# Patient Record
Sex: FEMALE | Race: WHITE | NOT HISPANIC OR LATINO | Employment: OTHER | URBAN - METROPOLITAN AREA
[De-identification: names, ages, dates, MRNs, and addresses within clinical notes are randomized per-mention and may not be internally consistent; named-entity substitution may affect disease eponyms.]

---

## 2017-01-06 ENCOUNTER — COMMUNICATION - HEALTHEAST (OUTPATIENT)
Dept: PALLIATIVE MEDICINE | Facility: OTHER | Age: 77
End: 2017-01-06

## 2017-01-20 ENCOUNTER — COMMUNICATION - HEALTHEAST (OUTPATIENT)
Dept: PALLIATIVE MEDICINE | Facility: CLINIC | Age: 77
End: 2017-01-20

## 2017-01-20 DIAGNOSIS — R52 PAIN: ICD-10-CM

## 2017-01-30 ENCOUNTER — COMMUNICATION - HEALTHEAST (OUTPATIENT)
Dept: INTERNAL MEDICINE | Facility: CLINIC | Age: 77
End: 2017-01-30

## 2017-01-30 DIAGNOSIS — I45.4 BUNDLE BRANCH BLOCK: ICD-10-CM

## 2017-01-31 ENCOUNTER — COMMUNICATION - HEALTHEAST (OUTPATIENT)
Dept: PALLIATIVE MEDICINE | Facility: OTHER | Age: 77
End: 2017-01-31

## 2017-02-08 ENCOUNTER — OFFICE VISIT - HEALTHEAST (OUTPATIENT)
Dept: INTERNAL MEDICINE | Facility: CLINIC | Age: 77
End: 2017-02-08

## 2017-02-08 ENCOUNTER — COMMUNICATION - HEALTHEAST (OUTPATIENT)
Dept: INTERNAL MEDICINE | Facility: CLINIC | Age: 77
End: 2017-02-08

## 2017-02-08 ENCOUNTER — RECORDS - HEALTHEAST (OUTPATIENT)
Dept: MAMMOGRAPHY | Facility: CLINIC | Age: 77
End: 2017-02-08

## 2017-02-08 DIAGNOSIS — F11.20 OPIOID TYPE DEPENDENCE, CONTINUOUS (H): ICD-10-CM

## 2017-02-08 DIAGNOSIS — E03.9 ACQUIRED HYPOTHYROIDISM: ICD-10-CM

## 2017-02-08 DIAGNOSIS — M06.9 RHEUMATOID ARTHRITIS (H): ICD-10-CM

## 2017-02-08 DIAGNOSIS — I45.4 BUNDLE BRANCH BLOCK: ICD-10-CM

## 2017-02-08 DIAGNOSIS — Z12.31 ENCOUNTER FOR SCREENING MAMMOGRAM FOR MALIGNANT NEOPLASM OF BREAST: ICD-10-CM

## 2017-02-08 DIAGNOSIS — M81.0 OSTEOPOROSIS: ICD-10-CM

## 2017-02-08 DIAGNOSIS — Z51.81 MEDICATION MONITORING ENCOUNTER: ICD-10-CM

## 2017-02-08 DIAGNOSIS — Z00.00 PREVENTATIVE HEALTH CARE: ICD-10-CM

## 2017-02-08 LAB
CHOLEST SERPL-MCNC: 170 MG/DL
FASTING STATUS PATIENT QL REPORTED: ABNORMAL
HDLC SERPL-MCNC: 43 MG/DL
LDLC SERPL CALC-MCNC: 104 MG/DL
TRIGL SERPL-MCNC: 116 MG/DL

## 2017-02-08 ASSESSMENT — MIFFLIN-ST. JEOR: SCORE: 1247.87

## 2017-02-09 ENCOUNTER — COMMUNICATION - HEALTHEAST (OUTPATIENT)
Dept: INTERNAL MEDICINE | Facility: CLINIC | Age: 77
End: 2017-02-09

## 2017-02-09 ENCOUNTER — HOSPITAL ENCOUNTER (OUTPATIENT)
Dept: PALLIATIVE MEDICINE | Facility: OTHER | Age: 77
Discharge: HOME | End: 2017-02-09

## 2017-02-09 DIAGNOSIS — M06.9 RHEUMATOID ARTHRITIS (H): ICD-10-CM

## 2017-02-09 DIAGNOSIS — M51.379 DEGENERATION OF LUMBAR OR LUMBOSACRAL INTERVERTEBRAL DISC: ICD-10-CM

## 2017-02-09 DIAGNOSIS — R52 PAIN: ICD-10-CM

## 2017-02-09 DIAGNOSIS — M54.32 BACK PAIN WITH LEFT-SIDED SCIATICA: ICD-10-CM

## 2017-02-09 ASSESSMENT — MIFFLIN-ST. JEOR: SCORE: 1247.87

## 2017-02-20 ENCOUNTER — COMMUNICATION - HEALTHEAST (OUTPATIENT)
Dept: PALLIATIVE MEDICINE | Facility: OTHER | Age: 77
End: 2017-02-20

## 2017-02-21 ENCOUNTER — OFFICE VISIT - HEALTHEAST (OUTPATIENT)
Dept: PALLIATIVE MEDICINE | Facility: OTHER | Age: 77
End: 2017-02-21

## 2017-02-21 DIAGNOSIS — G89.29 CHRONIC PAIN: ICD-10-CM

## 2017-02-26 ENCOUNTER — COMMUNICATION - HEALTHEAST (OUTPATIENT)
Dept: INTERNAL MEDICINE | Facility: CLINIC | Age: 77
End: 2017-02-26

## 2017-02-26 DIAGNOSIS — E03.9 HYPOTHYROIDISM: ICD-10-CM

## 2017-03-24 ENCOUNTER — COMMUNICATION - HEALTHEAST (OUTPATIENT)
Dept: PALLIATIVE MEDICINE | Facility: OTHER | Age: 77
End: 2017-03-24

## 2017-03-24 DIAGNOSIS — M54.32 BACK PAIN WITH LEFT-SIDED SCIATICA: ICD-10-CM

## 2017-03-24 DIAGNOSIS — M51.379 DEGENERATION OF LUMBAR OR LUMBOSACRAL INTERVERTEBRAL DISC: ICD-10-CM

## 2017-03-24 DIAGNOSIS — R52 PAIN: ICD-10-CM

## 2017-03-24 DIAGNOSIS — M06.9 RHEUMATOID ARTHRITIS (H): ICD-10-CM

## 2017-03-27 ENCOUNTER — HOSPITAL ENCOUNTER (OUTPATIENT)
Dept: PALLIATIVE MEDICINE | Facility: OTHER | Age: 77
Discharge: HOME | End: 2017-03-27

## 2017-03-27 DIAGNOSIS — M54.32 BACK PAIN WITH LEFT-SIDED SCIATICA: ICD-10-CM

## 2017-03-27 DIAGNOSIS — M06.9 RHEUMATOID ARTHRITIS (H): ICD-10-CM

## 2017-03-27 DIAGNOSIS — M51.379 DEGENERATION OF LUMBAR OR LUMBOSACRAL INTERVERTEBRAL DISC: ICD-10-CM

## 2017-03-27 DIAGNOSIS — R52 PAIN: ICD-10-CM

## 2017-03-27 ASSESSMENT — MIFFLIN-ST. JEOR: SCORE: 1300.04

## 2017-04-03 ENCOUNTER — COMMUNICATION - HEALTHEAST (OUTPATIENT)
Dept: PALLIATIVE MEDICINE | Facility: CLINIC | Age: 77
End: 2017-04-03

## 2017-04-03 DIAGNOSIS — R52 PAIN: ICD-10-CM

## 2017-04-18 ENCOUNTER — COMMUNICATION - HEALTHEAST (OUTPATIENT)
Dept: INTERNAL MEDICINE | Facility: CLINIC | Age: 77
End: 2017-04-18

## 2017-04-18 ENCOUNTER — OFFICE VISIT - HEALTHEAST (OUTPATIENT)
Dept: INTERNAL MEDICINE | Facility: CLINIC | Age: 77
End: 2017-04-18

## 2017-04-18 DIAGNOSIS — M25.559 HIP PAIN: ICD-10-CM

## 2017-04-18 DIAGNOSIS — F11.20 OPIOID TYPE DEPENDENCE, CONTINUOUS (H): ICD-10-CM

## 2017-04-18 ASSESSMENT — MIFFLIN-ST. JEOR: SCORE: 1229.72

## 2017-04-19 ENCOUNTER — COMMUNICATION - HEALTHEAST (OUTPATIENT)
Dept: INTERNAL MEDICINE | Facility: CLINIC | Age: 77
End: 2017-04-19

## 2017-04-21 ENCOUNTER — COMMUNICATION - HEALTHEAST (OUTPATIENT)
Dept: PALLIATIVE MEDICINE | Facility: OTHER | Age: 77
End: 2017-04-21

## 2017-04-24 ENCOUNTER — HOSPITAL ENCOUNTER (OUTPATIENT)
Dept: PALLIATIVE MEDICINE | Facility: OTHER | Age: 77
Discharge: HOME | End: 2017-04-24

## 2017-04-24 DIAGNOSIS — M25.519 PAIN IN JOINT, SHOULDER REGION: ICD-10-CM

## 2017-04-24 DIAGNOSIS — R52 PAIN: ICD-10-CM

## 2017-04-24 DIAGNOSIS — M06.9 RHEUMATOID ARTHRITIS (H): ICD-10-CM

## 2017-04-24 DIAGNOSIS — M51.379 DEGENERATION OF LUMBAR OR LUMBOSACRAL INTERVERTEBRAL DISC: ICD-10-CM

## 2017-04-24 DIAGNOSIS — M54.32 BACK PAIN WITH LEFT-SIDED SCIATICA: ICD-10-CM

## 2017-04-24 DIAGNOSIS — F11.20 OPIOID TYPE DEPENDENCE, CONTINUOUS (H): ICD-10-CM

## 2017-04-24 DIAGNOSIS — M96.1 POSTLAMINECTOMY SYNDROME, LUMBAR REGION: ICD-10-CM

## 2017-04-24 ASSESSMENT — MIFFLIN-ST. JEOR: SCORE: 1237.66

## 2017-05-16 ENCOUNTER — HOSPITAL ENCOUNTER (OUTPATIENT)
Dept: PALLIATIVE MEDICINE | Facility: OTHER | Age: 77
Discharge: HOME | End: 2017-05-16

## 2017-05-16 ENCOUNTER — HOSPITAL ENCOUNTER (OUTPATIENT)
Dept: PALLIATIVE MEDICINE | Facility: OTHER | Age: 77
Discharge: HOME | End: 2017-05-16
Attending: PHARMACIST

## 2017-05-16 ENCOUNTER — COMMUNICATION - HEALTHEAST (OUTPATIENT)
Dept: PALLIATIVE MEDICINE | Facility: OTHER | Age: 77
End: 2017-05-16

## 2017-05-16 DIAGNOSIS — M51.379 DEGENERATION OF LUMBAR OR LUMBOSACRAL INTERVERTEBRAL DISC: ICD-10-CM

## 2017-05-16 DIAGNOSIS — G89.4 CHRONIC PAIN SYNDROME: ICD-10-CM

## 2017-05-16 DIAGNOSIS — M54.32 BACK PAIN WITH LEFT-SIDED SCIATICA: ICD-10-CM

## 2017-05-16 DIAGNOSIS — M06.9 RHEUMATOID ARTHRITIS, INVOLVING UNSPECIFIED SITE, UNSPECIFIED RHEUMATOID FACTOR PRESENCE: ICD-10-CM

## 2017-05-16 DIAGNOSIS — M06.9 RHEUMATOID ARTHRITIS (H): ICD-10-CM

## 2017-05-16 DIAGNOSIS — M96.1 POSTLAMINECTOMY SYNDROME, LUMBAR REGION: ICD-10-CM

## 2017-05-16 DIAGNOSIS — R52 PAIN: ICD-10-CM

## 2017-05-16 RX ORDER — LORATADINE 10 MG/1
10 TABLET ORAL DAILY
Status: SHIPPED | COMMUNITY
Start: 2017-05-16 | End: 2022-12-14

## 2017-05-16 ASSESSMENT — MIFFLIN-ST. JEOR
SCORE: 1237.66
SCORE: 1237.66

## 2017-05-18 ENCOUNTER — AMBULATORY - HEALTHEAST (OUTPATIENT)
Dept: PALLIATIVE MEDICINE | Facility: OTHER | Age: 77
End: 2017-05-18

## 2017-06-05 ENCOUNTER — COMMUNICATION - HEALTHEAST (OUTPATIENT)
Dept: PALLIATIVE MEDICINE | Facility: OTHER | Age: 77
End: 2017-06-05

## 2017-06-05 DIAGNOSIS — R52 PAIN: ICD-10-CM

## 2017-06-05 DIAGNOSIS — M54.16 LEFT LUMBAR RADICULOPATHY: ICD-10-CM

## 2017-06-09 ENCOUNTER — COMMUNICATION - HEALTHEAST (OUTPATIENT)
Dept: PALLIATIVE MEDICINE | Facility: CLINIC | Age: 77
End: 2017-06-09

## 2017-06-09 DIAGNOSIS — G89.4 CHRONIC PAIN SYNDROME: ICD-10-CM

## 2017-06-09 DIAGNOSIS — M51.379 DEGENERATION OF LUMBAR OR LUMBOSACRAL INTERVERTEBRAL DISC: ICD-10-CM

## 2017-06-12 ENCOUNTER — COMMUNICATION - HEALTHEAST (OUTPATIENT)
Dept: PALLIATIVE MEDICINE | Facility: OTHER | Age: 77
End: 2017-06-12

## 2017-06-15 ENCOUNTER — HOSPITAL ENCOUNTER (OUTPATIENT)
Dept: PALLIATIVE MEDICINE | Facility: OTHER | Age: 77
Discharge: HOME | End: 2017-06-15
Attending: PHARMACIST

## 2017-06-15 ENCOUNTER — HOSPITAL ENCOUNTER (OUTPATIENT)
Dept: PALLIATIVE MEDICINE | Facility: OTHER | Age: 77
Discharge: HOME | End: 2017-06-15

## 2017-06-15 DIAGNOSIS — M51.379 DEGENERATION OF LUMBAR OR LUMBOSACRAL INTERVERTEBRAL DISC: ICD-10-CM

## 2017-06-15 DIAGNOSIS — M06.9 RHEUMATOID ARTHRITIS, INVOLVING UNSPECIFIED SITE, UNSPECIFIED RHEUMATOID FACTOR PRESENCE: ICD-10-CM

## 2017-06-15 DIAGNOSIS — G89.4 CHRONIC PAIN SYNDROME: ICD-10-CM

## 2017-06-15 DIAGNOSIS — M54.32 BACK PAIN WITH LEFT-SIDED SCIATICA: ICD-10-CM

## 2017-06-15 ASSESSMENT — MIFFLIN-ST. JEOR
SCORE: 1237.66
SCORE: 1215.44

## 2017-06-20 ENCOUNTER — COMMUNICATION - HEALTHEAST (OUTPATIENT)
Dept: PALLIATIVE MEDICINE | Facility: OTHER | Age: 77
End: 2017-06-20

## 2017-06-21 ENCOUNTER — HOSPITAL ENCOUNTER (OUTPATIENT)
Dept: PALLIATIVE MEDICINE | Facility: OTHER | Age: 77
Discharge: HOME | End: 2017-06-21
Attending: PAIN MEDICINE

## 2017-06-21 DIAGNOSIS — M54.16 LEFT LUMBAR RADICULOPATHY: ICD-10-CM

## 2017-06-21 ASSESSMENT — MIFFLIN-ST. JEOR: SCORE: 1260.34

## 2017-06-22 ENCOUNTER — COMMUNICATION - HEALTHEAST (OUTPATIENT)
Dept: PALLIATIVE MEDICINE | Facility: OTHER | Age: 77
End: 2017-06-22

## 2017-06-29 ENCOUNTER — COMMUNICATION - HEALTHEAST (OUTPATIENT)
Dept: PALLIATIVE MEDICINE | Facility: OTHER | Age: 77
End: 2017-06-29

## 2017-06-29 DIAGNOSIS — R52 PAIN: ICD-10-CM

## 2017-07-04 ENCOUNTER — AMBULATORY - HEALTHEAST (OUTPATIENT)
Dept: INTERNAL MEDICINE | Facility: CLINIC | Age: 77
End: 2017-07-04

## 2017-07-04 ENCOUNTER — COMMUNICATION - HEALTHEAST (OUTPATIENT)
Dept: INTERNAL MEDICINE | Facility: CLINIC | Age: 77
End: 2017-07-04

## 2017-07-10 ENCOUNTER — COMMUNICATION - HEALTHEAST (OUTPATIENT)
Dept: PALLIATIVE MEDICINE | Facility: OTHER | Age: 77
End: 2017-07-10

## 2017-07-10 DIAGNOSIS — G89.4 CHRONIC PAIN SYNDROME: ICD-10-CM

## 2017-07-10 DIAGNOSIS — M51.379 DEGENERATION OF LUMBAR OR LUMBOSACRAL INTERVERTEBRAL DISC: ICD-10-CM

## 2017-07-10 DIAGNOSIS — M54.32 BACK PAIN WITH LEFT-SIDED SCIATICA: ICD-10-CM

## 2017-07-31 ENCOUNTER — COMMUNICATION - HEALTHEAST (OUTPATIENT)
Dept: PALLIATIVE MEDICINE | Facility: OTHER | Age: 77
End: 2017-07-31

## 2017-07-31 DIAGNOSIS — R52 PAIN: ICD-10-CM

## 2017-08-07 ENCOUNTER — COMMUNICATION - HEALTHEAST (OUTPATIENT)
Dept: INTERNAL MEDICINE | Facility: CLINIC | Age: 77
End: 2017-08-07

## 2017-08-07 ENCOUNTER — COMMUNICATION - HEALTHEAST (OUTPATIENT)
Dept: PALLIATIVE MEDICINE | Facility: OTHER | Age: 77
End: 2017-08-07

## 2017-08-07 DIAGNOSIS — G89.4 CHRONIC PAIN SYNDROME: ICD-10-CM

## 2017-08-07 DIAGNOSIS — M51.379 DEGENERATION OF LUMBAR OR LUMBOSACRAL INTERVERTEBRAL DISC: ICD-10-CM

## 2017-08-07 DIAGNOSIS — I10 HTN (HYPERTENSION): ICD-10-CM

## 2017-08-07 DIAGNOSIS — M54.32 BACK PAIN WITH LEFT-SIDED SCIATICA: ICD-10-CM

## 2017-08-07 DIAGNOSIS — I45.4 BUNDLE BRANCH BLOCK: ICD-10-CM

## 2017-08-23 ENCOUNTER — HOSPITAL ENCOUNTER (OUTPATIENT)
Dept: PALLIATIVE MEDICINE | Facility: OTHER | Age: 77
Discharge: HOME | End: 2017-08-23
Attending: ANESTHESIOLOGY

## 2017-08-23 DIAGNOSIS — M54.32 BACK PAIN WITH LEFT-SIDED SCIATICA: ICD-10-CM

## 2017-08-23 ASSESSMENT — MIFFLIN-ST. JEOR: SCORE: 1252.4

## 2017-08-29 ENCOUNTER — COMMUNICATION - HEALTHEAST (OUTPATIENT)
Dept: PALLIATIVE MEDICINE | Facility: OTHER | Age: 77
End: 2017-08-29

## 2017-08-29 DIAGNOSIS — R52 PAIN: ICD-10-CM

## 2017-09-14 ENCOUNTER — COMMUNICATION - HEALTHEAST (OUTPATIENT)
Dept: PALLIATIVE MEDICINE | Facility: CLINIC | Age: 77
End: 2017-09-14

## 2017-09-14 DIAGNOSIS — M54.32 BACK PAIN WITH LEFT-SIDED SCIATICA: ICD-10-CM

## 2017-09-27 ENCOUNTER — COMMUNICATION - HEALTHEAST (OUTPATIENT)
Dept: PALLIATIVE MEDICINE | Facility: OTHER | Age: 77
End: 2017-09-27

## 2017-09-27 DIAGNOSIS — R52 PAIN: ICD-10-CM

## 2017-10-16 ENCOUNTER — COMMUNICATION - HEALTHEAST (OUTPATIENT)
Dept: PALLIATIVE MEDICINE | Facility: CLINIC | Age: 77
End: 2017-10-16

## 2017-10-16 DIAGNOSIS — M54.32 BACK PAIN WITH LEFT-SIDED SCIATICA: ICD-10-CM

## 2017-10-16 DIAGNOSIS — R52 PAIN: ICD-10-CM

## 2017-10-31 ENCOUNTER — COMMUNICATION - HEALTHEAST (OUTPATIENT)
Dept: INTERNAL MEDICINE | Facility: CLINIC | Age: 77
End: 2017-10-31

## 2017-10-31 DIAGNOSIS — J31.0 RHINITIS: ICD-10-CM

## 2017-11-03 ENCOUNTER — COMMUNICATION - HEALTHEAST (OUTPATIENT)
Dept: INTERNAL MEDICINE | Facility: CLINIC | Age: 77
End: 2017-11-03

## 2017-11-03 DIAGNOSIS — I45.4 BUNDLE BRANCH BLOCK: ICD-10-CM

## 2017-11-08 ENCOUNTER — HOSPITAL ENCOUNTER (OUTPATIENT)
Dept: PALLIATIVE MEDICINE | Facility: OTHER | Age: 77
Discharge: HOME | End: 2017-11-08
Attending: ANESTHESIOLOGY

## 2017-11-08 DIAGNOSIS — R52 PAIN: ICD-10-CM

## 2017-11-08 DIAGNOSIS — G89.4 CHRONIC PAIN SYNDROME: ICD-10-CM

## 2017-11-08 DIAGNOSIS — M54.32 BACK PAIN WITH LEFT-SIDED SCIATICA: ICD-10-CM

## 2017-11-08 ASSESSMENT — MIFFLIN-ST. JEOR: SCORE: 1252.4

## 2017-11-16 ENCOUNTER — COMMUNICATION - HEALTHEAST (OUTPATIENT)
Dept: PALLIATIVE MEDICINE | Facility: OTHER | Age: 77
End: 2017-11-16

## 2017-11-20 ENCOUNTER — COMMUNICATION - HEALTHEAST (OUTPATIENT)
Dept: INTERNAL MEDICINE | Facility: CLINIC | Age: 77
End: 2017-11-20

## 2017-11-20 DIAGNOSIS — M25.559 HIP PAIN: ICD-10-CM

## 2017-12-14 ENCOUNTER — COMMUNICATION - HEALTHEAST (OUTPATIENT)
Dept: PALLIATIVE MEDICINE | Facility: OTHER | Age: 77
End: 2017-12-14

## 2017-12-14 DIAGNOSIS — M54.32 BACK PAIN WITH LEFT-SIDED SCIATICA: ICD-10-CM

## 2017-12-15 ENCOUNTER — COMMUNICATION - HEALTHEAST (OUTPATIENT)
Dept: PALLIATIVE MEDICINE | Facility: OTHER | Age: 77
End: 2017-12-15

## 2017-12-22 ENCOUNTER — RECORDS - HEALTHEAST (OUTPATIENT)
Dept: ADMINISTRATIVE | Facility: OTHER | Age: 77
End: 2017-12-22

## 2018-01-03 ENCOUNTER — COMMUNICATION - HEALTHEAST (OUTPATIENT)
Dept: INTERNAL MEDICINE | Facility: CLINIC | Age: 78
End: 2018-01-03

## 2018-01-03 DIAGNOSIS — R06.2 WHEEZING: ICD-10-CM

## 2018-01-04 ENCOUNTER — COMMUNICATION - HEALTHEAST (OUTPATIENT)
Dept: PALLIATIVE MEDICINE | Facility: OTHER | Age: 78
End: 2018-01-04

## 2018-01-04 ENCOUNTER — COMMUNICATION - HEALTHEAST (OUTPATIENT)
Dept: PALLIATIVE MEDICINE | Facility: CLINIC | Age: 78
End: 2018-01-04

## 2018-01-04 DIAGNOSIS — M54.32 BACK PAIN WITH LEFT-SIDED SCIATICA: ICD-10-CM

## 2018-01-12 ENCOUNTER — COMMUNICATION - HEALTHEAST (OUTPATIENT)
Dept: PALLIATIVE MEDICINE | Facility: OTHER | Age: 78
End: 2018-01-12

## 2018-01-12 DIAGNOSIS — R52 PAIN: ICD-10-CM

## 2018-01-29 ENCOUNTER — HOSPITAL ENCOUNTER (OUTPATIENT)
Dept: PALLIATIVE MEDICINE | Facility: OTHER | Age: 78
Discharge: HOME | End: 2018-01-29
Attending: ANESTHESIOLOGY

## 2018-01-29 ENCOUNTER — RECORDS - HEALTHEAST (OUTPATIENT)
Dept: ADMINISTRATIVE | Facility: OTHER | Age: 78
End: 2018-01-29

## 2018-01-29 DIAGNOSIS — G89.4 CHRONIC PAIN SYNDROME: ICD-10-CM

## 2018-01-29 ASSESSMENT — MIFFLIN-ST. JEOR: SCORE: 1252.4

## 2018-01-30 ENCOUNTER — COMMUNICATION - HEALTHEAST (OUTPATIENT)
Dept: PALLIATIVE MEDICINE | Facility: OTHER | Age: 78
End: 2018-01-30

## 2018-01-30 DIAGNOSIS — M54.32 BACK PAIN WITH LEFT-SIDED SCIATICA: ICD-10-CM

## 2018-02-06 ENCOUNTER — COMMUNICATION - HEALTHEAST (OUTPATIENT)
Dept: INTERNAL MEDICINE | Facility: CLINIC | Age: 78
End: 2018-02-06

## 2018-02-06 DIAGNOSIS — I45.4 BUNDLE BRANCH BLOCK: ICD-10-CM

## 2018-02-09 ENCOUNTER — COMMUNICATION - HEALTHEAST (OUTPATIENT)
Dept: PALLIATIVE MEDICINE | Facility: OTHER | Age: 78
End: 2018-02-09

## 2018-02-13 ENCOUNTER — COMMUNICATION - HEALTHEAST (OUTPATIENT)
Dept: PALLIATIVE MEDICINE | Facility: OTHER | Age: 78
End: 2018-02-13

## 2018-02-13 DIAGNOSIS — M54.32 BACK PAIN WITH LEFT-SIDED SCIATICA: ICD-10-CM

## 2018-02-16 ENCOUNTER — COMMUNICATION - HEALTHEAST (OUTPATIENT)
Dept: PALLIATIVE MEDICINE | Facility: OTHER | Age: 78
End: 2018-02-16

## 2018-02-23 ENCOUNTER — COMMUNICATION - HEALTHEAST (OUTPATIENT)
Dept: INTERNAL MEDICINE | Facility: CLINIC | Age: 78
End: 2018-02-23

## 2018-02-23 DIAGNOSIS — E03.9 HYPOTHYROIDISM: ICD-10-CM

## 2018-03-12 ENCOUNTER — COMMUNICATION - HEALTHEAST (OUTPATIENT)
Dept: PALLIATIVE MEDICINE | Facility: OTHER | Age: 78
End: 2018-03-12

## 2018-03-12 DIAGNOSIS — M54.32 BACK PAIN WITH LEFT-SIDED SCIATICA: ICD-10-CM

## 2018-03-12 DIAGNOSIS — R52 PAIN: ICD-10-CM

## 2018-03-26 ENCOUNTER — HOSPITAL ENCOUNTER (OUTPATIENT)
Dept: PALLIATIVE MEDICINE | Facility: OTHER | Age: 78
Discharge: HOME | End: 2018-03-26
Attending: ANESTHESIOLOGY

## 2018-03-26 DIAGNOSIS — M54.32 BACK PAIN WITH LEFT-SIDED SCIATICA: ICD-10-CM

## 2018-03-26 DIAGNOSIS — M06.9 RHEUMATOID ARTHRITIS, INVOLVING UNSPECIFIED SITE, UNSPECIFIED RHEUMATOID FACTOR PRESENCE: ICD-10-CM

## 2018-03-26 DIAGNOSIS — G89.4 CHRONIC PAIN SYNDROME: ICD-10-CM

## 2018-03-26 ASSESSMENT — MIFFLIN-ST. JEOR: SCORE: 1252.4

## 2018-04-12 ENCOUNTER — COMMUNICATION - HEALTHEAST (OUTPATIENT)
Dept: PALLIATIVE MEDICINE | Facility: OTHER | Age: 78
End: 2018-04-12

## 2018-04-30 ENCOUNTER — COMMUNICATION - HEALTHEAST (OUTPATIENT)
Dept: INTERNAL MEDICINE | Facility: CLINIC | Age: 78
End: 2018-04-30

## 2018-05-04 ENCOUNTER — COMMUNICATION - HEALTHEAST (OUTPATIENT)
Dept: INTERNAL MEDICINE | Facility: CLINIC | Age: 78
End: 2018-05-04

## 2018-05-04 DIAGNOSIS — I10 HTN (HYPERTENSION): ICD-10-CM

## 2018-05-07 ENCOUNTER — COMMUNICATION - HEALTHEAST (OUTPATIENT)
Dept: PALLIATIVE MEDICINE | Facility: OTHER | Age: 78
End: 2018-05-07

## 2018-05-07 DIAGNOSIS — M54.32 BACK PAIN WITH LEFT-SIDED SCIATICA: ICD-10-CM

## 2018-05-08 ENCOUNTER — COMMUNICATION - HEALTHEAST (OUTPATIENT)
Dept: INTERNAL MEDICINE | Facility: CLINIC | Age: 78
End: 2018-05-08

## 2018-05-08 DIAGNOSIS — I45.4 BUNDLE BRANCH BLOCK: ICD-10-CM

## 2018-05-09 ENCOUNTER — COMMUNICATION - HEALTHEAST (OUTPATIENT)
Dept: SCHEDULING | Facility: CLINIC | Age: 78
End: 2018-05-09

## 2018-05-10 ENCOUNTER — COMMUNICATION - HEALTHEAST (OUTPATIENT)
Dept: INTERNAL MEDICINE | Facility: CLINIC | Age: 78
End: 2018-05-10

## 2018-05-10 DIAGNOSIS — M06.9 RHEUMATOID ARTHRITIS, INVOLVING UNSPECIFIED SITE, UNSPECIFIED RHEUMATOID FACTOR PRESENCE: ICD-10-CM

## 2018-05-15 ENCOUNTER — HOSPITAL ENCOUNTER (OUTPATIENT)
Dept: PALLIATIVE MEDICINE | Facility: OTHER | Age: 78
Discharge: HOME | End: 2018-05-15
Attending: ANESTHESIOLOGY

## 2018-05-15 ENCOUNTER — RECORDS - HEALTHEAST (OUTPATIENT)
Dept: ADMINISTRATIVE | Facility: OTHER | Age: 78
End: 2018-05-15

## 2018-05-15 DIAGNOSIS — G89.4 CHRONIC PAIN SYNDROME: ICD-10-CM

## 2018-05-15 ASSESSMENT — MIFFLIN-ST. JEOR: SCORE: 1230.58

## 2018-05-17 ENCOUNTER — COMMUNICATION - HEALTHEAST (OUTPATIENT)
Dept: INTERNAL MEDICINE | Facility: CLINIC | Age: 78
End: 2018-05-17

## 2018-05-21 ENCOUNTER — COMMUNICATION - HEALTHEAST (OUTPATIENT)
Dept: PALLIATIVE MEDICINE | Facility: OTHER | Age: 78
End: 2018-05-21

## 2018-05-21 ENCOUNTER — OFFICE VISIT - HEALTHEAST (OUTPATIENT)
Dept: INTERNAL MEDICINE | Facility: CLINIC | Age: 78
End: 2018-05-21

## 2018-05-21 DIAGNOSIS — F11.20 OPIOID TYPE DEPENDENCE, CONTINUOUS (H): ICD-10-CM

## 2018-05-21 DIAGNOSIS — I87.2 STASIS DERMATITIS: ICD-10-CM

## 2018-05-21 DIAGNOSIS — M06.9 RHEUMATOID ARTHRITIS (H): ICD-10-CM

## 2018-05-21 DIAGNOSIS — I87.2 CHRONIC VENOUS INSUFFICIENCY: ICD-10-CM

## 2018-05-21 DIAGNOSIS — R52 PAIN: ICD-10-CM

## 2018-05-23 ENCOUNTER — COMMUNICATION - HEALTHEAST (OUTPATIENT)
Dept: INTERNAL MEDICINE | Facility: CLINIC | Age: 78
End: 2018-05-23

## 2018-05-23 DIAGNOSIS — E03.9 HYPOTHYROIDISM: ICD-10-CM

## 2018-05-28 ENCOUNTER — RECORDS - HEALTHEAST (OUTPATIENT)
Dept: ADMINISTRATIVE | Facility: OTHER | Age: 78
End: 2018-05-28

## 2018-05-29 ENCOUNTER — COMMUNICATION - HEALTHEAST (OUTPATIENT)
Dept: PALLIATIVE MEDICINE | Facility: OTHER | Age: 78
End: 2018-05-29

## 2018-05-29 DIAGNOSIS — M54.32 BACK PAIN WITH LEFT-SIDED SCIATICA: ICD-10-CM

## 2018-06-05 ENCOUNTER — COMMUNICATION - HEALTHEAST (OUTPATIENT)
Dept: INTERNAL MEDICINE | Facility: CLINIC | Age: 78
End: 2018-06-05

## 2018-06-07 ENCOUNTER — COMMUNICATION - HEALTHEAST (OUTPATIENT)
Dept: PALLIATIVE MEDICINE | Facility: CLINIC | Age: 78
End: 2018-06-07

## 2018-06-07 DIAGNOSIS — M06.9 RHEUMATOID ARTHRITIS, INVOLVING UNSPECIFIED SITE, UNSPECIFIED RHEUMATOID FACTOR PRESENCE: ICD-10-CM

## 2018-06-14 ENCOUNTER — OFFICE VISIT - HEALTHEAST (OUTPATIENT)
Dept: VASCULAR SURGERY | Facility: CLINIC | Age: 78
End: 2018-06-14

## 2018-06-14 DIAGNOSIS — I87.303 VENOUS HYPERTENSION OF BOTH LOWER EXTREMITIES: ICD-10-CM

## 2018-06-14 DIAGNOSIS — M06.9 RHEUMATOID ARTHRITIS (H): ICD-10-CM

## 2018-06-14 DIAGNOSIS — I87.2 VENOUS STASIS DERMATITIS OF BOTH LOWER EXTREMITIES: ICD-10-CM

## 2018-06-14 DIAGNOSIS — L60.2 ONYCHAUXIS: ICD-10-CM

## 2018-06-14 DIAGNOSIS — I87.2 VENOUS INSUFFICIENCY OF BOTH LOWER EXTREMITIES: ICD-10-CM

## 2018-06-14 ASSESSMENT — MIFFLIN-ST. JEOR: SCORE: 1179.38

## 2018-06-19 ENCOUNTER — OFFICE VISIT - HEALTHEAST (OUTPATIENT)
Dept: INTERNAL MEDICINE | Facility: CLINIC | Age: 78
End: 2018-06-19

## 2018-06-19 ENCOUNTER — COMMUNICATION - HEALTHEAST (OUTPATIENT)
Dept: INTERNAL MEDICINE | Facility: CLINIC | Age: 78
End: 2018-06-19

## 2018-06-19 ENCOUNTER — COMMUNICATION - HEALTHEAST (OUTPATIENT)
Dept: PALLIATIVE MEDICINE | Facility: OTHER | Age: 78
End: 2018-06-19

## 2018-06-19 DIAGNOSIS — R52 PAIN: ICD-10-CM

## 2018-06-19 DIAGNOSIS — I87.2 VENOUS (PERIPHERAL) INSUFFICIENCY: ICD-10-CM

## 2018-06-19 DIAGNOSIS — M54.32 BACK PAIN WITH LEFT-SIDED SCIATICA: ICD-10-CM

## 2018-06-19 DIAGNOSIS — M81.0 OSTEOPOROSIS: ICD-10-CM

## 2018-06-19 DIAGNOSIS — Z09 HOSPITAL DISCHARGE FOLLOW-UP: ICD-10-CM

## 2018-06-19 DIAGNOSIS — Z00.00 PREVENTATIVE HEALTH CARE: ICD-10-CM

## 2018-06-19 RX ORDER — FOLIC ACID 1 MG/1
1 TABLET ORAL DAILY
Refills: 2 | Status: SHIPPED | COMMUNITY
Start: 2018-06-12

## 2018-06-19 ASSESSMENT — MIFFLIN-ST. JEOR: SCORE: 1186.64

## 2018-06-26 ENCOUNTER — COMMUNICATION - HEALTHEAST (OUTPATIENT)
Dept: PALLIATIVE MEDICINE | Facility: OTHER | Age: 78
End: 2018-06-26

## 2018-06-26 DIAGNOSIS — M54.32 BACK PAIN WITH LEFT-SIDED SCIATICA: ICD-10-CM

## 2018-06-27 ENCOUNTER — COMMUNICATION - HEALTHEAST (OUTPATIENT)
Dept: INTERNAL MEDICINE | Facility: CLINIC | Age: 78
End: 2018-06-27

## 2018-06-29 ENCOUNTER — HOSPITAL ENCOUNTER (OUTPATIENT)
Dept: PALLIATIVE MEDICINE | Facility: OTHER | Age: 78
Discharge: HOME | End: 2018-06-29
Attending: ANESTHESIOLOGY

## 2018-06-29 DIAGNOSIS — M54.32 BACK PAIN WITH LEFT-SIDED SCIATICA: ICD-10-CM

## 2018-06-29 DIAGNOSIS — G89.4 CHRONIC PAIN SYNDROME: ICD-10-CM

## 2018-06-29 DIAGNOSIS — M06.9 RHEUMATOID ARTHRITIS, INVOLVING UNSPECIFIED SITE, UNSPECIFIED RHEUMATOID FACTOR PRESENCE: ICD-10-CM

## 2018-06-29 ASSESSMENT — MIFFLIN-ST. JEOR: SCORE: 1191.17

## 2018-07-03 ENCOUNTER — COMMUNICATION - HEALTHEAST (OUTPATIENT)
Dept: PALLIATIVE MEDICINE | Facility: OTHER | Age: 78
End: 2018-07-03

## 2018-07-11 ENCOUNTER — OFFICE VISIT - HEALTHEAST (OUTPATIENT)
Dept: INTERNAL MEDICINE | Facility: CLINIC | Age: 78
End: 2018-07-11

## 2018-07-11 DIAGNOSIS — E03.9 ACQUIRED HYPOTHYROIDISM: ICD-10-CM

## 2018-07-11 DIAGNOSIS — I10 HTN (HYPERTENSION): ICD-10-CM

## 2018-07-11 DIAGNOSIS — M81.0 OSTEOPOROSIS: ICD-10-CM

## 2018-07-11 DIAGNOSIS — L03.119 CELLULITIS OF LOWER EXTREMITY, UNSPECIFIED LATERALITY: ICD-10-CM

## 2018-07-11 DIAGNOSIS — I87.2 VENOUS INSUFFICIENCY OF BOTH LOWER EXTREMITIES: ICD-10-CM

## 2018-07-11 DIAGNOSIS — Z00.00 PREVENTATIVE HEALTH CARE: ICD-10-CM

## 2018-07-11 LAB
ALBUMIN SERPL-MCNC: 3.5 G/DL (ref 3.5–5)
ALP SERPL-CCNC: 104 U/L (ref 45–120)
ALT SERPL W P-5'-P-CCNC: 28 U/L (ref 0–45)
ANION GAP SERPL CALCULATED.3IONS-SCNC: 11 MMOL/L (ref 5–18)
AST SERPL W P-5'-P-CCNC: 32 U/L (ref 0–40)
BILIRUB SERPL-MCNC: 0.4 MG/DL (ref 0–1)
BUN SERPL-MCNC: 15 MG/DL (ref 8–28)
CALCIUM SERPL-MCNC: 9.3 MG/DL (ref 8.5–10.5)
CHLORIDE BLD-SCNC: 107 MMOL/L (ref 98–107)
CHOLEST SERPL-MCNC: 155 MG/DL
CO2 SERPL-SCNC: 23 MMOL/L (ref 22–31)
CREAT SERPL-MCNC: 0.73 MG/DL (ref 0.6–1.1)
ERYTHROCYTE [DISTWIDTH] IN BLOOD BY AUTOMATED COUNT: 12.1 % (ref 11–14.5)
FASTING STATUS PATIENT QL REPORTED: NO
GFR SERPL CREATININE-BSD FRML MDRD: >60 ML/MIN/1.73M2
GLUCOSE BLD-MCNC: 91 MG/DL (ref 70–125)
HCT VFR BLD AUTO: 44.3 % (ref 35–47)
HDLC SERPL-MCNC: 54 MG/DL
HGB BLD-MCNC: 14.7 G/DL (ref 12–16)
LDLC SERPL CALC-MCNC: 77 MG/DL
MCH RBC QN AUTO: 32 PG (ref 27–34)
MCHC RBC AUTO-ENTMCNC: 33.3 G/DL (ref 32–36)
MCV RBC AUTO: 96 FL (ref 80–100)
PLATELET # BLD AUTO: 329 THOU/UL (ref 140–440)
PMV BLD AUTO: 6.7 FL (ref 7–10)
POTASSIUM BLD-SCNC: 4.6 MMOL/L (ref 3.5–5)
PROT SERPL-MCNC: 7.1 G/DL (ref 6–8)
RBC # BLD AUTO: 4.6 MILL/UL (ref 3.8–5.4)
SODIUM SERPL-SCNC: 141 MMOL/L (ref 136–145)
TRIGL SERPL-MCNC: 118 MG/DL
TSH SERPL DL<=0.005 MIU/L-ACNC: 0.55 UIU/ML (ref 0.3–5)
WBC: 9.5 THOU/UL (ref 4–11)

## 2018-07-12 LAB
25(OH)D3 SERPL-MCNC: 74.9 NG/ML (ref 30–80)
25(OH)D3 SERPL-MCNC: 74.9 NG/ML (ref 30–80)

## 2018-07-13 ENCOUNTER — RECORDS - HEALTHEAST (OUTPATIENT)
Dept: BONE DENSITY | Facility: CLINIC | Age: 78
End: 2018-07-13

## 2018-07-13 ENCOUNTER — RECORDS - HEALTHEAST (OUTPATIENT)
Dept: ADMINISTRATIVE | Facility: OTHER | Age: 78
End: 2018-07-13

## 2018-07-13 DIAGNOSIS — M81.0 AGE-RELATED OSTEOPOROSIS WITHOUT CURRENT PATHOLOGICAL FRACTURE: ICD-10-CM

## 2018-07-16 ENCOUNTER — COMMUNICATION - HEALTHEAST (OUTPATIENT)
Dept: PALLIATIVE MEDICINE | Facility: CLINIC | Age: 78
End: 2018-07-16

## 2018-07-16 ENCOUNTER — COMMUNICATION - HEALTHEAST (OUTPATIENT)
Dept: PALLIATIVE MEDICINE | Facility: OTHER | Age: 78
End: 2018-07-16

## 2018-07-16 DIAGNOSIS — M06.9 RHEUMATOID ARTHRITIS, INVOLVING UNSPECIFIED SITE, UNSPECIFIED RHEUMATOID FACTOR PRESENCE: ICD-10-CM

## 2018-07-26 ENCOUNTER — COMMUNICATION - HEALTHEAST (OUTPATIENT)
Dept: PALLIATIVE MEDICINE | Facility: OTHER | Age: 78
End: 2018-07-26

## 2018-07-26 DIAGNOSIS — M54.32 BACK PAIN WITH LEFT-SIDED SCIATICA: ICD-10-CM

## 2018-07-28 ENCOUNTER — RECORDS - HEALTHEAST (OUTPATIENT)
Dept: ADMINISTRATIVE | Facility: OTHER | Age: 78
End: 2018-07-28

## 2018-08-02 ENCOUNTER — COMMUNICATION - HEALTHEAST (OUTPATIENT)
Dept: INTERNAL MEDICINE | Facility: CLINIC | Age: 78
End: 2018-08-02

## 2018-08-02 DIAGNOSIS — I45.4 BUNDLE BRANCH BLOCK: ICD-10-CM

## 2018-08-16 ENCOUNTER — COMMUNICATION - HEALTHEAST (OUTPATIENT)
Dept: PALLIATIVE MEDICINE | Facility: OTHER | Age: 78
End: 2018-08-16

## 2018-08-16 DIAGNOSIS — M06.9 RHEUMATOID ARTHRITIS, INVOLVING UNSPECIFIED SITE, UNSPECIFIED RHEUMATOID FACTOR PRESENCE: ICD-10-CM

## 2018-08-21 ENCOUNTER — COMMUNICATION - HEALTHEAST (OUTPATIENT)
Dept: PALLIATIVE MEDICINE | Facility: CLINIC | Age: 78
End: 2018-08-21

## 2018-08-21 DIAGNOSIS — M62.830 SPASM OF BACK MUSCLES: ICD-10-CM

## 2018-08-22 ENCOUNTER — COMMUNICATION - HEALTHEAST (OUTPATIENT)
Dept: INTERNAL MEDICINE | Facility: CLINIC | Age: 78
End: 2018-08-22

## 2018-08-22 DIAGNOSIS — E03.9 HYPOTHYROIDISM: ICD-10-CM

## 2018-08-29 ENCOUNTER — RECORDS - HEALTHEAST (OUTPATIENT)
Dept: ADMINISTRATIVE | Facility: OTHER | Age: 78
End: 2018-08-29

## 2018-08-29 ENCOUNTER — HOSPITAL ENCOUNTER (OUTPATIENT)
Dept: PALLIATIVE MEDICINE | Facility: OTHER | Age: 78
Discharge: HOME | End: 2018-08-29
Attending: ANESTHESIOLOGY

## 2018-08-29 DIAGNOSIS — M54.32 BACK PAIN WITH LEFT-SIDED SCIATICA: ICD-10-CM

## 2018-08-29 DIAGNOSIS — M06.9 RHEUMATOID ARTHRITIS, INVOLVING UNSPECIFIED SITE, UNSPECIFIED RHEUMATOID FACTOR PRESENCE: ICD-10-CM

## 2018-08-29 DIAGNOSIS — M62.830 SPASM OF BACK MUSCLES: ICD-10-CM

## 2018-08-29 ASSESSMENT — MIFFLIN-ST. JEOR: SCORE: 1187.77

## 2018-09-10 ENCOUNTER — COMMUNICATION - HEALTHEAST (OUTPATIENT)
Dept: PALLIATIVE MEDICINE | Facility: CLINIC | Age: 78
End: 2018-09-10

## 2018-09-12 ENCOUNTER — COMMUNICATION - HEALTHEAST (OUTPATIENT)
Dept: PALLIATIVE MEDICINE | Facility: OTHER | Age: 78
End: 2018-09-12

## 2018-10-03 ENCOUNTER — OFFICE VISIT - HEALTHEAST (OUTPATIENT)
Dept: VASCULAR SURGERY | Facility: CLINIC | Age: 78
End: 2018-10-03

## 2018-10-03 DIAGNOSIS — I89.0 ACQUIRED LYMPHEDEMA OF LEG: ICD-10-CM

## 2018-10-03 DIAGNOSIS — I87.2 VENOUS INSUFFICIENCY OF BOTH LOWER EXTREMITIES: ICD-10-CM

## 2018-10-03 DIAGNOSIS — M06.9 RHEUMATOID ARTHRITIS (H): ICD-10-CM

## 2018-10-03 DIAGNOSIS — I87.2 VENOUS STASIS DERMATITIS OF BOTH LOWER EXTREMITIES: ICD-10-CM

## 2018-10-03 ASSESSMENT — MIFFLIN-ST. JEOR: SCORE: 1191.17

## 2018-10-09 ENCOUNTER — COMMUNICATION - HEALTHEAST (OUTPATIENT)
Dept: INTERNAL MEDICINE | Facility: CLINIC | Age: 78
End: 2018-10-09

## 2018-10-09 ENCOUNTER — COMMUNICATION - HEALTHEAST (OUTPATIENT)
Dept: PALLIATIVE MEDICINE | Facility: OTHER | Age: 78
End: 2018-10-09

## 2018-10-09 DIAGNOSIS — M06.9 RHEUMATOID ARTHRITIS, INVOLVING UNSPECIFIED SITE, UNSPECIFIED RHEUMATOID FACTOR PRESENCE: ICD-10-CM

## 2018-10-09 DIAGNOSIS — E03.9 HYPOTHYROIDISM: ICD-10-CM

## 2018-10-11 ENCOUNTER — COMMUNICATION - HEALTHEAST (OUTPATIENT)
Dept: PALLIATIVE MEDICINE | Facility: OTHER | Age: 78
End: 2018-10-11

## 2018-10-15 ENCOUNTER — COMMUNICATION - HEALTHEAST (OUTPATIENT)
Dept: PALLIATIVE MEDICINE | Facility: OTHER | Age: 78
End: 2018-10-15

## 2018-10-15 DIAGNOSIS — G89.4 CHRONIC PAIN SYNDROME: ICD-10-CM

## 2018-10-15 DIAGNOSIS — M06.9 RHEUMATOID ARTHRITIS, INVOLVING UNSPECIFIED SITE, UNSPECIFIED RHEUMATOID FACTOR PRESENCE: ICD-10-CM

## 2018-10-22 ENCOUNTER — COMMUNICATION - HEALTHEAST (OUTPATIENT)
Dept: PALLIATIVE MEDICINE | Facility: OTHER | Age: 78
End: 2018-10-22

## 2018-10-22 DIAGNOSIS — M06.9 RHEUMATOID ARTHRITIS, INVOLVING UNSPECIFIED SITE, UNSPECIFIED RHEUMATOID FACTOR PRESENCE: ICD-10-CM

## 2018-10-22 DIAGNOSIS — M62.830 SPASM OF BACK MUSCLES: ICD-10-CM

## 2018-11-12 ENCOUNTER — COMMUNICATION - HEALTHEAST (OUTPATIENT)
Dept: PALLIATIVE MEDICINE | Facility: OTHER | Age: 78
End: 2018-11-12

## 2018-11-12 DIAGNOSIS — G89.4 CHRONIC PAIN SYNDROME: ICD-10-CM

## 2018-11-19 ENCOUNTER — HOSPITAL ENCOUNTER (OUTPATIENT)
Dept: PALLIATIVE MEDICINE | Facility: OTHER | Age: 78
Discharge: HOME | End: 2018-11-19
Attending: ANESTHESIOLOGY

## 2018-11-19 DIAGNOSIS — G89.4 CHRONIC PAIN SYNDROME: ICD-10-CM

## 2018-11-19 DIAGNOSIS — M62.830 SPASM OF BACK MUSCLES: ICD-10-CM

## 2018-11-19 DIAGNOSIS — M06.9 RHEUMATOID ARTHRITIS, INVOLVING UNSPECIFIED SITE, UNSPECIFIED RHEUMATOID FACTOR PRESENCE: ICD-10-CM

## 2018-11-19 ASSESSMENT — MIFFLIN-ST. JEOR: SCORE: 1191.17

## 2018-11-28 ENCOUNTER — COMMUNICATION - HEALTHEAST (OUTPATIENT)
Dept: PALLIATIVE MEDICINE | Facility: OTHER | Age: 78
End: 2018-11-28

## 2018-11-28 DIAGNOSIS — M54.32 BACK PAIN WITH LEFT-SIDED SCIATICA: ICD-10-CM

## 2018-12-14 ENCOUNTER — COMMUNICATION - HEALTHEAST (OUTPATIENT)
Dept: INTERNAL MEDICINE | Facility: CLINIC | Age: 78
End: 2018-12-14

## 2018-12-14 DIAGNOSIS — J20.9 ACUTE BRONCHITIS, UNSPECIFIED ORGANISM: ICD-10-CM

## 2019-01-02 ENCOUNTER — COMMUNICATION - HEALTHEAST (OUTPATIENT)
Dept: PALLIATIVE MEDICINE | Facility: OTHER | Age: 79
End: 2019-01-02

## 2019-01-02 DIAGNOSIS — M06.9 RHEUMATOID ARTHRITIS, INVOLVING UNSPECIFIED SITE, UNSPECIFIED RHEUMATOID FACTOR PRESENCE: ICD-10-CM

## 2019-01-02 DIAGNOSIS — M54.32 BACK PAIN WITH LEFT-SIDED SCIATICA: ICD-10-CM

## 2019-01-03 ENCOUNTER — COMMUNICATION - HEALTHEAST (OUTPATIENT)
Dept: PALLIATIVE MEDICINE | Facility: OTHER | Age: 79
End: 2019-01-03

## 2019-01-14 ENCOUNTER — RECORDS - HEALTHEAST (OUTPATIENT)
Dept: ADMINISTRATIVE | Facility: OTHER | Age: 79
End: 2019-01-14

## 2019-01-15 ENCOUNTER — OFFICE VISIT - HEALTHEAST (OUTPATIENT)
Dept: INTERNAL MEDICINE | Facility: CLINIC | Age: 79
End: 2019-01-15

## 2019-01-15 DIAGNOSIS — Z12.31 ENCOUNTER FOR SCREENING MAMMOGRAM FOR MALIGNANT NEOPLASM OF BREAST: ICD-10-CM

## 2019-01-15 DIAGNOSIS — Z00.00 PREVENTATIVE HEALTH CARE: ICD-10-CM

## 2019-01-15 DIAGNOSIS — M81.8 OTHER OSTEOPOROSIS WITHOUT CURRENT PATHOLOGICAL FRACTURE: ICD-10-CM

## 2019-01-15 DIAGNOSIS — E03.9 ACQUIRED HYPOTHYROIDISM: ICD-10-CM

## 2019-01-15 DIAGNOSIS — T14.8XXA HEMATOMA OF SKIN: ICD-10-CM
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
## 2019-01-15 DIAGNOSIS — F11.20 OPIOID TYPE DEPENDENCE, CONTINUOUS (H): ICD-10-CM

## 2019-01-15 DIAGNOSIS — M06.9 RHEUMATOID ARTHRITIS, INVOLVING UNSPECIFIED SITE, UNSPECIFIED RHEUMATOID FACTOR PRESENCE: ICD-10-CM

## 2019-01-15 LAB
ANION GAP SERPL CALCULATED.3IONS-SCNC: 6 MMOL/L (ref 5–18)
BUN SERPL-MCNC: 13 MG/DL (ref 8–28)
CALCIUM SERPL-MCNC: 8.7 MG/DL (ref 8.5–10.5)
CHLORIDE BLD-SCNC: 101 MMOL/L (ref 98–107)
CO2 SERPL-SCNC: 29 MMOL/L (ref 22–31)
CREAT SERPL-MCNC: 0.78 MG/DL (ref 0.6–1.1)
ERYTHROCYTE [DISTWIDTH] IN BLOOD BY AUTOMATED COUNT: 10.9 % (ref 11–14.5)
GFR SERPL CREATININE-BSD FRML MDRD: >60 ML/MIN/1.73M2
GLUCOSE BLD-MCNC: 88 MG/DL (ref 70–125)
HCT VFR BLD AUTO: 38.5 % (ref 35–47)
HGB BLD-MCNC: 13.1 G/DL (ref 12–16)
MCH RBC QN AUTO: 31.9 PG (ref 27–34)
MCHC RBC AUTO-ENTMCNC: 33.9 G/DL (ref 32–36)
MCV RBC AUTO: 94 FL (ref 80–100)
PLATELET # BLD AUTO: 269 THOU/UL (ref 140–440)
PMV BLD AUTO: 7 FL (ref 7–10)
POTASSIUM BLD-SCNC: 5.1 MMOL/L (ref 3.5–5)
RBC # BLD AUTO: 4.1 MILL/UL (ref 3.8–5.4)
SODIUM SERPL-SCNC: 136 MMOL/L (ref 136–145)
TSH SERPL DL<=0.005 MIU/L-ACNC: 1.18 UIU/ML (ref 0.3–5)
WBC: 6.5 THOU/UL (ref 4–11)

## 2019-01-16 ENCOUNTER — HOSPITAL ENCOUNTER (OUTPATIENT)
Dept: PALLIATIVE MEDICINE | Facility: OTHER | Age: 79
Discharge: HOME | End: 2019-01-16
Attending: ANESTHESIOLOGY

## 2019-01-16 DIAGNOSIS — M54.32 BACK PAIN WITH LEFT-SIDED SCIATICA: ICD-10-CM

## 2019-01-16 DIAGNOSIS — M06.9 RHEUMATOID ARTHRITIS, INVOLVING UNSPECIFIED SITE, UNSPECIFIED RHEUMATOID FACTOR PRESENCE: ICD-10-CM

## 2019-01-16 RX ORDER — BENZOCAINE/MENTHOL 6 MG-10 MG
LOZENGE MUCOUS MEMBRANE
Qty: 30 G | Refills: 2 | Status: SHIPPED | OUTPATIENT
Start: 2019-01-16

## 2019-01-16 ASSESSMENT — MIFFLIN-ST. JEOR: SCORE: 1209.32

## 2019-01-25 ENCOUNTER — AMBULATORY - HEALTHEAST (OUTPATIENT)
Dept: INTERNAL MEDICINE | Facility: CLINIC | Age: 79
End: 2019-01-25

## 2019-01-25 ENCOUNTER — COMMUNICATION - HEALTHEAST (OUTPATIENT)
Dept: VASCULAR SURGERY | Facility: CLINIC | Age: 79
End: 2019-01-25

## 2019-01-25 ENCOUNTER — COMMUNICATION - HEALTHEAST (OUTPATIENT)
Dept: INTERNAL MEDICINE | Facility: CLINIC | Age: 79
End: 2019-01-25

## 2019-01-25 DIAGNOSIS — I45.4 BUNDLE BRANCH BLOCK: ICD-10-CM

## 2019-01-29 ENCOUNTER — COMMUNICATION - HEALTHEAST (OUTPATIENT)
Dept: PALLIATIVE MEDICINE | Facility: OTHER | Age: 79
End: 2019-01-29

## 2019-02-07 ENCOUNTER — COMMUNICATION - HEALTHEAST (OUTPATIENT)
Dept: SCHEDULING | Facility: CLINIC | Age: 79
End: 2019-02-07

## 2019-02-07 ENCOUNTER — COMMUNICATION - HEALTHEAST (OUTPATIENT)
Dept: INTERNAL MEDICINE | Facility: CLINIC | Age: 79
End: 2019-02-07

## 2019-02-07 ENCOUNTER — AMBULATORY - HEALTHEAST (OUTPATIENT)
Dept: INTERNAL MEDICINE | Facility: CLINIC | Age: 79
End: 2019-02-07

## 2019-02-14 ENCOUNTER — OFFICE VISIT - HEALTHEAST (OUTPATIENT)
Dept: SURGERY | Facility: CLINIC | Age: 79
End: 2019-02-14

## 2019-02-14 ENCOUNTER — COMMUNICATION - HEALTHEAST (OUTPATIENT)
Dept: SURGERY | Facility: CLINIC | Age: 79
End: 2019-02-14

## 2019-02-14 DIAGNOSIS — S81.802A OPEN WOUND OF KNEE, LEG, AND ANKLE, LEFT, INITIAL ENCOUNTER: ICD-10-CM

## 2019-02-14 DIAGNOSIS — S91.002A OPEN WOUND OF KNEE, LEG, AND ANKLE, LEFT, INITIAL ENCOUNTER: ICD-10-CM

## 2019-02-14 DIAGNOSIS — S81.002A OPEN WOUND OF KNEE, LEG, AND ANKLE, LEFT, INITIAL ENCOUNTER: ICD-10-CM

## 2019-02-14 ASSESSMENT — MIFFLIN-ST. JEOR: SCORE: 1182.1

## 2019-02-26 ENCOUNTER — COMMUNICATION - HEALTHEAST (OUTPATIENT)
Dept: PALLIATIVE MEDICINE | Facility: OTHER | Age: 79
End: 2019-02-26

## 2019-02-26 DIAGNOSIS — M06.9 RHEUMATOID ARTHRITIS, INVOLVING UNSPECIFIED SITE, UNSPECIFIED RHEUMATOID FACTOR PRESENCE: ICD-10-CM

## 2019-02-26 DIAGNOSIS — M54.32 BACK PAIN WITH LEFT-SIDED SCIATICA: ICD-10-CM

## 2019-03-21 ENCOUNTER — COMMUNICATION - HEALTHEAST (OUTPATIENT)
Dept: PALLIATIVE MEDICINE | Facility: OTHER | Age: 79
End: 2019-03-21

## 2019-03-21 DIAGNOSIS — M06.9 RHEUMATOID ARTHRITIS, INVOLVING UNSPECIFIED SITE, UNSPECIFIED RHEUMATOID FACTOR PRESENCE: ICD-10-CM

## 2019-03-21 DIAGNOSIS — M54.32 BACK PAIN WITH LEFT-SIDED SCIATICA: ICD-10-CM

## 2019-03-28 ENCOUNTER — COMMUNICATION - HEALTHEAST (OUTPATIENT)
Dept: PALLIATIVE MEDICINE | Facility: OTHER | Age: 79
End: 2019-03-28

## 2019-04-23 ENCOUNTER — COMMUNICATION - HEALTHEAST (OUTPATIENT)
Dept: PALLIATIVE MEDICINE | Facility: OTHER | Age: 79
End: 2019-04-23

## 2019-04-23 DIAGNOSIS — M06.9 RHEUMATOID ARTHRITIS, INVOLVING UNSPECIFIED SITE, UNSPECIFIED RHEUMATOID FACTOR PRESENCE: ICD-10-CM

## 2019-04-30 ENCOUNTER — COMMUNICATION - HEALTHEAST (OUTPATIENT)
Dept: PALLIATIVE MEDICINE | Facility: OTHER | Age: 79
End: 2019-04-30

## 2019-04-30 DIAGNOSIS — M06.9 RHEUMATOID ARTHRITIS, INVOLVING UNSPECIFIED SITE, UNSPECIFIED RHEUMATOID FACTOR PRESENCE: ICD-10-CM

## 2019-04-30 DIAGNOSIS — M54.32 BACK PAIN WITH LEFT-SIDED SCIATICA: ICD-10-CM

## 2019-05-01 ENCOUNTER — COMMUNICATION - HEALTHEAST (OUTPATIENT)
Dept: PALLIATIVE MEDICINE | Facility: OTHER | Age: 79
End: 2019-05-01

## 2019-05-01 DIAGNOSIS — M54.32 BACK PAIN WITH LEFT-SIDED SCIATICA: ICD-10-CM

## 2019-05-03 ENCOUNTER — HOSPITAL ENCOUNTER (OUTPATIENT)
Dept: PALLIATIVE MEDICINE | Facility: OTHER | Age: 79
Discharge: HOME | End: 2019-05-03
Attending: ANESTHESIOLOGY

## 2019-05-03 DIAGNOSIS — G89.4 CHRONIC PAIN SYNDROME: ICD-10-CM

## 2019-05-03 DIAGNOSIS — M54.32 BACK PAIN WITH LEFT-SIDED SCIATICA: ICD-10-CM

## 2019-05-03 DIAGNOSIS — M06.9 RHEUMATOID ARTHRITIS, INVOLVING UNSPECIFIED SITE, UNSPECIFIED RHEUMATOID FACTOR PRESENCE: ICD-10-CM

## 2019-05-03 ASSESSMENT — MIFFLIN-ST. JEOR: SCORE: 1182.1

## 2019-05-05 LAB
6MAM UR QL: NOT DETECTED
7AMINOCLONAZEPAM UR QL: NOT DETECTED
A-OH ALPRAZ UR QL: NOT DETECTED
ALPRAZ UR QL: NOT DETECTED
AMPHET UR QL SCN: NOT DETECTED
BARBITURATES UR QL: NOT DETECTED
BUPRENORPHINE UR QL: NOT DETECTED
BZE UR QL: NOT DETECTED
CARBOXYTHC UR QL: NOT DETECTED
CARISOPRODOL UR QL: PRESENT
CLONAZEPAM UR QL: NOT DETECTED
CODEINE UR QL: NOT DETECTED
CREAT UR-MCNC: 37.7 MG/DL (ref 20–400)
DIAZEPAM UR QL: NOT DETECTED
ETHYL GLUCURONIDE UR QL: NOT DETECTED
FENTANYL UR QL: NOT DETECTED
HYDROCODONE UR QL: NOT DETECTED
HYDROMORPHONE UR QL: NOT DETECTED
LORAZEPAM UR QL: NOT DETECTED
MDA UR QL: NOT DETECTED
MDEA UR QL: NOT DETECTED
MDMA UR QL: NOT DETECTED
ME-PHENIDATE UR QL: NOT DETECTED
MEPERIDINE UR QL: NOT DETECTED
METHADONE UR QL: NOT DETECTED
METHAMPHET UR QL: NOT DETECTED
MIDAZOLAM UR QL SCN: NOT DETECTED
MORPHINE UR QL: NOT DETECTED
NORBUPRENORPHINE UR QL CFM: NOT DETECTED
NORDIAZEPAM UR QL: NOT DETECTED
NORFENTANYL UR QL: NOT DETECTED
NORHYDROCODONE UR QL CFM: NOT DETECTED
NOROXYCODONE UR QL CFM: PRESENT
NOROXYMORPHONE UR QL SCN: PRESENT
OXAZEPAM UR QL: NOT DETECTED
OXYCODONE UR QL: PRESENT
OXYMORPHONE UR QL: PRESENT
PATHOLOGY STUDY: NORMAL
PCP UR QL: NOT DETECTED
PHENTERMINE UR QL: NOT DETECTED
PROPOXYPH UR QL: NOT DETECTED
SERVICE CMNT-IMP: NORMAL
TAPENTADOL UR QL SCN: NOT DETECTED
TAPENTADOL UR QL SCN: NOT DETECTED
TEMAZEPAM UR QL: NOT DETECTED
TRAMADOL UR QL: NOT DETECTED
ZOLPIDEM UR QL: NOT DETECTED

## 2019-05-07 ENCOUNTER — COMMUNICATION - HEALTHEAST (OUTPATIENT)
Dept: PALLIATIVE MEDICINE | Facility: OTHER | Age: 79
End: 2019-05-07

## 2019-05-07 DIAGNOSIS — G89.29 CHRONIC PAIN: ICD-10-CM

## 2019-05-15 ENCOUNTER — OFFICE VISIT - HEALTHEAST (OUTPATIENT)
Dept: VASCULAR SURGERY | Facility: CLINIC | Age: 79
End: 2019-05-15

## 2019-05-15 DIAGNOSIS — R22.42 LOWER LEG MASS, LEFT: ICD-10-CM

## 2019-05-15 DIAGNOSIS — I87.2 VENOUS STASIS DERMATITIS OF BOTH LOWER EXTREMITIES: ICD-10-CM

## 2019-05-15 DIAGNOSIS — M79.605 LEFT LEG PAIN: ICD-10-CM

## 2019-05-15 DIAGNOSIS — I89.0 ACQUIRED LYMPHEDEMA OF LEG: ICD-10-CM

## 2019-05-15 DIAGNOSIS — I87.303 VENOUS HYPERTENSION OF BOTH LOWER EXTREMITIES: ICD-10-CM

## 2019-05-15 DIAGNOSIS — I87.2 VENOUS INSUFFICIENCY OF BOTH LOWER EXTREMITIES: ICD-10-CM

## 2019-05-15 ASSESSMENT — MIFFLIN-ST. JEOR: SCORE: 1209.32

## 2019-05-22 ENCOUNTER — COMMUNICATION - HEALTHEAST (OUTPATIENT)
Dept: VASCULAR SURGERY | Facility: CLINIC | Age: 79
End: 2019-05-22

## 2019-06-03 ENCOUNTER — RECORDS - HEALTHEAST (OUTPATIENT)
Dept: ADMINISTRATIVE | Facility: OTHER | Age: 79
End: 2019-06-03

## 2019-06-11 ENCOUNTER — COMMUNICATION - HEALTHEAST (OUTPATIENT)
Dept: PALLIATIVE MEDICINE | Facility: OTHER | Age: 79
End: 2019-06-11

## 2019-06-11 DIAGNOSIS — M54.32 BACK PAIN WITH LEFT-SIDED SCIATICA: ICD-10-CM

## 2019-06-21 ENCOUNTER — COMMUNICATION - HEALTHEAST (OUTPATIENT)
Dept: PALLIATIVE MEDICINE | Facility: OTHER | Age: 79
End: 2019-06-21

## 2019-06-21 DIAGNOSIS — M06.9 RHEUMATOID ARTHRITIS, INVOLVING UNSPECIFIED SITE, UNSPECIFIED RHEUMATOID FACTOR PRESENCE: ICD-10-CM

## 2019-06-26 ENCOUNTER — COMMUNICATION - HEALTHEAST (OUTPATIENT)
Dept: INTERNAL MEDICINE | Facility: CLINIC | Age: 79
End: 2019-06-26

## 2019-06-26 ENCOUNTER — HOSPITAL ENCOUNTER (OUTPATIENT)
Dept: PALLIATIVE MEDICINE | Facility: OTHER | Age: 79
Discharge: HOME | End: 2019-06-26
Attending: ANESTHESIOLOGY

## 2019-06-26 DIAGNOSIS — M54.32 BACK PAIN WITH LEFT-SIDED SCIATICA: ICD-10-CM

## 2019-06-26 DIAGNOSIS — I45.4 BUNDLE BRANCH BLOCK: ICD-10-CM

## 2019-06-26 DIAGNOSIS — M06.9 RHEUMATOID ARTHRITIS, INVOLVING UNSPECIFIED SITE, UNSPECIFIED RHEUMATOID FACTOR PRESENCE: ICD-10-CM

## 2019-06-26 DIAGNOSIS — G89.4 CHRONIC PAIN SYNDROME: ICD-10-CM

## 2019-06-26 ASSESSMENT — MIFFLIN-ST. JEOR: SCORE: 1209.32

## 2019-07-11 ENCOUNTER — COMMUNICATION - HEALTHEAST (OUTPATIENT)
Dept: PALLIATIVE MEDICINE | Facility: OTHER | Age: 79
End: 2019-07-11

## 2019-07-16 ENCOUNTER — OFFICE VISIT - HEALTHEAST (OUTPATIENT)
Dept: INTERNAL MEDICINE | Facility: CLINIC | Age: 79
End: 2019-07-16

## 2019-07-16 ENCOUNTER — RECORDS - HEALTHEAST (OUTPATIENT)
Dept: MAMMOGRAPHY | Facility: CLINIC | Age: 79
End: 2019-07-16

## 2019-07-16 DIAGNOSIS — M81.0 OSTEOPOROSIS: ICD-10-CM

## 2019-07-16 DIAGNOSIS — F11.20 OPIOID TYPE DEPENDENCE, CONTINUOUS (H): ICD-10-CM

## 2019-07-16 DIAGNOSIS — J31.0 RHINITIS: ICD-10-CM

## 2019-07-16 DIAGNOSIS — Z12.31 ENCOUNTER FOR SCREENING MAMMOGRAM FOR MALIGNANT NEOPLASM OF BREAST: ICD-10-CM

## 2019-07-16 DIAGNOSIS — Z00.00 ENCOUNTER FOR GENERAL ADULT MEDICAL EXAMINATION WITHOUT ABNORMAL FINDINGS: ICD-10-CM

## 2019-07-16 DIAGNOSIS — I10 ESSENTIAL HYPERTENSION: ICD-10-CM

## 2019-07-16 DIAGNOSIS — R63.0 DECREASED APPETITE: ICD-10-CM

## 2019-07-16 DIAGNOSIS — M06.9 RHEUMATOID ARTHRITIS, INVOLVING UNSPECIFIED SITE, UNSPECIFIED RHEUMATOID FACTOR PRESENCE: ICD-10-CM

## 2019-07-16 RX ORDER — MOMETASONE FUROATE MONOHYDRATE 50 UG/1
SPRAY, METERED NASAL
Qty: 17 G | Refills: 5 | Status: SHIPPED | OUTPATIENT
Start: 2019-07-16 | End: 2021-09-03

## 2019-07-16 ASSESSMENT — MIFFLIN-ST. JEOR: SCORE: 1212.04

## 2019-07-24 ENCOUNTER — HOSPITAL ENCOUNTER (OUTPATIENT)
Dept: MAMMOGRAPHY | Facility: CLINIC | Age: 79
Discharge: HOME | End: 2019-07-24
Attending: INTERNAL MEDICINE

## 2019-07-24 DIAGNOSIS — N64.89 BREAST ASYMMETRY: ICD-10-CM

## 2019-07-31 ENCOUNTER — COMMUNICATION - HEALTHEAST (OUTPATIENT)
Dept: PALLIATIVE MEDICINE | Facility: OTHER | Age: 79
End: 2019-07-31

## 2019-07-31 DIAGNOSIS — M06.9 RHEUMATOID ARTHRITIS, INVOLVING UNSPECIFIED SITE, UNSPECIFIED RHEUMATOID FACTOR PRESENCE: ICD-10-CM

## 2019-07-31 DIAGNOSIS — M54.32 BACK PAIN WITH LEFT-SIDED SCIATICA: ICD-10-CM

## 2019-08-21 ENCOUNTER — HOSPITAL ENCOUNTER (OUTPATIENT)
Dept: PALLIATIVE MEDICINE | Facility: OTHER | Age: 79
Discharge: HOME | End: 2019-08-21
Attending: ANESTHESIOLOGY

## 2019-08-21 DIAGNOSIS — G89.4 CHRONIC PAIN SYNDROME: ICD-10-CM

## 2019-08-21 DIAGNOSIS — M06.9 RHEUMATOID ARTHRITIS, INVOLVING UNSPECIFIED SITE, UNSPECIFIED RHEUMATOID FACTOR PRESENCE: ICD-10-CM

## 2019-08-21 DIAGNOSIS — M54.32 BACK PAIN WITH LEFT-SIDED SCIATICA: ICD-10-CM

## 2019-08-21 ASSESSMENT — MIFFLIN-ST. JEOR: SCORE: 1200.25

## 2019-08-26 ENCOUNTER — COMMUNICATION - HEALTHEAST (OUTPATIENT)
Dept: PALLIATIVE MEDICINE | Facility: OTHER | Age: 79
End: 2019-08-26

## 2019-08-26 DIAGNOSIS — M54.32 BACK PAIN WITH LEFT-SIDED SCIATICA: ICD-10-CM

## 2019-08-26 DIAGNOSIS — M06.9 RHEUMATOID ARTHRITIS, INVOLVING UNSPECIFIED SITE, UNSPECIFIED RHEUMATOID FACTOR PRESENCE: ICD-10-CM

## 2019-09-18 ENCOUNTER — OFFICE VISIT - HEALTHEAST (OUTPATIENT)
Dept: VASCULAR SURGERY | Facility: CLINIC | Age: 79
End: 2019-09-18

## 2019-09-18 DIAGNOSIS — I87.2 VENOUS STASIS DERMATITIS OF BOTH LOWER EXTREMITIES: ICD-10-CM

## 2019-09-18 DIAGNOSIS — I87.303 VENOUS HYPERTENSION OF BOTH LOWER EXTREMITIES: ICD-10-CM

## 2019-09-18 DIAGNOSIS — I89.0 ACQUIRED LYMPHEDEMA OF LEG: ICD-10-CM

## 2019-09-18 DIAGNOSIS — I87.2 VENOUS INSUFFICIENCY OF BOTH LOWER EXTREMITIES: ICD-10-CM

## 2019-09-18 RX ORDER — NYSTATIN 100000 U/G
1 CREAM TOPICAL 2 TIMES DAILY PRN
Refills: 1 | Status: SHIPPED | COMMUNITY
Start: 2019-08-30

## 2019-09-18 ASSESSMENT — MIFFLIN-ST. JEOR: SCORE: 1204.78

## 2019-09-24 ENCOUNTER — COMMUNICATION - HEALTHEAST (OUTPATIENT)
Dept: INTERNAL MEDICINE | Facility: CLINIC | Age: 79
End: 2019-09-24

## 2019-09-24 DIAGNOSIS — E03.9 HYPOTHYROIDISM: ICD-10-CM

## 2019-09-24 DIAGNOSIS — I45.4 BUNDLE BRANCH BLOCK: ICD-10-CM

## 2019-10-22 ENCOUNTER — COMMUNICATION - HEALTHEAST (OUTPATIENT)
Dept: PALLIATIVE MEDICINE | Facility: OTHER | Age: 79
End: 2019-10-22

## 2019-10-22 DIAGNOSIS — M06.9 RHEUMATOID ARTHRITIS, INVOLVING UNSPECIFIED SITE, UNSPECIFIED RHEUMATOID FACTOR PRESENCE: ICD-10-CM

## 2019-10-22 DIAGNOSIS — M54.32 BACK PAIN WITH LEFT-SIDED SCIATICA: ICD-10-CM

## 2019-11-05 ENCOUNTER — HOSPITAL ENCOUNTER (OUTPATIENT)
Dept: PALLIATIVE MEDICINE | Facility: OTHER | Age: 79
Discharge: HOME | End: 2019-11-05
Attending: ANESTHESIOLOGY

## 2019-11-05 DIAGNOSIS — G89.4 CHRONIC PAIN SYNDROME: ICD-10-CM

## 2019-11-05 DIAGNOSIS — M06.9 RHEUMATOID ARTHRITIS, INVOLVING UNSPECIFIED SITE, UNSPECIFIED RHEUMATOID FACTOR PRESENCE: ICD-10-CM

## 2019-11-05 DIAGNOSIS — M54.32 BACK PAIN WITH LEFT-SIDED SCIATICA: ICD-10-CM

## 2019-11-05 ASSESSMENT — MIFFLIN-ST. JEOR: SCORE: 1204.78

## 2019-11-19 ENCOUNTER — COMMUNICATION - HEALTHEAST (OUTPATIENT)
Dept: PALLIATIVE MEDICINE | Facility: OTHER | Age: 79
End: 2019-11-19

## 2019-12-19 ENCOUNTER — COMMUNICATION - HEALTHEAST (OUTPATIENT)
Dept: PALLIATIVE MEDICINE | Facility: OTHER | Age: 79
End: 2019-12-19

## 2019-12-19 DIAGNOSIS — M06.9 RHEUMATOID ARTHRITIS, INVOLVING UNSPECIFIED SITE, UNSPECIFIED RHEUMATOID FACTOR PRESENCE: ICD-10-CM

## 2019-12-19 DIAGNOSIS — M54.32 BACK PAIN WITH LEFT-SIDED SCIATICA: ICD-10-CM

## 2020-01-07 ENCOUNTER — HOSPITAL ENCOUNTER (OUTPATIENT)
Dept: PALLIATIVE MEDICINE | Facility: OTHER | Age: 80
Discharge: HOME | End: 2020-01-07
Attending: ANESTHESIOLOGY

## 2020-01-07 DIAGNOSIS — M06.9 RHEUMATOID ARTHRITIS, INVOLVING UNSPECIFIED SITE, UNSPECIFIED RHEUMATOID FACTOR PRESENCE: ICD-10-CM

## 2020-01-07 DIAGNOSIS — G89.4 CHRONIC PAIN SYNDROME: ICD-10-CM

## 2020-01-07 DIAGNOSIS — M54.32 BACK PAIN WITH LEFT-SIDED SCIATICA: ICD-10-CM

## 2020-01-07 ASSESSMENT — MIFFLIN-ST. JEOR: SCORE: 1204.78

## 2020-01-09 ENCOUNTER — COMMUNICATION - HEALTHEAST (OUTPATIENT)
Dept: INTERNAL MEDICINE | Facility: CLINIC | Age: 80
End: 2020-01-09

## 2020-01-09 DIAGNOSIS — E03.9 HYPOTHYROIDISM: ICD-10-CM

## 2020-01-15 ENCOUNTER — COMMUNICATION - HEALTHEAST (OUTPATIENT)
Dept: PALLIATIVE MEDICINE | Facility: OTHER | Age: 80
End: 2020-01-15

## 2020-01-16 ENCOUNTER — COMMUNICATION - HEALTHEAST (OUTPATIENT)
Dept: INTERNAL MEDICINE | Facility: CLINIC | Age: 80
End: 2020-01-16

## 2020-01-20 ENCOUNTER — COMMUNICATION - HEALTHEAST (OUTPATIENT)
Dept: PALLIATIVE MEDICINE | Facility: OTHER | Age: 80
End: 2020-01-20

## 2020-01-20 DIAGNOSIS — M06.9 RHEUMATOID ARTHRITIS, INVOLVING UNSPECIFIED SITE, UNSPECIFIED RHEUMATOID FACTOR PRESENCE: ICD-10-CM

## 2020-01-20 DIAGNOSIS — M54.32 BACK PAIN WITH LEFT-SIDED SCIATICA: ICD-10-CM

## 2020-02-18 ENCOUNTER — COMMUNICATION - HEALTHEAST (OUTPATIENT)
Dept: PALLIATIVE MEDICINE | Facility: OTHER | Age: 80
End: 2020-02-18

## 2020-02-18 DIAGNOSIS — M06.9 RHEUMATOID ARTHRITIS, INVOLVING UNSPECIFIED SITE, UNSPECIFIED RHEUMATOID FACTOR PRESENCE: ICD-10-CM

## 2020-02-18 DIAGNOSIS — M54.32 BACK PAIN WITH LEFT-SIDED SCIATICA: ICD-10-CM

## 2020-03-09 ENCOUNTER — COMMUNICATION - HEALTHEAST (OUTPATIENT)
Dept: PALLIATIVE MEDICINE | Facility: OTHER | Age: 80
End: 2020-03-09

## 2020-03-09 DIAGNOSIS — M54.32 BACK PAIN WITH LEFT-SIDED SCIATICA: ICD-10-CM

## 2020-03-11 ENCOUNTER — AMBULATORY - HEALTHEAST (OUTPATIENT)
Dept: PALLIATIVE MEDICINE | Facility: OTHER | Age: 80
End: 2020-03-11

## 2020-03-18 ENCOUNTER — COMMUNICATION - HEALTHEAST (OUTPATIENT)
Dept: PALLIATIVE MEDICINE | Facility: OTHER | Age: 80
End: 2020-03-18

## 2020-03-18 DIAGNOSIS — M54.32 BACK PAIN WITH LEFT-SIDED SCIATICA: ICD-10-CM

## 2020-03-18 DIAGNOSIS — M06.9 RHEUMATOID ARTHRITIS, INVOLVING UNSPECIFIED SITE, UNSPECIFIED RHEUMATOID FACTOR PRESENCE: ICD-10-CM

## 2020-03-22 ENCOUNTER — COMMUNICATION - HEALTHEAST (OUTPATIENT)
Dept: INTERNAL MEDICINE | Facility: CLINIC | Age: 80
End: 2020-03-22

## 2020-03-22 DIAGNOSIS — I45.4 BUNDLE BRANCH BLOCK: ICD-10-CM

## 2020-03-30 ENCOUNTER — COMMUNICATION - HEALTHEAST (OUTPATIENT)
Dept: INTERNAL MEDICINE | Facility: CLINIC | Age: 80
End: 2020-03-30

## 2020-03-30 DIAGNOSIS — E03.9 HYPOTHYROIDISM: ICD-10-CM

## 2020-04-14 ENCOUNTER — COMMUNICATION - HEALTHEAST (OUTPATIENT)
Dept: PALLIATIVE MEDICINE | Facility: OTHER | Age: 80
End: 2020-04-14

## 2020-04-14 DIAGNOSIS — M06.9 RHEUMATOID ARTHRITIS, INVOLVING UNSPECIFIED SITE, UNSPECIFIED RHEUMATOID FACTOR PRESENCE: ICD-10-CM

## 2020-04-14 DIAGNOSIS — M54.32 BACK PAIN WITH LEFT-SIDED SCIATICA: ICD-10-CM

## 2020-05-01 ENCOUNTER — COMMUNICATION - HEALTHEAST (OUTPATIENT)
Dept: PALLIATIVE MEDICINE | Facility: OTHER | Age: 80
End: 2020-05-01

## 2020-05-01 DIAGNOSIS — M06.9 RHEUMATOID ARTHRITIS, INVOLVING UNSPECIFIED SITE, UNSPECIFIED RHEUMATOID FACTOR PRESENCE: ICD-10-CM

## 2020-05-08 ENCOUNTER — HOSPITAL ENCOUNTER (OUTPATIENT)
Dept: PALLIATIVE MEDICINE | Facility: OTHER | Age: 80
Discharge: HOME | End: 2020-05-08
Attending: ANESTHESIOLOGY

## 2020-05-08 DIAGNOSIS — M06.9 RHEUMATOID ARTHRITIS, INVOLVING UNSPECIFIED SITE, UNSPECIFIED RHEUMATOID FACTOR PRESENCE: ICD-10-CM

## 2020-05-08 RX ORDER — UBIDECARENONE 30 MG
1 CAPSULE ORAL DAILY PRN
Status: SHIPPED | COMMUNITY
Start: 2020-05-08 | End: 2021-11-19

## 2020-05-08 RX ORDER — VITAMIN E 268 MG
400 CAPSULE ORAL DAILY
Status: SHIPPED | COMMUNITY
Start: 2020-05-08 | End: 2021-09-03

## 2020-05-27 ENCOUNTER — COMMUNICATION - HEALTHEAST (OUTPATIENT)
Dept: PALLIATIVE MEDICINE | Facility: OTHER | Age: 80
End: 2020-05-27

## 2020-05-27 DIAGNOSIS — M06.9 RHEUMATOID ARTHRITIS, INVOLVING UNSPECIFIED SITE, UNSPECIFIED RHEUMATOID FACTOR PRESENCE: ICD-10-CM

## 2020-05-27 DIAGNOSIS — M54.32 BACK PAIN WITH LEFT-SIDED SCIATICA: ICD-10-CM

## 2020-06-23 ENCOUNTER — COMMUNICATION - HEALTHEAST (OUTPATIENT)
Dept: PALLIATIVE MEDICINE | Facility: OTHER | Age: 80
End: 2020-06-23

## 2020-06-23 DIAGNOSIS — M06.9 RHEUMATOID ARTHRITIS, INVOLVING UNSPECIFIED SITE, UNSPECIFIED RHEUMATOID FACTOR PRESENCE: ICD-10-CM

## 2020-06-23 DIAGNOSIS — M54.32 BACK PAIN WITH LEFT-SIDED SCIATICA: ICD-10-CM

## 2020-07-10 ENCOUNTER — HOSPITAL ENCOUNTER (OUTPATIENT)
Dept: PALLIATIVE MEDICINE | Facility: OTHER | Age: 80
Discharge: HOME | End: 2020-07-10
Attending: ANESTHESIOLOGY

## 2020-07-10 DIAGNOSIS — M06.9 RHEUMATOID ARTHRITIS, INVOLVING UNSPECIFIED SITE, UNSPECIFIED RHEUMATOID FACTOR PRESENCE: ICD-10-CM

## 2020-07-10 DIAGNOSIS — M54.32 BACK PAIN WITH LEFT-SIDED SCIATICA: ICD-10-CM

## 2020-08-04 ENCOUNTER — COMMUNICATION - HEALTHEAST (OUTPATIENT)
Dept: PALLIATIVE MEDICINE | Facility: OTHER | Age: 80
End: 2020-08-04

## 2020-08-04 DIAGNOSIS — M54.32 BACK PAIN WITH LEFT-SIDED SCIATICA: ICD-10-CM

## 2020-08-04 DIAGNOSIS — M06.9 RHEUMATOID ARTHRITIS, INVOLVING UNSPECIFIED SITE, UNSPECIFIED RHEUMATOID FACTOR PRESENCE: ICD-10-CM

## 2020-08-19 ENCOUNTER — COMMUNICATION - HEALTHEAST (OUTPATIENT)
Dept: PALLIATIVE MEDICINE | Facility: OTHER | Age: 80
End: 2020-08-19

## 2020-08-19 DIAGNOSIS — M54.32 BACK PAIN WITH LEFT-SIDED SCIATICA: ICD-10-CM

## 2020-09-14 ENCOUNTER — COMMUNICATION - HEALTHEAST (OUTPATIENT)
Dept: PALLIATIVE MEDICINE | Facility: OTHER | Age: 80
End: 2020-09-14

## 2020-09-14 DIAGNOSIS — M54.32 BACK PAIN WITH LEFT-SIDED SCIATICA: ICD-10-CM

## 2020-09-14 DIAGNOSIS — M06.9 RHEUMATOID ARTHRITIS, INVOLVING UNSPECIFIED SITE, UNSPECIFIED RHEUMATOID FACTOR PRESENCE: ICD-10-CM

## 2020-09-17 ENCOUNTER — OFFICE VISIT - HEALTHEAST (OUTPATIENT)
Dept: VASCULAR SURGERY | Facility: CLINIC | Age: 80
End: 2020-09-17

## 2020-09-17 DIAGNOSIS — I87.303 VENOUS HYPERTENSION OF BOTH LOWER EXTREMITIES: ICD-10-CM

## 2020-09-17 DIAGNOSIS — I87.2 VENOUS INSUFFICIENCY OF BOTH LOWER EXTREMITIES: ICD-10-CM

## 2020-09-17 DIAGNOSIS — M06.9 RHEUMATOID ARTHRITIS INVOLVING MULTIPLE SITES, UNSPECIFIED RHEUMATOID FACTOR PRESENCE: ICD-10-CM

## 2020-09-17 DIAGNOSIS — I87.2 VENOUS STASIS DERMATITIS OF BOTH LOWER EXTREMITIES: ICD-10-CM

## 2020-09-17 DIAGNOSIS — I89.0 ACQUIRED LYMPHEDEMA OF LEG: ICD-10-CM

## 2020-09-25 ENCOUNTER — COMMUNICATION - HEALTHEAST (OUTPATIENT)
Dept: PALLIATIVE MEDICINE | Facility: OTHER | Age: 80
End: 2020-09-25

## 2020-09-25 DIAGNOSIS — G89.4 CHRONIC PAIN SYNDROME: ICD-10-CM

## 2020-09-25 DIAGNOSIS — M06.9 RHEUMATOID ARTHRITIS (H): ICD-10-CM

## 2020-09-25 DIAGNOSIS — M06.9 RHEUMATOID ARTHRITIS, INVOLVING UNSPECIFIED SITE, UNSPECIFIED RHEUMATOID FACTOR PRESENCE: ICD-10-CM

## 2020-09-25 DIAGNOSIS — M25.519 ARTHRALGIA OF SHOULDER, UNSPECIFIED LATERALITY: ICD-10-CM

## 2020-09-25 DIAGNOSIS — M54.32 BACK PAIN WITH LEFT-SIDED SCIATICA: ICD-10-CM

## 2020-10-13 ENCOUNTER — COMMUNICATION - HEALTHEAST (OUTPATIENT)
Dept: PALLIATIVE MEDICINE | Facility: OTHER | Age: 80
End: 2020-10-13

## 2020-10-13 DIAGNOSIS — M06.9 RHEUMATOID ARTHRITIS (H): ICD-10-CM

## 2020-10-14 ENCOUNTER — HOSPITAL ENCOUNTER (OUTPATIENT)
Dept: PALLIATIVE MEDICINE | Facility: OTHER | Age: 80
Discharge: HOME | End: 2020-10-14
Attending: ANESTHESIOLOGY

## 2020-10-14 DIAGNOSIS — M06.9 RHEUMATOID ARTHRITIS (H): ICD-10-CM

## 2020-10-14 DIAGNOSIS — M54.32 BACK PAIN WITH LEFT-SIDED SCIATICA: ICD-10-CM

## 2020-10-21 ENCOUNTER — COMMUNICATION - HEALTHEAST (OUTPATIENT)
Dept: PALLIATIVE MEDICINE | Facility: OTHER | Age: 80
End: 2020-10-21

## 2020-11-11 ENCOUNTER — COMMUNICATION - HEALTHEAST (OUTPATIENT)
Dept: PALLIATIVE MEDICINE | Facility: OTHER | Age: 80
End: 2020-11-11

## 2020-11-13 ENCOUNTER — COMMUNICATION - HEALTHEAST (OUTPATIENT)
Dept: PALLIATIVE MEDICINE | Facility: OTHER | Age: 80
End: 2020-11-13

## 2020-11-13 DIAGNOSIS — M54.32 BACK PAIN WITH LEFT-SIDED SCIATICA: ICD-10-CM

## 2020-11-19 ENCOUNTER — COMMUNICATION - HEALTHEAST (OUTPATIENT)
Dept: PALLIATIVE MEDICINE | Facility: OTHER | Age: 80
End: 2020-11-19

## 2020-11-19 DIAGNOSIS — M54.32 BACK PAIN WITH LEFT-SIDED SCIATICA: ICD-10-CM

## 2020-12-15 ENCOUNTER — COMMUNICATION - HEALTHEAST (OUTPATIENT)
Dept: PALLIATIVE MEDICINE | Facility: OTHER | Age: 80
End: 2020-12-15

## 2020-12-15 DIAGNOSIS — M06.9 RHEUMATOID ARTHRITIS (H): ICD-10-CM

## 2020-12-15 DIAGNOSIS — M54.32 BACK PAIN WITH LEFT-SIDED SCIATICA: ICD-10-CM

## 2020-12-21 ENCOUNTER — COMMUNICATION - HEALTHEAST (OUTPATIENT)
Dept: ADMINISTRATIVE | Facility: CLINIC | Age: 80
End: 2020-12-21

## 2020-12-21 DIAGNOSIS — R06.2 WHEEZING: ICD-10-CM

## 2021-01-11 ENCOUNTER — COMMUNICATION - HEALTHEAST (OUTPATIENT)
Dept: PALLIATIVE MEDICINE | Facility: OTHER | Age: 81
End: 2021-01-11

## 2021-01-11 DIAGNOSIS — M54.32 BACK PAIN WITH LEFT-SIDED SCIATICA: ICD-10-CM

## 2021-01-19 ENCOUNTER — HOSPITAL ENCOUNTER (OUTPATIENT)
Dept: PALLIATIVE MEDICINE | Facility: OTHER | Age: 81
Discharge: HOME | End: 2021-01-19
Attending: ANESTHESIOLOGY

## 2021-01-19 DIAGNOSIS — M06.9 RHEUMATOID ARTHRITIS (H): ICD-10-CM

## 2021-01-19 DIAGNOSIS — M54.32 BACK PAIN WITH LEFT-SIDED SCIATICA: ICD-10-CM

## 2021-02-15 ENCOUNTER — COMMUNICATION - HEALTHEAST (OUTPATIENT)
Dept: PALLIATIVE MEDICINE | Facility: OTHER | Age: 81
End: 2021-02-15

## 2021-02-15 ENCOUNTER — COMMUNICATION - HEALTHEAST (OUTPATIENT)
Dept: FAMILY MEDICINE | Facility: CLINIC | Age: 81
End: 2021-02-15

## 2021-02-15 ENCOUNTER — COMMUNICATION - HEALTHEAST (OUTPATIENT)
Dept: INTERNAL MEDICINE | Facility: CLINIC | Age: 81
End: 2021-02-15

## 2021-02-15 DIAGNOSIS — R06.2 WHEEZING: ICD-10-CM

## 2021-02-15 DIAGNOSIS — M54.32 BACK PAIN WITH LEFT-SIDED SCIATICA: ICD-10-CM

## 2021-02-15 DIAGNOSIS — M06.9 RHEUMATOID ARTHRITIS (H): ICD-10-CM

## 2021-02-15 DIAGNOSIS — E03.9 HYPOTHYROIDISM: ICD-10-CM

## 2021-02-15 RX ORDER — LEVOTHYROXINE SODIUM 75 UG/1
TABLET ORAL
Qty: 90 TABLET | Refills: 3 | Status: SHIPPED | OUTPATIENT
Start: 2021-02-15 | End: 2022-05-08

## 2021-02-15 RX ORDER — ALBUTEROL SULFATE 90 UG/1
2 AEROSOL, METERED RESPIRATORY (INHALATION) EVERY 4 HOURS PRN
Qty: 1 INHALER | Refills: 3 | Status: SHIPPED | OUTPATIENT
Start: 2021-02-15 | End: 2022-07-12

## 2021-02-18 ENCOUNTER — COMMUNICATION - HEALTHEAST (OUTPATIENT)
Dept: SCHEDULING | Facility: CLINIC | Age: 81
End: 2021-02-18

## 2021-02-18 DIAGNOSIS — L08.9 LOCAL INFECTION OF SKIN AND SUBCUTANEOUS TISSUE: ICD-10-CM

## 2021-03-05 ENCOUNTER — HOSPITAL ENCOUNTER (OUTPATIENT)
Dept: PALLIATIVE MEDICINE | Facility: OTHER | Age: 81
Discharge: HOME | End: 2021-03-05
Attending: ANESTHESIOLOGY

## 2021-03-05 DIAGNOSIS — M54.32 BACK PAIN WITH LEFT-SIDED SCIATICA: ICD-10-CM

## 2021-03-05 DIAGNOSIS — M06.9 RHEUMATOID ARTHRITIS (H): ICD-10-CM

## 2021-03-07 ENCOUNTER — COMMUNICATION - HEALTHEAST (OUTPATIENT)
Dept: SCHEDULING | Facility: CLINIC | Age: 81
End: 2021-03-07

## 2021-03-07 DIAGNOSIS — I45.4 BUNDLE BRANCH BLOCK: ICD-10-CM

## 2021-03-08 ENCOUNTER — COMMUNICATION - HEALTHEAST (OUTPATIENT)
Dept: INTERNAL MEDICINE | Facility: CLINIC | Age: 81
End: 2021-03-08

## 2021-03-08 DIAGNOSIS — R05.9 COUGH: ICD-10-CM

## 2021-03-08 RX ORDER — ISOSORBIDE MONONITRATE 10 MG/1
10 TABLET ORAL 2 TIMES DAILY
Qty: 180 TABLET | Refills: 3 | Status: SHIPPED | OUTPATIENT
Start: 2021-03-08 | End: 2022-01-07

## 2021-03-19 ENCOUNTER — COMMUNICATION - HEALTHEAST (OUTPATIENT)
Dept: INTERNAL MEDICINE | Facility: CLINIC | Age: 81
End: 2021-03-19

## 2021-03-19 ENCOUNTER — OFFICE VISIT - HEALTHEAST (OUTPATIENT)
Dept: INTERNAL MEDICINE | Facility: CLINIC | Age: 81
End: 2021-03-19

## 2021-03-19 DIAGNOSIS — R06.00 DYSPNEA, UNSPECIFIED TYPE: ICD-10-CM

## 2021-03-19 DIAGNOSIS — R60.0 LOWER EXTREMITY EDEMA: ICD-10-CM

## 2021-03-19 DIAGNOSIS — L08.9 WOUND INFECTION: ICD-10-CM

## 2021-03-19 DIAGNOSIS — M81.8 OTHER OSTEOPOROSIS WITHOUT CURRENT PATHOLOGICAL FRACTURE: ICD-10-CM

## 2021-03-19 DIAGNOSIS — F11.20 OPIOID TYPE DEPENDENCE, CONTINUOUS (H): ICD-10-CM

## 2021-03-19 DIAGNOSIS — R05.9 COUGH: ICD-10-CM

## 2021-03-19 DIAGNOSIS — I89.0 ACQUIRED LYMPHEDEMA OF LEG: ICD-10-CM

## 2021-03-19 DIAGNOSIS — Z92.89 PERSONAL HISTORY OF OTHER MEDICAL TREATMENT: ICD-10-CM

## 2021-03-19 DIAGNOSIS — T14.8XXA WOUND INFECTION: ICD-10-CM

## 2021-03-19 DIAGNOSIS — G89.29 OTHER CHRONIC PAIN: ICD-10-CM

## 2021-03-19 ASSESSMENT — MIFFLIN-ST. JEOR: SCORE: 1153.75

## 2021-03-20 ENCOUNTER — COMMUNICATION - HEALTHEAST (OUTPATIENT)
Dept: INTERNAL MEDICINE | Facility: CLINIC | Age: 81
End: 2021-03-20

## 2021-03-29 ENCOUNTER — COMMUNICATION - HEALTHEAST (OUTPATIENT)
Dept: INTERNAL MEDICINE | Facility: CLINIC | Age: 81
End: 2021-03-29

## 2021-03-30 ENCOUNTER — COMMUNICATION - HEALTHEAST (OUTPATIENT)
Dept: INTERNAL MEDICINE | Facility: CLINIC | Age: 81
End: 2021-03-30

## 2021-03-31 ENCOUNTER — COMMUNICATION - HEALTHEAST (OUTPATIENT)
Dept: PALLIATIVE MEDICINE | Facility: OTHER | Age: 81
End: 2021-03-31

## 2021-03-31 DIAGNOSIS — M06.9 RHEUMATOID ARTHRITIS (H): ICD-10-CM

## 2021-04-06 ENCOUNTER — COMMUNICATION - HEALTHEAST (OUTPATIENT)
Dept: LAB | Facility: CLINIC | Age: 81
End: 2021-04-06

## 2021-04-08 ENCOUNTER — RECORDS - HEALTHEAST (OUTPATIENT)
Dept: BONE DENSITY | Facility: CLINIC | Age: 81
End: 2021-04-08

## 2021-04-08 ENCOUNTER — RECORDS - HEALTHEAST (OUTPATIENT)
Dept: ADMINISTRATIVE | Facility: OTHER | Age: 81
End: 2021-04-08

## 2021-04-08 ENCOUNTER — COMMUNICATION - HEALTHEAST (OUTPATIENT)
Dept: INTERNAL MEDICINE | Facility: CLINIC | Age: 81
End: 2021-04-08

## 2021-04-08 ENCOUNTER — AMBULATORY - HEALTHEAST (OUTPATIENT)
Dept: NURSING | Facility: CLINIC | Age: 81
End: 2021-04-08

## 2021-04-08 DIAGNOSIS — M81.8 OTHER OSTEOPOROSIS WITHOUT CURRENT PATHOLOGICAL FRACTURE: ICD-10-CM

## 2021-04-08 DIAGNOSIS — R05.9 COUGH: ICD-10-CM

## 2021-04-08 DIAGNOSIS — Z92.89 PERSONAL HISTORY OF OTHER MEDICAL TREATMENT: ICD-10-CM

## 2021-04-13 ENCOUNTER — COMMUNICATION - HEALTHEAST (OUTPATIENT)
Dept: INTERNAL MEDICINE | Facility: CLINIC | Age: 81
End: 2021-04-13

## 2021-04-14 ENCOUNTER — COMMUNICATION - HEALTHEAST (OUTPATIENT)
Dept: INTERNAL MEDICINE | Facility: CLINIC | Age: 81
End: 2021-04-14

## 2021-04-15 ENCOUNTER — COMMUNICATION - HEALTHEAST (OUTPATIENT)
Dept: PALLIATIVE MEDICINE | Facility: OTHER | Age: 81
End: 2021-04-15

## 2021-04-15 DIAGNOSIS — M06.9 RHEUMATOID ARTHRITIS (H): ICD-10-CM

## 2021-04-15 DIAGNOSIS — M54.32 BACK PAIN WITH LEFT-SIDED SCIATICA: ICD-10-CM

## 2021-04-20 ENCOUNTER — COMMUNICATION - HEALTHEAST (OUTPATIENT)
Dept: PALLIATIVE MEDICINE | Facility: OTHER | Age: 81
End: 2021-04-20

## 2021-04-20 DIAGNOSIS — M06.9 RHEUMATOID ARTHRITIS (H): ICD-10-CM

## 2021-04-20 DIAGNOSIS — M54.32 BACK PAIN WITH LEFT-SIDED SCIATICA: ICD-10-CM

## 2021-05-18 ENCOUNTER — COMMUNICATION - HEALTHEAST (OUTPATIENT)
Dept: PALLIATIVE MEDICINE | Facility: OTHER | Age: 81
End: 2021-05-18

## 2021-05-18 DIAGNOSIS — M54.32 BACK PAIN WITH LEFT-SIDED SCIATICA: ICD-10-CM

## 2021-05-24 ENCOUNTER — RECORDS - HEALTHEAST (OUTPATIENT)
Dept: ADMINISTRATIVE | Facility: CLINIC | Age: 81
End: 2021-05-24

## 2021-05-25 ENCOUNTER — RECORDS - HEALTHEAST (OUTPATIENT)
Dept: ADMINISTRATIVE | Facility: CLINIC | Age: 81
End: 2021-05-25

## 2021-05-26 ENCOUNTER — RECORDS - HEALTHEAST (OUTPATIENT)
Dept: ADMINISTRATIVE | Facility: CLINIC | Age: 81
End: 2021-05-26

## 2021-05-27 ENCOUNTER — RECORDS - HEALTHEAST (OUTPATIENT)
Dept: ADMINISTRATIVE | Facility: CLINIC | Age: 81
End: 2021-05-27

## 2021-05-27 ENCOUNTER — COMMUNICATION - HEALTHEAST (OUTPATIENT)
Dept: INTERNAL MEDICINE | Facility: CLINIC | Age: 81
End: 2021-05-27

## 2021-05-28 ENCOUNTER — RECORDS - HEALTHEAST (OUTPATIENT)
Dept: ADMINISTRATIVE | Facility: CLINIC | Age: 81
End: 2021-05-28

## 2021-05-28 NOTE — TELEPHONE ENCOUNTER
----- Message from Hemal Ramirez MD sent at 5/5/2019 10:33 AM CDT -----  Please contact [facility] to see what is needed to get this lady a TENS unit, I sent some forms in previously, thank you    Review of Tens unit order, unable to find an active order.  Will cue new orders for Tens unit and Tens unit supplies.  Call placed to [facility], they recommend continuing to fax these orders at this time.  E-scribe is not always seen on their end.  They will also need visit notes along with the order.  Order and documentation has been faxed.

## 2021-05-28 NOTE — PROGRESS NOTES
Date of Service: 05/15/2019     Date last seen by Dr. Davidson:  10/03/2018    PCP: Jordana Reynolds MD    Impression:  1. Bilateral leg swelling-stable  2. Long standing dependent swelling with venous insufficiency and hypertension of both legs-stable  3. Secondary lymphedema  4. Continued pain left lateral calf with mass  5. History of infection/cellulitis     Plan:  1. Questions answered.  2. Continue compression and exercises.  3. With the continued pain along the left lateral calf and the enlargement of the area I will order a nonvascular ultrasound to check for underlying abscess.  4. Patient will follow up in 4 months or when needed.  If something of concern is seen on the ultrasound plan of care may change.      Time spent with patient 15 minutes with greater than 50% time in consultation, education and coordination of care, excluding procedures.     ---------------------------------------------------------------------------------------------------------------------    Chief Complaint: Bilateral leg swelling and cellulitis history     History of Present Illness:     Jacqueline Prado returns to the [facility] for her bilateral leg swelling and cellulitis history felt to be due to long-standing dependent swelling and venous insufficiency with hypertension leading to some acquired lymphedema further complicated by underlying rheumatoid arthritis and long history of tobacco use.  She reports she continues to wear her compression daily.  She uses the Velcro compression.  She is exercising.  Overall, the swelling is under good control.  She continues to have some pain along the left lateral distal calf that she points to.  She says it gets hot sometimes and uncomfortable..  There has been no new numbness, tingling or weakness.  There have been no new masses, rashes, or swellings of any other joints. There has been no new abdominal bloating, bowel or bladder  changes and irregular bleeding. She has not run a fever.      Past Medical History:   Diagnosis Date     Bundle branch block     Created by Conversion  Replacement Utility updated for latest IMO load     Cellulitis      Chronic venous stasis dermatitis      Impetigo        Past Surgical History:   Procedure Laterality Date     BREAST CYST ASPIRATION Left 2000     VT EXCIS CERV DISK,ONE LEVEL      Description: Laminectomy With Disc Removal;  Recorded: 11/03/2011;  Annotations: L5-S1     VT INJECT VERTEBRAL BODY, LUMBAR      Description: Spinal Percutaneous Vertebroplasty, Injection Lumbar;  Recorded: 11/03/2011;  Annotations: L5     VT REMOVAL GALLBLADDER      Description: Cholecystectomy;  Recorded: 07/22/2009;         Current Outpatient Medications:      albuterol (PROAIR HFA;PROVENTIL HFA;VENTOLIN HFA) 90 mcg/actuation inhaler, Inhale 2 puffs every 4 (four) hours as needed for wheezing. And cough, Disp: 1 Inhaler, Rfl: 3     amitriptyline (ELAVIL) 25 MG tablet, One to two tabs bedtime, Disp: 60 tablet, Rfl: 2     aspirin 81 mg chewable tablet, Chew 81 mg daily., Disp: , Rfl:      b complex vitamins tablet, Take 1 tablet by mouth daily., Disp: , Rfl:      CALCIUM CARBONATE/VITAMIN D3 (CALCIUM+D ORAL), Take 3 tablets by mouth daily., Disp: , Rfl:      carisoprodol (SOMA) 350 MG tablet, Take 1 tablet (350 mg total) by mouth 3 (three) times a day as needed for muscle spasms., Disp: 90 tablet, Rfl: 2     cyanocobalamin, vitamin B-12, (VITAMIN B-12) 500 mcg TbER, Take 1 capsule by mouth daily., Disp: , Rfl:      DOCOSAHEXANOIC ACID/EPA (FISH OIL ORAL), Take 1 tablet by mouth daily., Disp: , Rfl:      ERGOCALCIFEROL, VITAMIN D2, (VITAMIN D2 ORAL), Take 1 tablet by mouth daily., Disp: , Rfl:      folic acid (FOLVITE) 1 MG tablet, TK 1 T PO ONCE DAILY., Disp: , Rfl: 2     hydrocortisone (CORTISONE, HYDROCORTISONE,) 1 % cream, Apply to hand three times a day, Disp: 30 g, Rfl: 2     isosorbide mononitrate (ISMO,MONOKET) 10  MG tablet, TAKE 1 TABLET BY MOUTH TWICE DAILY, Disp: 180 tablet, Rfl: 1     levothyroxine (SYNTHROID, LEVOTHROID) 75 MCG tablet, TAKE 1 TABLET DAILY AT 6:00 A.M., Disp: 90 tablet, Rfl: 3     loratadine (CLARITIN) 10 mg tablet, Take 10 mg by mouth daily., Disp: , Rfl:      methotrexate 2.5 MG tablet, Take 2.5 mg by mouth once a week. (Thursday) As directed, Disp: , Rfl:      mometasone (NASONEX) 50 mcg/actuation nasal spray, SHAKE WELL AND USE 2 SPRAYS IN EACH NOSTRIL EVERY DAY, Disp: 17 g, Rfl: 5     multivitamin capsule, Take 1 capsule by mouth daily., Disp: , Rfl:      omeprazole (PRILOSEC) 20 MG capsule, Take 20 mg by mouth Daily before breakfast., Disp: , Rfl:      oxyCODONE (OXYCONTIN) 10 mg 12 hr tablet, Take 2-3 tablets (20-30 mg total) by mouth at bedtime. Two to three tabs bedtime, Disp: 84 each, Rfl: 0     oxyCODONE (OXYCONTIN) 40 mg 12 hr tablet, Take 1 tablet (40 mg total) by mouth every 12 (twelve) hours., Disp: 7 tablet, Rfl: 0     triamcinolone (KENALOG) 0.1 % ointment, Apply to both legs twice daily, Disp: 30 g, Rfl: 0     white petrolatum (AQUAPHOR NATURAL HEALING) 41 % Oint, Apply to both legs twice daily, Disp: , Rfl: 0    Allergies   Allergen Reactions     Tetracyclines Rash     Acetaminophen      Baclofen Nausea Only     Celecoxib      Erythromycin Base      Nsaids (Non-Steroidal Anti-Inflammatory Drug)      Pentolinium Itching     Varenicline Itching and Swelling     Erythromycin Rash       Social History     Socioeconomic History     Marital status:      Spouse name: Not on file     Number of children: Not on file     Years of education: Not on file     Highest education level: Not on file   Occupational History     Not on file   Social Needs     Financial resource strain: Not on file     Food insecurity:     Worry: Not on file     Inability: Not on file     Transportation needs:     Medical: Not on file     Non-medical: Not on file   Tobacco Use     Smoking status: Former Smoker      Packs/day: 0.50     Types: Cigarettes     Last attempt to quit: 2018     Years since quittin.9     Smokeless tobacco: Current User     Tobacco comment: started smoking when she was 21 years old   Substance and Sexual Activity     Alcohol use: Yes     Comment: glass of wine during holiday     Drug use: Yes     Types: Marijuana     Comment: medical marijuana     Sexual activity: Never   Lifestyle     Physical activity:     Days per week: Not on file     Minutes per session: Not on file     Stress: Not on file   Relationships     Social connections:     Talks on phone: Not on file     Gets together: Not on file     Attends Jainism service: Not on file     Active member of club or organization: Not on file     Attends meetings of clubs or organizations: Not on file     Relationship status: Not on file     Intimate partner violence:     Fear of current or ex partner: Not on file     Emotionally abused: Not on file     Physically abused: Not on file     Forced sexual activity: Not on file   Other Topics Concern     Not on file   Social History Narrative    Lives in senior independent living.  .  3 kids.  Graduated from college.        Family History   Problem Relation Age of Onset     Breast cancer Sister 72     Leukemia Sister        Review of Systems:    Jacqueline Prado no new numbess, tingling or weakness, redness or rashes, fevers, new masses, abdominal bloating or discomfort, unexplained weight loss, increased pain, new ulcers, shortness of breath and chest pain  Full 12 point review of systems was completed.    Imaging:    I personally reviewed the following imaging today and those on care everywhere, if indicated    Nothing new to review.    Labs:    I personally reviewed the following labs today and those on care everywhere, if indicated    No results found for: SEDRATE      Lab Results   Component Value Date    CRP 2.1 (H) 2018           Lab Results   Component Value Date    CREATININE  0.78 01/15/2019      No results found for: HGBA1C        Lab Results   Component Value Date    BUN 13 01/15/2019              Lab Results   Component Value Date    ALBUMIN 3.5 07/11/2018       Vitamin D, Total (25-Hydroxy)   Date Value Ref Range Status   07/11/2018 74.9 30.0 - 80.0 ng/mL Final       Lab Results   Component Value Date    TSH 1.18 01/15/2019     Lab Results   Component Value Date    WBC 6.5 01/15/2019    HGB 13.1 01/15/2019    HCT 38.5 01/15/2019    MCV 94 01/15/2019     01/15/2019         Physical Exam:  Vitals:    05/15/19 1353   BP: 133/79   Pulse: 86   Resp: 24   Temp: 97.8  F (36.6  C)      BMI 33.82 (stable) weight 179 pounds    Circumferential measures:    Vasc Edema 6/14/2018 10/3/2018 5/15/2019   Right just above MTP 20.8 21.2 20.8   Right Ankle 24 22.2 21.9   Right Widest Calf 35.5 34.5 33.3   Right Thigh Up 10cm 46 49.5 -   Left - just above MTP 20.5 21 20   Left Ankle 3 21.8 21.8   Left Widest Calf 36 36.8 32   Left Thigh Up 10cm 46.5 49.3 -     Circumferential measures are good.    General:  79 y.o. female in no apparent distress.      Psych: Alert and oriented x 3.  Cooperative. Affect normal.    HEENT: Atraumatic and normocephalic    Musculoskeletal:  Normal range of motion in hips, knees and ankles bilaterally throughout .  There is no active joint synovitis, erythema, swelling or joint laxity.      Neurological:  Sensation is intact to pin prick and light touch in both legs except for slight decrease in medial left leg which is old.  Strength testing is normal in ankle dorsiflexion and great toe extension bilaterally.       Vascular: Dorsalis pedis and posterior tibialis pulses are strong and equal bilaterally. There are significant telangietasias, medial ankle venous flares, venous varicosities or spider veins.     Integumentary: Skin of the legs is uniformly warm and dry, and hyperpigmentated, with hyperkeratosis and keratoderma, with positive Stemmer's Sign.  There continues  to be distal calf erythema with an area of fullness which is slightly ballotable on the left lateral distal calf.  This area is painful to palpation and has slight increased calor as compared to the right leg. Nails are thickened, thin surrounding skin, decreased hair growth on toes, discolored.      Claribel Davidson MD, ABWMS, FACCWS, [facility]  Medical Director Wound Care and Lymphedema  [facility]  [phone]

## 2021-05-28 NOTE — TELEPHONE ENCOUNTER
Medication being requested: Amitriptyline  Last visit date: 01/16 Provider:Dr OWENS  Next visit date:05/03 Provider: Dr OWENS  Expected follow up: 8 weeks  MTM visit ([facility] Center) date: N/A  Red Flags/Comments identified on call: No show 03/20  Pertinent between visit information about requested medication (telephone, mychart, prior authorization): N/A  Last date prescribed:01/16 with 2 RF  Provider responsible: Dr OWENS  Spoke with patient: No  Script being sent to provider by nurse- dates and quantity:   Requested Prescriptions     Pending Prescriptions Disp Refills     amitriptyline (ELAVIL) 25 MG tablet 60 tablet 2     Sig: One to two tabs bedtime   Didn't queue bridge since pt has appt 05/03  Pharmacy cued: [facility]

## 2021-05-28 NOTE — PROGRESS NOTES
Assessment/Plan:     Problem List Items Addressed This Visit     Rheumatoid arthritis (H)    Relevant Medications    oxyCODONE (OXYCONTIN) 10 mg 12 hr tablet    Back pain with left-sided sciatica    Relevant Medications    oxyCODONE (OXYCONTIN) 40 mg 12 hr tablet      Other Visit Diagnoses     Chronic pain syndrome    -  Primary    Relevant Orders    Pain Management Drug Panel, Urine              No follow-ups on file.    Patient Instructions   PLAN::    Dr. Ramirez to look into TENS unit    Discussed Cetyl Myristoleate  500 mg  Two tabs twice a day for two weeks, then one twice a day    Discussed Frequency Specific Microcurrent-- Transitions in Health  [phone] or [facility] [phone]     Return in 8 weeks        Subjective:       78 y.o. female presents for evaluation of back pain with history of lumbar radiculopathy.    She describes last month there is new carpet installed with her apartment.  She was doing a lot of packing and moving, describes her that repetitive motion is flared up with her pain.    They have not yet gotten a TENS unit, have put in some calls to order that and apparently there more forms needed.    She did have a trial of the cetyl Merris told late, did not help, daughter notes she and her  did find benefit with that however.    She is pleased that she has not been smoking, had been 1/2 pack/day and has made an effort with that.    She continues with the OxyContin 40 mg in the morning and 10 mg 2-3 at bedtime.  She has been using the Soma 3 to 50 mg 3 times a day noting other muscle relaxants just did not do as well.      Current Outpatient Medications:      albuterol (PROAIR HFA;PROVENTIL HFA;VENTOLIN HFA) 90 mcg/actuation inhaler, Inhale 2 puffs every 4 (four) hours as needed for wheezing. And cough, Disp: 1 Inhaler, Rfl: 3     amitriptyline (ELAVIL) 25 MG tablet, One to two tabs bedtime, Disp: 60 tablet, Rfl: 2     aspirin 81 mg chewable tablet, Chew 81 mg daily.,  Disp: , Rfl:      b complex vitamins tablet, Take 1 tablet by mouth daily., Disp: , Rfl:      CALCIUM CARBONATE/VITAMIN D3 (CALCIUM+D ORAL), Take 3 tablets by mouth daily., Disp: , Rfl:      carisoprodol (SOMA) 350 MG tablet, Take 1 tablet (350 mg total) by mouth 3 (three) times a day as needed for muscle spasms., Disp: 90 tablet, Rfl: 2     cyanocobalamin, vitamin B-12, (VITAMIN B-12) 500 mcg TbER, Take 1 capsule by mouth daily., Disp: , Rfl:      DOCOSAHEXANOIC ACID/EPA (FISH OIL ORAL), Take 1 tablet by mouth daily., Disp: , Rfl:      ERGOCALCIFEROL, VITAMIN D2, (VITAMIN D2 ORAL), Take 1 tablet by mouth daily., Disp: , Rfl:      folic acid (FOLVITE) 1 MG tablet, TK 1 T PO ONCE DAILY., Disp: , Rfl: 2     hydrocortisone (CORTISONE, HYDROCORTISONE,) 1 % cream, Apply to hand three times a day, Disp: 30 g, Rfl: 2     isosorbide mononitrate (ISMO,MONOKET) 10 MG tablet, TAKE 1 TABLET BY MOUTH TWICE DAILY, Disp: 180 tablet, Rfl: 1     levothyroxine (SYNTHROID, LEVOTHROID) 75 MCG tablet, TAKE 1 TABLET DAILY AT 6:00 A.M., Disp: 90 tablet, Rfl: 3     loratadine (CLARITIN) 10 mg tablet, Take 10 mg by mouth daily., Disp: , Rfl:      methotrexate 2.5 MG tablet, Take 2.5 mg by mouth once a week. (Thursday) As directed, Disp: , Rfl:      mometasone (NASONEX) 50 mcg/actuation nasal spray, SHAKE WELL AND USE 2 SPRAYS IN EACH NOSTRIL EVERY DAY, Disp: 17 g, Rfl: 5     multivitamin capsule, Take 1 capsule by mouth daily., Disp: , Rfl:      omeprazole (PRILOSEC) 20 MG capsule, Take 20 mg by mouth Daily before breakfast., Disp: , Rfl:      oxyCODONE (OXYCONTIN) 10 mg 12 hr tablet, Take 2-3 tablets (20-30 mg total) by mouth at bedtime. Two to three tabs bedtime, Disp: 84 each, Rfl: 0     oxyCODONE (OXYCONTIN) 40 mg 12 hr tablet, Take 1 tablet (40 mg total) by mouth every 12 (twelve) hours., Disp: 7 tablet, Rfl: 0     triamcinolone (KENALOG) 0.1 % ointment, Apply to both legs twice daily, Disp: 30 g, Rfl: 0     white petrolatum (AQUAPHOR  "NATURAL HEALING) 41 % Oint, Apply to both legs twice daily, Disp: , Rfl: 0      She is alert with a clear sensorium good eye contact.  Thought process tight logical.         Objective:     Vitals:    05/03/19 1300   BP: 140/73   Pulse: 78   Resp: 16   Weight: 173 lb (78.5 kg)   Height: 5' 1\" (1.549 m)   PainSc:   8   PainLoc: Back       Plan discussed is to continue to try to clarify what is needed for a TENS unit.    Will resume the cetyl Myristoleate with a loading dose 2 tablets twice a day for 2 weeks.    Discussed the modality of frequency specific microcurrent and some resources.    Time spent more than 15 minutes face-to-face, 50% counseling about above condition and coordination of treatment plan          This note has been dictated using voice recognition software. Any grammatical or context distortions are unintentional and inherent to the software  "

## 2021-05-28 NOTE — TELEPHONE ENCOUNTER
Refill request: oxycodone 10 mg and oxycontin 40 mg  Last ov: 1/16  3/20-no show  5/3: with Dr. Ramirez  4/30-5/3-bridge to apt on 5/3  Requested Prescriptions     Pending Prescriptions Disp Refills     oxyCODONE (OXYCONTIN) 10 mg 12 hr tablet 9 each 0     Sig: Take 2-3 tablets (20-30 mg total) by mouth at bedtime for 3 days. Two to three tabs bedtime     oxyCODONE (OXYCONTIN) 40 mg 12 hr tablet 7 tablet 0     Sig: Take 1 tablet (40 mg total) by mouth every 12 (twelve) hours for 3 days.     [facility]  No withdrawal medication necessary

## 2021-05-28 NOTE — PATIENT INSTRUCTIONS - HE
PLAN::    Dr. Ramirez to look into TENS unit    Discussed Cetyl Myristoleate  500 mg  Two tabs twice a day for two weeks, then one twice a day    Discussed Frequency Specific Microcurrent-- Transitions in Health  [phone] or [facility] [phone]     Return in 8 weeks

## 2021-05-28 NOTE — PATIENT INSTRUCTIONS - HE
"Continue exercising and wearing compression always.  You will be getting US done for left calf mass    Swelling in the legs can be caused by many reasons. No matter what the reason, treatment usually includes some type of compression. . You should wear your compression socks as much as you can. Your compression should be put on first thing in the morning. Take the compression off at night. It is especially important to wear them with long periods of sitting/standing, long car rides or if you will be flying. Going without compression for even brief periods of time can be damaging to your legs and your health.  Compression socks should get replaced usually every 4-6 months. They do not need to be worn at night while in bed. If we have seen you in the last year, we can refill your sock prescription, otherwise your primary care provider is able to refill them for you. Call us with any problems or questions.   If you do a lot of standing, it is good to do calf raises to help keep the blood pumping. If you sit a lot at work, it is good to get up periodically to walk around. Elevation of the foot of your bed 4-6\" helps the blood return back to where it is needed.       Ultrasound    Thank you for choosing [facility] as partners in your care.  Please read the following information before your appointment.  Feel free to ask questions.    An ultrasound is an exam that uses sound waves to create pictures.  The special camera that takes the pictures is called a transducer.  An ultrasound technologist will perform the exam under the direction of a physician.    Preparation  Ultrasound preps vary.  If you are scheduled for an Aorta Ultrasound, please do not eat or drink anything 8 hours before your exam.  You may take daily medications with a small sip of water.  There is no preparation required for any of the other ultrasound exams performed at [facility].    The Examination  You may or may not " need to change clothes for your exam.  The technologist will explain the exam to you.  He or she will ask you a few questions and answer any questions you may have.    You will lie on a table and a gel will be applied to your skin.  A small handheld instrument called a transducer will be moved across the area we are looking at while pictures are taken.  Also, your exam may include measuring your blood pressure on your arms, legs and/or toes.    When the Exam Is Completed  Your physician will receive the results of the exam and explain them to you.  You may return to your normal diet and activities unless otherwise directed by your doctor.  Feel free to ask questions if something is not clear to you.  We welcome comments.    At [facility], we are dedicated to providing the best possible care.  Thank you for taking time to read these instructions.  We hope your experience is as pleasant as possible.      You are scheduled for the following exam(s):    []Carotid Duplex:  Evaluates the carotid arteries in the neck that feed the brain with blood.  Gel is applied to the skin of the neck.  A transducer will be placed on the gel covered areas to obtain images and evaluate and listen to the blood flow in the arteries.  This exam takes approximately 30 minutes.    [x]Venous Duplex:  Evaluates the veins that carry blood to the heart from the arms and/or legs.  Gel is applied to the skin of the arms and/or legs.  A transducer will be placed on the gel covered areas to obtain images and evaluate flow in the veins.  This exam takes approximately 30 minutes per arm/leg.    []Arterial Duplex:  Evaluates the arteries that feed the arms and legs with blood.  Gel is applied to the skin of the arms and/or legs.  A transducer will be placed on the gel covered areas to obtain images and listen to the blood flow in the arms and/or legs.  This exam takes approximately 30 minutes per arm/leg.    []ABELARDO or Segmental Pressures:  Ultrasound and  blood pressure cuffs are used to evaluate the arteries that supply the arms and legs with blood.  Several blood pressure cuffs will be place on your arms and legs.  When inflated, the cuffs will provide blood pressure readings that are used to determine where blockages in the arteries may be.  You may be asked to do a moderate level of exercise during this exam.  This exam takes approximately 30-60 minutes.    []Aorta:  Evaluates the abdominal aorta for blockages and aneurysm.  Gel is applied to the stomach.  A transducer will be placed on the gel covered areas to obtain images of the aorta.  This exam takes approximately 30 minutes.    Prep instructions:  Do not eat or drink anything 8 hours before procedure.  You may take daily medications with a small sip of water.        Please call us if you have any questions [phone]    Thank you for choosing [employer].

## 2021-05-28 NOTE — PROGRESS NOTES
Pt is scheduled for follow-up today for LBP that radiates to Rt hip and leg. Tolerates her meds and they are helpful.

## 2021-05-29 ENCOUNTER — RECORDS - HEALTHEAST (OUTPATIENT)
Dept: ADMINISTRATIVE | Facility: CLINIC | Age: 81
End: 2021-05-29

## 2021-05-29 NOTE — TELEPHONE ENCOUNTER
Pt called and the mass on the back of the calf is gone and wants to cancel her u/s. Pt will call to r/s if reoccurrence.

## 2021-05-30 ENCOUNTER — RECORDS - HEALTHEAST (OUTPATIENT)
Dept: ADMINISTRATIVE | Facility: CLINIC | Age: 81
End: 2021-05-30

## 2021-05-30 VITALS — WEIGHT: 184 LBS | BODY MASS INDEX: 33.86 KG/M2 | HEIGHT: 62 IN

## 2021-05-30 VITALS — WEIGHT: 184 LBS | HEIGHT: 62 IN | BODY MASS INDEX: 33.86 KG/M2

## 2021-05-30 VITALS — HEIGHT: 62 IN | BODY MASS INDEX: 33.13 KG/M2 | WEIGHT: 180 LBS

## 2021-05-30 VITALS — HEIGHT: 63 IN | WEIGHT: 192 LBS | BODY MASS INDEX: 34.02 KG/M2

## 2021-05-30 VITALS — BODY MASS INDEX: 31.89 KG/M2 | HEIGHT: 63 IN | WEIGHT: 180 LBS

## 2021-05-30 NOTE — PATIENT INSTRUCTIONS - HE
PLAN:    Discussed you may order a TENS unit    Discussed use of Frequency Specific Microcurrent, you may call [facility] in [facility] [phone] or [facility] [phone]    You may look into chiropractors that take Medicare and offer massage and acupunture as part of treatment    Continue opioids as you are    Return in 8 weeks

## 2021-05-30 NOTE — PROGRESS NOTES
The following steps were completed to comply with the REMS program for Prolia:  1. Ordering provider has previously reviewed information in the Medication Guide and Patient Counseling Chart, including the serious risks of Prolia  and the symptoms of each risk and have been advised  to seek prompt medical attention if they have signs or symptoms of any of the serious risks.  2. Provided each patient a copy of the Medication Guide and Patient Brochure.  See MAR for administration details.   Indication: Prolia  (denosumab) is a prescription medicine used to treat osteoporosis in patients who:   Are at high risk for fracture, meaning patients who have had a fracture related to osteoporosis, or who have multiple risk factors for fracture; Cannot use another osteoporosis medicine or other osteoporosis medicines did not work well.   The timeline for early/late injections would be 4 weeks early and any time after the 6 month dinora. If a patient receives their injection late, then the subsequent injection would be 6 months from the date that they actually received the injection    1.  When was the last injection?  1/15/19    2.  Has insurance for this injection been verified?  Yes    3.  Did you experience any new onset achiness or rashes that lasted for over a month with your previous Prolia injection?   No    4.  Do you have a fever over 101?F or a new deep cough that is unusual for you today? No    5.  Have you started any new medications in the last 6 months that you were told could affect your immune system? These may have been prescribed by oncologist, transplant, rheumatology, or dermatology.   No    6.  In the last 6 months have you have gastric bypass or parathyroid surgery?   No    7.  Do you plan dental work requiring drilling into the bone such as implants/extractions or oral surgery in the next 2-3 months?   No

## 2021-05-30 NOTE — TELEPHONE ENCOUNTER
Prior Authorization Request  Who s requesting:  Hemal Ramirez MD/ Joceline Alvarado  Pharmacy Name and Location: [address] [address]  Medication Name: Oxycodone 40mg  Insurance Plan: Medicare  Insurance Member ID Number:  [id number]  Informed patient that prior authorizations can take up to 10 business days for response:   Yes; 72 hours  Okay to leave a detailed message: Yes

## 2021-05-30 NOTE — TELEPHONE ENCOUNTER
Central PA team  [phone]  Pool: HE PA MED ([id number])          PA has been initiated.       PA form completed and faxed insurance via Cover My Meds     Key: [id number]     Medication: oxyCODONE (OXYCONTIN) 40 mg 12 hr tablet    Insurance:  [employer]         Response will be received via fax and may take up to 5-10 business days depending on plan

## 2021-05-30 NOTE — PROGRESS NOTES
"[facility] Follow Up Note    Assessment/Plan:  1. Osteoporosis  Here for Prolia injection.  Tolerating well.  Recommendations: Adequate calcium and vitamin D while on this medication.  Adequate weightbearing and balance exercise.  - denosumab 60 mg (PROLIA 60 mg/ml)    2. Rhinitis  Patient renewed  - mometasone (NASONEX) 50 mcg/actuation nasal spray; SHAKE WELL AND USE 2 SPRAYS IN EACH NOSTRIL EVERY DAY  Dispense: 17 g; Refill: 5    3. Rheumatoid arthritis, involving unspecified site, unspecified rheumatoid factor presence (H)  He continues to follow with Dr. Isidro Lopes every 6 months.  No new medication changes    4. Essential hypertension  Table on current plan    5. Opioid Dependence With Continuous Use  Followed by Dr. Ramirez at the pain clinic-notes reviewed    6. Decreased appetite  Cyclic/episodic decreased appetite.  No weight change.  No other symptoms.  Recommendation: Continue to follow      Follow-up twice yearly    Jordana Reynolds MD    Chief Complaint:  Here for Prolia and follow-up of usual medical problems    History of Present Illness:  Jacqueline is a 79 y.o. female who is here today for follow-up of her usual medical problems.  Of note, she states that overall she is doing well.  She recently celebrated her 79th birthday.  She states she feels younger.  She is always trying to keep busy.  She states that her goal in life is to \"keep from being bored \"!  She denies any recent falls.  She believes her rheumatoid arthritis is stable with no recent medication changes.    The chart is reviewed.  She has been seen recently by Dr. Ramirez.  She is followed by Dr. Isidro Lopes.  She identifies no real major issues.    She denies any chest pain, shortness of breath, or any new bowel or bladder problems.    She denies any new dental problems.  She is taking calcium and vitamin D as directed.  She is here for Prolia.  She denies any problems with this medication.    Review of Systems:  A " comprehensive review of systems was performed and was otherwise negative    PFSH:  Social History: She is .  She lives independently in a senior apartment facility.  Keeps active.  She continues to drive a car.  Social History     Tobacco Use   Smoking Status Former Smoker     Packs/day: 0.50     Types: Cigarettes     Last attempt to quit: 2018     Years since quittin.1   Smokeless Tobacco Current User   Tobacco Comment    started smoking when she was 21 years old       Past History:   Past Medical History:   Diagnosis Date     Bundle branch block     Created by Conversion  Replacement Utility updated for latest IMO load     Cellulitis      Chronic venous stasis dermatitis      Impetigo        Current Outpatient Medications   Medication Sig Dispense Refill     albuterol (PROAIR HFA;PROVENTIL HFA;VENTOLIN HFA) 90 mcg/actuation inhaler Inhale 2 puffs every 4 (four) hours as needed for wheezing. And cough 1 Inhaler 3     amitriptyline (ELAVIL) 25 MG tablet One to two tabs bedtime 60 tablet 2     aspirin 81 mg chewable tablet Chew 81 mg daily.       b complex vitamins tablet Take 1 tablet by mouth daily.       CALCIUM CARBONATE/VITAMIN D3 (CALCIUM+D ORAL) Take 3 tablets by mouth daily.       [START ON 2019] carisoprodol (SOMA) 350 MG tablet Take 1 tablet (350 mg total) by mouth 3 (three) times a day as needed for muscle spasms. 90 tablet 2     cyanocobalamin, vitamin B-12, (VITAMIN B-12) 500 mcg TbER Take 1 capsule by mouth daily.       DOCOSAHEXANOIC ACID/EPA (FISH OIL ORAL) Take 1 tablet by mouth daily.       ERGOCALCIFEROL, VITAMIN D2, (VITAMIN D2 ORAL) Take 1 tablet by mouth daily.       folic acid (FOLVITE) 1 MG tablet TK 1 T PO ONCE DAILY.  2     hydrocortisone (CORTISONE, HYDROCORTISONE,) 1 % cream Apply to hand three times a day 30 g 2     isosorbide mononitrate (ISMO,MONOKET) 10 MG tablet TAKE 1 TABLET BY MOUTH TWICE DAILY 180 tablet 0     levothyroxine (SYNTHROID, LEVOTHROID) 75 MCG tablet  "TAKE 1 TABLET DAILY AT 6:00 A.M. 90 tablet 3     loratadine (CLARITIN) 10 mg tablet Take 10 mg by mouth daily.       methotrexate 2.5 MG tablet Take 2.5 mg by mouth once a week. (Thursday) As directed       mometasone (NASONEX) 50 mcg/actuation nasal spray SHAKE WELL AND USE 2 SPRAYS IN EACH NOSTRIL EVERY DAY 17 g 5     multivitamin capsule Take 1 capsule by mouth daily.       omeprazole (PRILOSEC) 20 MG capsule Take 20 mg by mouth Daily before breakfast.       oxyCODONE (OXYCONTIN) 10 mg 12 hr tablet Take 2-3 tablets (20-30 mg total) by mouth at bedtime. Two to three tabs bedtime 84 each 0     oxyCODONE (OXYCONTIN) 40 mg 12 hr tablet Take 1 tablet (40 mg total) by mouth daily. 28 tablet 0     triamcinolone (KENALOG) 0.1 % ointment Apply to both legs twice daily 30 g 0     white petrolatum (AQUAPHOR NATURAL HEALING) 41 % Oint Apply to both legs twice daily  0     Current Facility-Administered Medications   Medication Dose Route Frequency Provider Last Rate Last Dose     denosumab 60 mg (PROLIA 60 mg/ml)  60 mg Subcutaneous Q6 Months Jordana Reynolds MD   60 mg at 07/16/19 1419       Family History: Nothing new.  Her daughter broke 5 ribs    Physical Exam:  General Appearance:   She is pleasant and well-appearing and in no acute distress  Vitals:    07/16/19 1414   BP: 124/70   Patient Site: Left Arm   Patient Position: Sitting   Cuff Size: Adult Regular   Pulse: 88   SpO2: 96%   Weight: 179 lb 9.6 oz (81.5 kg)   Height: 5' 1\" (1.549 m)     Wt Readings from Last 3 Encounters:   07/16/19 179 lb 9.6 oz (81.5 kg)   06/26/19 179 lb (81.2 kg)   05/15/19 179 lb (81.2 kg)     Body mass index is 33.94 kg/m .    Head neck exam is negative.  Lungs are clear to auscultation and percussion  Cardiac exam reveals a regular rate and rhythm with no murmurs or gallops    Data Review:    Analysis and Summary of Old Records (2): Reviewed Dr. Ramirez's notes as well as Dr. Davidson's    Records Requested (1):       Other History " Summarized (from other people in the room) (2):     Radiology Tests Summarized (XRAY/CT/MRI/DXA) (1): Reviewed the bone density    Labs Reviewed (1): Reviewed labs from other provider visits    Medicine Tests Reviewed (EKG/ECHO/COLONOSCOPY/EGD) (1):     Independent Review of EKG or X-RAY (2):

## 2021-05-30 NOTE — PROGRESS NOTES
Assessment/Plan:     Problem List Items Addressed This Visit     Rheumatoid arthritis (H)    Relevant Medications    carisoprodol (SOMA) 350 MG tablet (Start on 7/21/2019)    Back pain with left-sided sciatica    Relevant Medications    oxyCODONE (OXYCONTIN) 40 mg 12 hr tablet (Start on 7/9/2019)      Other Visit Diagnoses     Chronic pain syndrome    -  Primary            No follow-ups on file.    Patient Instructions   PLAN:    Discussed you may order a TENS unit    Discussed use of Frequency Specific Microcurrent, you may call [facility] in [facility] [phone] or [facility] [phone]    You may look into chiropractors that take Medicare and offer massage and acupunture as part of treatment    Continue opioids as you are    Return in 8 weeks        Subjective:       79 y.o. female presents for evaluation of back pain with history of surgery, and rheumatoid arthritis.    She describes continuing to recover from when the carpets were installed in her apartment and she need to move in and out.  She describes with a reaching and stretching it is a challenge.  She will work a day and rest today.    She has seen a rheumatologist recently, no change in medications are diagnosed with a fungal infection and started on agent.    They did look into a TENS unit.  Physical therapy was needed first of all the patient notes that has not been helpful.  They may look into obtaining on their own.    They did have a trial of cetyl Merris told late did not notice any benefit though daughter notes she herself did use it and notes benefit.    Daughter points out the patient's ankle seem to be doing better than they did a year ago and swelling and other concerns.  I see she is going to the lymphedema clinic.    We discussed frequency specific microcurrent.  Not looked into this.  She notes she had the most benefit from massage and then acupuncture insurance does not cover.  We discussed some chiropractic offices that  take Medicare may offered massage, and acupuncture incidental to that.    She continues on opioids, oxycodone code own 40 mg in the morning extended release, and 10 mg at bedtime 2-3.  Describes frustration with prior authorization refills.  Continues on the Soma 350 mg 3 times a day reports no problems with sedation or falling, and this is been the best muscle relaxant that she is used, has taken for many years.    Current Outpatient Medications:      albuterol (PROAIR HFA;PROVENTIL HFA;VENTOLIN HFA) 90 mcg/actuation inhaler, Inhale 2 puffs every 4 (four) hours as needed for wheezing. And cough, Disp: 1 Inhaler, Rfl: 3     amitriptyline (ELAVIL) 25 MG tablet, One to two tabs bedtime, Disp: 60 tablet, Rfl: 2     aspirin 81 mg chewable tablet, Chew 81 mg daily., Disp: , Rfl:      b complex vitamins tablet, Take 1 tablet by mouth daily., Disp: , Rfl:      CALCIUM CARBONATE/VITAMIN D3 (CALCIUM+D ORAL), Take 3 tablets by mouth daily., Disp: , Rfl:      cyanocobalamin, vitamin B-12, (VITAMIN B-12) 500 mcg TbER, Take 1 capsule by mouth daily., Disp: , Rfl:      DOCOSAHEXANOIC ACID/EPA (FISH OIL ORAL), Take 1 tablet by mouth daily., Disp: , Rfl:      ERGOCALCIFEROL, VITAMIN D2, (VITAMIN D2 ORAL), Take 1 tablet by mouth daily., Disp: , Rfl:      folic acid (FOLVITE) 1 MG tablet, TK 1 T PO ONCE DAILY., Disp: , Rfl: 2     hydrocortisone (CORTISONE, HYDROCORTISONE,) 1 % cream, Apply to hand three times a day, Disp: 30 g, Rfl: 2     isosorbide mononitrate (ISMO,MONOKET) 10 MG tablet, TAKE 1 TABLET BY MOUTH TWICE DAILY, Disp: 180 tablet, Rfl: 1     levothyroxine (SYNTHROID, LEVOTHROID) 75 MCG tablet, TAKE 1 TABLET DAILY AT 6:00 A.M., Disp: 90 tablet, Rfl: 3     loratadine (CLARITIN) 10 mg tablet, Take 10 mg by mouth daily., Disp: , Rfl:      methotrexate 2.5 MG tablet, Take 2.5 mg by mouth once a week. (Thursday) As directed, Disp: , Rfl:      mometasone (NASONEX) 50 mcg/actuation nasal spray, SHAKE WELL AND USE 2 SPRAYS IN EACH  "NOSTRIL EVERY DAY, Disp: 17 g, Rfl: 5     multivitamin capsule, Take 1 capsule by mouth daily., Disp: , Rfl:      omeprazole (PRILOSEC) 20 MG capsule, Take 20 mg by mouth Daily before breakfast., Disp: , Rfl:      oxyCODONE (OXYCONTIN) 10 mg 12 hr tablet, Take 2-3 tablets (20-30 mg total) by mouth at bedtime. Two to three tabs bedtime, Disp: 84 each, Rfl: 0     triamcinolone (KENALOG) 0.1 % ointment, Apply to both legs twice daily, Disp: 30 g, Rfl: 0     white petrolatum (AQUAPHOR NATURAL HEALING) 41 % Oint, Apply to both legs twice daily, Disp: , Rfl: 0     [START ON 7/21/2019] carisoprodol (SOMA) 350 MG tablet, Take 1 tablet (350 mg total) by mouth 3 (three) times a day as needed for muscle spasms., Disp: 90 tablet, Rfl: 2     [START ON 7/9/2019] oxyCODONE (OXYCONTIN) 40 mg 12 hr tablet, Take 1 tablet (40 mg total) by mouth daily., Disp: 28 tablet, Rfl: 0  Alert clear sensorium good eye contact.  Thought process tight logical affect is full range.  Some humor.     reviewed, as expected         Objective:     Vitals:    06/26/19 1310   BP: 126/77   Pulse: 85   Resp: 16   Weight: 179 lb (81.2 kg)   Height: 5' 1\" (1.549 m)   PainSc:   8       Discussed that they may be ordering a TENS unit and pain for out-of-pocket with a history of some benefit.    Reviewed again the role of frequency specific microcurrent and resources.    Daughter will also look into some research for chiropractors that take Medicare, offering chiropractic and massage and she has had a low that we discussed Medicare does not otherwise cover.    Time spent more than 15 minutes face-to-face, 50% count about above condition coordination treatment including use of modalities as above          This note has been dictated using voice recognition software. Any grammatical or context distortions are unintentional and inherent to the software  "

## 2021-05-31 ENCOUNTER — RECORDS - HEALTHEAST (OUTPATIENT)
Dept: ADMINISTRATIVE | Facility: CLINIC | Age: 81
End: 2021-05-31

## 2021-05-31 VITALS — HEIGHT: 63 IN | WEIGHT: 185 LBS | BODY MASS INDEX: 32.78 KG/M2

## 2021-05-31 VITALS — WEIGHT: 185 LBS | HEIGHT: 62 IN | BODY MASS INDEX: 34.04 KG/M2

## 2021-05-31 VITALS — HEIGHT: 63 IN | BODY MASS INDEX: 31.89 KG/M2 | WEIGHT: 180 LBS

## 2021-05-31 VITALS — BODY MASS INDEX: 31.89 KG/M2 | HEIGHT: 63 IN | WEIGHT: 180 LBS

## 2021-05-31 VITALS — HEIGHT: 62 IN | BODY MASS INDEX: 34.04 KG/M2 | WEIGHT: 185 LBS

## 2021-05-31 VITALS — HEIGHT: 63 IN | BODY MASS INDEX: 31.02 KG/M2 | WEIGHT: 175.1 LBS

## 2021-05-31 VITALS — BODY MASS INDEX: 34.04 KG/M2 | HEIGHT: 62 IN | WEIGHT: 185 LBS

## 2021-05-31 NOTE — PROGRESS NOTES
Assessment/Plan:     Problem List Items Addressed This Visit     Rheumatoid arthritis (H)    Relevant Medications    oxyCODONE (OXYCONTIN) 10 mg 12 hr tablet (Start on 8/29/2019)    Back pain with left-sided sciatica    Relevant Medications    oxyCODONE (OXYCONTIN) 40 mg 12 hr tablet (Start on 9/16/2019)    amitriptyline (ELAVIL) 25 MG tablet      Other Visit Diagnoses     Chronic pain syndrome    -  Primary              No follow-ups on file.    Patient Instructions   PLAN:    Discussed you will be getting TENS unit    Discussed other electromagnetic therapy, you may contact Dr. Edmond Langston, [phone]    Continue opioids as you are    Return in 8 weeks        Subjective:       79 y.o. female presents for evaluation of back pain.  Reports left low back pain, hip pain, going into the knee.  Pain varies from a 6 to an 8.  There are occasional spasms.    She reviews today continues with the back pain.  Attributes it to continue to pack and on pack.  She can do a small amount each day and then is down for a day or 2.  Other activities such as vacuuming can be a challenge.    Daughter review she has not had a regular  for several months that was now returned.  The daughter notes that soon will move into the [place] season where the patient enjoys putting up decorations to be more active and she acknowledges the case.    Since last seen TENS unit has been ordered expected to come soon.    They did not look into chiropractic treatment with massage or acupuncture, or frequency specific microcurrent.    She has been medically stable, treated for a yeast infection.     is reviewed, continues on her OxyContin 40 mg daily and the OxyContin 10 mg 2 to 3 tablets at bedtime, and Soma.  P reviewed as expected.      Current Outpatient Medications:      albuterol (PROAIR HFA;PROVENTIL HFA;VENTOLIN HFA) 90 mcg/actuation inhaler, Inhale 2 puffs every 4 (four) hours as needed for wheezing. And cough, Disp: 1 Inhaler,  Rfl: 3     amitriptyline (ELAVIL) 25 MG tablet, One to two tabs bedtime, Disp: 60 tablet, Rfl: 2     aspirin 81 mg chewable tablet, Chew 81 mg daily., Disp: , Rfl:      b complex vitamins tablet, Take 1 tablet by mouth daily., Disp: , Rfl:      CALCIUM CARBONATE/VITAMIN D3 (CALCIUM+D ORAL), Take 3 tablets by mouth daily., Disp: , Rfl:      cyanocobalamin, vitamin B-12, (VITAMIN B-12) 500 mcg TbER, Take 1 capsule by mouth daily., Disp: , Rfl:      DOCOSAHEXANOIC ACID/EPA (FISH OIL ORAL), Take 1 tablet by mouth daily., Disp: , Rfl:      ERGOCALCIFEROL, VITAMIN D2, (VITAMIN D2 ORAL), Take 1 tablet by mouth daily., Disp: , Rfl:      folic acid (FOLVITE) 1 MG tablet, TK 1 T PO ONCE DAILY., Disp: , Rfl: 2     hydrocortisone (CORTISONE, HYDROCORTISONE,) 1 % cream, Apply to hand three times a day, Disp: 30 g, Rfl: 2     isosorbide mononitrate (ISMO,MONOKET) 10 MG tablet, TAKE 1 TABLET BY MOUTH TWICE DAILY, Disp: 180 tablet, Rfl: 0     levothyroxine (SYNTHROID, LEVOTHROID) 75 MCG tablet, TAKE 1 TABLET DAILY AT 6:00 A.M., Disp: 90 tablet, Rfl: 3     loratadine (CLARITIN) 10 mg tablet, Take 10 mg by mouth daily., Disp: , Rfl:      methotrexate 2.5 MG tablet, Take 2.5 mg by mouth once a week. (Thursday) As directed, Disp: , Rfl:      mometasone (NASONEX) 50 mcg/actuation nasal spray, SHAKE WELL AND USE 2 SPRAYS IN EACH NOSTRIL EVERY DAY, Disp: 17 g, Rfl: 5     multivitamin capsule, Take 1 capsule by mouth daily., Disp: , Rfl:      omeprazole (PRILOSEC) 20 MG capsule, Take 20 mg by mouth Daily before breakfast., Disp: , Rfl:      triamcinolone (KENALOG) 0.1 % ointment, Apply to both legs twice daily, Disp: 30 g, Rfl: 0     white petrolatum (AQUAPHOR NATURAL HEALING) 41 % Oint, Apply to both legs twice daily, Disp: , Rfl: 0     carisoprodol (SOMA) 350 MG tablet, Take 1 tablet (350 mg total) by mouth 3 (three) times a day as needed for muscle spasms., Disp: 90 tablet, Rfl: 2     [START ON 8/29/2019] oxyCODONE (OXYCONTIN) 10 mg 12  "hr tablet, Take 2-3 tablets (20-30 mg total) by mouth at bedtime. Two to three tabs bedtime, Disp: 84 each, Rfl: 0     [START ON 9/16/2019] oxyCODONE (OXYCONTIN) 40 mg 12 hr tablet, Take 1 tablet (40 mg total) by mouth daily., Disp: 28 tablet, Rfl: 0    Current Facility-Administered Medications:      denosumab 60 mg (PROLIA 60 mg/ml), 60 mg, Subcutaneous, Q6 Months, Jordana Reynolds MD, 60 mg at 07/16/19 1419  She is alert with a clear sensorium good eye contact.  Thought process tight logical.  Affect is fairly full range.         Objective:     Vitals:    08/21/19 1331   BP: 123/74   Pulse: 72   Resp: 16   Weight: 177 lb (80.3 kg)   Height: 5' 1\" (1.549 m)   PainSc:   8         Time spent more than 10 minutes face-to-face, 50% count about above condition coordination treatment plan as above        This note has been dictated using voice recognition software. Any grammatical or context distortions are unintentional and inherent to the software  "

## 2021-05-31 NOTE — PATIENT INSTRUCTIONS - HE
PLAN:    Discussed you will be getting TENS unit    Discussed other electromagnetic therapy, you may contact Dr. Edmond Langston, [phone]    Continue opioids as you are    Return in 8 weeks

## 2021-06-01 VITALS — HEIGHT: 62 IN | BODY MASS INDEX: 34.04 KG/M2 | WEIGHT: 185 LBS

## 2021-06-01 VITALS — BODY MASS INDEX: 32.55 KG/M2 | WEIGHT: 172.4 LBS | HEIGHT: 61 IN

## 2021-06-01 VITALS — HEIGHT: 61 IN | BODY MASS INDEX: 32.9 KG/M2 | WEIGHT: 174.25 LBS

## 2021-06-01 VITALS — WEIGHT: 182.8 LBS | BODY MASS INDEX: 33.43 KG/M2

## 2021-06-01 VITALS — BODY MASS INDEX: 33.16 KG/M2 | HEIGHT: 62 IN | WEIGHT: 180.19 LBS

## 2021-06-01 VITALS — BODY MASS INDEX: 31.93 KG/M2 | WEIGHT: 169 LBS

## 2021-06-01 VITALS — HEIGHT: 61 IN | WEIGHT: 175 LBS | BODY MASS INDEX: 33.04 KG/M2

## 2021-06-01 VITALS — BODY MASS INDEX: 32.85 KG/M2 | WEIGHT: 174 LBS | HEIGHT: 61 IN

## 2021-06-01 NOTE — TELEPHONE ENCOUNTER
Refill Approved    Rx renewed per Medication Renewal Policy. Medication was last renewed on 10/9/18.    Vicky Caicedo, [facility] Triage/Med Refill 9/24/2019     Requested Prescriptions   Pending Prescriptions Disp Refills     levothyroxine (SYNTHROID, LEVOTHROID) 75 MCG tablet 90 tablet 3     Sig: TAKE 1 TABLET DAILY AT 6:00 A.M.       Thyroid Hormones Protocol Passed - 9/24/2019  2:08 PM        Passed - Provider visit in past 12 months or next 3 months     Last office visit with prescriber/PCP: 7/16/2019 Jordana Reynolds MD OR same dept: 7/16/2019 Jordana Reynolds MD OR same specialty: 7/16/2019 Jordana Reynolds MD  Last physical: 6/19/2018 Last MTM visit: Visit date not found   Next visit within 3 mo: Visit date not found  Next physical within 3 mo: Visit date not found  Prescriber OR PCP: Jordana Reynolds MD  Last diagnosis associated with med order: 1. Hypothyroidism  - levothyroxine (SYNTHROID, LEVOTHROID) 75 MCG tablet; TAKE 1 TABLET DAILY AT 6:00 A.M.  Dispense: 90 tablet; Refill: 3    If protocol passes may refill for 12 months if within 3 months of last provider visit (or a total of 15 months).             Passed - TSH on file in past 12 months for patient age 12 & older     TSH   Date Value Ref Range Status   01/15/2019 1.18 0.30 - 5.00 uIU/mL Final

## 2021-06-01 NOTE — TELEPHONE ENCOUNTER
Patient called back and stated that she would like to be able to  the prescription for Oxycontin 10mg at the same time that she picks up the prescription for the 40 mg.

## 2021-06-01 NOTE — TELEPHONE ENCOUNTER
Call from pharmacy. Need a new rx for Oxycontin 40 mg, since had not filled and now . Will cue reorder.

## 2021-06-01 NOTE — TELEPHONE ENCOUNTER
Refill Approved    Rx renewed per Medication Renewal Policy. Medication was last renewed on 6/27/19.    Vicky Caicedo, [facility] Triage/Med Refill 9/24/2019     Requested Prescriptions   Pending Prescriptions Disp Refills     isosorbide mononitrate (ISMO,MONOKET) 10 MG tablet [Pharmacy Med Name: ISOSORBIDE MONONITRATE 10MG TABS] 180 tablet 0     Sig: TAKE 1 TABLET BY MOUTH TWICE DAILY       Isosorbide Refill Protocol Passed - 9/24/2019  8:27 AM        Passed - Visit with PCP or prescribing provider visit in last 6 months or next 3 months     Last office visit with prescriber/PCP: 7/16/2019 OR same dept: 7/16/2019 Jordana Reynolds MD OR same specialty: 7/16/2019 Jordana Reynolds MD Last physical: Visit date not found Last MTM visit: Visit date not found     Next appt within 3 mo: Visit date not found  Next physical within 3 mo: Visit date not found  Prescriber OR PCP: Jordana Reynolds MD  Last diagnosis associated with med order: 1. Bundle branch block  - isosorbide mononitrate (ISMO,MONOKET) 10 MG tablet [Pharmacy Med Name: ISOSORBIDE MONONITRATE 10MG TABS]; TAKE 1 TABLET BY MOUTH TWICE DAILY  Dispense: 180 tablet; Refill: 0    If protocol passes may refill for 6 months if within 3 months of last provider visit (or a total of 9 months).              Passed - Blood pressure filed in past 12 months     BP Readings from Last 1 Encounters:   09/18/19 122/69

## 2021-06-01 NOTE — PROGRESS NOTES
No new concern; continues to wear bilateral compression socks. Pt cancelled the US for right shin lump; she is requesting to get new order to get the US  Due to recurrent lump.

## 2021-06-01 NOTE — TELEPHONE ENCOUNTER
Patient plans to have her Daughter's sister in law  her prescriptions from the clinic tomorrow, 9/26.  Call placed to patient:  Marcie Curiel will be picking up prescriptions  She will bring a photo ID.

## 2021-06-01 NOTE — PATIENT INSTRUCTIONS - HE
"Swelling in the legs can be caused by many reasons. No matter what the reason, treatment usually includes some type of compression.  You should wear your compression socks as much as you can. Your compression should be put on first thing in the morning. Take the compression off at night. It is especially important to wear them with long periods of sitting/standing, long car rides or if you will be flying. Going without compression for even brief periods of time can be damaging to your legs and your health.  Compression socks should get refilled every 4-6 months. They do not need to be worn at night while in bed. We can refill your sock prescription for 1 year otherwise your primary is able to refill them for you. Call us with any problems or questions. If you do a lot of standing, it is good to do calf raises to help keep the blood pumping. If you sit a lot at work, it is good to get up periodically to walk around. Elevation of the foot of your bed 4-6\" helps the blood return back to where it is needed.       Please call us if you have any questions [phone]    Thank you for choosing [facility].              "

## 2021-06-01 NOTE — TELEPHONE ENCOUNTER
Spoke to patient, she stated the prescription should have been for Oxycontin 10mg. Patient is going to contact her pharmacy for clarification and call back.

## 2021-06-01 NOTE — PROGRESS NOTES
Date of Service: 2019     Date last seen by Dr. Davidson:  05/15/2019    PCP: Jordana Reynolds MD    Impression:  1. Bilateral leg swelling-stable  2. Long standing dependent swelling with venous insufficiency and hypertension of both legs-stable  3. Secondary lymphedema  4. Continued right anterior calf with mass stable and no longer painful  5. History of infection/cellulitis  6. History of rheumatoid arthritis     Plan:  1. Questions answered.  2. Continue compression and exercises.  Continue to update regularly.    3. As right leg stable will hold off on any further imaging.  4. Patient will follow up in 1 year,  or when needed.  She is to call with any concerns.      Time spent with patient 15 minutes with greater than 50% time in consultation, education and coordination of care, excluding procedures.     ---------------------------------------------------------------------------------------------------------------------    Chief Complaint: Bilateral leg swelling and cellulitis history     History of Present Illness:     Jacqueline Prado returns to the [facility] for her bilateral leg swelling and cellulitis history felt to be due to long-standing dependent swelling and venous insufficiency with hypertension leading to some acquired lymphedema further complicated by underlying rheumatoid arthritis and long history of tobacco use.  At her last visit she was to get an ultrasound of the left lateral calf because there was a slight mass.  She canceled the test.  At her last visit she was also to continue her compression and exercise. She says she is doing well.  She is not having any problems with her swelling with wearing the stockings regularly and updating consistently.  There has been no new numbness, tingling or weakness.  There have been no new masses, rashes, or swellings of any other joints. There has been no new abdominal bloating, bowel or bladder  changes and irregular bleeding. She has not run a fever.      Past Medical History:   Diagnosis Date     Bundle branch block     Created by Conversion  Replacement Utility updated for latest IMO load     Cellulitis      Chronic venous stasis dermatitis      Impetigo      Rheumatoid arthritis (H)        Past Surgical History:   Procedure Laterality Date     BREAST CYST ASPIRATION Left 2000     WY EXCIS CERV DISK,ONE LEVEL      Description: Laminectomy With Disc Removal;  Recorded: 11/03/2011;  Annotations: L5-S1     WY INJECT VERTEBRAL BODY, LUMBAR      Description: Spinal Percutaneous Vertebroplasty, Injection Lumbar;  Recorded: 11/03/2011;  Annotations: L5     WY REMOVAL GALLBLADDER      Description: Cholecystectomy;  Recorded: 07/22/2009;         Current Outpatient Medications:      albuterol (PROAIR HFA;PROVENTIL HFA;VENTOLIN HFA) 90 mcg/actuation inhaler, Inhale 2 puffs every 4 (four) hours as needed for wheezing. And cough, Disp: 1 Inhaler, Rfl: 3     amitriptyline (ELAVIL) 25 MG tablet, One to two tabs bedtime, Disp: 60 tablet, Rfl: 2     aspirin 81 mg chewable tablet, Chew 81 mg daily., Disp: , Rfl:      b complex vitamins tablet, Take 1 tablet by mouth daily., Disp: , Rfl:      CALCIUM CARBONATE/VITAMIN D3 (CALCIUM+D ORAL), Take 3 tablets by mouth daily., Disp: , Rfl:      cyanocobalamin, vitamin B-12, (VITAMIN B-12) 500 mcg TbER, Take 1 capsule by mouth daily., Disp: , Rfl:      DOCOSAHEXANOIC ACID/EPA (FISH OIL ORAL), Take 1 tablet by mouth daily., Disp: , Rfl:      ERGOCALCIFEROL, VITAMIN D2, (VITAMIN D2 ORAL), Take 1 tablet by mouth daily., Disp: , Rfl:      folic acid (FOLVITE) 1 MG tablet, TK 1 T PO ONCE DAILY., Disp: , Rfl: 2     hydrocortisone (CORTISONE, HYDROCORTISONE,) 1 % cream, Apply to hand three times a day, Disp: 30 g, Rfl: 2     isosorbide mononitrate (ISMO,MONOKET) 10 MG tablet, TAKE 1 TABLET BY MOUTH TWICE DAILY, Disp: 180 tablet, Rfl: 0     levothyroxine (SYNTHROID, LEVOTHROID) 75 MCG  tablet, TAKE 1 TABLET DAILY AT 6:00 A.M., Disp: 90 tablet, Rfl: 3     loratadine (CLARITIN) 10 mg tablet, Take 10 mg by mouth daily., Disp: , Rfl:      methotrexate 2.5 MG tablet, Take 2.5 mg by mouth once a week. (Thursday) As directed, Disp: , Rfl:      mometasone (NASONEX) 50 mcg/actuation nasal spray, SHAKE WELL AND USE 2 SPRAYS IN EACH NOSTRIL EVERY DAY, Disp: 17 g, Rfl: 5     multivitamin capsule, Take 1 capsule by mouth daily., Disp: , Rfl:      omeprazole (PRILOSEC) 20 MG capsule, Take 20 mg by mouth Daily before breakfast., Disp: , Rfl:      oxyCODONE (OXYCONTIN) 10 mg 12 hr tablet, Take 2-3 tablets (20-30 mg total) by mouth at bedtime. Two to three tabs bedtime, Disp: 84 each, Rfl: 0     oxyCODONE (OXYCONTIN) 40 mg 12 hr tablet, Take 1 tablet (40 mg total) by mouth daily., Disp: 28 tablet, Rfl: 0     triamcinolone (KENALOG) 0.1 % ointment, Apply to both legs twice daily, Disp: 30 g, Rfl: 0     white petrolatum (AQUAPHOR NATURAL HEALING) 41 % Oint, Apply to both legs twice daily, Disp: , Rfl: 0     carisoprodol (SOMA) 350 MG tablet, Take 1 tablet (350 mg total) by mouth 3 (three) times a day as needed for muscle spasms., Disp: 90 tablet, Rfl: 2     nystatin (MYCOSTATIN) cream, Apply 1 application topically 2 (two) times a day as needed., Disp: , Rfl: 1    Current Facility-Administered Medications:      denosumab 60 mg (PROLIA 60 mg/ml), 60 mg, Subcutaneous, Q6 Months, Jordana Reynolds MD, 60 mg at 07/16/19 1419    Allergies   Allergen Reactions     Tetracyclines Rash     Acetaminophen      Baclofen Nausea Only     Celecoxib      Erythromycin Base      Nsaids (Non-Steroidal Anti-Inflammatory Drug)      Pentolinium Itching     Varenicline Itching and Swelling     Erythromycin Rash       Social History     Socioeconomic History     Marital status:      Spouse name: Not on file     Number of children: Not on file     Years of education: Not on file     Highest education level: Not on file    Occupational History     Not on file   Social Needs     Financial resource strain: Not on file     Food insecurity:     Worry: Not on file     Inability: Not on file     Transportation needs:     Medical: Not on file     Non-medical: Not on file   Tobacco Use     Smoking status: Former Smoker     Packs/day: 0.50     Types: Cigarettes     Last attempt to quit: 2018     Years since quittin.2     Smokeless tobacco: Current User     Tobacco comment: started smoking when she was 21 years old   Substance and Sexual Activity     Alcohol use: Yes     Comment: glass of wine during holiday     Drug use: Yes     Types: Marijuana     Comment: medical marijuana     Sexual activity: Never   Lifestyle     Physical activity:     Days per week: Not on file     Minutes per session: Not on file     Stress: Not on file   Relationships     Social connections:     Talks on phone: Not on file     Gets together: Not on file     Attends [employer] service: Not on file     Active member of club or organization: Not on file     Attends meetings of clubs or organizations: Not on file     Relationship status: Not on file     Intimate partner violence:     Fear of current or ex partner: Not on file     Emotionally abused: Not on file     Physically abused: Not on file     Forced sexual activity: Not on file   Other Topics Concern     Not on file   Social History Narrative    Lives in senior independent living.  .  3 kids.  Graduated from college.        Family History   Problem Relation Age of Onset     Breast cancer Sister 72     Leukemia Sister        Review of Systems:    Jacqueline Prado no new numbess, tingling or weakness, redness or rashes, fevers, new masses, abdominal bloating or discomfort, unexplained weight loss, increased pain, new ulcers, shortness of breath and chest pain  Full 12 point review of systems was completed.    Imaging:    I personally reviewed the following imaging today and those on care everywhere, if  indicated    EXAM: MAMMO DIAGNOSTIC 3D LEFT, US BREAST LIMITED (FOCAL) LEFT  LOCATION: [facility] OUT[facility] SERVICES  DATE/TIME: 7/24/2019 1:44 PM     INDICATION: Left breast asymmetry -- call back.  COMPARISON: 7/16/2019, 2/8/2017, 3/19/2014, 11/27/2012.     MAMMOGRAPHIC FINDINGS: Left full-field digital diagnostic mammogram performed. The breast tissue is composed almost entirely of fat. A circumscribed asymmetry is again seen in the 3:00 position in zone 2, better visualized on the tomographic images. The   exam appears otherwise stable and negative. Breast tomosynthesis was used in interpretation.     ULTRASOUND FINDINGS: Targeted left breast ultrasound was performed by both the ultrasonographer and the radiologist. In the 3:00 position in zone 2 there is an anechoic lesion with sharp margins and increased through transmission consistent with a benign   cyst measuring 6 x 5 x 3 mm correlating with the mammographic abnormality. There were no findings to suggest malignancy.     IMPRESSION:   Small benign cyst correlates with the abnormality seen on the patient's mammogram. No findings to suggest malignancy.     ACR BI-RADS Category 2: Benign.     Return to annual screening schedule is recommended.      Labs:    I personally reviewed the following labs today and those on care everywhere, if indicated    No results found for: SEDRATE      Lab Results   Component Value Date    CRP 2.1 (H) 06/06/2018           Lab Results   Component Value Date    CREATININE 0.78 01/15/2019      No results found for: HGBA1C        Lab Results   Component Value Date    BUN 13 01/15/2019              Lab Results   Component Value Date    ALBUMIN 3.5 07/11/2018       Vitamin D, Total (25-Hydroxy)   Date Value Ref Range Status   07/11/2018 74.9 30.0 - 80.0 ng/mL Final       Lab Results   Component Value Date    TSH 1.18 01/15/2019     Lab Results   Component Value Date    WBC 6.5 01/15/2019    HGB 13.1 01/15/2019    HCT 38.5  01/15/2019    MCV 94 01/15/2019     01/15/2019     6/24/2019 white blood cell count 9.6 hemoglobin 14.9 platelets 355 ALT 17 AST 20 creatinine 0.86 GFR greater than 60    Physical Exam:  Vitals:    09/18/19 1354   BP: 122/69   Pulse: 92   Resp: 24   Temp: 97.6  F (36.4  C)      BMI 33.63 (stable) weight 178 pounds    Circumferential measures:    Vasc Edema 6/14/2018 10/3/2018 5/15/2019 9/18/2019   Right just above MTP 20.8 21.2 20.8 20   Right Ankle 24 22.2 21.9 21.7   Right Widest Calf 35.5 34.5 33.3 33.8   Right Thigh Up 10cm 46 49.5 - -   Left - just above MTP 20.5 21 20 19.7   Left Ankle 3 21.8 21.8 22.1   Left Widest Calf 36 36.8 32 33.2   Left Thigh Up 10cm 46.5 49.3 - -     Circumferential measures are stable.    General:  79 y.o. female in no apparent distress.      Psych: Alert and oriented x 3.  Cooperative. Affect normal.    HEENT: Atraumatic and normocephalic    Musculoskeletal:  Normal range of motion in knees and ankles bilaterally.  There is no active joint synovitis, erythema, swelling or joint laxity.      Neurological:  Sensation is intact to pin prick and light touch in both legs except for slight decrease in medial left leg which is old and stable.    Strength testing is normal in hip flexion, hip extension, ankle dorsiflexion and great toe extension bilaterally.       Vascular: Dorsalis pedis and posterior tibialis pulses are strong and equal bilaterally. There are significant telangietasias, medial ankle venous flares, venous varicosities or spider veins.     Integumentary: Skin of the legs is uniformly warm and dry, and hyperpigmentated, with hyperkeratosis and keratoderma, with positive Stemmer's Sign.  The calcification on the left ant shin is stable and not painful.  No areas of pain or swelling were found on the left leg today.  Nails are thickened, thin surrounding skin, decreased hair growth on toes, discolored.      Claribel Davidson MD, ABWMS, FACCWS, [facility]  Medical Director  Wound Care and Lymphedema  [facility]  [phone]

## 2021-06-02 VITALS — BODY MASS INDEX: 32.66 KG/M2 | WEIGHT: 173 LBS | HEIGHT: 61 IN

## 2021-06-02 VITALS — HEIGHT: 61 IN | WEIGHT: 175 LBS | BODY MASS INDEX: 33.04 KG/M2

## 2021-06-02 VITALS — BODY MASS INDEX: 33.79 KG/M2 | WEIGHT: 179 LBS | HEIGHT: 61 IN

## 2021-06-02 VITALS — WEIGHT: 175 LBS | BODY MASS INDEX: 33.04 KG/M2 | HEIGHT: 61 IN

## 2021-06-02 VITALS — BODY MASS INDEX: 33.42 KG/M2 | WEIGHT: 176.9 LBS

## 2021-06-03 ENCOUNTER — RECORDS - HEALTHEAST (OUTPATIENT)
Dept: ADMINISTRATIVE | Facility: CLINIC | Age: 81
End: 2021-06-03

## 2021-06-03 ENCOUNTER — COMMUNICATION - HEALTHEAST (OUTPATIENT)
Dept: PALLIATIVE MEDICINE | Facility: OTHER | Age: 81
End: 2021-06-03

## 2021-06-03 VITALS — WEIGHT: 177 LBS | BODY MASS INDEX: 33.42 KG/M2 | HEIGHT: 61 IN

## 2021-06-03 VITALS
HEART RATE: 92 BPM | TEMPERATURE: 97.6 F | SYSTOLIC BLOOD PRESSURE: 122 MMHG | RESPIRATION RATE: 24 BRPM | BODY MASS INDEX: 33.61 KG/M2 | DIASTOLIC BLOOD PRESSURE: 69 MMHG | WEIGHT: 178 LBS | HEIGHT: 61 IN

## 2021-06-03 VITALS — HEIGHT: 61 IN | WEIGHT: 173 LBS | BODY MASS INDEX: 32.66 KG/M2

## 2021-06-03 VITALS — HEIGHT: 61 IN | BODY MASS INDEX: 33.91 KG/M2 | WEIGHT: 179.6 LBS

## 2021-06-03 VITALS — HEIGHT: 61 IN | BODY MASS INDEX: 33.79 KG/M2 | WEIGHT: 179 LBS

## 2021-06-03 VITALS — HEIGHT: 61 IN | WEIGHT: 179 LBS | BODY MASS INDEX: 33.79 KG/M2

## 2021-06-03 VITALS — HEIGHT: 61 IN | BODY MASS INDEX: 33.61 KG/M2 | WEIGHT: 178 LBS

## 2021-06-03 DIAGNOSIS — M06.9 RHEUMATOID ARTHRITIS (H): ICD-10-CM

## 2021-06-03 NOTE — PROGRESS NOTES
Pt is being seen today by Hemal Ramirez MD, for refills, RASHAUN and f/u of 9-constant, sharp, spasm back and lt leg pain.   F8

## 2021-06-03 NOTE — PATIENT INSTRUCTIONS - HE
PLAN:    At check-out may get information packet for Acupuncture    Discussed electromagnetic therapy, may call Dr. Edmond Langston    Continue Oxycodone, Soma as you are    Return in 8 weeks

## 2021-06-03 NOTE — PROGRESS NOTES
"Assessment/Plan:     Problem List Items Addressed This Visit     Rheumatoid arthritis (H)    Relevant Medications    carisoprodol (SOMA) 350 MG tablet (Start on 11/21/2019)    oxyCODONE (OXYCONTIN) 10 mg 12 hr tablet (Start on 11/21/2019)    Back pain with left-sided sciatica    Relevant Medications    oxyCODONE (OXYCONTIN) 40 mg 12 hr tablet (Start on 11/21/2019)    amitriptyline (ELAVIL) 25 MG tablet (Start on 11/19/2019)      Other Visit Diagnoses     Chronic pain syndrome    -  Primary            No follow-ups on file.    Patient Instructions   PLAN:    At check-out may get information packet for Acupuncture    Discussed electromagnetic therapy, may call Dr. Edmond Langston    Continue Oxycodone, Soma as you are    Return in 8 weeks        Subjective:       79 y.o. female presents for evaluation, with chronic lumbar radiculopathy.    She is seen with her daughter.  Reviews pain is significant for flared up recently.  She is still recovering from having to move.  Described more recently working on \"little stuff\" which is constant reaching and bending flared up her back.    Also describes picking up some groceries that were heavier than she should have picked up 3 days ago.  Does not feel her back is been this bad in a while.    She is a TENS unit, has not been using it, her daughter is been out of town until today and helps her.    Daughter notes that she also has a better  recently she has had over the last 2 years.  Her daughter reviews that between her mother and her son with physical disabilities she is acknowledges she has had significant caregiver fatigue.    Reports her mother seem to be doing better mentally than she had a year ago.  Patient acknowledges this and has been able to be more social.    She has been using a vaporizer to stop smoking, and has less cough and shortness of breath.    She continues with her methotrexate for her rheumatoid arthritis.    We reviewed other modalities such as " acupuncture.  She had found benefit with this in [place].  Discussed Medicare may be covering this in the new year and she would like to proceed.    She would also like to look into the electromagnetic frequency therapies we have discussed previously.      Current Outpatient Medications:      albuterol (PROAIR HFA;PROVENTIL HFA;VENTOLIN HFA) 90 mcg/actuation inhaler, Inhale 2 puffs every 4 (four) hours as needed for wheezing. And cough, Disp: 1 Inhaler, Rfl: 3     [START ON 11/19/2019] amitriptyline (ELAVIL) 25 MG tablet, One to two tabs bedtime, Disp: 60 tablet, Rfl: 2     aspirin 81 mg chewable tablet, Chew 81 mg daily., Disp: , Rfl:      b complex vitamins tablet, Take 1 tablet by mouth daily., Disp: , Rfl:      CALCIUM CARBONATE/VITAMIN D3 (CALCIUM+D ORAL), Take 3 tablets by mouth daily., Disp: , Rfl:      [START ON 11/21/2019] carisoprodol (SOMA) 350 MG tablet, Take 1 tablet (350 mg total) by mouth 3 (three) times a day as needed for muscle spasms., Disp: 90 tablet, Rfl: 2     cyanocobalamin, vitamin B-12, (VITAMIN B-12) 500 mcg TbER, Take 1 capsule by mouth daily., Disp: , Rfl:      DOCOSAHEXANOIC ACID/EPA (FISH OIL ORAL), Take 1 tablet by mouth daily., Disp: , Rfl:      ERGOCALCIFEROL, VITAMIN D2, (VITAMIN D2 ORAL), Take 1 tablet by mouth daily., Disp: , Rfl:      folic acid (FOLVITE) 1 MG tablet, TK 1 T PO ONCE DAILY., Disp: , Rfl: 2     isosorbide mononitrate (ISMO,MONOKET) 10 MG tablet, TAKE 1 TABLET BY MOUTH TWICE DAILY, Disp: 180 tablet, Rfl: 1     levothyroxine (SYNTHROID, LEVOTHROID) 75 MCG tablet, TAKE 1 TABLET DAILY AT 6:00 A.M., Disp: 90 tablet, Rfl: 0     loratadine (CLARITIN) 10 mg tablet, Take 10 mg by mouth daily., Disp: , Rfl:      methotrexate 2.5 MG tablet, Take 2.5 mg by mouth once a week. (Thursday) As directed, Disp: , Rfl:      mometasone (NASONEX) 50 mcg/actuation nasal spray, SHAKE WELL AND USE 2 SPRAYS IN EACH NOSTRIL EVERY DAY, Disp: 17 g, Rfl: 5     multivitamin capsule, Take 1  "capsule by mouth daily., Disp: , Rfl:      omeprazole (PRILOSEC) 20 MG capsule, Take 20 mg by mouth Daily before breakfast., Disp: , Rfl:      [START ON 11/21/2019] oxyCODONE (OXYCONTIN) 10 mg 12 hr tablet, Take 2-3 tablets (20-30 mg total) by mouth at bedtime. Two to three tabs bedtime, Disp: 84 each, Rfl: 0     [START ON 11/21/2019] oxyCODONE (OXYCONTIN) 40 mg 12 hr tablet, Take 1 tablet (40 mg total) by mouth daily., Disp: 28 tablet, Rfl: 0     hydrocortisone (CORTISONE, HYDROCORTISONE,) 1 % cream, Apply to hand three times a day, Disp: 30 g, Rfl: 2     nystatin (MYCOSTATIN) cream, Apply 1 application topically 2 (two) times a day as needed., Disp: , Rfl: 1     triamcinolone (KENALOG) 0.1 % ointment, Apply to both legs twice daily, Disp: 30 g, Rfl: 0     white petrolatum (AQUAPHOR NATURAL HEALING) 41 % Oint, Apply to both legs twice daily, Disp: , Rfl: 0    Current Facility-Administered Medications:      denosumab 60 mg (PROLIA 60 mg/ml), 60 mg, Subcutaneous, Q6 Months, Jordana Reynolds MD, 60 mg at 07/16/19 1419  Is alert with a clear sensorium good eye contact.  Thought process tight logical.  She gets up to pace through the interview noting her back is been flared up.         Objective:     Vitals:    11/05/19 1240   BP: 150/78   Pulse: 89   Weight: 178 lb (80.7 kg)   Height: 5' 1\" (1.549 m)   PainSc:   9   PainLoc: Back       At checkout will get information regarding acupuncture in case she can schedule the next year.    She will continue with the Amie Contin 40 mg in the morning, oxycodone 10 mg extended release 3 in the evening.  She has been using the Soma 3 and 50 mg 3 times a day.  Reviews over the years tried many other muscle relaxants, tentative allergies has done the best on this.    Time spent more than 15 minutes face-to-face, 50% count about above condition coordination treatment plan as above          This note has been dictated using voice recognition software. Any grammatical or context " distortions are unintentional and inherent to the software

## 2021-06-04 ENCOUNTER — OFFICE VISIT - HEALTHEAST (OUTPATIENT)
Dept: INTERNAL MEDICINE | Facility: CLINIC | Age: 81
End: 2021-06-04

## 2021-06-04 VITALS — BODY MASS INDEX: 33.61 KG/M2 | HEIGHT: 61 IN | WEIGHT: 178 LBS

## 2021-06-04 VITALS — BODY MASS INDEX: 34.76 KG/M2 | HEIGHT: 60 IN

## 2021-06-04 VITALS — HEIGHT: 60 IN | BODY MASS INDEX: 34.76 KG/M2

## 2021-06-04 DIAGNOSIS — Z60.4 SOCIAL ISOLATION: ICD-10-CM

## 2021-06-04 DIAGNOSIS — E03.9 ACQUIRED HYPOTHYROIDISM: ICD-10-CM

## 2021-06-04 DIAGNOSIS — N25.81 SECONDARY RENAL HYPERPARATHYROIDISM (H): ICD-10-CM

## 2021-06-04 DIAGNOSIS — M06.9 RHEUMATOID ARTHRITIS INVOLVING MULTIPLE SITES, UNSPECIFIED WHETHER RHEUMATOID FACTOR PRESENT (H): ICD-10-CM

## 2021-06-04 DIAGNOSIS — I10 ESSENTIAL HYPERTENSION: ICD-10-CM

## 2021-06-04 DIAGNOSIS — I87.2 VENOUS STASIS DERMATITIS OF BOTH LOWER EXTREMITIES: ICD-10-CM

## 2021-06-04 RX ORDER — LEFLUNOMIDE 20 MG/1
20 TABLET ORAL DAILY
Status: SHIPPED | COMMUNITY
Start: 2021-05-03

## 2021-06-04 SDOH — SOCIAL STABILITY - SOCIAL INSECURITY: SOCIAL EXCLUSION AND REJECTION: Z60.4

## 2021-06-05 VITALS — HEIGHT: 60 IN | BODY MASS INDEX: 34.76 KG/M2

## 2021-06-05 VITALS — BODY MASS INDEX: 34.76 KG/M2 | HEIGHT: 60 IN

## 2021-06-05 VITALS
HEIGHT: 60 IN | SYSTOLIC BLOOD PRESSURE: 138 MMHG | WEIGHT: 172 LBS | TEMPERATURE: 98.9 F | DIASTOLIC BLOOD PRESSURE: 70 MMHG | BODY MASS INDEX: 33.77 KG/M2 | OXYGEN SATURATION: 96 % | HEART RATE: 92 BPM

## 2021-06-05 NOTE — TELEPHONE ENCOUNTER
Made a home care visit. Wants to know if patient has any medication allergies. Wants faxed to [phone]

## 2021-06-05 NOTE — PROGRESS NOTES
Assessment/Plan:     Problem List Items Addressed This Visit     Rheumatoid arthritis (H)    Relevant Medications    oxyCODONE (OXYCONTIN) 10 mg 12 hr tablet (Start on 1/16/2020)    Back pain with left-sided sciatica    Relevant Medications    oxyCODONE (OXYCONTIN) 40 mg 12 hr tablet (Start on 1/16/2020)      Other Visit Diagnoses     Chronic pain syndrome                  No follow-ups on file.    Patient Instructions   PLAN:    At check-out may get packet for Acupuncture    Discussed may look into electromagnetic therapy with Dr. Langston [phone]    Continue Oxycontin as you are    Return in 8 weeks        Subjective:       79 y.o. female presents for management of low back pain lumbar radiculopathy.    Daughter.  She reviews things are about the same.  She has gotten a walker for walking outside in the winter to be more stable on ice and snow.    She has gotten her TENS unit is learned to use over the last week.    They describe getting rid of her favorite 40-year-old chair which is worn out, having tried to new JR since then to adjust, having neck pain.    She had the flu over Thanksgiving, taken a while to recover.    Daughter notes she visits more that she has stop smoking in her apartment and they have change the carpeting.    She has not yet looked in the acupuncture though she is interested, has not looked into the electromagnetic therapy.    Has not had any falls since last seen.  Her mood is doing well.    She continues on the OxyContin 40 mg in the morning, and 10 mg 2 to 3 tablets at bedtime.  This regimen has allowed her to maintain her independence.  There was concern about sedation and falls, though has not been concern        Current Outpatient Medications:      albuterol (PROAIR HFA;PROVENTIL HFA;VENTOLIN HFA) 90 mcg/actuation inhaler, Inhale 2 puffs every 4 (four) hours as needed for wheezing. And cough, Disp: 1 Inhaler, Rfl: 3     amitriptyline (ELAVIL) 25 MG tablet, One to two tabs bedtime,  Disp: 60 tablet, Rfl: 2     aspirin 81 mg chewable tablet, Chew 81 mg daily., Disp: , Rfl:      b complex vitamins tablet, Take 1 tablet by mouth daily., Disp: , Rfl:      CALCIUM CARBONATE/VITAMIN D3 (CALCIUM+D ORAL), Take 3 tablets by mouth daily., Disp: , Rfl:      cyanocobalamin, vitamin B-12, (VITAMIN B-12) 500 mcg TbER, Take 1 capsule by mouth daily., Disp: , Rfl:      DOCOSAHEXANOIC ACID/EPA (FISH OIL ORAL), Take 1 tablet by mouth daily., Disp: , Rfl:      ERGOCALCIFEROL, VITAMIN D2, (VITAMIN D2 ORAL), Take 1 tablet by mouth daily., Disp: , Rfl:      folic acid (FOLVITE) 1 MG tablet, TK 1 T PO ONCE DAILY., Disp: , Rfl: 2     hydrocortisone (CORTISONE, HYDROCORTISONE,) 1 % cream, Apply to hand three times a day, Disp: 30 g, Rfl: 2     isosorbide mononitrate (ISMO,MONOKET) 10 MG tablet, TAKE 1 TABLET BY MOUTH TWICE DAILY, Disp: 180 tablet, Rfl: 1     levothyroxine (SYNTHROID, LEVOTHROID) 75 MCG tablet, TAKE 1 TABLET DAILY AT 6:00 A.M., Disp: 90 tablet, Rfl: 0     loratadine (CLARITIN) 10 mg tablet, Take 10 mg by mouth daily., Disp: , Rfl:      methotrexate 2.5 MG tablet, Take 2.5 mg by mouth once a week. (Thursday) As directed, Disp: , Rfl:      mometasone (NASONEX) 50 mcg/actuation nasal spray, SHAKE WELL AND USE 2 SPRAYS IN EACH NOSTRIL EVERY DAY, Disp: 17 g, Rfl: 5     multivitamin capsule, Take 1 capsule by mouth daily., Disp: , Rfl:      nystatin (MYCOSTATIN) cream, Apply 1 application topically 2 (two) times a day as needed., Disp: , Rfl: 1     omeprazole (PRILOSEC) 20 MG capsule, Take 20 mg by mouth Daily before breakfast., Disp: , Rfl:      [START ON 1/16/2020] oxyCODONE (OXYCONTIN) 10 mg 12 hr tablet, Take 2-3 tablets (20-30 mg total) by mouth at bedtime. Two to three tabs bedtime, Disp: 84 each, Rfl: 0     [START ON 1/16/2020] oxyCODONE (OXYCONTIN) 40 mg 12 hr tablet, Take 1 tablet (40 mg total) by mouth daily., Disp: 28 tablet, Rfl: 0     triamcinolone (KENALOG) 0.1 % ointment, Apply to both legs  "twice daily, Disp: 30 g, Rfl: 0     white petrolatum (AQUAPHOR NATURAL HEALING) 41 % Oint, Apply to both legs twice daily, Disp: , Rfl: 0     carisoprodol (SOMA) 350 MG tablet, Take 1 tablet (350 mg total) by mouth 3 (three) times a day as needed for muscle spasms., Disp: 90 tablet, Rfl: 2    Current Facility-Administered Medications:      denosumab 60 mg (PROLIA 60 mg/ml), 60 mg, Subcutaneous, Q6 Months, Jordana Reynolds MD, 60 mg at 07/16/19 1419  She is alert with a clear sensorium good eye contact.  Thought process tight logical.  Affect is fairly full range.  Good humor with her daughter.         Objective:     Vitals:    01/07/20 1359   BP: 120/72   Pulse: 88   Weight: 178 lb (80.7 kg)   Height: 5' 1\" (1.549 m)   PainSc:   7   PainLoc: Back       Discussed Medicare may be covering acupuncture in the new year, she will get information packet at checkout.    In the future may look into the electromagnetic therapy as well.  She will otherwise continue with the OxyContin as she is.    Time spent more than 10 minutes face-to-face, more than 50% counts about above condition coordination treatment plan          This note has been dictated using voice recognition software. Any grammatical or context distortions are unintentional and inherent to the software  "

## 2021-06-05 NOTE — TELEPHONE ENCOUNTER
Due for labs    Rx renewed per Medication Renewal Policy. Medication was last renewed on 9/24/19.    Miguelina Molina, [facility] Triage/Med Refill 1/13/2020     Requested Prescriptions   Pending Prescriptions Disp Refills     levothyroxine (SYNTHROID, LEVOTHROID) 75 MCG tablet [Pharmacy Med Name: LEVOTHYROXINE 0.075MG (75MCG) TABS] 90 tablet 0     Sig: TAKE 1 TABLET BY MOUTH DAILY AT 6AM       Thyroid Hormones Protocol Passed - 1/9/2020  6:18 PM        Passed - Provider visit in past 12 months or next 3 months     Last office visit with prescriber/PCP: 7/16/2019 Jordana Reynolds MD OR same dept: 7/16/2019 Jordana Reynolds MD OR same specialty: 7/16/2019 Jordana Reynolds MD  Last physical: 6/19/2018 Last MTM visit: Visit date not found   Next visit within 3 mo: Visit date not found  Next physical within 3 mo: Visit date not found  Prescriber OR PCP: Jordana Reynolds MD  Last diagnosis associated with med order: 1. Hypothyroidism  - levothyroxine (SYNTHROID, LEVOTHROID) 75 MCG tablet [Pharmacy Med Name: LEVOTHYROXINE 0.075MG (75MCG) TABS]; TAKE 1 TABLET BY MOUTH DAILY AT 6AM  Dispense: 90 tablet; Refill: 0    If protocol passes may refill for 12 months if within 3 months of last provider visit (or a total of 15 months).             Passed - TSH on file in past 12 months for patient age 12 & older     TSH   Date Value Ref Range Status   01/15/2019 1.18 0.30 - 5.00 uIU/mL Final

## 2021-06-05 NOTE — PROGRESS NOTES
Patient here with daughter for follow up appointment with Dr Ramirez for back pain. She rates her pain a 7 and states it moves all over her back. She describes the pain as achy, sometimes sharp. F=6.

## 2021-06-05 NOTE — PATIENT INSTRUCTIONS - HE
PLAN:    At check-out may get packet for Acupuncture    Discussed may look into electromagnetic therapy with Dr. Langston [phone]    Continue Oxycontin as you are    Return in 8 weeks

## 2021-06-05 NOTE — TELEPHONE ENCOUNTER
Who is calling:  Home Care Nurse, Marcelina rodriguez/ [facility]   Reason for Call:  Please Fax Medication AND Allergy list   Fx# [phone]  Date of last appointment with primary care:01/06/20  Okay to leave a detailed message: Yes

## 2021-06-06 NOTE — PROGRESS NOTES
Patient canceled day of appointment.  Reviewing the record just obtained her OxyContin 2/26.    She did see her rheumatologist in January according to care everywhere.

## 2021-06-07 NOTE — TELEPHONE ENCOUNTER
Medication being requested: Soma  Last visit date: 1/7/20 Provider: ROXANA  Next visit date: 5/8/20 Provider: ROXANA  Expected follow up: 8 weeks  MTM visit ([facility] Center) date: No  Pertinent between visit information about requested medication (telephone, mychart, prior authorization, concerns):   3/11 Cancelled appointment with ROXANA  Last date prescribed: 11/5/2019  Provider responsible: ROXANA  Spoke with patient: Patient left a message requesting refill  Script being sent to provider - dates and quantity:   Bridge to Appointment  Requested Prescriptions     Pending Prescriptions Disp Refills     carisoprodoL (SOMA) 350 MG tablet 21 tablet 0     Sig: Take 1 tablet (350 mg total) by mouth 3 (three) times a day as needed for muscle spasms.     Pharmacy cued: [facility]

## 2021-06-07 NOTE — TELEPHONE ENCOUNTER
RN cannot approve Refill Request    RN can NOT refill this medication Protocol failed and NO refill given.      Miguelina Molina, [facility] Triage/Med Refill 3/31/2020    Requested Prescriptions   Pending Prescriptions Disp Refills     levothyroxine (SYNTHROID, LEVOTHROID) 75 MCG tablet [Pharmacy Med Name: LEVOTHYROXINE 0.075MG (75MCG) TABS] 90 tablet 3     Sig: TAKE 1 TABLET BY MOUTH DAILY AT 6 AM       Thyroid Hormones Protocol Failed - 3/30/2020  3:19 PM        Failed - TSH on file in past 12 months for patient age 12 & older     TSH   Date Value Ref Range Status   01/15/2019 1.18 0.30 - 5.00 uIU/mL Final                   Passed - Provider visit in past 12 months or next 3 months     Last office visit with prescriber/PCP: 7/16/2019 Jordana Reynolds MD OR same dept: 7/16/2019 Jordana Reynolds MD OR same specialty: 7/16/2019 Jordana Reynolds MD  Last physical: 6/19/2018 Last MTM visit: Visit date not found   Next visit within 3 mo: Visit date not found  Next physical within 3 mo: Visit date not found  Prescriber OR PCP: Jordana Reynolds MD  Last diagnosis associated with med order: 1. Hypothyroidism  - levothyroxine (SYNTHROID, LEVOTHROID) 75 MCG tablet [Pharmacy Med Name: LEVOTHYROXINE 0.075MG (75MCG) TABS]; TAKE 1 TABLET BY MOUTH DAILY AT 6 AM  Dispense: 90 tablet; Refill: 0    If protocol passes may refill for 12 months if within 3 months of last provider visit (or a total of 15 months).

## 2021-06-07 NOTE — TELEPHONE ENCOUNTER
Problem: Fall Risk (Adult)  Goal: Absence of Falls  Patient will demonstrate the desired outcomes by discharge/transition of care.   Outcome: Ongoing (interventions implemented as appropriate)  Bed in low position and locked. Alarms on and audible. Call light within reach. Education given on preventing falls and using call light. Undersatnding verbalized by family at bedside       RN cannot approve Refill Request    RN can NOT refill this medication Protocol failed and NO refill given.      Miguelina Molina, [facility] Triage/Med Refill 3/23/2020    Requested Prescriptions   Pending Prescriptions Disp Refills     isosorbide mononitrate (ISMO,MONOKET) 10 MG tablet [Pharmacy Med Name: ISOSORBIDE MONONITRATE 10MG TABS] 180 tablet 3     Sig: TAKE 1 TABLET BY MOUTH TWICE DAILY       Isosorbide Refill Protocol Failed - 3/22/2020  5:01 AM        Failed - Visit with PCP or prescribing provider visit in last 6 months or next 3 months     Last office visit with prescriber/PCP: Visit date not found OR same dept: 7/16/2019 Jordana Reynolds MD OR same specialty: 7/16/2019 Jordana Reynolds MD Last physical: Visit date not found Last MTM visit: Visit date not found     Next appt within 3 mo: Visit date not found  Next physical within 3 mo: Visit date not found  Prescriber OR PCP: Jordana Reynolds MD  Last diagnosis associated with med order: 1. Bundle branch block  - isosorbide mononitrate (ISMO,MONOKET) 10 MG tablet [Pharmacy Med Name: ISOSORBIDE MONONITRATE 10MG TABS]; TAKE 1 TABLET BY MOUTH TWICE DAILY  Dispense: 180 tablet; Refill: 1    If protocol passes may refill for 6 months if within 3 months of last provider visit (or a total of 9 months).              Passed - Blood pressure filed in past 12 months     BP Readings from Last 1 Encounters:   01/07/20 120/72

## 2021-06-08 ENCOUNTER — COMMUNICATION - HEALTHEAST (OUTPATIENT)
Dept: PALLIATIVE MEDICINE | Facility: OTHER | Age: 81
End: 2021-06-08

## 2021-06-08 DIAGNOSIS — M06.9 RHEUMATOID ARTHRITIS (H): ICD-10-CM

## 2021-06-08 NOTE — PATIENT INSTRUCTIONS - HE
Plan:    We will send you a packet for the acupuncture, and you may consider scheduling when feel comfortable.    Discussed electromagnetic therapy as an option to help your pain, may contact Dr. Langston, [phone]    Continue the OxyContin 40 mg morning, and OxyContin 10 mg 3 3 tablets bedtime.    Continue the Soma 21 mg 3 times a day.    Reschedule Dr. Ramirez in 8 weeks.

## 2021-06-08 NOTE — TELEPHONE ENCOUNTER
Refill request: soma, adding to previously completed template as there have been no significant changes or encounters since last template completed.  Requested Prescriptions     Pending Prescriptions Disp Refills     carisoprodoL (SOMA) 350 MG tablet 90 tablet 0     Sig: Take 1 tablet (350 mg total) by mouth 3 (three) times a day as needed for muscle spasms.     Signed Prescriptions Disp Refills     oxyCODONE (OXYCONTIN) 40 mg 12 hr tablet 28 tablet 0     Sig: Take 1 tablet (40 mg total) by mouth daily.     Authorizing Provider: JAIME GARCÍA     amitriptyline (ELAVIL) 25 MG tablet 60 tablet 2     Sig: One to two tabs bedtime     Authorizing Provider: JAIME GARCÍA     oxyCODONE (OXYCONTIN) 10 mg 12 hr tablet 84 each 0     Sig: Take 2-3 tablets (20-30 mg total) by mouth at bedtime. Two to three tabs bedtime     Authorizing Provider: JAIME GARCÍA
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
## 2021-06-08 NOTE — PROGRESS NOTES
Subjective:   Jacqueline Prado is a 76 y.o. female who presents for evaluation of pain. Patient was last seen 11/4/2016.      Major issues:  1. Rheumatoid arthritis    2. Pain    3. Back pain with left-sided sciatica    4. Lumbar Disc Degeneration        CC: Pain  See rooming evaluation    HPI:   Impact of pain treatments:   Analgesia: good   ADL's: Work: retired since she was 41 years of age, disability. Household: has a house keeper who cleans and does laundry, able to wash dishes and cook. Social: live alone, able to socialize in a fashionable manner with family and friends.   AE's: denies   Aberrant behavior: denies    Aggravating factors: being confined and sitting for a long time, moving wrong, reaching above the head, bending, twist  Alleviating factors: medications, exercises  Associated symptoms: had severe leg cramps with Tizanadine  DME: none  Location/Laterality of the pain: back pain, left side  Timing: intermittent  Quality: radiating to the left ankle  Severity:6/10    Activities Impaired by Increasing Pain Severity: F= 6  3-Enjoy  4-Work, Enjoy  5-Active, Mood Work Enjoy  6-Sleep, Active, Mood Work Enjoy  7-Walk, Sleep, Active, Mood Work Enjoy  8-Relate, Walk, Sleep, Active, Mood Work Enjoy      Medication: Patient is taking OxyContin 40 mg BID.     Last opioid dose was OxyContin 2/9/17@1300 PM.       Interventional: Has tried injections a few years ago.  Will like to try again but scheduling has been a concern.    Rehabilitation: Has done PT previously and feels like it did not offer any pain relief.    Integrative Approaches: Is doing home exercise program.      Mental Health: Feels like she is mentally stable, not interested in psychotherapist.     Records: Reviewed to prepare for today's visit and reflected throughout the notes.     Diagnostics:   Lab:  Last UDS/SWAB on 09/16/2016 was reviewed and was appropriate.      Review of Systems pblm pert  Constitutional- - sleep disturbances, +  "activity intolerance  Musculoskeletal- + pain  Neuro- + cognitive changes, + radicular, + neuropathic symptoms  GI/-  + constipation, - urinary difficulty  Psych-  - mood disorders,  - taking medication in a fashion other than prescribed    Objective:     Vitals:    02/09/17 1546   BP: 146/89   Pulse: 100   Resp: 16   Weight: 184 lb (83.5 kg)   Height: 5' 2\" (1.575 m)   PainSc:   6       Constitutional:  Pleasant and cooperative female who presents alone today.   Psychiatric: Mood and affect are appropriate for the situation, setting and topic of discussion.  Patient does not appear sedated.  Integumentary:  Observed skin WNL  HEENT: EOM's grossly intact.    Chest: Breathing is non-labored.   Neurological:  Alert and oriented in all spheres including: time, place, person and situation.  Durable Medical Equipment:none     Assessment:   Jacqueline Prado is a 76 y.o. female seen in clinic today for chronic back pain.  She reports her pain as being mild to moderate.  She reports cramps when she was taking Tizanidine and discontinued taking that in Novemeber.  She says her cramps are almost gone.  She is sleeping well through the night.  Her daughter has not got her TENS unit but she plan on reminding her to get it for her.  She is planning on getting epidural injections but she will arrange when her daughter is available to drive her from the appointment.      Plan:   Plan/NextSteps:     Medication:   Medication prescribed today Oxycodone 30 mg every 12 hours to fill 2/21/2017 for use 2/23/2017-3/25/2017    REFILL INSTRUCTIONS:  Please contact the clinic refill line 7 days before your refill is due. Speak clearly; note cell phones cut in-and-out and poor quality speech and reception issues will influence our ability to hear you and be efficient with your prescription.     Call [phone] leave:   Your name (first and last w/ spelling)   Date of birth  Name of all the medication(s) being requested  Dose of the " medication(s)   How you are taking the medication (eg. twice per day etc).     Contact your pharmacy 3 (three) days after leaving your message to see if your prescription has been received. Please request the pharmacy check your profile to be certain about any concerns with a script failing to be received. Note: Char updates have been inconsistent.  If the script has not been received there may have been a problem with the communication please reach back out to the clinic.       Interventional: Has tried injections a few years ago.  Will like to try again but scheduling has been a concern.    Rehabilitation: Has done PT previously and feels like it did not offer any pain relief.    Integrative Approaches: Is doing home exercise program.      Mental Health: Feels like mentally stable.    Records: Reviewed to prepare for today's visit and reflected throughout the notes.     Health Maintenance: per primary care    Diagnostics: UDS/SWAB collected 09/16/2016 results are appropriate.    UDS/SWAB:  Patient required a random Urine Drug Screen, due to the need to comply with [place] Model Policy Guidelines for the use of any controlled substances. This is to ensure that patient is compliant with treatment, and to diminish diversion, abuse, or any other aberrant behaviors. Patient is either being considered for or taking a controlled substance. Unexpected findings will be discussed and treatment decision may be adjusted.       Records: Reviewed to assist with preparation for the office visit and are reflected throughout the note.    Follow up: 1 month      Education: Please call Monday-Friday for problems or questions and one of the clinical support staff (CSS) will help to get things figured out. The number is [phone]. Some folks are using [employer] to send and e-mail. Please remember some issues require an office visit.     Reviewed the plan of care, provided justification and answered questions with the patient.      SAFETY REMINDERS  No alcohol while taking controlled substances. Alcohol is not an illegal substance, it is unsafe to use in combination. It is a build up of substances in the body that can be extremely hazardous and may cause respirations to slow to a dangerous rate resulting in hospitalization, brain damage, or death.    Opioid medications have been associated with sharp rise in unintentional overdose and death.  Overdose is a condition characterized by the consumption in excess of a particular drug causing adverse effects. Symptoms of overdose include: breathing slow and shallow, erratic or not at all, pinpoint pupils, hallucinations, confusion, muscle jerks, slack muscles, extreme sleepiness or loss of alertness, awake but not able to talk, face pale or clammy, vomiting, for lighter skinned people, the skin tone turns bluish purple, for darker skinned people, it turns grayish or ashen. If in a situation where overdose is a concern engage the emergency response system (dial 911).    Do not sell, loan, borrow or share your opioid medication with anyone. Deaths have occurred as a result of this practice. It is illegal and patients are being prosecuted.       *Universal Precautions:   UDS/Swab- 09/16/2016   Consent-   Agreement- 2/9/2017  Pharmacy- as documented   - 2/9/2017  Count- n/a  Psychological evaluation n/a  Pharmacogenetic testing- n/a  MME- 90    Management of opioid medication is inherently a moderate to high complex medial interaction based on the risk management required at each contact r/t risks and side effects.    Patient Arrived @ 1536 for a 1500 appointment.     TT: 40 - min  CT: over half spent in education and counseling as outlined in the plan.      Nicole MG FNP-C  [address] [facility] [place] [address]  [facility]  [phone]  [phone]

## 2021-06-08 NOTE — PROGRESS NOTES
"Assessment/Plan:     Problem List Items Addressed This Visit     Rheumatoid arthritis (H)    Relevant Medications    carisoprodoL (SOMA) 350 MG tablet            No follow-ups on file.    Patient Instructions   Plan:    We will send you a packet for the acupuncture, and you may consider scheduling when feel comfortable.    Discussed electromagnetic therapy as an option to help your pain, may contact Dr. Langston, [phone]    Continue the OxyContin 40 mg morning, and OxyContin 10 mg 3 3 tablets bedtime.    Continue the Soma 21 mg 3 times a day.    Reschedule Dr. Ramirez in 8 weeks.        Subjective:       80 y.o. female The patient has been notified of the following:      \"We have found that certain health care needs can be provided without the need for a face to face visit.  This service lets us provide the care you need with a phone conversation.       I will have full access to your [facility] medical record during this entire phone call.   I will be taking notes for your medical record.      Since this is like an office visit, we will bill your insurance company for this service.       There are potential benefits and risks of telephone visits (e.g. limits to patient confidentiality) that differ from in-person visits.?  Confidentiality still applies for telephone services, and nobody will record the visit.  It is important to be in a quiet, private space that is free of distractions (including cell phone or other devices) during the visit.??      If during the course of the call I believe a telephone visit is not appropriate, you will not be charged for this service\"     Consent has been obtained for this service by care team member: Yes                   Ms. Prado reviews her son  3 weeks ago of uncertain circumstances.  She acknowledges feeling depressed when she talks about it.  She has support of a large extended family.        She has a  6 hours a week, this person has been doing " errands for her and her washing.  She has been managing the rest of the household which flares up her back.  She has been avoiding going to stores.    Since last seen she did not look into acupuncture or the electromagnetic therapy.  She would be interested in learning about there is resources.    She does continue with the Soma 350 mg 3 times a day, OxyContin 40 mg daily, OxyContin 10 mg 2 to 3 tablets at bedtime.   is reviewed.  With his medication regimen she is continuing to manage.      Current Outpatient Medications:      albuterol (PROAIR HFA;PROVENTIL HFA;VENTOLIN HFA) 90 mcg/actuation inhaler, Inhale 2 puffs every 4 (four) hours as needed for wheezing. And cough, Disp: 1 Inhaler, Rfl: 3     amitriptyline (ELAVIL) 25 MG tablet, One to two tabs bedtime, Disp: 60 tablet, Rfl: 2     aspirin 81 mg chewable tablet, Chew 81 mg daily., Disp: , Rfl:      b complex vitamins tablet, Take 1 tablet by mouth daily., Disp: , Rfl:      CALCIUM CARBONATE/VITAMIN D3 (CALCIUM+D ORAL), Take 3 tablets by mouth daily., Disp: , Rfl:      carisoprodoL (SOMA) 350 MG tablet, Take 1 tablet (350 mg total) by mouth 3 (three) times a day as needed for muscle spasms., Disp: 90 tablet, Rfl: 0     cyanocobalamin, vitamin B-12, (VITAMIN B-12) 500 mcg TbER, Take 1 capsule by mouth daily., Disp: , Rfl:      DOCOSAHEXANOIC ACID/EPA (FISH OIL ORAL), Take 1 tablet by mouth daily., Disp: , Rfl:      ERGOCALCIFEROL, VITAMIN D2, (VITAMIN D2 ORAL), Take 1 tablet by mouth daily., Disp: , Rfl:      folic acid (FOLVITE) 1 MG tablet, TK 1 T PO ONCE DAILY., Disp: , Rfl: 2     isosorbide mononitrate (ISMO,MONOKET) 10 MG tablet, TAKE 1 TABLET BY MOUTH TWICE DAILY (Patient taking differently: Take 10 mg by mouth daily. TAKE 1 TABLET BY MOUTH TWICE DAILY), Disp: 180 tablet, Rfl: 3     levothyroxine (SYNTHROID, LEVOTHROID) 75 MCG tablet, TAKE 1 TABLET BY MOUTH DAILY AT 6 AM, Disp: 90 tablet, Rfl: 3     loratadine (CLARITIN) 10 mg tablet, Take 10 mg by  mouth daily., Disp: , Rfl:      methotrexate 2.5 MG tablet, Take 2.5 mg by mouth once a week. (Thursday) As directed, Disp: , Rfl:      mometasone (NASONEX) 50 mcg/actuation nasal spray, SHAKE WELL AND USE 2 SPRAYS IN EACH NOSTRIL EVERY DAY (Patient taking differently: 2 sprays into each nostril 2 (two) times a day as needed. SHAKE WELL AND USE 2 SPRAYS IN EACH NOSTRIL EVERY DAY, as needed), Disp: 17 g, Rfl: 5     multivitamin capsule, Take 1 capsule by mouth daily., Disp: , Rfl:      omeprazole (PRILOSEC) 20 MG capsule, Take 20 mg by mouth Daily before breakfast., Disp: , Rfl:      oxyCODONE (OXYCONTIN) 10 mg 12 hr tablet, Take 2-3 tablets (20-30 mg total) by mouth at bedtime. Two to three tabs bedtime, Disp: 84 each, Rfl: 0     oxyCODONE (OXYCONTIN) 40 mg 12 hr tablet, Take 1 tablet (40 mg total) by mouth daily., Disp: 28 tablet, Rfl: 0     potassium gluconate 550 mg (90 mg) tablet, Take 1 tablet by mouth daily as needed., Disp: , Rfl:      triamcinolone (KENALOG) 0.1 % ointment, Apply to both legs twice daily, Disp: 30 g, Rfl: 0     vitamin E 400 unit capsule, Take 400 Units by mouth daily., Disp: , Rfl:      hydrocortisone (CORTISONE, HYDROCORTISONE,) 1 % cream, Apply to hand three times a day, Disp: 30 g, Rfl: 2     nystatin (MYCOSTATIN) cream, Apply 1 application topically 2 (two) times a day as needed., Disp: , Rfl: 1     white petrolatum (AQUAPHOR NATURAL HEALING) 41 % Oint, Apply to both legs twice daily, Disp: , Rfl: 0    Current Facility-Administered Medications:      denosumab 60 mg (PROLIA 60 mg/ml), 60 mg, Subcutaneous, Q6 Months, Jordana Reynolds MD, 60 mg at 07/16/19 1419  Telephone sounds alert, clear sensorium thought process logical and affect is congruent regarding her son's loss.    Impression: Chronic pain includes postlaminectomy syndrome, has been stable on the above medication regimen, living independently.    Plan, will send information about the above other interventions for her to  explore when she feels comfortable.    Telephone time more than 10 minutes.              Objective:     Vitals:    05/08/20 1445   Height: 5' (1.524 m)   PainSc:   9   PainLoc: Leg                   This note has been dictated using voice recognition software. Any grammatical or context distortions are unintentional and inherent to the software

## 2021-06-09 NOTE — PROGRESS NOTES
"Assessment/Plan:     Problem List Items Addressed This Visit     Rheumatoid arthritis (H)    Relevant Medications    carisoprodoL (SOMA) 350 MG tablet    oxyCODONE (OXYCONTIN) 10 mg 12 hr tablet (Start on 7/22/2020)    Back pain with left-sided sciatica    Relevant Medications    oxyCODONE (OXYCONTIN) 40 mg 12 hr tablet (Start on 7/22/2020)              No follow-ups on file.    There are no Patient Instructions on file for this visit.      Subjective:       80 y.o. The patient has been notified of the following:      \"We have found that certain health care needs can be provided without the need for a face to face visit.  This service lets us provide the care you need with a phone conversation.       I will have full access to your [facility] medical record during this entire phone call.   I will be taking notes for your medical record.      Since this is like an office visit, we will bill your insurance company for this service.       There are potential benefits and risks of telephone visits (e.g. limits to patient confidentiality) that differ from in-person visits.?  Confidentiality still applies for telephone services, and nobody will record the visit.  It is important to be in a quiet, private space that is free of distractions (including cell phone or other devices) during the visit.??      If during the course of the call I believe a telephone visit is not appropriate, you will not be charged for this service\"     Consent has been obtained for this service by care team member: Yes        Needs review she is \"okay\".  She has had a few bad days.  She notes she needs to slow down a \"behave myself\".  By this she means her  has not been coming in regularly and will not be for a couple weeks.  She notes any kind of repetitive motion flares of her back including cleaning and laundry.  She notes she \"will survive\".    There is been no change in her medical situation.    Reports depression occasionally as she " is not left her appointment is early February.  She is very cautious about the COVID exposure.    She notes her son's death now is also been likely attributed to COVID.  She notes if she does not talk about him think about him too much she does okay.    She is been on hold for going out for treatment such as acupuncture, any other medical concerns.    She continues on the oxycodone 40 mg extended release in the morning and the 10 mg extended release 3 times a day in the evening, and on the Soma 3 to 50 mg 3 times a day.  This regimen is allowed her to cope with acceptable side effects.      Current Outpatient Medications:      albuterol (PROAIR HFA;PROVENTIL HFA;VENTOLIN HFA) 90 mcg/actuation inhaler, Inhale 2 puffs every 4 (four) hours as needed for wheezing. And cough, Disp: 1 Inhaler, Rfl: 3     amitriptyline (ELAVIL) 25 MG tablet, One to two tabs bedtime, Disp: 60 tablet, Rfl: 2     aspirin 81 mg chewable tablet, Chew 81 mg daily., Disp: , Rfl:      b complex vitamins tablet, Take 1 tablet by mouth daily., Disp: , Rfl:      CALCIUM CARBONATE/VITAMIN D3 (CALCIUM+D ORAL), Take 3 tablets by mouth daily., Disp: , Rfl:      carisoprodoL (SOMA) 350 MG tablet, Take 1 tablet (350 mg total) by mouth 3 (three) times a day as needed for muscle spasms., Disp: 90 tablet, Rfl: 0     cyanocobalamin, vitamin B-12, (VITAMIN B-12) 500 mcg TbER, Take 1 capsule by mouth daily., Disp: , Rfl:      ERGOCALCIFEROL, VITAMIN D2, (VITAMIN D2 ORAL), Take 1 tablet by mouth daily., Disp: , Rfl:      folic acid (FOLVITE) 1 MG tablet, TK 1 T PO ONCE DAILY., Disp: , Rfl: 2     hydrocortisone (CORTISONE, HYDROCORTISONE,) 1 % cream, Apply to hand three times a day, Disp: 30 g, Rfl: 2     isosorbide mononitrate (ISMO,MONOKET) 10 MG tablet, TAKE 1 TABLET BY MOUTH TWICE DAILY (Patient taking differently: Take 10 mg by mouth daily. TAKE 1 TABLET BY MOUTH TWICE DAILY), Disp: 180 tablet, Rfl: 3     levothyroxine (SYNTHROID, LEVOTHROID) 75 MCG tablet,  TAKE 1 TABLET BY MOUTH DAILY AT 6 AM, Disp: 90 tablet, Rfl: 3     loratadine (CLARITIN) 10 mg tablet, Take 10 mg by mouth daily., Disp: , Rfl:      methotrexate 2.5 MG tablet, Take 2.5 mg by mouth once a week. (Thursday) As directed, Disp: , Rfl:      mometasone (NASONEX) 50 mcg/actuation nasal spray, SHAKE WELL AND USE 2 SPRAYS IN EACH NOSTRIL EVERY DAY (Patient taking differently: 2 sprays into each nostril 2 (two) times a day as needed. SHAKE WELL AND USE 2 SPRAYS IN EACH NOSTRIL EVERY DAY, as needed), Disp: 17 g, Rfl: 5     multivitamin capsule, Take 1 capsule by mouth daily., Disp: , Rfl:      NON FORMULARY, Apply 1 application topically daily. Sera Ve, Disp: , Rfl:      nystatin (MYCOSTATIN) cream, Apply 1 application topically 2 (two) times a day as needed., Disp: , Rfl: 1     omeprazole (PRILOSEC) 20 MG capsule, Take 20 mg by mouth Daily before breakfast., Disp: , Rfl:      [START ON 7/22/2020] oxyCODONE (OXYCONTIN) 10 mg 12 hr tablet, Take 2-3 tablets (20-30 mg total) by mouth at bedtime. Two to three tabs bedtime, Disp: 84 each, Rfl: 0     [START ON 7/22/2020] oxyCODONE (OXYCONTIN) 40 mg 12 hr tablet, Take 1 tablet (40 mg total) by mouth daily., Disp: 28 tablet, Rfl: 0     potassium gluconate 550 mg (90 mg) tablet, Take 1 tablet by mouth daily as needed., Disp: , Rfl:      triamcinolone (KENALOG) 0.1 % ointment, Apply to both legs twice daily, Disp: 30 g, Rfl: 0     vitamin E 400 unit capsule, Take 400 Units by mouth daily., Disp: , Rfl:      DOCOSAHEXANOIC ACID/EPA (FISH OIL ORAL), Take 1 tablet by mouth daily., Disp: , Rfl:      white petrolatum (AQUAPHOR NATURAL HEALING) 41 % Oint, Apply to both legs twice daily, Disp: , Rfl: 0  No current facility-administered medications for this encounter.       Plan: Chronic pain includes postlaminectomy syndrome, rheumatoid arthritis, --has been maintained on this dose of opioids with some degree of function.  Has used analgesics such as amitriptyline and Soma,  without anticholinergic side effects or sedation.  There is been a concern of falls at one time which has not resumed as a concern.    Plan: We will continue with above medications.  When she feels comfortable with the COVID virus may consider adding the acupuncture.    Total time more than 12 minutes          Objective:     Vitals:    07/10/20 1330   Height: 5' (1.524 m)   PainSc:   8   PainLoc: Back                   This note has been dictated using voice recognition software. Any grammatical or context distortions are unintentional and inherent to the software

## 2021-06-09 NOTE — PROGRESS NOTES
Subjective:   Jacqueline Prado is a 76 y.o. female who presents for evaluation of pain. Patient was last seen 2/9/2017.      Major issues:  1. Pain    2. Rheumatoid arthritis    3. Back pain with left-sided sciatica    4. Lumbar Disc Degeneration        CC: Pain  See rooming evaluation    HPI:   Impact of pain treatments:   Analgesia: fair   ADL's: retired, on disability.  Able to participate in chores, able to socialize in a fashionable manner.    AE's: constipation managed with fiber, stool softener and senna.   Aberrant behavior: denies    Aggravating factors: sitting for too long, not able to move around, moving the wrong way, squatting too fast.  Alleviating factors: medications, ice, heat, elevated feet, and pacing  Associated symptoms: loss/grief (big brother)  DME: cane  Location/Laterality of the pain: back pain  Timing: constant  Quality: deep knot, throbbing, radiates back of left hip almost to the foot.  Severity:7/10 last OV 6/10    Activities Impaired by Increasing Pain Severity: F= 5  3-Enjoy  4-Work, Enjoy  5-Active, Mood Work Enjoy  6-Sleep, Active, Mood Work Enjoy  7-Walk, Sleep, Active, Mood Work Enjoy  8-Relate, Walk, Sleep, Active, Mood Work Enjoy      Medication: Patient is taking OxyContin 30 mg twice a day, Soma 350 mg three times a day.     She has taken lyrica; celebrex; cyclobenzaprine, baclofen (dizzy, confused sleepy).   Last opioid dose was Oxycodone last dose 3/27/17 @ 1100am.       Interventional: She will follow up with Dr. Stacy for ZENY injections    Rehabilitation: Will refer for PT    Integrative Approaches: Stay active    Records: Reviewed to prepare for today's visits and reflected throughout the note.    Diagnostics:   Lab:  Last UDS/SWAB on 09/16/2017 was reviewed and was appropriate.      Review of Systems pblm pert  Constitutional- - sleep disturbances, + activity intolerance  Musculoskeletal- + pain  Neuro- + cognitive changes, + radicular, + neuropathic  "symptoms  GI/-  + constipation, - urinary difficulty  Psych-  - mood disorders,  - taking medication in a fashion other than prescribed    Objective:     Vitals:    17 1555   BP: 146/85   Pulse: (!) 101   Resp: 14   Weight: 192 lb (87.1 kg)   Height: 5' 3\" (1.6 m)   PainSc:   7       Constitutional:  Pleasant and cooperative female who presents alone today.   Psychiatric: Mood and affect are appropriate for the situation, setting and topic of discussion.  Patient does not appear sedated.  Integumentary:  Observed skin WNL  HEENT: EOM's grossly intact.    Chest: Breathing is non-labored.   Neurological:  Alert and oriented in all spheres including: time, place, person and situation.  Durable Medical Equipment: cane    Assessment:   Jacqueline Prado is a 76 y.o. female seen in clinic today for chronic back pain.  She reports that her pain has increased since she recently travelled to her brothers .  I noticed in her chart she has history of missed appointments, i discussed with her about staying on the schedule and following up with recommendations.  I will only be refilling her scripts on appointment.  She still has not followed up on purchasing a TENS unit she is still waiting for her daughter.  This has been ongoing for several months.  She is resistant to participate in PT because previously it did not work and the only thing that has worked for her has been narcotics.  She did not follow through with medical cannabis because of the cost.  Her psychiatry has restarted her on Amitriptyline and she feels like it is helping with sleep. She continues to use SOMA and she reports that helps with muscle spasms.      Her older brother  recently and she attended the .  Now she is showing signs and symptoms of grief and loss.        Plan:   Plan/NextSteps:     Medication:   Medication prescribed today OxyContin 30 mg twice a day  for use 3/    REFILL INSTRUCTIONS:  Please contact the " clinic refill line 7 days before your refill is due. Speak clearly; note cell phones cut in-and-out and poor quality speech and reception issues will influence our ability to hear you and be efficient with your prescription.     Call [phone] leave:   Your name (first and last w/ spelling)   Date of birth  Name of all the medication(s) being requested  Dose of the medication(s)   How you are taking the medication (eg. twice per day etc).     Contact your pharmacy 3 (three) days after leaving your message to see if your prescription has been received. Please request the pharmacy check your profile to be certain about any concerns with a script failing to be received. Note: Char updates have been inconsistent.  If the script has not been received there may have been a problem with the communication please reach back out to the clinic.        Interventional: She will follow up with Dr. Stacy for ZENY injections    Rehabilitation: Will refer for PT    Integrative Approaches: Stay active    Health Maintenance: per primary care    Records: Reviewed to assist with preparation for the office visit and are reflected throughout the note.    Follow up: 1 month      Education: Please call Monday-Friday for problems or questions and one of the clinical support staff (CSS) will help to get things figured out. The number is [phone]. Some folks are using [employer] to send and e-mail. Please remember some issues require an office visit.     Reviewed the plan of care, provided justification and answered questions with the patient.     SAFETY REMINDERS  No alcohol while taking controlled substances. Alcohol is not an illegal substance, it is unsafe to use in combination. It is a build up of substances in the body that can be extremely hazardous and may cause respirations to slow to a dangerous rate resulting in hospitalization, brain damage, or death.    Opioid medications have been associated with sharp rise in  unintentional overdose and death.  Overdose is a condition characterized by the consumption in excess of a particular drug causing adverse effects. Symptoms of overdose include: breathing slow and shallow, erratic or not at all, pinpoint pupils, hallucinations, confusion, muscle jerks, slack muscles, extreme sleepiness or loss of alertness, awake but not able to talk, face pale or clammy, vomiting, for lighter skinned people, the skin tone turns bluish purple, for darker skinned people, it turns grayish or ashen. If in a situation where overdose is a concern engage the emergency response system (dial 911).    Do not sell, loan, borrow or share your opioid medication with anyone. Deaths have occurred as a result of this practice. It is illegal and patients are being prosecuted.       *Universal Precautions:   UDS/Swab- 09/16/2016   Consent-   Agreement- 2/9/2017  Pharmacy- as documented   - 3/27/2017  Count- n/a  Psychological evaluation n/a  Pharmacogenetic testing- n/a  MME- 90    Management of opioid medication is inherently a moderate to high complex medial interaction based on the risk management required at each contact r/t risks and side effects.    Patient Arrived @ 1550 for a 1540 appointment.     TT: 25 - min  CT: over half spent in education and counseling as outlined in the plan.      Nicole MG FNP-C  [address] [place] [address]  [facility]  [phone]  [phone]

## 2021-06-09 NOTE — PATIENT INSTRUCTIONS - HE
PLAN:  Continue with the OxyContin 40 mg in the morning and the oxycodone 10 mg 3 at bedtime.    Continue with the amitriptyline, Soma, as you are.    Discussed may consider acupuncture in the future when the coronavirus concerns have stabilized.    Follow-up with Dr. Ramirez in 3 months.

## 2021-06-09 NOTE — TELEPHONE ENCOUNTER
Central PA team  [phone]  Pool: HE PA MED ([id number])          PA has been initiated.       PA form completed and faxed insurance via Cover My Meds     Key:  [id number]     Medication:  OXYCONTIN 40MG ER    Insurance:  [employer]         Response will be received via fax and may take up to 5-10 business days depending on plan

## 2021-06-09 NOTE — TELEPHONE ENCOUNTER
Prior Authorization Request  Who s requesting:  Hemal Ramirez MD  Pharmacy Name and Location: [facility] [id number]  Medication Name: Oxycodone ER 40mg 1/day  Insurance Plan: Medicare  Insurance Member ID Number:  [id number]  Informed patient that prior authorizations can take up to 10 business days for response:   Yes; 72 hours  Okay to leave a detailed message: Yes

## 2021-06-10 NOTE — TELEPHONE ENCOUNTER
Refill request for oxycontin and oxycodone  Patient now has a follow up apt on 10/14  Last refill date of 7/22-8/19.  Requested Prescriptions     Pending Prescriptions Disp Refills     oxyCODONE (OXYCONTIN) 40 mg 12 hr tablet 28 tablet 0     Sig: Take 1 tablet (40 mg total) by mouth daily.     oxyCODONE (OXYCONTIN) 10 mg 12 hr tablet 84 each 0     Sig: Take 2-3 tablets (20-30 mg total) by mouth at bedtime. Two to three tabs bedtime     Signed Prescriptions Disp Refills     carisoprodoL (SOMA) 350 MG tablet 90 tablet 0     Sig: Take 1 tablet (350 mg total) by mouth 3 (three) times a day as needed for muscle spasms.     Authorizing Provider: LIZZY FOLEY

## 2021-06-10 NOTE — PROGRESS NOTES
[facility] Follow Up Note    Assessment/Plan:  1. Hip pain  Ongoing issues with hip and leg pain-under the care of the pain clinic.  Some frustrations there.  Recommendation: Proceed with physical therapy as was recommended.  Will write prescription for TENS unit and encourage her to follow through  - Ambulatory referral to Physical Therapy    2. Opioid Dependence With Continuous Use  Have discussed her interactions at the pain clinic.  Continues to feel as though providers are dismissive, distrusting and condescending.  On a positive note, some progress made in opioid dose reduction.  She is now on 30 mg twice daily.  Reassurance provided today that these are merely protocols.  She expresses understanding    3. Elevated BP  In improved with rest.  Of note, she continues to grieve the loss of her brother.  She is very sad about his passing    4.  Bone health  Would like her to follow through with bone densitometry and Prolia injections    Jordana Reynolds MD    Chief Complaint:  Chief Complaint   Patient presents with     Pain     Discuss Pain Doctors       History of Present Illness:  Jacqueline is a 76 y.o. female who is here today for discussion of a few issues that are bothering her.  First, she has long-standing chronic pain related to back issues, her connective tissue disorder etc.  She states she continues to deal with hip pain that extends into her leg.  She is working with the pain clinic and has been on opioid pain medications for approximately 30 years.  She expresses some frustration today as she feels the providers at the pain clinic are somewhat unkind and dismissive of her.  She states she feels as though they are accusing her being noncompliant.  She does report that she had to miss 2 appointments.  One was because she had neurovirus and did not want to leave her apartment.  The other was because her brother passed away and she had to attend his  in [place].  She is tearful when  discussing his death.  She does admit that she has been slow to follow through with physical therapy has these have never given her any sustained benefit.  Also, she states her daughter has been very busy and not able to help her acquire a TENS unit.  She will try to work harder to meet that goal.  Additionally, we did discuss that it would be reasonable to reduce opioid use consistent with her advancing age and decreased metabolism.  She is down to 30 mg of OxyContin twice daily which is a reduction of dose.  She understands that she may need to tolerate some bit of pain.  Of note, she continues to be social with family and friends.  She drives a car.  She performs activities of daily living        Review of Systems:  A comprehensive review of systems was performed and was otherwise negative    PFSH:  Social History: .  She lives independently.  She has an adult daughter in the area  History   Smoking Status     Current Every Day Smoker     Packs/day: 0.50     Types: Cigarettes   Smokeless Tobacco     Not on file       Past History: Significant for chronic pain  Current Outpatient Prescriptions   Medication Sig Dispense Refill     adalimumab (HUMIRA) 40 mg/0.8 mL injection Use 40 mg As Directed every 14 (fourteen) days.        albuterol (PROVENTIL HFA;VENTOLIN HFA) 90 mcg/actuation inhaler Inhale 2 puffs every 4 (four) hours as needed for wheezing. And cough 1 Inhaler 0     amitriptyline (ELAVIL) 50 MG tablet Take 1 tablet (50 mg total) by mouth bedtime as needed for sleep. 30 tablet 0     aspirin 81 mg chewable tablet Chew 81 mg daily.       b complex vitamins tablet Take 1 tablet by mouth daily.       CALCIUM CARBONATE/VITAMIN D3 (CALCIUM+D ORAL) Take 3 tablets by mouth daily.       carisoprodol (SOMA) 350 MG tablet TAKE 1 TABLET BY MOUTH THREE TIMES DAILY. 90 tablet 0     cyanocobalamin, vitamin B-12, (VITAMIN B-12) 500 mcg TbER Take 1 capsule by mouth daily.       denosumab 60 mg/mL Syrg Inject 60 mg  "under the skin every 6 (six) months.       DOCOSAHEXANOIC ACID/EPA (FISH OIL ORAL) Take 1 tablet by mouth daily.       ERGOCALCIFEROL, VITAMIN D2, (VITAMIN D2 ORAL) Take 1 tablet by mouth daily.       folic acid (FOLVITE) 1 MG tablet Use As Directed.       isosorbide mononitrate (ISMO,MONOKET) 10 MG tablet TAKE 1 TABLET BY MOUTH TWICE DAILY 180 tablet 1     levothyroxine (SYNTHROID, LEVOTHROID) 75 MCG tablet TAKE 1 TABLET DAILY AT 6:00 A.M. 90 tablet 3     lidocaine (LIDODERM) 5 % Up to 3 patches, 12 hours on, 12 hours off 90 patch 0     lisinopril (PRINIVIL,ZESTRIL) 5 MG tablet        methotrexate 2.5 MG tablet        mometasone (NASONEX) 50 mcg/actuation nasal spray SHAKE WELL AND U 2 SPRAYS IEN QD       mometasone (NASONEX) 50 mcg/actuation nasal spray SHAKE WELL AND USE 2 SPRAYS IN EACH NOSTRIL EVERY DAY 17 g 5     multivitamin capsule Take 1 capsule by mouth daily.       NASONEX 50 mcg/actuation nasal spray        oxyCODONE (OXYCONTIN) 30 mg 12 hr tablet Take 1 tablet (30 mg total) by mouth every 12 (twelve) hours. 56 tablet 0     POLYMYXIN B SULF/TRIMETHOPRIM (POLYMYXIN B SUL-TRIMETHOPRIM OPHT) Use As Directed.       sucralfate (CARAFATE) 100 mg/mL suspension Use As Directed.       viscous lidocaine HC (LIDOCAINE) 2 % Soln viscous solution Use As Directed.       No current facility-administered medications for this visit.        Family History: Nothing new    Physical Exam:  General Appearance:   Initial blood pressure is elevated.  She is feeling upset about her up and coming pain appointment and the death of her brother  Vitals:    04/18/17 1444 04/18/17 1521   BP: 162/78 146/72   Patient Site: Left Arm    Patient Position: Sitting    Cuff Size: Adult Regular    Pulse: (!) 56    Weight: 180 lb (81.6 kg)    Height: 5' 2\" (1.575 m)      Wt Readings from Last 3 Encounters:   04/18/17 180 lb (81.6 kg)   03/27/17 192 lb (87.1 kg)   02/09/17 184 lb (83.5 kg)     Body mass index is 32.92 kg/(m^2).        Data " Review:    Analysis and Summary of Old Records (2): Reviewed pain clinic records    Records Requested (1): 0      Other History Summarized (from other people in the room) (2): 0    Radiology Tests Summarized (XRAY/CT/MRI/DXA) (1): 0    Labs Reviewed (1): 0    Medicine Tests Reviewed (EKG/ECHO/COLONOSCOPY/EGD) (1): 0    Independent Review of EKG or X-RAY (2): 0

## 2021-06-10 NOTE — PROGRESS NOTES
Subjective:   Jacqueline Prado is a 77 y.o. female who presents for evaluation of pain. Patient was last seen 04/24/2017.      Major issues:  1. Chronic pain syndrome    2. Back pain with left-sided sciatica    3. Lumbar Disc Degeneration    4. Postlaminectomy Syndrome (Lumbar)        CC: Pain  See rooming evaluation    HPI:   Impact of pain treatments:   Analgesia: poor   ADL's: Work: she is on disability. Household: She is not able to participate in chores. Social: Not able to socialize in a fashionable manner.   AE's: constipation (managed with fiber, stool softner and senna)  Aberrant behavior: denies    Aggravating factors: bending, cleaning, sitting for too long, deriving  Alleviating factors: medications, hot and cold pack, lidoderm patches  Associated symptoms: increased pain  DME: none  Location/Laterality of the pain: back pain  Timing: constant  Quality: tight, throbbing, travels down the leg and foot.  Severity:8/10 last OV 7/10    Activities Impaired by Increasing Pain Severity: F= 8  3-Enjoy  4-Work, Enjoy  5-Active, Mood Work Enjoy  6-Sleep, Active, Mood Work Enjoy  7-Walk, Sleep, Active, Mood Work Enjoy  8-Relate, Walk, Sleep, Active, Mood Work Enjoy      Medication: Patient is taking OxyContin 30 mg twice a day, Soma 350 mg three times a day.      Last opioid dose was Oxycontin 20mg last taken on 05/16/2017 @ 1200p.       Interventional: She will have injections done when her daughter is able to drive her.      Rehabilitation: Has an order for PT she will follow up for an appointment.  Juanita Masters.  [address] [place]. Ph # [phone].    Integrative Approaches: Stay active    Records: Reviewed to prepare for today's visits and reflected throughout the note.    Additional Problems: increased pain    Diagnostics:   Lab:  Last UDS/SWAB on 09/16/2016 was reviewed and was appropriate.      Review of Systems pblm pert  Constitutional- - sleep disturbances, + activity  "intolerance  Musculoskeletal- + pain  Neuro- + cognitive changes, + radicular, + neuropathic symptoms  GI/-  - constipation (managed with senna, fiber, and stool softner) - urinary difficulty  Psych-  - mood disorders,  - taking medication in a fashion other than prescribed    Objective:     Vitals:    05/16/17 1313   BP: 123/75   Pulse: 91   Resp: 16   Weight: 180 lb (81.6 kg)   Height: 5' 2.5\" (1.588 m)   PainSc:   8       Constitutional:  Pleasant and cooperative female who presents alone today.   Psychiatric: Mood and affect are appropriate for the situation, setting and topic of discussion.  Patient does not appear sedated.  Integumentary:  Observed skin WNL  HEENT: EOM's grossly intact.    Chest: Breathing is non-labored.   Neurological:  Alert and oriented in all spheres including: time, place, person and situation.  Durable Medical Equipment: none    Assessment:   Jacqueline Prado is a 77 y.o. female seen in clinic today for chronic back pain.  Today she reports that today her pain is worse.  She said she noticed that since her medication changes she is in increased pain.  She reports that when she was taking 30 mg she was more active.  She realized that 20 mg she feels more pain and she spent most of her time laying down.  She has decreased activities, due to back pain, not able to clean up due to increased pain.     She is currently waiting for PT  To schedule an appointment.      Today I will change her medications back to OxyContin 30 mg 2 times a day and follow up with the patient in 1 month.      Plan:   Plan/NextSteps:     Medication:   Medication prescribed today OxyContin 30 mg 2 times a day.     REFILL INSTRUCTIONS:  Please contact the clinic refill line 7 days before your refill is due. Speak clearly; note cell phones cut in-and-out and poor quality speech and reception issues will influence our ability to hear you and be efficient with your prescription.     Call [phone] leave:   Your name " (first and last w/ spelling)   Date of birth  Name of all the medication(s) being requested  Dose of the medication(s)   How you are taking the medication (eg. twice per day etc).     Contact your pharmacy 3 (three) days after leaving your message to see if your prescription has been received. Please request the pharmacy check your profile to be certain about any concerns with a script failing to be received. Note: Char updates have been inconsistent.  If the script has not been received there may have been a problem with the communication please reach back out to the clinic.       Interventional: She will schedule an appointment with Dr. Stacy once she has a .    Rehabilitation: Has an order for PT she will follow up for an appointment.  Juanita Masters.  [address] [place] [address]. Ph # [phone].    Integrative Approaches: Stay active     Health Maintenance: per primary care    Records: Reviewed to assist with preparation for the office visit and are reflected throughout the note.    Follow up: 1 month      Education: Please call Monday-Friday for problems or questions and one of the clinical support staff (CSS) will help to get things figured out. The number is [phone]. Some folks are using [employer] to send and e-mail. Please remember some issues require an office visit.     Reviewed the plan of care, provided justification and answered questions with the patient.     SAFETY REMINDERS  No alcohol while taking controlled substances. Alcohol is not an illegal substance, it is unsafe to use in combination. It is a build up of substances in the body that can be extremely hazardous and may cause respirations to slow to a dangerous rate resulting in hospitalization, brain damage, or death.    Opioid medications have been associated with sharp rise in unintentional overdose and death.  Overdose is a condition characterized by the consumption in excess of a particular drug causing adverse  effects. Symptoms of overdose include: breathing slow and shallow, erratic or not at all, pinpoint pupils, hallucinations, confusion, muscle jerks, slack muscles, extreme sleepiness or loss of alertness, awake but not able to talk, face pale or clammy, vomiting, for lighter skinned people, the skin tone turns bluish purple, for darker skinned people, it turns grayish or ashen. If in a situation where overdose is a concern engage the emergency response system (dial 911).    Do not sell, loan, borrow or share your opioid medication with anyone. Deaths have occurred as a result of this practice. It is illegal and patients are being prosecuted.       *Universal Precautions:   UDS/Swab- 09/16/2016   Consent-   Agreement- 2/9/2017  Pharmacy- as documented   - 05/16/2017 reviewed, ok  Count- n/a  Psychological evaluation n/a  Pharmacogenetic testing- n/a  MME- 90    Management of opioid medication is inherently a moderate to high complex medial interaction based on the risk management required at each contact r/t risks and side effects.    Patient Arrived @ 1305 for a 1300 appointment.     TT: 25 - min  CT: over half spent in education and counseling as outlined in the plan.      Nicole MG FNP-C  [address] [place] [address]  [facility]  [phone]  [phone]

## 2021-06-10 NOTE — PROGRESS NOTES
Subjective:   Jacqueline Prado is a 76 y.o. female who presents for evaluation of pain. Patient was last seen 03/27/2017.      Major issues:  1. Postlaminectomy Syndrome (Lumbar)    2. Pain    3. Rheumatoid arthritis    4. Back pain with left-sided sciatica    5. Lumbar Disc Degeneration        CC: Pain  See rooming evaluation    HPI:   Impact of pain treatments:   Analgesia: poor   ADL's: She is retired on disability, not able to participate in chores, not able to participate in a fashionable manner.    AE's: Constipation managed with OTC medication.   Aberrant behavior: denies    Aggravating factors: sitting too long, moving around, doing anything too fast.  Alleviating factors: medications  Associated symptoms: increased pain  DME: cane  Location/Laterality of the pain: back pain  Timing: constant  Quality: throbbing, sharp  Severity:7/10 last OV 6/10    Activities Impaired by Increasing Pain Severity: F= 7  3-Enjoy  4-Work, Enjoy  5-Active, Mood Work Enjoy  6-Sleep, Active, Mood Work Enjoy  7-Walk, Sleep, Active, Mood Work Enjoy  8-Relate, Walk, Sleep, Active, Mood Work Enjoy      Medication:  Patient is taking OxyContin 30 mg twice a day, Soma 350 mg three times a day.        Last opioid dose was oxycodone last dose- 4/24/17 @ 900am.       Interventional: She will follow up with Dr. Stacy for ZENY injections    Rehabilitation: Will refer for PT     Integrative Approaches: Stay active    Records: Reviewed to prepare for today's visits and reflected throughout the note     Additional Problems: Increased pain.    Diagnostics:   Lab:  Last UDS/SWAB on 09/16/2017 was reviewed and was appropriate.      Review of Systems pblm pert  Constitutional- + sleep disturbances, + activity intolerance  Musculoskeletal- + pain  Neuro- + cognitive changes, + radicular, + neuropathic symptoms  GI/-  + constipation, + urinary difficulty  Psych-  - mood disorders,  -taking medication in a fashion other than  "prescribed    Objective:     Vitals:    04/24/17 0906   BP: (!) 159/95   Pulse: 91   Resp: 12   Weight: 180 lb (81.6 kg)   Height: 5' 2.5\" (1.588 m)   PainSc:   7       Constitutional:  Pleasant and cooperative female who presents with her daughter Alyssa and her son in law Dr. Curiel today.   Psychiatric: Mood and affect are appropriate for the situation, setting and topic of discussion.  Patient does not appear sedated.  Integumentary:  Observed skin WNL  HEENT: EOM's grossly intact.    Chest: Breathing is non-labored.   Neurological:  Alert and oriented in all spheres including: time, place, person and situation.  Durable Medical Equipment: none    Assessment:   Jacqueline Prado is a 76 y.o. female seen in clinic today for chronic pain and opioid dependence.  Today she presents with her son inlalisha  Veena and her daughter Alyssa.  According to Alyssa, she has noticed her mom decrease of function compared to when she was taking Oxycontin 40 mg.  Discussed this with Jacqueline and she says she has not given the pain clinic a clear picture because she is afraid to be cut off her medications.    Last time Alyssa came to an appointment with [facility], Dr. Ramirez had talked to them about TENS unit.  Alyssa was going to do some research on it ans lennie if she can obtain one for her mom.  Since then she has not had time.  Today we talked about going to PT and they might order through them if not I can see if I will be able to order one for her.      We discussed about physical therapy.  She was worried that she will need to do manual exercise and she is afraid she has popped her ribs a few times just from a simple urn or twist.  She is interested in myofascial release.  I will send over her order to [facility] in [place].      For injections she is not sure.  She will like to hold off for a while.  She understands that it is recommended, it is part of treatment but it is not going to impact our plan of care but it will be nice " to know it will offer some pain relief benefits or not.  Her understanding was that if she does not participate she will be cut off her narcotics.  She is on a monthly appointments and prescription refill because I feel like she needs closer follow up.  She was concerned that I was not trusting her and I explained to her it is because i feel like she presents differently than she feels.  We will try to work together to give her better results.  She says it is ok to call her daughter if i need to reach out with concerns or questions.      Her medications her OxyContin she reports it is assistive but she feels like it does not last long enough.  I made some changes and ordered OxyContin 20 mg TID from OxyContin 30 mg 2 times a day.  She is sure we maintained same MME.  Continue with Soma 350 mg. I will follow up with her in 4 weeks for further evaluation.      I will also like her to make an appointment with the MTM and also with the next visit i will order pharmacogenetic testing.      Plan:   Plan/NextSteps:     Medication:   Medication prescribed today OxyContin 20 mg three times a day, Soma 350 mg to fill 5/5/2017    REFILL INSTRUCTIONS:  Please contact the clinic refill line 7 days before your refill is due. Speak clearly; note cell phones cut in-and-out and poor quality speech and reception issues will influence our ability to hear you and be efficient with your prescription.     Call [phone] leave:   Your name (first and last w/ spelling)   Date of birth  Name of all the medication(s) being requested  Dose of the medication(s)   How you are taking the medication (eg. twice per day etc).     Contact your pharmacy 3 (three) days after leaving your message to see if your prescription has been received. Please request the pharmacy check your profile to be certain about any concerns with a script failing to be received. Note: Char updates have been inconsistent.  If the script has not been received there may  have been a problem with the communication please reach back out to the clinic.       Rehabilitation: Will look for PT close to house and send referral    Integrative Approaches: Stay active     Health Maintenance: per primary care    Records: Reviewed to assist with preparation for the office visit and are reflected throughout the note.    Follow up: 1 month      Education: Please call Monday-Friday for problems or questions and one of the clinical support staff (CSS) will help to get things figured out. The number is [phone]. Some folks are using [employer] to send and e-mail. Please remember some issues require an office visit.     Reviewed the plan of care, provided justification and answered questions with the patient.     SAFETY REMINDERS  No alcohol while taking controlled substances. Alcohol is not an illegal substance, it is unsafe to use in combination. It is a build up of substances in the body that can be extremely hazardous and may cause respirations to slow to a dangerous rate resulting in hospitalization, brain damage, or death.    Opioid medications have been associated with sharp rise in unintentional overdose and death.  Overdose is a condition characterized by the consumption in excess of a particular drug causing adverse effects. Symptoms of overdose include: breathing slow and shallow, erratic or not at all, pinpoint pupils, hallucinations, confusion, muscle jerks, slack muscles, extreme sleepiness or loss of alertness, awake but not able to talk, face pale or clammy, vomiting, for lighter skinned people, the skin tone turns bluish purple, for darker skinned people, it turns grayish or ashen. If in a situation where overdose is a concern engage the emergency response system (dial 911).    Do not sell, loan, borrow or share your opioid medication with anyone. Deaths have occurred as a result of this practice. It is illegal and patients are being prosecuted.       *Universal Precautions:    UDS/Swab- 09/16/2016   Consent-   Agreement- 2/9/2017  Pharmacy- as documented   - 4/24/2017  Count- n/a  Psychological evaluation n/a  Pharmacogenetic testing- n/a  MME- 90    Management of opioid medication is inherently a moderate to high complex medial interaction based on the risk management required at each contact r/t risks and side effects.    Patient Arrived @ 0857 for a 0900 appointment.     TT: 40 - min  CT: over half spent in education and counseling as outlined in the plan.      Nicole Robb APRN FNP-C  [address] [place] [address]  [facility]  [phone]  [phone]

## 2021-06-10 NOTE — TELEPHONE ENCOUNTER
Patient left message for refills of Oxycontin 10 and 40mg. Spoke with pharmacist. Last picked up both of these on 7/23 for 28 days. Too early for fill.

## 2021-06-10 NOTE — TELEPHONE ENCOUNTER
Medication being requested: soma  Last visit date: 7/10 Provider: hina  Next visit date: none Provider: hian  Expected follow up: 8 weeks  MTM visit (Pain Center) date: no  Pertinent between visit information about requested medication (telephone, mychart, prior authorization, concerns): no  Last date prescribed: 7/10  Provider responsible: hina  Spoke with patient: no  Script being sent to provider - dates and quantity:   Requested Prescriptions     Pending Prescriptions Disp Refills     carisoprodoL (SOMA) 350 MG tablet 90 tablet 0     Sig: Take 1 tablet (350 mg total) by mouth 3 (three) times a day as needed for muscle spasms.     Pharmacy cued: no

## 2021-06-10 NOTE — TELEPHONE ENCOUNTER
Refill request: amitriptyline 25 mg  Requested Prescriptions     Pending Prescriptions Disp Refills     amitriptyline (ELAVIL) 25 MG tablet 60 tablet 2     Sig: One to two tabs bedtime     Signed Prescriptions Disp Refills     carisoprodoL (SOMA) 350 MG tablet 90 tablet 0     Sig: Take 1 tablet (350 mg total) by mouth 3 (three) times a day as needed for muscle spasms.     Authorizing Provider: LIZZY FOLEY     oxyCODONE (OXYCONTIN) 40 mg 12 hr tablet 28 tablet 0     Sig: Take 1 tablet (40 mg total) by mouth daily.     Authorizing Provider: LIZZY FOLEY     oxyCODONE (OXYCONTIN) 10 mg 12 hr tablet 84 each 0     Sig: Take 2-3 tablets (20-30 mg total) by mouth at bedtime. Two to three tabs bedtime     Authorizing Provider: LIZZY FOLEY

## 2021-06-10 NOTE — PROGRESS NOTES
Medication Therapy Management - [facility]       ASSESSMENT AND PLAN    Chronic Pain Syndrome: uncontrolled. It is reasonable to change oxycontin dose back to 30 mg BID (same total daily dose) as she found this to be more effective. I agree with utilizing the lowest dose of opioid possible to manage pain due to her age and fall risk.   -Agree with changing oxycontin back to 30 mg BID  -Advised patient to continue using Lidoderm patches  -Recommend discontinuation of amitriptyline due to anticholinergic effects and increased fall risk in the elderly. Ideally would not be on a TCA, however, it is reasonable to continue with a safer option due to efficacy. She cannot take nortriptyline (for reasons she cannot remember). Therefore we will change to doxepin 25 mg at bedtime with titration as tolerated (less anticholinergic with similar pain efficacy)  -Consider opioid augmentation strategies at follow up visit    Medication Management: all medications evaluated and reconciled in Epic    FOLLOW-UP PLAN  Jacqueline was advised to follow up in 1 month.        SUBJECTIVE AND OBJECTIVE  Jacqueline Prado is a 77 y.o. female who was referred by Yana Robb CNP for [facility] services.  Jacqueline's chief concern today is medication management.     Pain History: 33 years ago underwent a lumbar laminectomy and fusion. Pain has been managed with medications ever since. Patient states that her pain was previously well controlled on OxyContin 40 mg twice daily.  She has been tapering her OxyContin due to concerns from her son-in-law and daughter related to decreased function.  Patient states that she does not believe she had any difficulties with this dose.  Regardless, she has been tapered and is currently taking 20 mg 3 times daily.  She does not feel that this is effective for her pain, and requests to go back to 30 mg twice daily.  She does admit that in the past she had occasionally felt fuzzy from her medications,  "but it did not happen often.  Location: lower back pain that travels down her left leg  Description of pain: constant, achy and tight. Spasm daily in the lower back  Pain Score: 9/10  Functional Score: 7  Aggravating Factors: working/cleaning, extension of arms, sitting for a long time, twisting her back  Alleviating Factors: Pain medications, hot and cold pack  Current medication regimen: Oxycontin 20 mg TID, Soma 350 mg TID \"for years\", fish oil, methotrexate weekly - she is unsure of dose, amitriptyline 50 mg at bedtime - \"very helpful for sleep and pain\". Occasional OTC lidoderm patches with good success.   Previously tried medications: Several intolerances to medications, including tizanidine, baclofen, celecoxib, and APAP. She doesn't remember what happened when she took. She believes she has allergies to drug excipients, but cannot prove it. She is a poor medication historian - \"I've tried so many things for pain in the last 30 years, I can't remember anymore.     NSAIDS: celecoxib -  NSAIDs CI due to cardiac hx    Opioids: Morphine (never tried), Vicodin(doesn't remember)    Antidepressants: nortriptyline(took away appetite, would fall asleep standing up), Cymbalta(doesn't remember)    Gabapentinoids:Gabapentin(cannot remember), Lyrica(does not believe she has tried)    Topicals: Lidoderm patches(work so well). Lidocaine ointment - doesn't work - just soaks in to clothes    Supplements: none     Memantine - unknown    Muscle relaxants: Baclofen (nausea), tizanidine (unknown)    Epidurals - mke better for relatively short time    RA agents: Humira - states her RA is in remission - Humira is help per rheum  Non-pharmacological treatments:     Physical Therapy: Has been recommended    Chiropractic: never tried    Acupuncture: Never tried    Medication and deep breathing are helpful    Tens    Social History     Social History     Marital status:      Spouse name: N/A     Number of children: N/A     Years " of education: N/A     Occupational History     Not on file.     Social History Main Topics     Smoking status: Current Every Day Smoker     Packs/day: 0.50     Types: Cigarettes     Smokeless tobacco: Not on file     Alcohol use Not on file     Drug use: Not on file     Sexual activity: Not on file     Other Topics Concern     Not on file     Social History Narrative       Current Outpatient Prescriptions   Medication Sig Dispense Refill     loratadine (CLARITIN) 10 mg tablet Take 10 mg by mouth daily.       omeprazole (PRILOSEC) 20 MG capsule Take 20 mg by mouth Daily before breakfast.       albuterol (PROVENTIL HFA;VENTOLIN HFA) 90 mcg/actuation inhaler Inhale 2 puffs every 4 (four) hours as needed for wheezing. And cough 1 Inhaler 0     amitriptyline (ELAVIL) 50 MG tablet Take 1 tablet (50 mg total) by mouth bedtime as needed for sleep. 30 tablet 0     aspirin 81 mg chewable tablet Chew 81 mg daily.       b complex vitamins tablet Take 1 tablet by mouth daily.       CALCIUM CARBONATE/VITAMIN D3 (CALCIUM+D ORAL) Take 3 tablets by mouth daily.       [START ON 5/31/2017] carisoprodol (SOMA) 350 MG tablet TAKE 1 TABLET BY MOUTH THREE TIMES DAILY. 90 tablet 0     cyanocobalamin, vitamin B-12, (VITAMIN B-12) 500 mcg TbER Take 1 capsule by mouth daily.       denosumab 60 mg/mL Syrg Inject 60 mg under the skin every 6 (six) months.       DOCOSAHEXANOIC ACID/EPA (FISH OIL ORAL) Take 1 tablet by mouth daily.       doxepin (SINEQUAN) 25 MG capsule Take 1 capsule (25 mg total) by mouth at bedtime. 30 capsule 0     ERGOCALCIFEROL, VITAMIN D2, (VITAMIN D2 ORAL) Take 1 tablet by mouth daily.       folic acid (FOLVITE) 1 MG tablet Use As Directed.       isosorbide mononitrate (ISMO,MONOKET) 10 MG tablet TAKE 1 TABLET BY MOUTH TWICE DAILY 180 tablet 1     levothyroxine (SYNTHROID, LEVOTHROID) 75 MCG tablet TAKE 1 TABLET DAILY AT 6:00 A.M. 90 tablet 3     lisinopril (PRINIVIL,ZESTRIL) 5 MG tablet        methotrexate 2.5 MG tablet  once a week. As directed       mometasone (NASONEX) 50 mcg/actuation nasal spray SHAKE WELL AND USE 2 SPRAYS IN EACH NOSTRIL EVERY DAY 17 g 5     multivitamin capsule Take 1 capsule by mouth daily.       oxyCODONE (OXYCONTIN) 20 mg 12 hr tablet Take 1 tablet (20 mg total) by mouth every 8 (eight) hours as needed for pain. 84 tablet 0     POLYMYXIN B SULF/TRIMETHOPRIM (POLYMYXIN B SUL-TRIMETHOPRIM OPHT) Use As Directed.       sucralfate (CARAFATE) 100 mg/mL suspension Use As Directed.       No current facility-administered medications for this encounter.        We reviewed Jacqueline's medication list with them, discussing reason for use, directions for use, and potential side effects of each medication.  Indication, safety, efficacy, and convenience was assessed for all medications.     Jacqueline was provided with a printed AVS, and this care plan was communicated via EMR with Yana Robb CNP and is the authorizing prescriber for this visit.  Direct supervision was available by either the patient's PCP or another available physician when needed.    Time spent: 45 minutes    Nicole Duval, PharmD  MTM Pharmacist at [facility]

## 2021-06-11 ENCOUNTER — HOSPITAL ENCOUNTER (OUTPATIENT)
Dept: PALLIATIVE MEDICINE | Facility: OTHER | Age: 81
Discharge: HOME | End: 2021-06-11
Attending: ANESTHESIOLOGY

## 2021-06-11 DIAGNOSIS — M54.32 BACK PAIN WITH LEFT-SIDED SCIATICA: ICD-10-CM

## 2021-06-11 DIAGNOSIS — G89.4 CHRONIC PAIN SYNDROME: ICD-10-CM

## 2021-06-11 DIAGNOSIS — M06.9 RHEUMATOID ARTHRITIS (H): ICD-10-CM

## 2021-06-11 RX ORDER — CARISOPRODOL 350 MG/1
350 TABLET ORAL 3 TIMES DAILY PRN
Qty: 90 TABLET | Refills: 0 | Status: SHIPPED | OUTPATIENT
Start: 2021-07-09 | End: 2021-08-20

## 2021-06-11 RX ORDER — OXYCODONE HCL 10 MG/1
20-30 TABLET, FILM COATED, EXTENDED RELEASE ORAL AT BEDTIME
Qty: 24 EACH | Refills: 0 | Status: SHIPPED | OUTPATIENT
Start: 2021-06-11 | End: 2021-07-12

## 2021-06-11 RX ORDER — OXYCODONE HCL 40 MG/1
40 TABLET, FILM COATED, EXTENDED RELEASE ORAL
Qty: 28 TABLET | Refills: 0 | Status: SHIPPED | OUTPATIENT
Start: 2021-06-17 | End: 2021-07-12

## 2021-06-11 ASSESSMENT — MIFFLIN-ST. JEOR: SCORE: 1117.46

## 2021-06-11 NOTE — TELEPHONE ENCOUNTER
Adding oxycontin 40 mg   No changes since last template completed on 9/14 8/19-28 days    Requested Prescriptions     Pending Prescriptions Disp Refills     oxyCODONE (OXYCONTIN) 40 mg 12 hr tablet 28 tablet 0     Sig: Take 1 tablet (40 mg total) by mouth daily.     Signed Prescriptions Disp Refills     carisoprodoL (SOMA) 350 MG tablet 90 tablet 0     Sig: Take 1 tablet (350 mg total) by mouth 3 (three) times a day as needed for muscle spasms.     Authorizing Provider: LIZZY FOLEY

## 2021-06-11 NOTE — PATIENT INSTRUCTIONS - HE
"Continue compression and exercises.  Continue to update compression stockings regularly.      Follow up in 1 year, or when needed.    Call the clinic at [phone] with any concerns.        Swelling in the legs can be caused by many reasons. No matter what the reason, treatment usually includes some type of compression. You should wear your compression socks as much as you can. Your compression should be put on first thing in the morning. Take the compression off at night. It is especially important to wear them with long periods of sitting/standing, long car rides or if you will be flying. Going without compression for even brief periods of time can be damaging to your legs and your health.  Compression socks should get replaced usually every 4-6 months. They do not need to be worn at night while in bed. If we have seen you in the last year, we can refill your sock prescription, otherwise your primary care provider is able to refill them for you. Call us with any problems or questions.   Compression Velcro may need to be replaced every 9-12 months.  Often the liners and socks need to be replaced every three or four months.  The neoprene velcro may last up to 2 years.  If the velcro becomes worn a tailor may be able to repair it for you inexpensively.    If you do a lot of standing, it is good to do calf raises to help keep the blood pumping. If you sit a lot at work, it is good to get up periodically to walk around. Elevation of the foot of your bed 4-6\" helps the blood return back to where it is needed.   Please call us if you have any questions [phone]    Thank you for choosing [facility].  "

## 2021-06-11 NOTE — TELEPHONE ENCOUNTER
Medication being requested: Oxycontin 10mg and 40mg (not due until 10/15/20)  Last visit date: 7/10/20.  Provider: BE  Next visit date: 10/14/20.  Provider: ROXANA  Expected follow up: 3 months  MTM visit (Pain Center) date: No  UDT date: 3/2020  Agreement date: 3/2020   (Last fill date; name; strength; provider; MME; quantity):  09/17/2020 Oxycontin Er 40 Mg Tablet Qty: 28.00 for 28 days  08/21/2020 Oxycontin Er 10 Mg Tablet Qty: 84.00 for 28 days  Pertinent between visit information about requested medication (telephone, mychart, prior authorization, concerns, comments): No  Script being sent to provider by nurse- dates and quantity:   Requested Prescriptions     Pending Prescriptions Disp Refills     oxyCODONE (OXYCONTIN) 10 mg 12 hr tablet 84 each 0     Sig: Take 2-3 tablets (20-30 mg total) by mouth at bedtime. Two to three tabs bedtime     Pharmacy cued: [facility]  Standing orders for withdrawal protocol implemented: SEBASTIAN

## 2021-06-11 NOTE — PROGRESS NOTES
Subjective:   Jacqueline Prado is a 77 y.o. female who presents for evaluation of pain. Patient was last seen 05/16/2017.      Major issues:  1. Lumbar Disc Degeneration    2. Chronic pain syndrome        CC: Pain  See rooming evaluation    HPI:   Impact of pain treatments:   Analgesia: poor   ADL's: Work: she is on disability. Household: She is not able to participate in chores. Social: Not able to socialize in a fashionable manner.   AE's: constipation (managed with fiber, stool softner and senna)  Aberrant behavior: denies     Aggravating factors: bending, cleaning, sitting for too long, deriving  Alleviating factors: medications, hot and cold pack, lidoderm patches  Associated symptoms: increased pain  DME: none  Location/Laterality of the pain: back pain  Timing: constant  Quality: throbbing, radiates down to leg  Severity:8/10    Activities Impaired by Increasing Pain Severity: F= 7  3-Enjoy  4-Work, Enjoy  5-Active, Mood Work Enjoy  6-Sleep, Active, Mood Work Enjoy  7-Walk, Sleep, Active, Mood Work Enjoy  8-Relate, Walk, Sleep, Active, Mood Work Enjoy      Medication: Patient is taking OxyContin 30 mg twice a day, Soma 350 mg three times a day.     Last opioid dose was Oxycontin 30mg last dose- 6/15/17 @ 1100am.       Interventional: She will have injections done when her daughter is able to drive her.    Rehabilitation: Has an order for PT she will follow up for an appointment.  Juanita Masters.  [address] [place]. Ph # [phone].     Integrative Approaches: Stay active    Records: Reviewed to prepare for today's visits and reflected throughout the note.     Additional Problems:  increased pain    Diagnostics:   Lab:  Last UDS/SWAB on 09/16/2016 was reviewed and was appropriate.      Review of Systems   Constitutional- + sleep disturbances, + activity intolerance  Musculoskeletal- + pain  Neuro- + cognitive changes, + radicular, + neuropathic symptoms  GI/-  + constipation, -  "urinary difficulty  Psych-  - mood disorders,  - taking medication in a fashion other than prescribed    Objective:     Vitals:    06/15/17 1408   BP: 130/82   Pulse: (!) 114   Resp: 16   Weight: 175 lb 1.6 oz (79.4 kg)   Height: 5' 2.5\" (1.588 m)   PainSc:   8       Constitutional:  Pleasant and cooperative female who presents alone today.   Psychiatric: Mood and affect are appropriate for the situation, setting and topic of discussion.  Patient does not appear sedated.  Integumentary:  Observed skin WNL  HEENT: EOM's grossly intact.    Chest: Breathing is non-labored.   Neurological:  Alert and oriented in all spheres including: time, place, person and situation.  Durable Medical Equipment: none    Assessment:   Jacqueline Prado is a 77 y.o. female seen in clinic today for chronic low back pain.  She reports that since she increased her OxyContin back to 30 mg she has some degree of function and she is able to participate in some chores.  She still noticed that is is not as good as when she was taking 40 mg of OxyContin.  She wishes to go back to Oxyconti 40 mg.  She continues to experience sharp pain in the right hip and down the leg, she feels big throbbing nod and sciatic nerve.  She has a lot of discomfort from sitting or standing for extended periods of time.      She also reports that since her medications were changed fromamitriptyline to Doxipen she noticed she was getting less and less sleep and she remember some days when she was awake for 2 nights 20 hrs awake, 23 hours awake while taking Doxipen.  She is back to takingamitriptyline and she gets about 6 or 7 hours of sleep plus a nap.        Plan:   Plan/NextSteps:     Medication:   Medication prescribed today OxyContin 30 mg 2 times a day to fill 06/15/17.      REFILL INSTRUCTIONS:  Please contact the clinic refill line 7 days before your refill is due. Speak clearly; note cell phones cut in-and-out and poor quality speech and reception issues will " influence our ability to hear you and be efficient with your prescription.     Call [phone] leave:   Your name (first and last w/ spelling)   Date of birth  Name of all the medication(s) being requested  Dose of the medication(s)   How you are taking the medication (eg. twice per day etc).     Contact your pharmacy 3 (three) days after leaving your message to see if your prescription has been received. Please request the pharmacy check your profile to be certain about any concerns with a script failing to be received. Note: Char updates have been inconsistent.  If the script has not been received there may have been a problem with the communication please reach back out to the clinic.       Interventional: She will schedule an appointment with Dr. Stacy once she has a .     Rehabilitation: Has an order for PT she will call and schedule an appointment.  Juanita Masters.  [address] [place].  # [phone].     Integrative Approaches: Stay active     Health Maintenance: per primary care     Records: Reviewed to assist with preparation for the office visit and are reflected throughout the note.     Follow up: with Dr. Tracy        Education: Please call Monday-Friday for problems or questions and one of the clinical support staff (CSS) will help to get things figured out. The number is [phone]. Some folks are using [employer] to send and e-mail. Please remember some issues require an office visit.     Reviewed the plan of care, provided justification and answered questions with the patient.     SAFETY REMINDERS  No alcohol while taking controlled substances. Alcohol is not an illegal substance, it is unsafe to use in combination. It is a build up of substances in the body that can be extremely hazardous and may cause respirations to slow to a dangerous rate resulting in hospitalization, brain damage, or death.    Opioid medications have been associated with sharp rise in  unintentional overdose and death.  Overdose is a condition characterized by the consumption in excess of a particular drug causing adverse effects. Symptoms of overdose include: breathing slow and shallow, erratic or not at all, pinpoint pupils, hallucinations, confusion, muscle jerks, slack muscles, extreme sleepiness or loss of alertness, awake but not able to talk, face pale or clammy, vomiting, for lighter skinned people, the skin tone turns bluish purple, for darker skinned people, it turns grayish or ashen. If in a situation where overdose is a concern engage the emergency response system (dial 911).    Do not sell, loan, borrow or share your opioid medication with anyone. Deaths have occurred as a result of this practice. It is illegal and patients are being prosecuted.     *Universal Precautions:   UDS/Swab- 09/16/2016   Consent-   Agreement- 2/9/2017  Pharmacy- as documented   - 06/15/2017 reviewed, ok  Count- n/a  Psychological evaluation n/a  Pharmacogenetic testing- n/a  MME- 90    Management of opioid medication is inherently a moderate to high complex medial interaction based on the risk management required at each contact r/t risks and side effects.    TT: 25 - min  CT: over half spent in education and counseling as outlined in the plan.      Nicole MG FNP-C  [address] [place] [address]  [facility]  [phone]  [phone]

## 2021-06-11 NOTE — TELEPHONE ENCOUNTER
Medication being requested: Soma  Last visit date: 07/10.  Provider: BE  Next visit date: 10/14.  Provider: ROXANA  Expected follow up: 3 month  MTM visit (Pain Center) date: N/A  UDT date: 05/03/19  Agreement date: 05/03/19   (Last fill date; name; strength; provider; MME; quantity):  08/12/20 filled #90 for 30 days per -appropriate  Pertinent between visit information about requested medication (telephone, mychart, prior authorization, concerns, comments): None noted  Script being sent to provider by nurse- dates and quantity:   Requested Prescriptions     Pending Prescriptions Disp Refills     carisoprodoL (SOMA) 350 MG tablet 90 tablet 0     Sig: Take 1 tablet (350 mg total) by mouth 3 (three) times a day as needed for muscle spasms.     Pharmacy cued: [facility]  Standing orders for withdrawal protocol implemented: N/A

## 2021-06-11 NOTE — PROGRESS NOTES
"           Medication Therapy Management - [facility]       ASSESSMENT AND PLAN    Chronic Pain Syndrome: Patient is high fall risk due to age, multiple fall risk medications, and hx of previous falls. We discussed rationale for amitriptyline discontinuation (high risk in the elderly). She is very reluctant to change medications \"If this doesn't work, I'm not making changes again\". We discussed strategies to facilitate an easier transition.  -Advised amitriptyline 25 mg x 1 week, then stop. Then start gabapentin 100-200 mg at bedtime - we can titrate this as tolerated. Re-trialing gapentin for both sleep and pain. Consider trazodone for future consideration if sleep remains problematic.   -Long term soma use is also not ideal (fall risk, addiction potential)- We will consider change/discontinue in the future as able (defer today)  -See Yana Robb CNP today    FOLLOW-UP PLAN  Jacqueline was advised to follow up in 1 month.        SUBJECTIVE AND OBJECTIVE  Jacqueline Prado is a 77 y.o. female who was referred by Yana Robb CNP for MT services.  Jacqueline's chief concern today is follow up pain management.  Patient arrived 25 minutes late for 30 minute appointment, therefore was seen briefly and we only discussed amitriptyline and fall risk. Has an appointment with Yana Robb CNP immediately following this today.    Pain History: 33 years ago underwent a lumbar laminectomy and fusion. Pain has been managed with medications ever since. Patient states that her pain was previously well controlled on OxyContin 40 mg twice daily.  She has been tapering her OxyContin due to concerns from her son-in-law and daughter related to decreased function.  Patient states that she does not believe she had any difficulties with this dose. She has a history of falls (\"only two times, it isn't so bad!\"). During previous visit, was changed from amitriptyline to doxepin due to risk of falls.Patient stopped taking " "doxepin because it wasn't helpful for sleep. Describes staying up for \"36 hours\" several days in a row. The lack of sleep aggravated her pain. She also believes that this has caused her RA to worsen as well. She follows with rheumatology and does not see them again until November. She re-initiated amitriptyline without consulting the clinic. \"I started with a half pill for one night, but then just went back to a full tablet [50 mg]\". Her sleep has improved and she is feeling much better.   Location: lower back pain that travels down her left leg  Description of pain: Throbbing, radiates down the leg, constant  Pain Score: 8/10  Functional Score: 7  Aggravating Factors: working/cleaning, extension of arms, sitting for a long time, twisting her back  Alleviating Factors: Pain medications, hot and cold pack  Current medication regimen: Oxycontin 30 mg BID, Soma 350 mg TID, Doxepin 25 mg qHS (not taking) - re-initiated amitriptyline 50 mg daily without medical advice  Several intolerances to medications, including tizanidine, baclofen, celecoxib, and APAP. She doesn't remember what happened when she took. She believes she has allergies to drug excipients, but cannot prove it. She is a poor medication historian - \"I've tried so many things for pain in the last 30 years, I can't remember anymore.     NSAIDS: celecoxib -  NSAIDs CI due to cardiac hx    Opioids: Morphine (never tried), Vicodin(doesn't remember)    Antidepressants: nortriptyline(took away appetite, would fall asleep standing up), Cymbalta(doesn't remember)    Gabapentinoids:Gabapentin(cannot remember), Lyrica(does not believe she has tried)    Topicals: Lidoderm patches(work so well). Lidocaine ointment - doesn't work - just soaks in to clothes    Supplements: none     Memantine - unknown    Muscle relaxants: Baclofen (nausea), tizanidine (unknown)    Epidurals - mke better for relatively short time    RA agents: Humira - states her RA is in remission - " Humira is help per rheum  Non-pharmacological treatments:     Physical Therapy: Has been recommended    Chiropractic: never tried    Acupuncture: Never tried    Medication and deep breathing are helpful    Universal Precautions:   UDS/Swab- 09/16/2016   Consent-   Agreement- 2/9/2017  Pharmacy- as documented   -  Count- n/a  Psychological evaluation n/a  Pharmacogenetic testing- n/a  MME- 90    Social History     Social History     Marital status:      Spouse name: N/A     Number of children: N/A     Years of education: N/A     Occupational History     Not on file.     Social History Main Topics     Smoking status: Current Every Day Smoker     Packs/day: 0.50     Types: Cigarettes     Smokeless tobacco: Not on file     Alcohol use Not on file     Drug use: Not on file     Sexual activity: Not on file     Other Topics Concern     Not on file     Social History Narrative       Current Outpatient Prescriptions   Medication Sig Dispense Refill     albuterol (PROVENTIL HFA;VENTOLIN HFA) 90 mcg/actuation inhaler Inhale 2 puffs every 4 (four) hours as needed for wheezing. And cough 1 Inhaler 0     amitriptyline (ELAVIL) 50 MG tablet Take 1 tablet (50 mg total) by mouth bedtime as needed for sleep. 30 tablet 0     aspirin 81 mg chewable tablet Chew 81 mg daily.       b complex vitamins tablet Take 1 tablet by mouth daily.       CALCIUM CARBONATE/VITAMIN D3 (CALCIUM+D ORAL) Take 3 tablets by mouth daily.       carisoprodol (SOMA) 350 MG tablet TAKE 1 TABLET BY MOUTH THREE TIMES DAILY. 90 tablet 0     cyanocobalamin, vitamin B-12, (VITAMIN B-12) 500 mcg TbER Take 1 capsule by mouth daily.       denosumab 60 mg/mL Syrg Inject 60 mg under the skin every 6 (six) months.       DOCOSAHEXANOIC ACID/EPA (FISH OIL ORAL) Take 1 tablet by mouth daily.       doxepin (SINEQUAN) 25 MG capsule Take 1 capsule (25 mg total) by mouth at bedtime. 30 capsule 0     ERGOCALCIFEROL, VITAMIN D2, (VITAMIN D2 ORAL) Take 1 tablet by mouth  daily.       folic acid (FOLVITE) 1 MG tablet Use As Directed.       isosorbide mononitrate (ISMO,MONOKET) 10 MG tablet TAKE 1 TABLET BY MOUTH TWICE DAILY 180 tablet 1     levothyroxine (SYNTHROID, LEVOTHROID) 75 MCG tablet TAKE 1 TABLET DAILY AT 6:00 A.M. 90 tablet 3     lisinopril (PRINIVIL,ZESTRIL) 5 MG tablet        loratadine (CLARITIN) 10 mg tablet Take 10 mg by mouth daily.       methotrexate 2.5 MG tablet once a week. As directed       mometasone (NASONEX) 50 mcg/actuation nasal spray SHAKE WELL AND USE 2 SPRAYS IN EACH NOSTRIL EVERY DAY 17 g 5     multivitamin capsule Take 1 capsule by mouth daily.       omeprazole (PRILOSEC) 20 MG capsule Take 20 mg by mouth Daily before breakfast.       oxyCODONE (OXYCONTIN) 10 mg 12 hr tablet Take 1 tablet (10 mg total) by mouth every 12 (twelve) hours. For pain 60 tablet 0     OXYCONTIN 20 mg 12 hr tablet Take 1 tablet (20 mg total) by mouth every 12 (twelve) hours. For pain 60 tablet 0     POLYMYXIN B SULF/TRIMETHOPRIM (POLYMYXIN B SUL-TRIMETHOPRIM OPHT) Use As Directed.       sucralfate (CARAFATE) 100 mg/mL suspension Use As Directed.       No current facility-administered medications for this encounter.        We reviewed Jacqueline's medication list with them, discussing reason for use, directions for use, and potential side effects of each medication.  Indication, safety, efficacy, and convenience was assessed for all medications.     Jacqueline was provided with a printed AVS, and this care plan was communicated via EMR with Yana Robb CNP and is the authorizing prescriber for this visit.  Direct supervision was available by either the patient's PCP or another available physician when needed.    This note has been dictated using voice recognition software. Any grammatical or context distortions are unintentional and inherent to the software.       Time spent: 15 minutes    Yasmine MeekD  MTM Pharmacist at [facility]

## 2021-06-11 NOTE — PROGRESS NOTES
Pt continue to wear bilateral compression stocking daily. Pt denied any discomfort with leg. She states that once in awhile leg are swollen more if she has not been moving . Pt states that swollen in the leg has been stable.

## 2021-06-11 NOTE — PROGRESS NOTES
"Jacqueline Prado is a 80 y.o. female who is being evaluated via a billable video visit.      The patient has been notified of following:     \"This video visit will be conducted via a call between you and your physician/provider. We have found that certain health care needs can be provided without the need for an in-person physical exam.  This service lets us provide the care you need with a video conversation.  If a prescription is necessary we can send it directly to your pharmacy.  If lab work is needed we can place an order for that and you can then stop by our lab to have the test done at a later time.    Video visits are billed at different rates depending on your insurance coverage. Please reach out to your insurance provider with any questions.    If during the course of the call the physician/provider feels a video visit is not appropriate, you will not be charged for this service.\"    Patient has given verbal consent to a Video visit? Yes  How would you like to obtain your AVS? AVS Preference: Mail a copy.  If dropped by the video visit, the video invitation should be sent to: Text to cell phone: [phone]  Will anyone else be joining your video visit? No          Video-Visit Details    Type of service:  Video Visit    Originating Location (pt. Location): Home    Distant Location (provider location):  [facility] [place]      Platform used for Video Visit: Nimco Fraire LPN    "

## 2021-06-12 NOTE — PROGRESS NOTES
"Patient is seen with her daughter.  Reviewing the record there have been changes from amitriptyline to other agents to try to help with sleep given concerns with falls, and oxycodone was decreased to 30 mg twice a day also to diminish doses.    On interview she is seen with her daughter.  She describes having significant problems with the medication changes.  She did not tolerate Namenda , tizanidine or gabapentin.  She has gone back to amitriptyline which she is taken on her own and 50 mg bedtime.  She reviews she is sleeping better.  Her memory is better.  She has not had any falls.    She also had significant difficulties cutting down the oxime Contin to 30 mg twice a day.  She had much less ability to function.  Her daughter's concern she is minimizes, reviews that she did not participate in the world.  Decreased driving, decreased socialization.  The need to hire help for her to care for herself.  She was much more functional before these changes.    She did have an anesthetic intervention with Dr. Stacy.  She described it was difficult having 45 minutes on the table, 3 or 4 different approaches and after a few days has had significant spasms in her right back pain down her right leg and into her left leg.  Reviews that when she had treatments with Dr. Blake in the past that seemed to do better.  She has canceled physical therapy appointment until recently ready to return to physical therapy at [facility] hoping to have some massage as well.  She will be established with a TENS unit at that time.    Family and patient are interested in discussing medical cannabis to try to help with pain and allow decrease doses of medications.    Blood pressure 140/84, pulse 90, resp. rate 16, height 5' 2\" (1.575 m), weight 185 lb (83.9 kg).    She is alert with a clear sensorium bright affect.  She stands to most of the session indicates she is a difficult low back.  She ambulates with significant " lordosis.    Oppression: Chronic pain related to multiple back surgeries continued low back pain at times with radiculopathy.  There have been concerns with falls possibly related to the amitriptyline 50 mg though patient also found poor sleep and maturity level of function, also associated decreased dose of oxycodone.    Plan: Patient will reschedule with encourage can get the TENS unit.    Discussed ways to contact Dr. Blake with whom she is more comfortable    We discussed the role of medical cannabis, possible benefits and side effects, recommendation use high CBD low THC agents.  Patient and her daughter are very interested.    We will use amitriptyline 50 mg of the present, with 25 mg tablets 2 at bedtime decreasing his there is benefit.    We will go back to OxyContin 40 mg in the morning and she notes she is more active during the day and requests this dose, feels she is a good sleeper and can decrease the dose.  Will use 10 mg tablets of OxyContin 2-3 at bedtime tapering as well.    Time spent 25 minutes face-to-face more than 50% counseling about above condition and coordination treatment plan

## 2021-06-12 NOTE — PATIENT INSTRUCTIONS - HE
PLAN:  Continue the OxyContin 40 mg in the morning, and OxyContin 10 mg to 3 tablets at bedtime.    Up with Dr. Ramirez in 12 weeks.

## 2021-06-12 NOTE — TELEPHONE ENCOUNTER
Medication being requested: CARISOPRODOL 350 MG TABLETS  Last visit date: 7/10/20  Provider: ROXANA  Next visit date: 10/14/20.  Provider: ROXANA  Expected follow up: 3 MONTHS  MTM visit (Pain Center) date: NO   (Last fill date; name; strength; provider; MME; quantity):  SEE ROUTINE NOTE  Pertinent between visit information about requested medication (telephone, mychart, prior authorization, concerns, comments): NONE  Script being sent to provider by nurse- dates and quantity:   Requested Prescriptions     Pending Prescriptions Disp Refills     carisoprodoL (SOMA) 350 MG tablet 90 tablet 0     Sig: Take 1 tablet (350 mg total) by mouth 3 (three) times a day as needed for muscle spasms.     Pharmacy cued [facility]  Standing orders for withdrawal protocol implemented: NA

## 2021-06-12 NOTE — PROGRESS NOTES
"Assessment/Plan:     Problem List Items Addressed This Visit     Rheumatoid arthritis (H)    Relevant Medications    carisoprodoL (SOMA) 350 MG tablet (Start on 11/13/2020)    oxyCODONE (OXYCONTIN) 10 mg 12 hr tablet (Start on 10/23/2020)    Back pain with left-sided sciatica    Relevant Medications    oxyCODONE (OXYCONTIN) 40 mg 12 hr tablet (Start on 10/15/2020)            No follow-ups on file.    There are no Patient Instructions on file for this visit.      Subjective:       80 y.o. female The patient has been notified of the following:      \"We have found that certain health care needs can be provided without the need for a face to face visit.  This service lets us provide the care you need with a phone conversation.       I will have full access to your [facility] medical record during this entire phone call.   I will be taking notes for your medical record.      Since this is like an office visit, we will bill your insurance company for this service.       There are potential benefits and risks of telephone visits (e.g. limits to patient confidentiality) that differ from in-person visits.?  Confidentiality still applies for telephone services, and nobody will record the visit.  It is important to be in a quiet, private space that is free of distractions (including cell phone or other devices) during the visit.??      If during the course of the call I believe a telephone visit is not appropriate, you will not be charged for this service\"     Consent has been obtained for this service by care team member: Yes      Reviews things are about the same, has not left the house since February.  There is a woman who comes to her house to do some errands such as picking up her prescriptions which we are trying to simplify the pickup dates, so washing.    She continues on the Soma, notes nothing else works the same and she has had a lot of allergy to other medications, and those that affect cognition.    She continues " with the oxycodone extended release 40 mg in the morning and 10 mg extended release 2-3 at bedtime.    She has been working with the lymphedema clinic virtually for her lower extremity.    No other medication changes.      Current Outpatient Medications:      albuterol (PROAIR HFA;PROVENTIL HFA;VENTOLIN HFA) 90 mcg/actuation inhaler, Inhale 2 puffs every 4 (four) hours as needed for wheezing. And cough, Disp: 1 Inhaler, Rfl: 3     amitriptyline (ELAVIL) 25 MG tablet, One to two tabs bedtime, Disp: 60 tablet, Rfl: 2     aspirin 81 mg chewable tablet, Chew 81 mg daily., Disp: , Rfl:      b complex vitamins tablet, Take 1 tablet by mouth daily., Disp: , Rfl:      CALCIUM CARBONATE/VITAMIN D3 (CALCIUM+D ORAL), Take 3 tablets by mouth daily., Disp: , Rfl:      [START ON 11/13/2020] carisoprodoL (SOMA) 350 MG tablet, Take 1 tablet (350 mg total) by mouth 3 (three) times a day as needed for muscle spasms., Disp: 90 tablet, Rfl: 0     cyanocobalamin, vitamin B-12, (VITAMIN B-12) 500 mcg TbER, Take 1 capsule by mouth daily., Disp: , Rfl:      DOCOSAHEXANOIC ACID/EPA (FISH OIL ORAL), Take 1 tablet by mouth daily., Disp: , Rfl:      ERGOCALCIFEROL, VITAMIN D2, (VITAMIN D2 ORAL), Take 1 tablet by mouth daily., Disp: , Rfl:      folic acid (FOLVITE) 1 MG tablet, TK 1 T PO ONCE DAILY., Disp: , Rfl: 2     hydrocortisone (CORTISONE, HYDROCORTISONE,) 1 % cream, Apply to hand three times a day, Disp: 30 g, Rfl: 2     isosorbide mononitrate (ISMO,MONOKET) 10 MG tablet, TAKE 1 TABLET BY MOUTH TWICE DAILY (Patient taking differently: Take 10 mg by mouth daily. TAKE 1 TABLET BY MOUTH TWICE DAILY), Disp: 180 tablet, Rfl: 3     levothyroxine (SYNTHROID, LEVOTHROID) 75 MCG tablet, TAKE 1 TABLET BY MOUTH DAILY AT 6 AM, Disp: 90 tablet, Rfl: 3     loratadine (CLARITIN) 10 mg tablet, Take 10 mg by mouth daily., Disp: , Rfl:      methotrexate 2.5 MG tablet, Take 2.5 mg by mouth once a week. (Thursday) As directed, Disp: , Rfl:      mometasone  (NASONEX) 50 mcg/actuation nasal spray, SHAKE WELL AND USE 2 SPRAYS IN EACH NOSTRIL EVERY DAY (Patient taking differently: 2 sprays into each nostril 2 (two) times a day as needed. SHAKE WELL AND USE 2 SPRAYS IN EACH NOSTRIL EVERY DAY, as needed), Disp: 17 g, Rfl: 5     multivitamin capsule, Take 1 capsule by mouth daily., Disp: , Rfl:      NON FORMULARY, Apply 1 application topically daily. Sera Ve, Disp: , Rfl:      nystatin (MYCOSTATIN) cream, Apply 1 application topically 2 (two) times a day as needed., Disp: , Rfl: 1     omeprazole (PRILOSEC) 20 MG capsule, Take 20 mg by mouth Daily before breakfast., Disp: , Rfl:      [START ON 10/23/2020] oxyCODONE (OXYCONTIN) 10 mg 12 hr tablet, Take 2-3 tablets (20-30 mg total) by mouth at bedtime. Two to three tabs bedtime, Disp: 84 each, Rfl: 0     [START ON 10/15/2020] oxyCODONE (OXYCONTIN) 40 mg 12 hr tablet, Take 1 tablet (40 mg total) by mouth daily., Disp: 28 tablet, Rfl: 0     potassium gluconate 550 mg (90 mg) tablet, Take 1 tablet by mouth daily as needed., Disp: , Rfl:      triamcinolone (KENALOG) 0.1 % ointment, Apply to both legs twice daily, Disp: 30 g, Rfl: 0     vitamin E 400 unit capsule, Take 400 Units by mouth daily., Disp: , Rfl:     Telephone sounds alert, clear sensorium.  Thought process tight logical.     is reviewed as expected.             Objective:     Vitals:    10/14/20 1327   Height: 5' (1.524 m)   PainSc:   8   PainLoc: Back           Assessment, postlaminectomy syndrome, rheumatoid arthritis, has been stable on this above opioid regimen for some time.    Plan: No changes, she is aware she may need to come in for the next opioid med check.    Total time more than 8 minutes.      This note has been dictated using voice recognition software. Any grammatical or context distortions are unintentional and inherent to the software

## 2021-06-12 NOTE — PROGRESS NOTES
"Jacqueline Prado is a 80 y.o. female who is being evaluated via a billable telephone visit.      The patient has been notified of following:     \"This telephone visit will be conducted via a call between you and your physician/provider. We have found that certain health care needs can be provided without the need for a physical exam.  This service lets us provide the care you need with a short phone conversation.  If a prescription is necessary we can send it directly to your pharmacy.  If lab work is needed we can place an order for that and you can then stop by our lab to have the test done at a later time.    Telephone visits are billed at different rates depending on your insurance coverage. During this emergency period, for some insurers they may be billed the same as an in-person visit.  Please reach out to your insurance provider with any questions.    If during the course of the call the physician/provider feels a telephone visit is not appropriate, you will not be charged for this service.\"    Patient has given verbal consent to a Telephone visit? Yes    What phone number would you like to be contacted at? [phone]    Patient would like to receive their AVS by AVS Preference: Staci.    Patient is here for a follow up appointment with Dr. Ramirez. Patient has low back pain, describes the pain as constant, sharp and feels like \"muscles are in a knot\",rates the pain as 8/10. Patient needs refills for Soma, Oxycontin 10 mg and 40 mg.  CSA and UDT are deferred due to COVID 19. Functionality is 6. Patient states their pain interferes with walking, work and relationships.    Miguelina Nicole LPN    "

## 2021-06-12 NOTE — TELEPHONE ENCOUNTER
Will add oxycontin 40 mg and soma  Last ov: 10/14 with [facility]  Patient needs to schedule a follow up apt.  oxycontin 40 mg last filled on 9/17-28 days  Soma last filled on 9/14-30 days    Requested Prescriptions     Pending Prescriptions Disp Refills     oxyCODONE (OXYCONTIN) 40 mg 12 hr tablet 28 tablet 0     Sig: Take 1 tablet (40 mg total) by mouth daily.     carisoprodoL (SOMA) 350 MG tablet 90 tablet 0     Sig: Take 1 tablet (350 mg total) by mouth 3 (three) times a day as needed for muscle spasms.     Signed Prescriptions Disp Refills     oxyCODONE (OXYCONTIN) 10 mg 12 hr tablet 84 each 0     Sig: Take 2-3 tablets (20-30 mg total) by mouth at bedtime. Two to three tabs bedtime     Authorizing Provider: LIZZY FOLEY

## 2021-06-13 NOTE — TELEPHONE ENCOUNTER
Medication being requested: Amitryptyline  Last visit date:10/14/20 Provider: ROXANA  Next visit date: 01/19/20 Provider: ROXANA  Expected follow up: UP TO 12 WEEKS  MTM visit (Pain Center) date: NA  Pertinent between visit information about requested medication (telephone, mychart, prior authorization, concerns): None  Last date prescribed: 08/19/20  Provider responsible: be  Spoke with patient: No  Script being sent to provider - dates and quantity:   Requested Prescriptions     Pending Prescriptions Disp Refills     amitriptyline (ELAVIL) 25 MG tablet 60 tablet 2     Sig: One to two tabs bedtime       Pharmacy cued: [facility]

## 2021-06-13 NOTE — TELEPHONE ENCOUNTER
Reason for Call:  Medication or medication refill:    Do you use a [facility]?  Name of the pharmacy and phone number for the current request:[facility] in [place]     Name of the medication requested:   albuterol (PROAIR HFA;PROVENTIL HFA;VENTOLIN HFA) 90 mcg/actuation inhaler 1 Inhaler 3 1/5/2018  No   Sig - Route: Inhale 2 puffs every 4 (four) hours as needed for wheezing. And cough - Inhalation         Other request: [place],  she needs a new inhaler.      Can we leave a detailed message on this number? No     Phone number patient can be reached at: Home number on file [phone] (home)    Best Time: any    Call taken on 12/21/2020 at 2:53 PM by Pamela Behr

## 2021-06-13 NOTE — TELEPHONE ENCOUNTER
Medication being requested: Oxycontin 40  Last visit date: 10/14  Provider: BE  Next visit date: 1/19/21  Provider: ROXANA  Expected follow up: 12 weeks  [facility] visit ([facility]) date: eveline  UDT date: 5/3/19  Agreement date: 5/3/19   (Last fill date; name; strength; provider; MME; quantity):  reviewed:  Last filled 10/21, # 28, 28 day supply, refill due date 11/18  Pertinent between visit information about requested medication (telephone, mychart, prior authorization, concerns, comments):PLAN:  Continue the OxyContin 40 mg in the morning, and OxyContin 10 mg to 3 tablets at bedtime.  Script being sent to provider by nurse- dates and quantity:   [facility] cued: [facility]  Standing orders for withdrawal protocol implemented: eveline

## 2021-06-13 NOTE — TELEPHONE ENCOUNTER
Refill request: soma  Appears to have been sent to pharmacy for start date of 11/13  Receipt confirmed on 10/14 at 6/10 pm

## 2021-06-13 NOTE — TELEPHONE ENCOUNTER
Medication being requested: oxycodone 40 mg and oxycodone 10 mg and soma  Last visit date:10/14/20      Provider:BE  Next visit date: 21   Provider:BE  Expected follow up: 8 weeks  MTM visit ([facility]) date- no  UDS and CSA - 19  -   Soma: , #90, 30 days  oxycontin 40 m/20-#28  oxycontin 10 m/25, #84(RX at the pharmacy)  Pertinent between visit information about requested medication (telephone, mychart, prior authorization, concerns, red flags, comments):   Patient has not filled the oxycontin 10 mg RX at the pharmacy from October, pharmacy can utilize this RX when patient indicates she is ready to   Script being sent to provider by nurse- dates and quantity:  Requested Prescriptions     Pending Prescriptions Disp Refills     oxyCODONE (OXYCONTIN) 40 mg 12 hr tablet 28 tablet 0     Sig: Take 1 tablet (40 mg total) by mouth daily.     carisoprodoL (SOMA) 350 MG tablet 90 tablet 0     Sig: Take 1 tablet (350 mg total) by mouth 3 (three) times a day as needed for muscle spasms.       Date that the medication is expected to be refilled-  Multiple dates  Pharmacy cued:  [facility]  Standing orders for withdrawal protocol implemented or requested- N/A

## 2021-06-14 ENCOUNTER — COMMUNICATION - HEALTHEAST (OUTPATIENT)
Dept: INTERNAL MEDICINE | Facility: CLINIC | Age: 81
End: 2021-06-14

## 2021-06-14 DIAGNOSIS — R60.0 LOWER EXTREMITY EDEMA: ICD-10-CM

## 2021-06-14 RX ORDER — HYDROCHLOROTHIAZIDE 12.5 MG/1
CAPSULE ORAL
Qty: 90 CAPSULE | Refills: 0 | Status: SHIPPED | OUTPATIENT
Start: 2021-06-14 | End: 2021-09-22

## 2021-06-14 NOTE — TELEPHONE ENCOUNTER
Medication being requested: Oxycontin   Last visit date: 10/14.  Provider: BE  Next visit date: 01/19.  Provider: ROXANA  Expected follow up: 8 wks  MTM visit ([facility]) date: N/A  UDT date: 05/03/19  Agreement date: 05/03/19   (Last fill date; name; strength; provider; MME; quantity):  Last filled 12/21 for 28 days. This appears appropriate  Pertinent between visit information about requested medication (telephone, mychart, prior authorization, concerns, comments): Pt requests med be dispensed 1/14 for use 1/18 as her children are out of town and she has someone who can pick it up 1/14.  Script being sent to provider by nurse- dates and quantity:   Requested Prescriptions     Pending Prescriptions Disp Refills     oxyCODONE (OXYCONTIN) 40 mg 12 hr tablet 28 tablet 0     Sig: Take 1 tablet (40 mg total) by mouth daily.   dispense 1/14 for use 1/18  Pharmacy cued: [facility]  Standing orders for withdrawal protocol implemented: N/A

## 2021-06-14 NOTE — PROGRESS NOTES
Jacqueline Prado is a 80 y.o. female who is being evaluated via a billable telephone visit.      What phone number would you like to be contacted at? [phone]  How would you like to obtain your AVS? AVS Preference: [place].    Patient is here for a follow up appointment with Dr. Ramirez. Patient has low back pain, describes the pain as inter,rates the pain as 8/10. Patient needs refills for Soma, OxyContin Er 10 mg and 40 mg. Functionality is 4. Patient states their pain interferes with working.    Miguelina Nicole LPN

## 2021-06-14 NOTE — PROGRESS NOTES
Assessment/Plan:     Problem List Items Addressed This Visit     Rheumatoid arthritis (H)    Relevant Medications    carisoprodoL (SOMA) 350 MG tablet    oxyCODONE (OXYCONTIN) 10 mg 12 hr tablet    Back pain with left-sided sciatica    Relevant Medications    oxyCODONE (OXYCONTIN) 40 mg 12 hr tablet (Start on 2/12/2021)            No follow-ups on file.    Patient Instructions   PLAN:  Continue with the OxyContin 40 mg daily and the oxycodone 10 mg extended release 3 daily.    Follow-up with Dr. Ramirez in 3 months.        Subjective:       80 y.o. female Jacqueline Prado is a 80 y.o. female who is being evaluated via a video   With Swati    What phone number would you like to be contacted at?  How would you like to obtain your AVS? AVS Preference: Vincenthart.      Subjective     Jacqueline Prado is 80 y.o. and presents to clinic today for the following health issues   HPI seen for arthritis related to rheumatoid arthritis and lumbar degenerative changes.    She describes has had a few days of depression, attributed to Covid isolation.  She is trying to maintain contact through Facebook, talking to friends, her sister and her children.    She has been busy putting away [email or web address] has the pace herself as a flareup of her back pain.    Has been out twice to get injections in rheumatology.    She continues with the OxyContin 40 mg in the morning and 10 mg extended release 3 times at night with the soma.  This regimen allows some degree of comfort.  No side effects.     is reviewed as expected.      Current Outpatient Medications:      albuterol (PROAIR HFA;PROVENTIL HFA;VENTOLIN HFA) 90 mcg/actuation inhaler, Inhale 2 puffs every 4 (four) hours as needed for wheezing. And cough, Disp: 1 Inhaler, Rfl: 3     amitriptyline (ELAVIL) 25 MG tablet, One to two tabs bedtime, Disp: 60 tablet, Rfl: 2     aspirin 81 mg chewable tablet, Chew 81 mg daily., Disp: , Rfl:      b complex vitamins tablet, Take 1 tablet by  mouth daily., Disp: , Rfl:      CALCIUM CARBONATE/VITAMIN D3 (CALCIUM+D ORAL), Take 3 tablets by mouth daily., Disp: , Rfl:      carisoprodoL (SOMA) 350 MG tablet, Take 1 tablet (350 mg total) by mouth 3 (three) times a day as needed for muscle spasms., Disp: 90 tablet, Rfl: 0     cyanocobalamin, vitamin B-12, (VITAMIN B-12) 500 mcg TbER, Take 1 capsule by mouth daily., Disp: , Rfl:      DOCOSAHEXANOIC ACID/EPA (FISH OIL ORAL), Take 1 tablet by mouth daily., Disp: , Rfl:      ERGOCALCIFEROL, VITAMIN D2, (VITAMIN D2 ORAL), Take 1 tablet by mouth daily., Disp: , Rfl:      folic acid (FOLVITE) 1 MG tablet, TK 1 T PO ONCE DAILY., Disp: , Rfl: 2     hydrocortisone (CORTISONE, HYDROCORTISONE,) 1 % cream, Apply to hand three times a day, Disp: 30 g, Rfl: 2     isosorbide mononitrate (ISMO,MONOKET) 10 MG tablet, TAKE 1 TABLET BY MOUTH TWICE DAILY (Patient taking differently: Take 10 mg by mouth daily. TAKE 1 TABLET BY MOUTH TWICE DAILY), Disp: 180 tablet, Rfl: 3     levothyroxine (SYNTHROID, LEVOTHROID) 75 MCG tablet, TAKE 1 TABLET BY MOUTH DAILY AT 6 AM, Disp: 90 tablet, Rfl: 3     loratadine (CLARITIN) 10 mg tablet, Take 10 mg by mouth daily., Disp: , Rfl:      methotrexate 2.5 MG tablet, Take 2.5 mg by mouth once a week. (Thursday) As directed, Disp: , Rfl:      mometasone (NASONEX) 50 mcg/actuation nasal spray, SHAKE WELL AND USE 2 SPRAYS IN EACH NOSTRIL EVERY DAY (Patient taking differently: 2 sprays into each nostril 2 (two) times a day as needed. SHAKE WELL AND USE 2 SPRAYS IN EACH NOSTRIL EVERY DAY, as needed), Disp: 17 g, Rfl: 5     multivitamin capsule, Take 1 capsule by mouth daily., Disp: , Rfl:      NON FORMULARY, Apply 1 application topically daily. Sera Ve, Disp: , Rfl:      nystatin (MYCOSTATIN) cream, Apply 1 application topically 2 (two) times a day as needed., Disp: , Rfl: 1     omeprazole (PRILOSEC) 20 MG capsule, Take 20 mg by mouth Daily before breakfast., Disp: , Rfl:      oxyCODONE (OXYCONTIN) 10 mg  12 hr tablet, Take 2-3 tablets (20-30 mg total) by mouth at bedtime. Two to three tabs bedtime, Disp: 84 each, Rfl: 0     [START ON 2/12/2021] oxyCODONE (OXYCONTIN) 40 mg 12 hr tablet, Take 1 tablet (40 mg total) by mouth daily., Disp: 28 tablet, Rfl: 0     potassium gluconate 550 mg (90 mg) tablet, Take 1 tablet by mouth daily as needed., Disp: , Rfl:      vitamin E 400 unit capsule, Take 400 Units by mouth daily., Disp: , Rfl:      triamcinolone (KENALOG) 0.1 % ointment, Apply to both legs twice daily, Disp: 30 g, Rfl: 0    Alert with a clear sensorium good eye contact.  Thought process logical.  Affect is fairly full range.      Additional provider notes:     Review of Systems        Objective       Vitals:  No vitals were obtained today due to virtual visit.    Physical Exam    Assessment: Chronic pain related to rheumatoid arthritis with diffuse chronic pain, and lumbar degenerative changes.    On this regimen of opioids is been maintained allowing her to maintain independent living particularly at this time of Covid.    Plan, continue opioids as above.    Total time: 10 minutes                          Objective:     Vitals:    01/19/21 1244   Height: 5' (1.524 m)   PainSc:   8   PainLoc: Back                   This note has been dictated using voice recognition software. Any grammatical or context distortions are unintentional and inherent to the software

## 2021-06-14 NOTE — PATIENT INSTRUCTIONS - HE
PLAN:  Continue with the OxyContin 40 mg daily and the oxycodone 10 mg extended release 3 daily.    Follow-up with Dr. Ramirez in 3 months.

## 2021-06-14 NOTE — PROGRESS NOTES
"This is seen with her daughter.  She reviews taking oxycodone 20 mg at bedtime.  It takes about an hour and a half to go to sleep.  3-5 mornings in the week she describes very much looking forward to taking her OxyContin 40 in the morning.  She expects with the holidays coming up when she is busy with shopping and trimming the trees she would like to go to taking 3 tablets a night.  Her daughter notes that this is her favorite time of year and she is much more active.    On decreasing the amitriptyline she did not sleep as well as gone back to taking 50 mg at night.  Nortriptyline did not do as well.  She reviews with the medication changes between try nortriptyline Namenda and tizanidine she got more depressed and she recognizes over the last 4-6 weeks is doing better.  Her daughter agrees, noting that they have been able to call her to invite her out for dinner a few times spontaneously and she is gone, and when a granddaughter came to town.  She reports she is catching up on things that she had put off around the home for projects.    She does have an appointment next week at the [facility] to resume using her TENS unit.    She has learned she needs a referral to see Dr. Blake at the [facility] surgery to review injections.    She is getting registered for the [place] medical cannabis program, there are some more things that need to be addressed.    She would like us to fill out a form again for a handicap parking sticker.  She reviews problems with proprioception for her left foot so she is very concerned about falls and walking, and her rheumatoid arthritis has had significant joint pain.    She continues with the [facility] 3 times a day.    Blood pressure 141/90, pulse 90, resp. rate 14, height 5' 2\" (1.575 m), weight 185 lb (83.9 kg).    In score 8.  She is alert with a clear sensorium bright affect.  Ambulates with cane.    Impression chronic pain related to low back pain with radiculopathy.  We have " made efforts to diminish the amitriptyline 50 mg as there were concerns with falls.  She has not been having falls.  Proprioception is she notes may be more of a concern.    Plan we will be establishing for the TENS unit.    She will see Dr. Blake whom she is done injections in the past is comfortable with this approach.    She will begin start of the medical cannabis program we discussed the goal at that time to decrease opioids and possibly amitriptyline.  We will use high CBD below THC products.    Time spent 25 minutes face-to-face more than 50% counseling about above condition coordination treatment plan

## 2021-06-15 NOTE — TELEPHONE ENCOUNTER
Reason for Call:  Other call back      Detailed comments: PT REQUESTING A COUGH SYRUP - SINUS DRAINAGE, NO SINUS HEADACHE OR PRESSURE, NO FEVER    PHARM: [facility]      Phone Number Patient can be reached at: Home number on file [phone] (home)    Best Time: N/A    Can we leave a detailed message on this number?: No    Call taken on 3/8/2021 at 3:06 PM by Monie Frederick

## 2021-06-15 NOTE — TELEPHONE ENCOUNTER
Patient calls for next refill: oxycontin 40 mg and soma  Patient still needing a follow up apt with BE    Please attempt to schedule patient for follow up and route back to this basket

## 2021-06-15 NOTE — TELEPHONE ENCOUNTER
Medication being requested:  Oxycontin 40 mg  Last visit date: 1/19   Provider: ROXANA  Next visit date: non sched    Provider: ROXANA  Expected follow up: 3 months  [facility]M visit ([facility]) date: eveline  UDT date: 5/3/2019  Agreement date: 5/3/2019   (Last fill date; name; strength; provider; MME; quantity): Unable to access   Last ordered 1/19 for start date of 2/12  Pharmacy contacted , order on file for start date of 2/12    Pertinent between visit information about requested medication (telephone, mychart, prior authorization, concerns, comments): eveline  Script being sent to provider by nurse- dates and quantity: na  [facility] c[facility]  Standing orders for withdrawal protocol implemented: eveline

## 2021-06-15 NOTE — TELEPHONE ENCOUNTER
Reason for Call:  Medication or medication refill:    Do you use a [facility]?  Name of the pharmacy and phone number for the current request: [facility] [place] [phone]     Name of the medication requested: albuterol (PROAIR HFA;PROVENTIL HFA;VENTOLIN HFA) 90 mcg/actuation inhaler    Other request: Per patient is is out of it and she is really stuffed up.      Can we leave a detailed message on this number? No    Phone number patient can be reached at:   Cell number on file:    Telephone Information:   Mobile [phone]       Best Time: anytime    Call taken on 2/15/2021 at 3:33 PM by Cinthia Whelan

## 2021-06-15 NOTE — PROGRESS NOTES
Jacqueline Prado is a 80 y.o. female who is being evaluated via a billable telephone visit.      What phone number would you like to be contacted at? [phone]  How would you like to obtain your AVS? AVS Preference: Mail a copy.   Pain score: 4  Constant or intermittent: constant and intermittent  What does your pain feel like: dull, sharp, throbbing   Does the pain interfere with:   Work: yes  Walking/distance: no  Sleep: no  Daily activities: no  Relationships/social life: no  Mood: yes  F= 5    Miguelina Nicole LPN

## 2021-06-15 NOTE — PATIENT INSTRUCTIONS - HE
PLAN:  Continue with the OxyContin 40 mg in the morning and 10 mg OxyContin 3 at bedtime.    Continue the Soma 350 mg 3 times a day.    Follow-up with Dr. Ramirez in 3 months.

## 2021-06-15 NOTE — TELEPHONE ENCOUNTER
Reason for Call:  Medication or medication refill:    Do you use a [facility]?  Name of the pharmacy and phone number for the current request: [facility] [place]    Name of the medication requested: LEVOTHYROZINE    Other request: N/A    Can we leave a detailed message on this number? Yes    Phone number patient can be reached at: Cell number on file:    Telephone Information:   Mobile [phone]       Best Time: ANYTIME    Call taken on 2/15/2021 at 3:08 PM by Monie Frederick

## 2021-06-15 NOTE — TELEPHONE ENCOUNTER
Patient's daugther and patient calling to request an antibiotic. Patient scratched her leg and now has a 1x3 area of redness and it's draining serous fluid.  Patient wants on-call provider to be contacted.    Paged on-call provider, Dr. Sharon Forde, at 5:51 pm via page  to call FNA back.  Second page sent at 6:10 pm. Call from Dr. Forde who gave verbal order for Keflex 500mg by mouth four times a day for 10 day, patient to be see if not better in 2 days. Patient, daughter, and son-in-law was informed of the prescription.  Rx sent to [facility] in  [place], [phone].      Reason for Disposition    [1] Red area or streak AND [2] no fever    Additional Information    Negative: [1] Widespread rash AND [2] bright red, sunburn-like AND [3] too weak to stand    Negative: Sounds like a life-threatening emergency to the triager    Negative: [1] Widespread rash AND [2] bright red, sunburn-like    Negative: Severe pain in the wound    Negative: Black (necrotic) or blisters develop in wound    Negative: Patient sounds very sick or weak to the triager    Negative: [1] Looks infected (spreading redness, red streak, pus) AND [2] fever    Negative: [1] Red streak runs from the wound AND [2] longer than 1 inch (2.5 cm)    Negative: [1] Skin around the wound has become red AND [2] larger than 2 inches (5 cm)    Negative: [1] Face wound AND [2] looks infected (e.g., spreading redness)    Negative: [1] Finger wound AND [2] entire finger swollen    Protocols used: WOUND INFECTION-A-AH

## 2021-06-15 NOTE — PROGRESS NOTES
"Assessment/Plan:     Problem List Items Addressed This Visit     Rheumatoid arthritis (H)    Relevant Medications    oxyCODONE (OXYCONTIN) 10 mg 12 hr tablet    Back pain with left-sided sciatica              Return in about 3 months (around 6/5/2021).    Patient Instructions   PLAN:  Continue with the OxyContin 40 mg in the morning and 10 mg OxyContin 3 at bedtime.    Continue the Soma 350 mg 3 times a day.    Follow-up with Dr. Ramirez in 3 months.        Subjective:       80 y.o. female The patient has been notified of the following:      \"We have found that certain health care needs can be provided without the need for a face to face visit.  This service lets us provide the care you need with a phone conversation.       I will have full access to your [facility] medical record during this entire phone call.   I will be taking notes for your medical record.      Since this is like an office visit, we will bill your insurance company for this service.       There are potential benefits and risks of telephone visits (e.g. limits to patient confidentiality) that differ from in-person visits.?  Confidentiality still applies for telephone services, and nobody will record the visit.  It is important to be in a quiet, private space that is free of distractions (including cell phone or other devices) during the visit.??      If during the course of the call I believe a telephone visit is not appropriate, you will not be charged for this service\"     Consent has been obtained for this service by care team member: Yes    Patient followed for lumbar disc degeneration, postlaminectomy syndrome, rheumatoid arthritis.    She reports today she is doing better, with the seasonal change in more light.  Reviews living in [place], [place], [place] and there was much more sun, coming here to support her daughter and grandchildren.    There have been no changes in her medical conditions, has isolating, and doing fine.    Describes " "she continues to be a \"clutch\", injured her calf back in and do table corner, does not have any significant trouble swelling, changing in breathing or other concerns.    Indeed we first met there were some concern for falls, which she attributed to tripping over carpeting, I was worried about orthostatic changes with try cyclics and other concerns.  We made no medication changes other otherwise been no falls in the interval, daughter in the past is confirmed.    She continues on the OxyContin 40 mg extended release 1 in the morning and 10 mg extended release 3 at bedtime, with Soma 350 mg 3 times a day.  No side effects.   reviewed, as expected.      Current Outpatient Medications:      albuterol (PROAIR HFA;PROVENTIL HFA;VENTOLIN HFA) 90 mcg/actuation inhaler, Inhale 2 puffs every 4 (four) hours as needed for wheezing. And cough, Disp: 1 Inhaler, Rfl: 3     amitriptyline (ELAVIL) 25 MG tablet, One to two tabs bedtime, Disp: 60 tablet, Rfl: 2     aspirin 81 mg chewable tablet, Chew 81 mg daily., Disp: , Rfl:      b complex vitamins tablet, Take 1 tablet by mouth daily., Disp: , Rfl:      CALCIUM CARBONATE/VITAMIN D3 (CALCIUM+D ORAL), Take 3 tablets by mouth daily., Disp: , Rfl:      carisoprodoL (SOMA) 350 MG tablet, Take 1 tablet (350 mg total) by mouth 3 (three) times a day as needed for muscle spasms., Disp: 90 tablet, Rfl: 0     cyanocobalamin, vitamin B-12, (VITAMIN B-12) 500 mcg TbER, Take 1 capsule by mouth daily., Disp: , Rfl:      DOCOSAHEXANOIC ACID/EPA (FISH OIL ORAL), Take 1 tablet by mouth daily., Disp: , Rfl:      ERGOCALCIFEROL, VITAMIN D2, (VITAMIN D2 ORAL), Take 1 tablet by mouth daily., Disp: , Rfl:      folic acid (FOLVITE) 1 MG tablet, TK 1 T PO ONCE DAILY., Disp: , Rfl: 2     hydrocortisone (CORTISONE, HYDROCORTISONE,) 1 % cream, Apply to hand three times a day, Disp: 30 g, Rfl: 2     isosorbide mononitrate (ISMO,MONOKET) 10 MG tablet, TAKE 1 TABLET BY MOUTH TWICE DAILY (Patient taking " differently: Take 10 mg by mouth daily. TAKE 1 TABLET BY MOUTH TWICE DAILY), Disp: 180 tablet, Rfl: 3     levothyroxine (SYNTHROID, LEVOTHROID) 75 MCG tablet, TAKE 1 TABLET BY MOUTH DAILY AT 6 AM, Disp: 90 tablet, Rfl: 3     loratadine (CLARITIN) 10 mg tablet, Take 10 mg by mouth daily., Disp: , Rfl:      methotrexate 2.5 MG tablet, Take 2.5 mg by mouth once a week. (Thursday) As directed, Disp: , Rfl:      mometasone (NASONEX) 50 mcg/actuation nasal spray, SHAKE WELL AND USE 2 SPRAYS IN EACH NOSTRIL EVERY DAY (Patient taking differently: 2 sprays into each nostril 2 (two) times a day as needed. SHAKE WELL AND USE 2 SPRAYS IN EACH NOSTRIL EVERY DAY, as needed), Disp: 17 g, Rfl: 5     multivitamin capsule, Take 1 capsule by mouth daily., Disp: , Rfl:      NON FORMULARY, Apply 1 application topically daily. Sera Ve, Disp: , Rfl:      nystatin (MYCOSTATIN) cream, Apply 1 application topically 2 (two) times a day as needed., Disp: , Rfl: 1     omeprazole (PRILOSEC) 20 MG capsule, Take 20 mg by mouth Daily before breakfast., Disp: , Rfl:      oxyCODONE (OXYCONTIN) 10 mg 12 hr tablet, Take 2-3 tablets (20-30 mg total) by mouth at bedtime. Two to three tabs bedtime, Disp: 84 each, Rfl: 0     [START ON 3/12/2021] oxyCODONE (OXYCONTIN) 40 mg 12 hr tablet, Take 1 tablet (40 mg total) by mouth daily., Disp: 28 tablet, Rfl: 0     potassium gluconate 550 mg (90 mg) tablet, Take 1 tablet by mouth daily as needed., Disp: , Rfl:      sulfamethoxazole-trimethoprim (SEPTRA DS) 800-160 mg per tablet, Take 1 tablet by mouth 2 (two) times a day., Disp: , Rfl:      triamcinolone (KENALOG) 0.1 % ointment, Apply to both legs twice daily, Disp: 30 g, Rfl: 0     vitamin E 400 unit capsule, Take 400 Units by mouth daily., Disp: , Rfl:     By telephone alert, clear sensorium, thought process logical.  Affect is fairly full range.            Objective:     Vitals:    03/05/21 1546   Height: 5' (1.524 m)   PainSc:   4   PainLoc: Back        Assessment: Lumbar degenerative changes and laminectomy syndrome, has been stable on above opioids allowing her to live independently.  No side effects noted with the soma.    Plan medications as above, follow-up in 3 months.    Total time more than 8 minutes            This note has been dictated using voice recognition software. Any grammatical or context distortions are unintentional and inherent to the software

## 2021-06-15 NOTE — PROGRESS NOTES
Jacqueline is seen with her daughter.  She reviews getting signed up for the medical cannabis and her daughter needs to complete a background check as the caregiver to  the prescriptions.    She has gone back on the amitriptyline, did not tolerate the nortriptyline.  There have been no falls.  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  They have been ffalls where she has broken her ribs and her wrist.  She recently almost fell when there was a rug that was  was repositioned.    She has not obtained a TENS unit, going to physical therapy and told they do not do that.    She did have a evaluation at [facility] for physical therapy.  She would like to make arrangements to go somewhere else to get myofascial release.    She did call Dr. Blake and got a referral faxed to them.  She did not hear back.    Continues on oxycodone 40 mg extended release in the morning 20 mg at bedtime.    She describes having difficulties with the flu after [place].  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      Current Outpatient Prescriptions:      albuterol (PROAIR HFA;PROVENTIL HFA;VENTOLIN HFA) 90 mcg/actuation inhaler, Inhale 2 puffs every 4 (four) hours as needed for wheezing. And cough, Disp: 1 Inhaler, Rfl: 3     amitriptyline (ELAVIL) 25 MG tablet, One to two tabs bedtime, Disp: 60 tablet, Rfl: 2     aspirin 81 mg chewable tablet, Chew 81 mg daily., Disp: , Rfl:      b complex vitamins tablet, Take 1 tablet by mouth daily., Disp: , Rfl:      CALCIUM CARBONATE/VITAMIN D3 (CALCIUM+D ORAL), Take 3 tablets by mouth daily., Disp: , Rfl:      carisoprodol (SOMA) 350 MG tablet, Take 1 tablet (350 mg total) by mouth 3 (three) times a day as needed for muscle spasms., Disp: 90 tablet, Rfl: 1     cyanocobalamin, vitamin B-12, (VITAMIN B-12) 500 mcg TbER, Take 1 capsule by mouth daily., Disp: , Rfl:      denosumab  "60 mg/mL Syrg, Inject 60 mg under the skin every 6 (six) months., Disp: , Rfl:      DOCOSAHEXANOIC ACID/EPA (FISH OIL ORAL), Take 1 tablet by mouth daily., Disp: , Rfl:      ERGOCALCIFEROL, VITAMIN D2, (VITAMIN D2 ORAL), Take 1 tablet by mouth daily., Disp: , Rfl:      folic acid (FOLVITE) 1 MG tablet, Use As Directed., Disp: , Rfl:      isosorbide mononitrate (ISMO,MONOKET) 10 MG tablet, TAKE 1 TABLET BY MOUTH TWICE DAILY, Disp: 180 tablet, Rfl: 0     levothyroxine (SYNTHROID, LEVOTHROID) 75 MCG tablet, TAKE 1 TABLET DAILY AT 6:00 A.M., Disp: 90 tablet, Rfl: 3     lisinopril (PRINIVIL,ZESTRIL) 5 MG tablet, TAKE 1 TABLET(5 MG) BY MOUTH DAILY, Disp: 30 tablet, Rfl: 0     lisinopril (PRINIVIL,ZESTRIL) 5 MG tablet, Take 1 tablet (5 mg total) by mouth daily., Disp: 90 tablet, Rfl: 2     loratadine (CLARITIN) 10 mg tablet, Take 10 mg by mouth daily., Disp: , Rfl:      methotrexate 2.5 MG tablet, once a week. As directed, Disp: , Rfl:      mometasone (NASONEX) 50 mcg/actuation nasal spray, SHAKE WELL AND USE 2 SPRAYS IN EACH NOSTRIL EVERY DAY, Disp: 17 g, Rfl: 5     multivitamin capsule, Take 1 capsule by mouth daily., Disp: , Rfl:      NON FORMULARY, , Disp: , Rfl:      omeprazole (PRILOSEC) 20 MG capsule, Take 20 mg by mouth Daily before breakfast., Disp: , Rfl:      oxyCODONE (OXYCONTIN) 10 mg 12 hr tablet, Two to three tabs bedtime, Disp: 84 each, Rfl: 0     oxyCODONE (OXYCONTIN) 40 mg 12 hr tablet, Take 1 tablet (40 mg total) by mouth daily., Disp: 28 each, Rfl: 0     POLYMYXIN B SULF/TRIMETHOPRIM (POLYMYXIN B SUL-TRIMETHOPRIM OPHT), Use As Directed., Disp: , Rfl:      sucralfate (CARAFATE) 100 mg/mL suspension, Use As Directed., Disp: , Rfl:   Blood pressure 138/79, pulse 87, resp. rate 16, height 5' 2\" (1.575 m), weight 185 lb (83.9 kg).    Pain score 7.  She is alert with a clear sensorium bright affect.  Thought process tight logical.    Impression: chronic pain relates to low back pain with a history of " radiculopathy.    Plan she will do the process to begin using medical cannabis, discussed using high CBD low THC agents.  She will taper the opioids as she tolerates.    She will contact Dr. Blake for injections in her back which have been helpful in the past.    She was given the resource of the [facility] physical therapy for the myofascial release she would like to proceed.    We will see if the [facility] medical supply program can help obtain a TENS unit.    Time spent 15 minutes face-to-face more than 50% count about above condition and coordination treatment plan

## 2021-06-15 NOTE — TELEPHONE ENCOUNTER
Refill Request  Did you contact pharmacy: No  Medication name: isosorbide mononitrate (ISMO,MONOKET) 10 MG tablet  Requested Prescriptions      No prescriptions requested or ordered in this encounter     Who prescribed the medication:PCP  [facility]ed [facility]acy: [facility] patient out of medication: Yes  Patient notified refills processed in 3 business days:  yes  Okay to leave a detailed message: yes

## 2021-06-15 NOTE — TELEPHONE ENCOUNTER
"Spoke to pt to get more information.    Pt reports she has her covid vaccine.    Pt reports every year around this time, pt gets post nasal drainage, and has a productive cough-white phlegm.    Pt denies fever, sinus pressure, headache, body aches.    Onset x 3 weeks    PT reports she's on Bactrim currently for a wound on her leg that was prescribed by a private \"conceirge\" medical physician.     Pt requesting cough medicine.    "

## 2021-06-16 PROBLEM — M79.605 LEFT LEG PAIN: Status: ACTIVE | Noted: 2019-05-15

## 2021-06-16 PROBLEM — I89.0 ACQUIRED LYMPHEDEMA OF LEG: Status: ACTIVE | Noted: 2018-10-03

## 2021-06-16 PROBLEM — I87.2 VENOUS INSUFFICIENCY OF BOTH LOWER EXTREMITIES: Status: ACTIVE | Noted: 2018-06-14

## 2021-06-16 PROBLEM — L60.2 ONYCHAUXIS: Status: ACTIVE | Noted: 2018-06-14

## 2021-06-16 PROBLEM — I87.303 VENOUS HYPERTENSION OF BOTH LOWER EXTREMITIES: Status: ACTIVE | Noted: 2018-06-14

## 2021-06-16 PROBLEM — R22.42 LOWER LEG MASS, LEFT: Status: ACTIVE | Noted: 2019-05-15

## 2021-06-16 NOTE — TELEPHONE ENCOUNTER
New Appointment Needed  What is the reason for the visit:    Patient needs her prolia shot, she usually gets them twice a year, but did not get on the last part of the year last year, so needs to get one   Provider Preference: [facility]  How soon do you need to be seen? ASAP she will call daughter to see when daughter can take her and will let you know when you call please call after noon hour. Thanks  Waitlist offered?: N/A  Okay to leave a detailed message:Yes

## 2021-06-16 NOTE — TELEPHONE ENCOUNTER
Telephone Encounter by Zeynep Horne RN at 6/24/2019 11:20 AM     Author: Zeynep Horne RN Service: -- Author Type: Registered Nurse    Filed: 6/24/2019 11:23 AM Encounter Date: 6/21/2019 Status: Signed    : Zeynep Horne RN (Registered Nurse)        review:    5/3 last ov  6/26 next ov    Requested Prescriptions     Pending Prescriptions Disp Refills   ? oxyCODONE (OXYCONTIN) 10 mg 12 hr tablet 84 each 0     Sig: Take 2-3 tablets (20-30 mg total) by mouth at bedtime. Two to three tabs bedtime     UDS and CSA - 05/3/19    brady

## 2021-06-16 NOTE — TELEPHONE ENCOUNTER
Medication being requested: Soma  Last visit date: 3/5/21 Provider: BE  Next visit date: 6/4/21 Provider: ROXANA  Expected follow up: 3 months  MTM visit (Pain Center) date: No  Pertinent between visit information about requested medication (telephone, mychart, prior authorization, concerns): No  Last date prescribed: 3/3/21  Provider responsible: BE  Spoke with patient: gen  Script being sent to provider - dates and quantity:   4/2/21-5/2/21  Requested Prescriptions     Pending Prescriptions Disp Refills     carisoprodoL (SOMA) 350 MG tablet 90 tablet 0     Sig: Take 1 tablet (350 mg total) by mouth 3 (three) times a day as needed for muscle spasms.     [facility] cued[facility]

## 2021-06-16 NOTE — TELEPHONE ENCOUNTER
Telephone Encounter by Zeynep Horne RN at 8/1/2019  3:04 PM     Author: Zeynep Horne RN Service: -- Author Type: Registered Nurse    Filed: 8/1/2019  3:08 PM Encounter Date: 7/31/2019 Status: Signed    : Zeynep Horne RN (Registered Nurse)       Refill request: oxycontin 10 mg      Requested Prescriptions     Pending Prescriptions Disp Refills   ? oxyCODONE (OXYCONTIN) 10 mg 12 hr tablet 84 each 0     Sig: Take 2-3 tablets (20-30 mg total) by mouth at bedtime. Two to three tabs bedtime     Signed Prescriptions Disp Refills   ? oxyCODONE (OXYCONTIN) 40 mg 12 hr tablet 28 tablet 0     Sig: Take 1 tablet (40 mg total) by mouth daily.     Authorizing Provider: LIZZY FOLEY

## 2021-06-16 NOTE — TELEPHONE ENCOUNTER
Telephone Encounter by Zeynep Horne RN at 10/22/2019  3:57 PM     Author: Zeynep Horne RN Service: -- Author Type: Registered Nurse    Filed: 10/22/2019  4:00 PM Encounter Date: 10/22/2019 Status: Addendum    : Zeynep Horne RN (Registered Nurse)    Related Notes: Original Note by Zeynep Horne RN (Registered Nurse) filed at 10/22/2019  3:57 PM       Adding soma 350 mg.

## 2021-06-16 NOTE — TELEPHONE ENCOUNTER
"Prolia Injection Phone Screen      Screening questions have been asked 2-3 days prior to administration visit for Prolia. If any questions are answered with \"Yes,\" this phone encounter were will routed to ordering provider for further evaluation.     1.  When was the last injection?  7/6/2019    2.  Has insurance for this injection been verified?  Yes    3.  Did you experience any new onset achiness or rashes that lasted for over a month with your previous Prolia injection?   No    4.  Do you have a fever over 101?F or a new deep cough that is unusual for you today? No    5.  Have you started any new medications in the last 6 months that you were told could affect your immune system? These may have been prescribed by oncologist, transplant, rheumatology, or dermatology.   No    6.  In the last 6 months have you have gastric bypass or parathyroid surgery?   No    7.  Do you plan dental work requiring drilling into the bone such as implants/extractions or oral surgery in the next 2-3 months?   No    8. Do you have new insurance since the last injection?  No    9. Have you received the Covid-19 vaccine? Yes  If No - Proceed with Prolia injection  If Yes - Date of vaccination 1/19/2021. Will need to wait until 2 weeks after 2nd dose of Covid-19 vaccine before administering Prolia       Patient informed if symptoms discussed above present prior to their administration appointment, they are to notify clinic immediately.   Maite MARSH(R)            "

## 2021-06-16 NOTE — TELEPHONE ENCOUNTER
Telephone Encounter by Sushila Summers CMA at 2/14/2019  1:17 PM     Author: Sushila Summers CMA Service: -- Author Type: Certified Medical Assistant    Filed: 2/14/2019  1:18 PM Encounter Date: 2/14/2019 Status: Signed    : Sushila Summers CMA (Certified Medical Assistant)       Message:     Called the patient, informed her of Dr. Arciniega's message above. Patient expressed understanding to me. Closing encounter.    Sushila Summers CMA 2/14/2019 1:18 PM

## 2021-06-16 NOTE — PROGRESS NOTES
[facility] Follow Up Note    Assessment/Plan:  1. Wound infection/lower extremity edema/venous stasis  Ulceration of the distal left lower extremity examined.  Surrounding erythema and mild swelling.  Recommendation: We will proceed with Bactrim DS twice daily.  Mupirocin ointment and dressing  Leg elevation as needed.  Of note, her son-in-law is a general surgeon, he will be checking on her periodically to assess progress.  She has home care company that will also be coming in for wound assessment and dressing changes.  Recommend follow-up in 3 to 4 weeks or sooner if needed.  - sulfamethoxazole-trimethoprim (BACTRIM DS) 800-160 mg per tablet; Take 1 tablet by mouth 2 (two) times a day for 14 days.  Dispense: 28 tablet; Refill: 0  - mupirocin (BACTROBAN) 2 % ointment; Apply to affected area 3 times daily  Dispense: 30 g; Refill: 0    2. Dyspnea, unspecified type  Nonspecific dyspnea-cough.  Family worried for heart failure although denies PND or orthopnea.  Recommendation: We will check echocardiogram to assess cardiovascular function  - Echo Complete; Future    3. Cough  Likely secondary to postnasal drip-nonetheless, will check chest x-ray.  On Bactrim.  We will continue with Tessalon Perles  - XR Chest 2 Views    4. Other chronic pain  Pain clinic.  Stable    5. Lower extremity edema /acquired lymphedema of the leg  Likely secondary to chronic venous stasis.  Recommend compression stockings and leg elevation.  Will give a trial of low-dose hydrochlorothiazide-monitoring for lightheadedness and dizziness  - hydroCHLOROthiazide (MICROZIDE) 12.5 mg capsule; Take 1 capsule (12.5 mg total) by mouth daily.  Dispense: 30 capsule; Refill: 11    6. Other osteoporosis without current pathological fracture  Prolia therapy has been interrupted due to the pandemic.  Of note, she is very reluctant to leave her apartment.  Recommendation: Will update bone density when able.  We will proceed with prior  authorization for Prolia  - DXA Bone Density Scan; Future  - denosumab 60 mg (PROLIA 60 mg/ml)    7. Personal history of other medical treatment  Secondary hyperparathyroidism-chronic kidney disease/esophageal reflux  - DXA Bone Density Scan; Future          Jordana Reynolds MD    Chief Complaint:  Chief Complaint   Patient presents with     Wound Check     left leg wound f/u - has finished the abx course of bactrim (discuss if extended abx are needed)      Allergic Rhinitis     currently using tessalon prn to help with cough/congestion     Osteoporosis     will need new order for this if pt is to continue (last injection given was 7/16/19)       History of Present Illness:  Jacqueline is a 80 y.o. female who is here today accompanied by her daughter Dinorah for evaluation of the following problems.    She contacted our office with recurrent left lower extremity open wound with cellulitis.  She developed an open wound after she hit her leg on the table.  She does have an underlying history of venous stasis and venous insufficiency.  She does have compression stockings.  She states that she has hired a [facility] medicine service to come visit her at her apartment.  She has been very reluctant to leave home due to the pandemic.  Her son passed away from complications of Covid.  She is quite devastated and very nervous.  She is here today because she has received 2 vaccinations.    She was given a short course of Bactrim which resulted in significant improvement.  They are here to have the wound rechecked.  Her son-in-law is a general surgeon.  He has been checking on the wound regularly.  It had closed up significantly.  Of note, as the day progresses, her edema seems to intensify.  She does state that she is able to get her stockings on.  She has a recliner and states that she has been compliant with leg elevation.  She has see Dr. Davidson for chronic lymphedema in the past.    In addition to the leg wound, she has a  cough.  She requested Tessalon Perles.  She states she is coughing up some phlegm.    She denies any chest pain.  She denies any orthopnea or PND.  She does have some deconditioning and some shortness of breath with exertion.    Additionally, she has gotten behind on her Prolia.  With the onset of the pandemic, as stated, they have been reluctant to leave their home.    Social issues discussed.  They like the  medical company that comes to her home once or twice a week.  Apparently labs were just done within the last couple of weeks.  They wish that there was [place] visiting doctor.    Review of Systems:  A comprehensive review of systems was performed and was otherwise negative    PFSH:  Social History: She is .  She lives alone.  She is in independent living.  Social History     Tobacco Use   Smoking Status Former Smoker     Packs/day: 0.50     Types: Cigarettes     Quit date: 2018     Years since quittin.8   Smokeless Tobacco Current User   Tobacco Comment    started smoking when she was 21 years old, vaping       Past History:   Past Medical History:   Diagnosis Date     Bundle branch block     Created by Conversion  Replacement Utility updated for latest IMO load     Cellulitis      Chronic venous stasis dermatitis      Impetigo      Rheumatoid arthritis (H)        Current Outpatient Medications   Medication Sig Dispense Refill     albuterol (PROAIR HFA;PROVENTIL HFA;VENTOLIN HFA) 90 mcg/actuation inhaler Inhale 2 puffs every 4 (four) hours as needed for wheezing. And cough 1 Inhaler 3     amitriptyline (ELAVIL) 25 MG tablet One to two tabs bedtime 60 tablet 2     aspirin 81 mg chewable tablet Chew 81 mg daily.       b complex vitamins tablet Take 1 tablet by mouth daily.       benzonatate (TESSALON PERLES) 100 MG capsule Take 1 capsule (100 mg total) by mouth 3 (three) times a day as needed for cough. 30 capsule 0     CALCIUM CARBONATE/VITAMIN D3 (CALCIUM+D ORAL) Take 3 tablets by  mouth daily.       carisoprodoL (SOMA) 350 MG tablet Take 1 tablet (350 mg total) by mouth 3 (three) times a day as needed for muscle spasms. 90 tablet 0     cyanocobalamin, vitamin B-12, (VITAMIN B-12) 500 mcg TbER Take 1 capsule by mouth daily.       DOCOSAHEXANOIC ACID/EPA (FISH OIL ORAL) Take 1 tablet by mouth daily.       ERGOCALCIFEROL, VITAMIN D2, (VITAMIN D2 ORAL) Take 1 tablet by mouth daily.       folic acid (FOLVITE) 1 MG tablet TK 1 T PO ONCE DAILY.  2     hydrocortisone (CORTISONE, HYDROCORTISONE,) 1 % cream Apply to hand three times a day 30 g 2     isosorbide mononitrate (ISMO,MONOKET) 10 MG tablet Take 1 tablet (10 mg total) by mouth 2 (two) times a day. 180 tablet 3     levothyroxine (SYNTHROID, LEVOTHROID) 75 MCG tablet TAKE 1 TABLET BY MOUTH DAILY AT 6 AM 90 tablet 3     loratadine (CLARITIN) 10 mg tablet Take 10 mg by mouth daily.       methotrexate 2.5 MG tablet Take 2.5 mg by mouth once a week. (Thursday) As directed       mometasone (NASONEX) 50 mcg/actuation nasal spray SHAKE WELL AND USE 2 SPRAYS IN EACH NOSTRIL EVERY DAY (Patient taking differently: 2 sprays into each nostril 2 (two) times a day as needed. SHAKE WELL AND USE 2 SPRAYS IN EACH NOSTRIL EVERY DAY, as needed) 17 g 5     multivitamin capsule Take 1 capsule by mouth daily.       NON FORMULARY Apply 1 application topically daily. Sera Ve       nystatin (MYCOSTATIN) cream Apply 1 application topically 2 (two) times a day as needed.  1     omeprazole (PRILOSEC) 20 MG capsule Take 20 mg by mouth Daily before breakfast.       oxyCODONE (OXYCONTIN) 10 mg 12 hr tablet Take 2-3 tablets (20-30 mg total) by mouth at bedtime. Two to three tabs bedtime 84 each 0     oxyCODONE (OXYCONTIN) 40 mg 12 hr tablet Take 1 tablet (40 mg total) by mouth daily. 28 tablet 0     potassium gluconate 550 mg (90 mg) tablet Take 1 tablet by mouth daily as needed.       triamcinolone (KENALOG) 0.1 % ointment Apply to both legs twice daily 30 g 0     vitamin E  "400 unit capsule Take 400 Units by mouth daily.       hydroCHLOROthiazide (MICROZIDE) 12.5 mg capsule Take 1 capsule (12.5 mg total) by mouth daily. 30 capsule 11     mupirocin (BACTROBAN) 2 % ointment Apply to affected area 3 times daily 30 g 0     sulfamethoxazole-trimethoprim (BACTRIM DS) 800-160 mg per tablet Take 1 tablet by mouth 2 (two) times a day for 14 days. 28 tablet 0     sulfamethoxazole-trimethoprim (SEPTRA DS) 800-160 mg per tablet Take 1 tablet by mouth 2 (two) times a day.       Current Facility-Administered Medications   Medication Dose Route Frequency Provider Last Rate Last Admin     [START ON 4/2/2021] denosumab 60 mg (PROLIA 60 mg/ml)  60 mg Subcutaneous Once Jordana Reynolds MD           Family History: Son recently passed away from Covid    Physical Exam:  General Appearance:   Pleasant, well-appearing and in no acute distress  Vitals:    03/19/21 1412   BP: 138/70   Patient Site: Left Arm   Patient Position: Sitting   Cuff Size: Adult Regular   Pulse: 92   Temp: 98.9  F (37.2  C)   SpO2: 96%   Weight: 172 lb (78 kg)   Height: 4' 11.5\" (1.511 m)     Wt Readings from Last 3 Encounters:   03/19/21 172 lb (78 kg)   01/07/20 178 lb (80.7 kg)   11/05/19 178 lb (80.7 kg)     Body mass index is 34.16 kg/m .    Weight is down about 7 pounds  Lungs are clear to auscultation and percussion  Cardiac exam reveals regular rate and rhythm  Extremities are examined.  The ulceration is noted in the left lower anterior leg.  It is about 2 cm horizontal.  There is surrounding erythema and warmth  The calf is not tender  Ankle edema is not present in the right lower extremity    Data Review:    Analysis and Summary of Old Records (2): Reviewed Dr. Davidson's notes    Records Requested (1):       Other History Summarized (from other people in the room) (2): Her daughter contributes to the history    Radiology Tests Summarized (XRAY/CT/MRI/DXA) (1): Ordered the bone density    Labs Reviewed (1): Reviewed labs " that are present in the chart from January    Medicine Tests Reviewed (EKG/ECHO/COLONOSCOPY/EGD) (1):     Independent Review of EKG or X-RAY (2): Ordered and reviewed a chest x-ray for the cough

## 2021-06-16 NOTE — PROGRESS NOTES
The following steps were completed to comply with the REMS program for Prolia:  1. Ordering provider has previously reviewed information in the Medication Guide and Patient Counseling Chart, including the serious risks of Prolia  and the symptoms of each risk and have been advised  to seek prompt medical attention if they have signs or symptoms of any of the serious risks.  2. Provided each patient a copy of the Medication Guide and Patient Brochure.  See MAR for administration details.   Indication: Prolia  (denosumab) is a prescription medicine used to treat osteoporosis in patients who:   Are at high risk for fracture, meaning patients who have had a fracture related to osteoporosis, or who have multiple risk factors for fracture; Cannot use another osteoporosis medicine or other osteoporosis medicines did not work well.   The timeline for early/late injections would be 4 weeks early and any time after the 6 month dinora. If a patient receives their injection late, then the subsequent injection would be 6 months from the date that they actually received the injection    1.  When was the last injection?  7/16/19    2.  Has insurance for this injection been verified?  Yes    3.  Did you experience any new onset achiness or rashes that lasted for over a month with your previous Prolia injection?   No    4.  Do you have a fever over 101?F or a new deep cough that is unusual for you today? No    5.  Have you started any new medications in the last 6 months that you were told could affect your immune system? These may have been prescribed by oncologist, transplant, rheumatology, or dermatology.   No    6.  In the last 6 months have you have gastric bypass or parathyroid surgery?   No    7.  Do you plan dental work requiring drilling into the bone such as implants/extractions or oral surgery in the next 2-3 months?   No     No recent covid vaccines

## 2021-06-16 NOTE — TELEPHONE ENCOUNTER
Pt is here for a bone density and asking to get a refill on her tesslon pearles she has terrible allergies, she takes her claritin daily she sounds typical cough for allergies ok to get refill???

## 2021-06-16 NOTE — TELEPHONE ENCOUNTER
Telephone Encounter by Theresa Mcrae RN at 11/19/2019 10:30 AM     Author: Theresa Mcrae RN Service: -- Author Type: Registered Nurse    Filed: 11/19/2019 10:39 AM Encounter Date: 11/19/2019 Status: Signed    : Theresa Mcrae RN (Registered Nurse)       Medication being requested: Oxycodone 40 and 10 mg  Last visit date: 11/5  Provider: James  Next visit date: 1/7/2020  Provider: James  Expected follow up: 8 weeks  MTM visit ([facility] Center) date: na  UDT date: 5/3/19  Agreement date:  5/3/19   snipped in:      Pertinent between visit information about requested medication (telephone, mychart, prior authorization, concerns, comments): new rx already at pharmacy  Script being sent to provider by nurse- dates and quantity: na  Pharmacy cued: na  Standing orders for withdrawal protocol implemented:

## 2021-06-16 NOTE — TELEPHONE ENCOUNTER
"Telephone Encounter by Zeynep Horne RN at 6/11/2019  3:38 PM     Author: Zeynep Horne RN Service: -- Author Type: Registered Nurse    Filed: 6/11/2019  3:47 PM Encounter Date: 6/11/2019 Status: Signed    : Zeynep Horne RN (Registered Nurse)       Refill request: oxycontin 40 mg  Plan from ov on 5/3:  She continues with the OxyContin 40 mg in the morning and 10 mg 2-3 at bedtime.   Next ov: 6/29 with BE      Patient reports, \"I still have oxycontin 10 mg left\"    UDS and CSA - 05/3/19         "

## 2021-06-16 NOTE — TELEPHONE ENCOUNTER
Spoke to Jacqueline - she will call back and schedule when she consults with her daughter for transportation.

## 2021-06-16 NOTE — TELEPHONE ENCOUNTER
I called pt to schedule a Prolia shot, she mentioned she finished her Bactrim yesterday, but still having mucus, sometimes still colored,no temp, what to do to get rid of mucus, using claritin, inhaler and flonase ?????? What else to do?

## 2021-06-16 NOTE — PROGRESS NOTES
Jacqueline is seen with her daughter.  She reviews she is not doing so well that her back pain.  There were problems with some of her  so that she is doing more cleaning at home.  She now has a new  this more reliable.  She also was overactive at the holidays.    She has been off her Humira since January, may take a while to get this approved again.  She has been placed on prednisone 5 mg daily.    She is scheduled on 4/11 to see Dr. Blake for evaluation and then may have injections.  Describes she can tell the difference between her nerve pain and her joint pain.    She has not called [facility] to get the TENS unit.    She is not ready to go to physical therapy yet with her pain problems, and driving makes it worse.    Her daughter notes they have scheduled appointment today for the medical cannabis dispensary.  Daughter  has recently moved and was out of town.    Patient notes she has been more active, going out to dinner with the family, and movies with a friend.      Current Outpatient Prescriptions:      albuterol (PROAIR HFA;PROVENTIL HFA;VENTOLIN HFA) 90 mcg/actuation inhaler, Inhale 2 puffs every 4 (four) hours as needed for wheezing. And cough, Disp: 1 Inhaler, Rfl: 3     amitriptyline (ELAVIL) 25 MG tablet, One to two tabs bedtime, Disp: 60 tablet, Rfl: 2     aspirin 81 mg chewable tablet, Chew 81 mg daily., Disp: , Rfl:      b complex vitamins tablet, Take 1 tablet by mouth daily., Disp: , Rfl:      CALCIUM CARBONATE/VITAMIN D3 (CALCIUM+D ORAL), Take 3 tablets by mouth daily., Disp: , Rfl:      carisoprodol (SOMA) 350 MG tablet, Take 1 tablet (350 mg total) by mouth 3 (three) times a day as needed for muscle spasms., Disp: 90 tablet, Rfl: 1     cyanocobalamin, vitamin B-12, (VITAMIN B-12) 500 mcg TbER, Take 1 capsule by mouth daily., Disp: , Rfl:      denosumab 60 mg/mL Syrg, Inject 60 mg under the skin every 6 (six) months., Disp: , Rfl:      DOCOSAHEXANOIC ACID/EPA (FISH  "OIL ORAL), Take 1 tablet by mouth daily., Disp: , Rfl:      ERGOCALCIFEROL, VITAMIN D2, (VITAMIN D2 ORAL), Take 1 tablet by mouth daily., Disp: , Rfl:      folic acid (FOLVITE) 1 MG tablet, Use As Directed., Disp: , Rfl:      isosorbide mononitrate (ISMO,MONOKET) 10 MG tablet, TAKE 1 TABLET BY MOUTH TWICE DAILY, Disp: 180 tablet, Rfl: 0     levothyroxine (SYNTHROID, LEVOTHROID) 75 MCG tablet, TAKE 1 TABLET DAILY AT 6:00 A.M., Disp: 90 tablet, Rfl: 0     lisinopril (PRINIVIL,ZESTRIL) 5 MG tablet, TAKE 1 TABLET(5 MG) BY MOUTH DAILY, Disp: 30 tablet, Rfl: 0     lisinopril (PRINIVIL,ZESTRIL) 5 MG tablet, Take 1 tablet (5 mg total) by mouth daily., Disp: 90 tablet, Rfl: 2     loratadine (CLARITIN) 10 mg tablet, Take 10 mg by mouth daily., Disp: , Rfl:      methotrexate 2.5 MG tablet, once a week. As directed, Disp: , Rfl:      mometasone (NASONEX) 50 mcg/actuation nasal spray, SHAKE WELL AND USE 2 SPRAYS IN EACH NOSTRIL EVERY DAY, Disp: 17 g, Rfl: 5     multivitamin capsule, Take 1 capsule by mouth daily., Disp: , Rfl:      NON FORMULARY, , Disp: , Rfl:      omeprazole (PRILOSEC) 20 MG capsule, Take 20 mg by mouth Daily before breakfast., Disp: , Rfl:      [START ON 4/9/2018] oxyCODONE (OXYCONTIN) 40 mg 12 hr tablet, Take 1 tablet (40 mg total) by mouth daily., Disp: 28 each, Rfl: 0     POLYMYXIN B SULF/TRIMETHOPRIM (POLYMYXIN B SUL-TRIMETHOPRIM OPHT), Use As Directed., Disp: , Rfl:      predniSONE (DELTASONE) 5 MG tablet, Take 5 mg by mouth., Disp: , Rfl:      sucralfate (CARAFATE) 100 mg/mL suspension, Use As Directed., Disp: , Rfl:      oxyCODONE (OXYCONTIN) 10 mg 12 hr tablet, Two to three tablets bedtime, Disp: 84 each, Rfl: 0  Blood pressure 147/81, pulse 86, resp. rate 16, height 5' 2\" (1.575 m), weight 185 lb (83.9 kg).    Pain score 8.  She is alert with a clear sensorium good eye contact.  She appears more kyphotic than last seen.    Impression: Low back pain related history of lumbar radiculopathy.   PLAN: she " will keep the appointment with Dr. Blake for home she has had injections in the past.  Her graph    Discussed with the medical cannabis beginning with high CBD/low THC products.    Resources given to call the [facility] to obtain a TENS unit.    Time spent more than 15 minutes face-to-face 50% count about above condition and coordination treatment plan

## 2021-06-16 NOTE — TELEPHONE ENCOUNTER
Sorry for this.  I need to figure outt if she has missed her prolia injections.  Can you help me see the actual date of administration

## 2021-06-16 NOTE — TELEPHONE ENCOUNTER
Telephone Encounter by Kailyn Matthews RN at 3/18/2020 10:55 AM     Author: Kailyn Matthews RN Service: -- Author Type: Registered Nurse    Filed: 3/18/2020 11:09 AM Encounter Date: 3/18/2020 Status: Signed    : Kailyn Matthews RN (Registered Nurse)       Medication being requested: Oxycontin.  Last visit date: 1/7.  Provider: hina  Next visit date: 5/8.  Provider: hina  Expected follow up: 8 weeks  MTM visit ([facility]) date: no  UDT date: 5/2019  Agreement date: ?   snipped in:    Pertinent between visit information about requested medication (telephone, mychart, prior authorization, concerns, comments): none  Script being sent to provider by nurse- dates and quantity:   Requested Prescriptions     Pending Prescriptions Disp Refills   ? oxyCODONE (OXYCONTIN) 40 mg 12 hr tablet 28 tablet 0     Sig: Take 1 tablet (40 mg total) by mouth daily.   ? oxyCODONE (OXYCONTIN) 10 mg 12 hr tablet 84 each 0     Sig: Take 2-3 tablets (20-30 mg total) by mouth at bedtime. Two to three tabs bedtime     Pharmacy cued: [facility] orders for withdrawal protocol implemented: eveline

## 2021-06-16 NOTE — TELEPHONE ENCOUNTER
Telephone Encounter by Cristina Damon RN at 12/19/2019  4:17 PM     Author: Cristina Damon RN Service: -- Author Type: Registered Nurse    Filed: 12/19/2019  4:21 PM Encounter Date: 12/19/2019 Status: Signed    : Cristina Damon RN (Registered Nurse)       Medication being requested: Oxycontin 10mg and 40mg  Last visit date: 11/5/19  Provider: BE  Next visit date: 1/7/20.  Provider: BE  Expected follow up: 8 weeks  MTM visit ([facility]) date: No  UDT date: 5/2019  Agreement date: 5/2019   snipped in:    Pertinent between visit information about requested medication (telephone, mychart, prior authorization, concerns, comments): No  Script being sent to provider by nurse- dates and quantity:   Requested Prescriptions     Pending Prescriptions Disp Refills   ? oxyCODONE (OXYCONTIN) 10 mg 12 hr tablet 84 each 0     Sig: Take 2-3 tablets (20-30 mg total) by mouth at bedtime. Two to three tabs bedtime   ? oxyCODONE (OXYCONTIN) 40 mg 12 hr tablet 28 tablet 0     Sig: Take 1 tablet (40 mg total) by mouth daily.     Pharmacy cued: [facility]  Standing orders for withdrawal protocol implemented: SEBASTIAN

## 2021-06-16 NOTE — TELEPHONE ENCOUNTER
Medication being requested: amitriptyline 25 mg tablets  Last visit date: 3/5/2021 Provider: BE  Next visit date: 6/4/2021 Provider: ROXANA  Expected follow up: 3 months  MTM visit ([facility]) date: n/a  Pertinent between visit information about requested medication (telephone, mychart, prior authorization, concerns): medication refills  Last date prescribed: 11/13/2020  Provider responsible: BE  Spoke with patient: no  Script being sent to provider - dates and quantity: amitriptyline 30 days, 60 tabs and 5 refills  Pharmacy cued: [facility] in [place]

## 2021-06-16 NOTE — TELEPHONE ENCOUNTER
Telephone Encounter by Rosita Reina at 1/3/2019  9:45 AM     Author: Rosita Reina Service: -- Author Type: --    Filed: 1/3/2019  9:47 AM Encounter Date: 1/3/2019 Status: Signed    : Rosita Reina       PA INITIATION

## 2021-06-16 NOTE — TELEPHONE ENCOUNTER
Medication being requested: Oxycodone 10 mg and 40 mg  Last visit date: 3/5   Provider: BE  Next visit date:  6/4   Provider: Dick  Expected follow up: 3 months  MTM visit ([facility]) date: eveline  UDT date: 5/3/2019  Agreement date: 5/3/2019   (Last fill date; name; strength; provider; MME; quantity):  reviewed:  Oxycontin 10 mg last filled 3/12, # 84, 28 day supply  Oxycontin  40 mg last filled 3/16 # 28, 28 day supply.  Both prescriptions due for refill    Pertinent between visit information about requested medication (telephone, mychart, prior authorization, concerns, comments): PLAN:  Continue with the OxyContin 40 mg in the morning and 10 mg OxyContin 3 at bedtime.  Script being sent to provider by nurse- dates and quantity:   Requested Prescriptions     Pending Prescriptions Disp Refills     oxyCODONE (OXYCONTIN) 10 mg 12 hr tablet 84 each 0     Sig: Take 2-3 tablets (20-30 mg total) by mouth at bedtime. Two to three tabs bedtime     oxyCODONE (OXYCONTIN) 40 mg 12 hr tablet 28 tablet 0     Sig: Take 1 tablet (40 mg total) by mouth daily.     Pharmacy cued: [facility]  Standing orders for withdrawal protocol implemented: eveline

## 2021-06-16 NOTE — TELEPHONE ENCOUNTER
Telephone Encounter by Theresa Mcrae RN at 3/9/2020  3:53 PM     Author: Theresa Mcrae RN Service: -- Author Type: Registered Nurse    Filed: 3/9/2020  4:11 PM Encounter Date: 3/9/2020 Status: Signed    : Theresa Mcrae RN (Registered Nurse)       Medication being requested: Oxycontin  Last visit date: 5/3/19  Provider: BE  Next visit date:  5/3/19  Provider: BE  Expected follow up:  8 weeks  MTM visit ([facility]) date: na  UDT date:  5/3/19  Agreement date: 5/3/19   snipped in:      Pertinent between visit information about requested medication (telephone, mychart, prior authorization, concerns, comments): missing next appointment due to daughter being ill.  Script being sent to provider by nurse- dates and quantity:   Requested Prescriptions     Pending Prescriptions Disp Refills   ? amitriptyline (ELAVIL) 25 MG tablet 60 tablet 2     Sig: One to two tabs bedtime     Pharmacy cued: [facility]  Standing orders for withdrawal protocol implemented: na

## 2021-06-16 NOTE — TELEPHONE ENCOUNTER
Telephone Encounter by Zeynep Horne RN at 7/31/2019  9:11 AM     Author: Zeynep Horne RN Service: -- Author Type: Registered Nurse    Filed: 7/31/2019  9:19 AM Encounter Date: 7/31/2019 Status: Signed    : Zeynep Horne RN (Registered Nurse)       Medication being requested: oxycontin 40 mg  Last visit date: 6/26.  Provider: BE  Next visit date: 8/29.  Provider: BE  Expected follow up: 8 weeks  MTM visit ([facility]) date: no  UDS and CSA - 05/03/19   snipped in:    Pertinent between visit information about requested medication (telephone, mychart, prior authorization, concerns, comments):   PA  Script being sent to provider by nurse- dates and quantity:   Requested Prescriptions     Pending Prescriptions Disp Refills   ? oxyCODONE (OXYCONTIN) 40 mg 12 hr tablet 28 tablet 0     Sig: Take 1 tablet (40 mg total) by mouth daily.     [facility] cued[facility]  Standing orders for withdrawal protocol implemented: SEBASTIAN

## 2021-06-16 NOTE — TELEPHONE ENCOUNTER
Telephone Encounter by Rosita Reina at 1/4/2019  8:25 AM     Author: Rosita Reina Service: -- Author Type: --    Filed: 1/4/2019  8:26 AM Encounter Date: 1/3/2019 Status: Signed    : Rosita Reina       [facility] APPROVAL

## 2021-06-16 NOTE — TELEPHONE ENCOUNTER
Telephone Encounter by Chandrika Garcia at 7/11/2019  2:15 PM     Author: Chandrika Garcia Service: -- Author Type: --    Filed: 7/11/2019  2:15 PM Encounter Date: 7/11/2019 Status: Signed    : Chandrika Garcia       No PA needed, this medication is covered.  Called pharmacy and it was processed successfully. Patient has a high copay of $72

## 2021-06-16 NOTE — TELEPHONE ENCOUNTER
Telephone Encounter by Zeynep Horne RN at 2/18/2020  9:41 AM     Author: Zeynep Horne RN Service: -- Author Type: Registered Nurse    Filed: 2/18/2020  9:46 AM Encounter Date: 2/18/2020 Status: Signed    : Zeynep Horne RN (Registered Nurse)       Medication being requested: oxycodone 10 mg and oxycodone 40 mg  Last visit date: 01/07  Provider: BE  Next visit date: 3/11  Provider: BE  Expected follow up: 8 weeks  MTM visit ([facility]) date: no  UDS and CSA - 05/03/19   snipped in:    Pertinent between visit information about requested medication (telephone, mychart, prior authorization, concerns, comments): NA  Script being sent to provider by nurse- dates and quantity:   Requested Prescriptions     Pending Prescriptions Disp Refills   ? oxyCODONE (OXYCONTIN) 40 mg 12 hr tablet 28 tablet 0     Sig: Take 1 tablet (40 mg total) by mouth daily.   ? oxyCODONE (OXYCONTIN) 10 mg 12 hr tablet 84 each 0     Sig: Take 2-3 tablets (20-30 mg total) by mouth at bedtime. Two to three tabs bedtime     [facility] cued[facility]  Standing orders for withdrawal protocol implemented: SEBASTIAN

## 2021-06-16 NOTE — TELEPHONE ENCOUNTER
Telephone Encounter by Kailyn Matthews RN at 10/22/2019  9:55 AM     Author: Kailyn Matthews RN Service: -- Author Type: Registered Nurse    Filed: 10/22/2019 10:01 AM Encounter Date: 10/22/2019 Status: Signed    : Kailyn Matthews RN (Registered Nurse)       Medication being requested: Oxycontin 10, 40   Last visit date: 8/21  Provider: hina  Next visit date: 11/5.  Provider: hina  Expected follow up: 8 weeks  MTM visit ([facility]) date: no  UDT date: 5/2019  Agreement date: 5/2019   snipped in:    Pertinent between visit information about requested medication (telephone, mychart, prior authorization, concerns, comments): cx appt on 10/15  Script being sent to provider by nurse- dates and quantity:   Requested Prescriptions     Pending Prescriptions Disp Refills   ? oxyCODONE (OXYCONTIN) 40 mg 12 hr tablet 28 tablet 0     Sig: Take 1 tablet (40 mg total) by mouth daily.   ? oxyCODONE (OXYCONTIN) 10 mg 12 hr tablet 84 each 0     Sig: Take 2-3 tablets (20-30 mg total) by mouth at bedtime. Two to three tabs bedtime     [facility] cued: [facility]  Standing orders for withdrawal protocol implemented: eveline

## 2021-06-16 NOTE — TELEPHONE ENCOUNTER
Telephone Encounter by Zeynep Horne RN at 1/27/2020  9:38 AM     Author: Zeynep Horne RN Service: -- Author Type: Registered Nurse    Filed: 1/27/2020  9:46 AM Encounter Date: 1/20/2020 Status: Signed    : Zeynep Horne RN (Registered Nurse)       Medication being requested: oxycontin 10 mg and oxycontin 40 mg  Last visit date: 01/07  Provider: BE  Next visit date: 3/11  Provider: BE  Expected follow up: 8 weeks  MTM visit ([facility]) date: no  UDS and CSA - 05/03/19   snipped in:    Pertinent between visit information about requested medication (telephone, mychart, prior authorization, concerns, comments):   homecare visits in place  Script being sent to provider by nurse- dates and quantity:   Refill have already been sent to the pharmacy on 1/7, patient can pick these up anytime.  [facility] cued: [facility]  Standing orders for withdrawal protocol implemented: SEBASTIAN

## 2021-06-17 NOTE — TELEPHONE ENCOUNTER
Telephone Encounter by Chandrika Garcia at 6/23/2020  2:17 PM     Author: Chandrika Garcia Service: -- Author Type: --    Filed: 6/23/2020  2:17 PM Encounter Date: 6/23/2020 Status: Signed    : Chandrika Garcia       No PA needed, medication and quantity are covered, per call to pharmacy it is refill too soon until tomorrow.  copay is $75.89

## 2021-06-17 NOTE — TELEPHONE ENCOUNTER
Adding refill request: soma    Per : last filled on 3/31/21 for 30 days  Start date of 4/2/21  Requested Prescriptions     Pending Prescriptions Disp Refills     carisoprodoL (SOMA) 350 MG tablet 90 tablet 0     Sig: Take 1 tablet (350 mg total) by mouth 3 (three) times a day as needed for muscle spasms.     Signed Prescriptions Disp Refills     amitriptyline (ELAVIL) 25 MG tablet 60 tablet 5     Sig: One to two tabs bedtime     Authorizing Provider: LIZZY FOLEY

## 2021-06-17 NOTE — TELEPHONE ENCOUNTER
Telephone Encounter by Zeynep Horne RN at 5/27/2020  1:34 PM     Author: Zeynep Horne RN Service: -- Author Type: Registered Nurse    Filed: 5/27/2020  1:39 PM Encounter Date: 5/27/2020 Status: Signed    : Zeynep Horne RN (Registered Nurse)       Medication being requested: oxycontin 40 mg and oxycodone 10 mg and   Last visit date: 5/08  Provider: BE  Next visit date: 7/10  Provider: BE  Expected follow up: 8 weeks  MTM visit ([facility]) date: no  UDS and CSA - 05/03/19   snipped in:    Pertinent between visit information about requested medication (telephone, mychart, prior authorization, concerns, comments): NA  Script being sent to provider by nurse- dates and quantity:   Requested Prescriptions     Pending Prescriptions Disp Refills   ? oxyCODONE (OXYCONTIN) 40 mg 12 hr tablet 28 tablet 0     Sig: Take 1 tablet (40 mg total) by mouth daily.   ? amitriptyline (ELAVIL) 25 MG tablet 60 tablet 2     Sig: One to two tabs bedtime   ? oxyCODONE (OXYCONTIN) 10 mg 12 hr tablet 84 each 0     Sig: Take 2-3 tablets (20-30 mg total) by mouth at bedtime. Two to three tabs bedtime     Ph[facility]acy cued:  [facility]  Standing orders for withdrawal protocol implemented: SEBASTIAN

## 2021-06-17 NOTE — TELEPHONE ENCOUNTER
Telephone Encounter by Sabrina Moran CMA at 4/14/2020  3:35 PM     Author: Sabrina Moran CMA Service: -- Author Type: Certified Medical Assistant    Filed: 4/14/2020  3:43 PM Encounter Date: 4/14/2020 Status: Signed    : Sabrina Moran CMA (Certified Medical Assistant)       Medication being requested: Oxycodone 10mg and 40mg  Last visit date: 1/7/20  Provider: ROXANA  Next visit date: 5/8/20.  Provider: ROXANA  UDT: 5/3/19  CSA 5/3/19  Note:     snipped in:    Script cued - dates and quantity:   Requested Prescriptions     Pending Prescriptions Disp Refills   ? oxyCODONE (OXYCONTIN) 40 mg 12 hr tablet 28 tablet 0     Sig: Take 1 tablet (40 mg total) by mouth daily.   ? oxyCODONE (OXYCONTIN) 10 mg 12 hr tablet 84 each 0     Sig: Take 2-3 tablets (20-30 mg total) by mouth at bedtime. Two to three tabs bedtime        Pharmacy cued: [facility]

## 2021-06-17 NOTE — TELEPHONE ENCOUNTER
Medication being requested:  Oxycodone  Last visit date: 3/5.  Provider: BE  Next visit date: 6/14.  Provider: ROXANA  Expected follow up: 3 months  MTM visit ([facility]) date: eveline  UDT date: 5/3/19  Agreement date: 5/3/19   (Last fill date; name; strength; provider; MME; quantity):  reviewed:  Oxy 40 mg last filled 4/20, refill due date 5/18  Pertinent between visit information about requested medication (telephone, mychart, prior authorization, concerns, comments): Patient still has Oxycontin 10 mg tabs available for fill at pharmacy  Script being sent to provider by nurse- dates and quantity:   Requested Prescriptions     Pending Prescriptions Disp Refills     oxyCODONE (OXYCONTIN) 40 mg 12 hr tablet 28 tablet 0     Sig: Take 1 tablet (40 mg total) by mouth daily.     Pharmacy cued: [facility]  Standing orders for withdrawal protocol implemented: eveline

## 2021-06-18 ENCOUNTER — RECORDS - HEALTHEAST (OUTPATIENT)
Dept: ADMINISTRATIVE | Facility: CLINIC | Age: 81
End: 2021-06-18

## 2021-06-18 NOTE — PROGRESS NOTES
"  Assessment and Plan:   Annual wellness forms reviewed.  Issues addressed below    1. Preventative health care  Patient will need the new shingles vaccine when she is fully recovered from her current illness.  She is due for bone density for an update.  Other health maintenance issues are deferred due to recent hospitalization.    2. Hospital discharge follow-up  Hospitalized for cellulitis requiring IV antibiotics.  Discharged with Keflex.  She has completed this.  She has also had a follow-up with Dr. Claribel Davidson with regard to lower extremity edema.    3. Venous (peripheral) insufficiency  Reviewed consult from Dr. Davidson.  She is using juxta lite wraps.  Edema seems well controlled with some redness on the right greater than the left.  No open lesions.  Recommendation: Continue with triamcinolone lotion.  Would like to see a referral to home health for them to instruct patient and caregivers in proper leg wrap techniques.  - Ambulatory referral to Home Health    4. Back pain with left-sided sciatica  Worsening of lumbar back pain with hospitalization.  Would like home health to assess mobility, gait and safety along with provide physical therapy assessment  - Ambulatory referral to Home Health    5. Osteoporosis  Prolia has fallen through the cracks.  Would like to reinstitute medication and update bone densitometry.  - Ambulatory referral to Home Health  - DXA Bone Density Scan; Future     6.  Cognitive changes/some functional changes with this recent hospitalization  Would like to assess home care needs.  Have talked about the importance of [facility] and extra support until she is back to baseline.  She is very tearful today fearing that she is losing her independence.  Reassurance provided.    7.  Of note, other issues may need to be addressed.  Daughter has worries about abdominal hernia.  This \"has not been looked at in a while \".  Patient is not having any gastrointestinal symptoms.  Worries about " her height loss as well.    8.  Blood pressure  Recommend cessation of lisinopril as blood pressure is 110/60.    The patient's current medical problems were reviewed.      The following health maintenance schedule was reviewed with the patient and provided in printed form in the after visit summary:   Health Maintenance   Topic Date Due     DXA SCAN  05/06/2004     INFLUENZA VACCINE RULE BASED (Season Ended) 08/01/2018     FALL RISK ASSESSMENT  06/19/2019     ADVANCE DIRECTIVES DISCUSSED WITH PATIENT  02/08/2022     TD 18+ HE  06/26/2026     PNEUMOCOCCAL POLYSACCHARIDE VACCINE AGE 65 AND OVER  Completed     PNEUMOCOCCAL CONJUGATE VACCINE FOR ADULTS (PCV13 OR PREVNAR)  Completed       Subjective:   Chief Complaint: Jacqueline Prado is an 78 y.o. female here for an Annual Wellness visit.     HPI: She is also here for hospital follow-up.  She is accompanied by her daughter.  Of note, in early June, she presented for evaluation of a blistery rash on her lower extremity.  She was treated for presumptive cellulitis but things progressed and did not improve.  She was then admitted to the hospital for treatment of cellulitis with IV antibiotics.  Additionally, she has had a consult with Dr. Davidson for lower extremity edema.  She has completed antibiotics both oral and IV and is currently using juxta lite wraps.  She states that she is back home and handling most her activities of daily living.  There is some help coming to the home to help her with wraps, although she does not feel that she is doing these well.  She describes significant back pain that had worsened while in the hospital.  Apparently, she did not get her usual chronic pain medications for over a day.  They are unhappy about that.  Also, she is using CBD oil at bedtime.  She is beeping this.  Her daughter thinks that her mental status may be foggy as a result.  Since she has been home, she has not had any falls.  She has not had any bowel or bladder  issues.    She is very tearful here today as she feels as though she is losing her independence.  Because people are coming in the home to help her and that she is now dependent on a great deal of help, she is very worried.  Of note, her back pain is significantly escalated.  She will be seeing her pain doctor next week.    Review of Systems:    Please see above.  The rest of the review of systems are negative for all systems.    Patient Care Team:  Jordana Reynolds MD as PCP - General  Hemal Ramirez MD as Physician (Pain Medicine)  Isidro Lopes MD (Rheumatology)     Patient Active Problem List   Diagnosis     Joint Pain, Localized In The Right Shoulder     Rheumatoid arthritis (H)     Opioid Dependence With Continuous Use     Postlaminectomy Syndrome (Lumbar)     Back pain with left-sided sciatica     Hypothyroidism     Bundle Branch Block     Secondary Hyperparathyroidism     Lumbar Disc Degeneration     Osteoporosis     Esophageal reflux     Myalgia and myositis     HTN (hypertension)     Venous stasis dermatitis     Insomnia     Osteoarthritis     Symptomatic menopausal or female climacteric states     Venous hypertension of both lower extremities     Venous insufficiency of both lower extremities     Onychauxis     Past Medical History:   Diagnosis Date     Bundle branch block     Created by Conversion  Replacement Utility updated for latest IMO load     Cellulitis      Chronic venous stasis dermatitis      Impetigo       Past Surgical History:   Procedure Laterality Date     BREAST CYST ASPIRATION Left 2000     MS EXCIS CERV DISK,ONE LEVEL      Description: Laminectomy With Disc Removal;  Recorded: 11/03/2011;  Annotations: L5-S1     MS INJECT VERTEBRAL BODY, LUMBAR      Description: Spinal Percutaneous Vertebroplasty, Injection Lumbar;  Recorded: 11/03/2011;  Annotations: L5     MS REMOVAL GALLBLADDER      Description: Cholecystectomy;  Recorded: 07/22/2009;      Family History   Problem Relation  Age of Onset     Breast cancer Sister 72     Leukemia Sister       Social History     Social History     Marital status:      Spouse name: N/A     Number of children: N/A     Years of education: N/A     Occupational History     Not on file.     Social History Main Topics     Smoking status: Former Smoker     Packs/day: 0.50     Types: Cigarettes     Quit date: 6/1/2018     Smokeless tobacco: Current User      Comment: started smoking when she was 21 years old     Alcohol use Yes      Comment: glass of wine during holiday     Drug use: Yes     Special: Marijuana      Comment: medical marijuana     Sexual activity: No     Other Topics Concern     Not on file     Social History Narrative    Lives in senior independent living.  .  3 kids.  Graduated from college.       Occasions reconciled from before, during and after hospitalization with patient in the presence of her daughter.  Current Outpatient Prescriptions   Medication Sig Dispense Refill     albuterol (PROAIR HFA;PROVENTIL HFA;VENTOLIN HFA) 90 mcg/actuation inhaler Inhale 2 puffs every 4 (four) hours as needed for wheezing. And cough 1 Inhaler 3     amitriptyline (ELAVIL) 25 MG tablet One to two tabs bedtime 60 tablet 2     aspirin 81 mg chewable tablet Chew 81 mg daily.       b complex vitamins tablet Take 1 tablet by mouth daily.       CALCIUM CARBONATE/VITAMIN D3 (CALCIUM+D ORAL) Take 3 tablets by mouth daily.       [START ON 6/20/2018] carisoprodol (SOMA) 350 MG tablet Take 1 tablet (350 mg total) by mouth 3 (three) times a day as needed for muscle spasms. 90 tablet 1     cyanocobalamin, vitamin B-12, (VITAMIN B-12) 500 mcg TbER Take 1 capsule by mouth daily.       DOCOSAHEXANOIC ACID/EPA (FISH OIL ORAL) Take 1 tablet by mouth daily.       ERGOCALCIFEROL, VITAMIN D2, (VITAMIN D2 ORAL) Take 1 tablet by mouth daily.       folic acid (FOLVITE) 1 MG tablet TK 1 T PO ONCE DAILY.  2     isosorbide mononitrate (ISMO,MONOKET) 10 MG tablet TAKE 1 TABLET  "BY MOUTH TWICE DAILY 180 tablet 0     levothyroxine (SYNTHROID, LEVOTHROID) 75 MCG tablet Take 1 tablet (75 mcg total) by mouth Daily at 6:00 am. 90 tablet 0     lisinopril (PRINIVIL,ZESTRIL) 5 MG tablet Take 1 tablet (5 mg total) by mouth daily. 90 tablet 2     loratadine (CLARITIN) 10 mg tablet Take 10 mg by mouth daily.       methotrexate 2.5 MG tablet Take 2.5 mg by mouth once a week. (Thursday) As directed       mometasone (NASONEX) 50 mcg/actuation nasal spray SHAKE WELL AND USE 2 SPRAYS IN EACH NOSTRIL EVERY DAY 17 g 5     multivitamin capsule Take 1 capsule by mouth daily.       omeprazole (PRILOSEC) 20 MG capsule Take 20 mg by mouth Daily before breakfast.       oxyCODONE (OXYCONTIN) 10 mg 12 hr tablet Two to three tablets bedtime 84 each 0     oxyCODONE (OXYCONTIN) 40 mg 12 hr tablet Take 1 tablet (40 mg total) by mouth daily. 28 each 0     predniSONE (DELTASONE) 5 MG tablet Take 5 mg by mouth daily.        sucralfate (CARAFATE) 100 mg/mL suspension Use As Directed.       triamcinolone (KENALOG) 0.1 % ointment Apply to both legs twice daily 30 g 0     white petrolatum (AQUAPHOR NATURAL HEALING) 41 % Oint Apply to both legs twice daily  0     No current facility-administered medications for this visit.       Objective:   Vital Signs:   Visit Vitals     /62 (Patient Site: Left Arm, Patient Position: Sitting, Cuff Size: Adult Large)     Pulse 78     Ht 5' 1\" (1.549 m)     Wt 174 lb (78.9 kg)     BMI 32.88 kg/m2        VisionScreening:  No exam data present     PHYSICAL EXAM  She appears somewhat frazzled today.  She is slightly pale.  She ambulates with a great deal of pain.  Head and neck exam is performed.  No overt abnormalities noted.  Lungs revealed diminished breath sounds.  Cardiac exam reveals regular rate and rhythm with no murmurs or gallops  Abdomen is protuberant.  It is soft and nontender  Lower extremities are examined in depth.  The wraps are removed.  The right lower extremity has " slightly more edema than the left.  There is more erythema.  No open sores are noted.  Left leg looks good.  Minimal erythema in the distal aspect of the leg.  Skin thinning is definitely noted.    Assessment Results 6/19/2018   Activities of Daily Living No help needed   Instrumental Activities of Daily Living 1 - Full function   Get Up and Go Score Less than 12 seconds   Mini Cog Total Score 4   Some recent data might be hidden     A Mini-Cog score of 0-2 suggests the possibility of dementia, score of 3-5 suggests no dementia    Identified Health Risks:     She is at risk for lack of exercise and has been provided with information to increase physical activity for the benefit of her well-being.  The patient was counseled and encouraged to consider modifying their diet and eating habits. She was provided with information on recommended healthy diet options.  The patient reports that she has difficulty with instrumental activities of daily living.  She was provided with a list of local organizations that provide support services and advised to make a follow up appointment in 2-3 weeks weeks to address this further.   The patient was provided with written information regarding signs of hearing loss.  The patient's FABIAN-7 score is consistent with probably anxiety disorder.  She was provided with patient information regarding anxiety and was advised to set up a follow up appointment in 2-3 weeks weeks to further address this issue.  Patient's advanced directive was discussed and I am comfortable with the patient's wishes.

## 2021-06-18 NOTE — PROGRESS NOTES
Referred By: Jordana Reynolds MD    Date Of Service: 06/14/2018    Chief Complaint: Bilateral leg swelling and cellulitis history    History of Present Illness:    Jacqueline Prado presents to the [facility] as a new consult to evaluate Bilateral leg swelling and cellulitis history.  The swelling began about 15 years ago. The ankles would begin to swell. 39 years ago she was diagnosed with RA.  She stopped using the Humira a year ago.  The RA has gotten worse.  About 9 months ago the swelling worsened.  She started to get blisters of the legs with cellulits.  Outpatient antibiotics were tried with Keflex and Augmentin without benefit.  Inpatient use of Vanco was beneficial.    There was no associated trauma, medication change or major illness.  Previous treatments have included elevation and short stretch compression bandaging.  The swelling improved since onset.  Pain is rated a 0 on a 10 point scale.  The swelling is improved with elevation.  The patient sleeps in a recliner.  There is no significant pain with elevation of the legs.  She does not wake up at night with pain in the legs.  There has been no new numbness, tingling or weakness.  There have been no new masses, rashes, or swellings of any other joints. There has been no new abdominal bloating, bowel or bladder changes and irregular bleeding.  She has had less appetite and an 8 pound weight loss since hospitalization.  It is noted she has a long smoking history.  She stopped smoking in June but is using E cigarettes.    Past Medical History:   Diagnosis Date     Bundle branch block     Created by Conversion  Replacement Utility updated for latest IMO load     Cellulitis      Chronic venous stasis dermatitis      Impetigo        Past Surgical History:   Procedure Laterality Date     BREAST CYST ASPIRATION Left 2000     MA EXCIS CERV DISK,ONE LEVEL      Description: Laminectomy With Disc Removal;  Recorded:  11/03/2011;  Annotations: L5-S1     IL INJECT VERTEBRAL BODY, LUMBAR      Description: Spinal Percutaneous Vertebroplasty, Injection Lumbar;  Recorded: 11/03/2011;  Annotations: L5     IL REMOVAL GALLBLADDER      Description: Cholecystectomy;  Recorded: 07/22/2009;         Current Outpatient Prescriptions:      albuterol (PROAIR HFA;PROVENTIL HFA;VENTOLIN HFA) 90 mcg/actuation inhaler, Inhale 2 puffs every 4 (four) hours as needed for wheezing. And cough, Disp: 1 Inhaler, Rfl: 3     amitriptyline (ELAVIL) 25 MG tablet, One to two tabs bedtime, Disp: 60 tablet, Rfl: 2     aspirin 81 mg chewable tablet, Chew 81 mg daily., Disp: , Rfl:      b complex vitamins tablet, Take 1 tablet by mouth daily., Disp: , Rfl:      CALCIUM CARBONATE/VITAMIN D3 (CALCIUM+D ORAL), Take 3 tablets by mouth daily., Disp: , Rfl:      carisoprodol (SOMA) 350 MG tablet, Take 1 tablet (350 mg total) by mouth 3 (three) times a day as needed for muscle spasms., Disp: 90 tablet, Rfl: 1     cyanocobalamin, vitamin B-12, (VITAMIN B-12) 500 mcg TbER, Take 1 capsule by mouth daily., Disp: , Rfl:      DOCOSAHEXANOIC ACID/EPA (FISH OIL ORAL), Take 1 tablet by mouth daily., Disp: , Rfl:      ERGOCALCIFEROL, VITAMIN D2, (VITAMIN D2 ORAL), Take 1 tablet by mouth daily., Disp: , Rfl:      isosorbide mononitrate (ISMO,MONOKET) 10 MG tablet, TAKE 1 TABLET BY MOUTH TWICE DAILY, Disp: 180 tablet, Rfl: 0     levothyroxine (SYNTHROID, LEVOTHROID) 75 MCG tablet, Take 1 tablet (75 mcg total) by mouth Daily at 6:00 am., Disp: 90 tablet, Rfl: 0     lisinopril (PRINIVIL,ZESTRIL) 5 MG tablet, Take 1 tablet (5 mg total) by mouth daily., Disp: 90 tablet, Rfl: 2     loratadine (CLARITIN) 10 mg tablet, Take 10 mg by mouth daily., Disp: , Rfl:      methotrexate 2.5 MG tablet, Take 2.5 mg by mouth once a week. (Thursday) As directed, Disp: , Rfl:      mometasone (NASONEX) 50 mcg/actuation nasal spray, SHAKE WELL AND USE 2 SPRAYS IN EACH NOSTRIL EVERY DAY, Disp: 17 g, Rfl:  5     multivitamin capsule, Take 1 capsule by mouth daily., Disp: , Rfl:      omeprazole (PRILOSEC) 20 MG capsule, Take 20 mg by mouth Daily before breakfast., Disp: , Rfl:      oxyCODONE (OXYCONTIN) 10 mg 12 hr tablet, Two to three tablets bedtime, Disp: 84 each, Rfl: 0     oxyCODONE (OXYCONTIN) 40 mg 12 hr tablet, Take 1 tablet (40 mg total) by mouth daily., Disp: 28 each, Rfl: 0     predniSONE (DELTASONE) 5 MG tablet, Take 5 mg by mouth daily. , Disp: , Rfl:      sucralfate (CARAFATE) 100 mg/mL suspension, Use As Directed., Disp: , Rfl:      triamcinolone (KENALOG) 0.1 % ointment, Apply to both legs twice daily, Disp: 30 g, Rfl: 0     white petrolatum (AQUAPHOR NATURAL HEALING) 41 % Oint, Apply to both legs twice daily, Disp: , Rfl: 0    Allergies   Allergen Reactions     Tetracyclines Rash     Acetaminophen      Baclofen Nausea Only     Celecoxib      Erythromycin Base      Nsaids (Non-Steroidal Anti-Inflammatory Drug)      Varenicline Itching and Swelling     Erythromycin Rash       Social History     Social History     Marital status:      Spouse name: N/A     Number of children: N/A     Years of education: N/A     Occupational History     Not on file.     Social History Main Topics     Smoking status: Former Smoker     Packs/day: 0.50     Types: Cigarettes     Quit date: 6/1/2018     Smokeless tobacco: Current User      Comment: started smoking when she was 21 years old     Alcohol use Yes      Comment: glass of wine during holiday     Drug use: Yes     Special: Marijuana      Comment: medical marijuana     Sexual activity: No     Other Topics Concern     Not on file     Social History Narrative    Lives in senior independent living.  .  3 kids.  Graduated from college.        Family History   Problem Relation Age of Onset     Breast cancer Sister 72     Leukemia Sister        Review of Systems:  Review of systems is otherwise negative, except as noted above.  Full 12 point review of systems was  completed.    Radiology/Diagnostic Studies:    I personally reviewed the following imaging today and those on care everywhere, if indicated     Us Venous Legs Bilateral    Result Date: 6/6/2018  US VENOUS LEGS BILATERAL 6/6/2018 8:44 AM INDICATION: Cellulitis and bilateral leg swelling. TECHNIQUE: Routine exam with compression, augmentation, and duplex utilizing 2D gray-scale imaging, Doppler interrogation with color-flow and spectral waveform analysis. COMPARISON: None. FINDINGS: The common femoral, femoral, popliteal, and segmentally visualized calf veins were evaluated. Right leg veins are negative for deep venous thrombosis. Left leg veins are negative for deep venous thrombosis. No popliteal cysts.     CONCLUSION: 1.  Bilateral leg veins are negative for DVT.      Laboratory Studies:  I personally reviewed the following labs today and those on care everywhere, if indicated      No results found for: SEDRATE      Lab Results   Component Value Date    CRP 2.1 (H) 06/06/2018           Lab Results   Component Value Date    CREATININE 0.79 06/06/2018      No results found for: HGBA1C        Lab Results   Component Value Date    BUN 11 06/06/2018              Lab Results   Component Value Date    ALBUMIN 3.4 (L) 06/06/2018       Vitamin D, Total (25-Hydroxy)   Date Value Ref Range Status   02/08/2017 61.0 30.0 - 80.0 ng/mL Final       Lab Results   Component Value Date    TSH 0.74 02/08/2017     Lab Results   Component Value Date    WBC 11.6 (H) 06/06/2018    HGB 14.5 06/06/2018    HCT 42.3 06/06/2018    MCV 95 06/06/2018     06/06/2018       Physical Exam:  Vitals:    06/14/18 1331   BP: 140/82   Pulse: 80   Resp: 20   Temp: 98.4  F (36.9  C)     BMI 32.57    See flow chart for circumferential measures.    Vasc Edema 6/14/2018   Right just above MTP 20.8   Right Ankle 24   Right Widest Calf 35.5   Right Thigh Up 10cm 46   Left - just above MTP 20.5   Left Ankle 3   Left Widest Calf 36   Left Thigh Up 10cm  46.5     BMI 32.57    General:  78 y.o. female in no apparent distress.      Psych: Alert and oriented x 3.  Cooperative.   Affect normal.    Respiratory:  Lungs have decreased breath sounds throughout    Cardiovascular: Normal S1, S2 without murmur, gallop or rub.  No audible carotid bruits. Regular rhythm.     Abdominal: Normal bowel sounds without any pain, guarding or rigidity. No inguinal lymphadenopathy palpated.    Musculoskeletal:  Normal range of motion in hips, knees and ankles bilaterally throughout .  There is no active joint synovitis, erythema, swelling or joint laxity.     Neurological:  Sensation is intact to pin prick and light touch in both legs.  Strength testing is normal in hip flexion, knee flexion, knee extension, ankle dorsiflexion and great toe extension bilaterally. Deep tendon reflexes, knee jerks and ankle jerks, are decreased bilaterally.      Vascular: Dorsalis pedis and posterior tibialis pulses are strong and equal bilaterally and reveal audible biphasic waves with a hand held doppler bilaterally. There are significant telangietasias, medial ankle venous flares, venous varicosities  and spider veins . There is normal capillary refill.     Integumentary: Skin of the legs is uniformly warm and dry, and hyperpigmentated, with hyperkeratosis and keratoderma, with positive Stemmer's Sign  and with significant crusting.  Nails are thickened, thin surrounding skin, decreased hair growth on toes, discolored, ingrown, digging into the skin and painful      Impression:  1.  Bilateral leg swelling  2.  Long standing dependent swelling with venous insufficiency and hypertension of both legs  3.  Secondary lymphedema  4.  Recent infection/cellulitis      Plan:  1. Type of swelling was reviewed in detail with the patient and daughter.  Questions were answered and education was completed.  2. Continue compression and exercises.  3. Short stretch bandaging now and will teach family.  Then to get  velcro-juxtalite medium short.  Information given.  4. Patient will follow up in 2 months or when needed.     Thank you very much for referring Jacqueline Prado.  If you have any questions please feel free to contact me at [phone].    Time spent with patient 60 minutes with greater than 50% time in consultation, education and coordination of care, excluding procedures.     Claribel Davidson MD, ABWMS, FACCWS, [facility]  Medical Director Wound Care and Lymphedema  [facility]  [phone]

## 2021-06-18 NOTE — PROGRESS NOTES
Hospitalized 6/5/18 for cellulitis. Has juxtalite velcro compressions at home but did not bring it in, has not started wearing them. Pt came in compression short stretch wraps. Has [facility] but only comes to trim toenails. Patient currently uses E-cig, former cigarette smoker

## 2021-06-18 NOTE — PROGRESS NOTES
" ASSESSMENT AND PLAN:    1. Stasis dermatitis  Bilaterally with superficial infection - impetiginized without significant cellulitis.  Suspect this is her recurrent 'shingles'.  We discussed this.  She will use silvadene cream daily and take augmentin 125/827 bid for 7 days, with BID elevation for 45 minutes - \"toes above the nose\".  She will need ER and admission if this progresses or there develops spreading redness, or fever    2. Rheumatoid arthritis  Ongoing treatment.     3. Opioid Dependence With Continuous Use  Ongoing treatment, stable.     4. Chronic venous insufficiency  Chronic LE edema with stasis and periodic inflammation.  Clinically there is no heart failure of renal failure.     Follow up if fails to improve.     CHIEF COMPLAINT:  Chief Complaint   Patient presents with     Foot Pain     HISTORY OF PRESENT ILLNESS:  Jacqueline Prado is a 78 y.o. female with chronic lower extremity edema.  Has developed a sore rash on both lower legs and feet. She thinks this is 'shingles'.  She tried treatment with valtrex and it didn't work.  No fever, or chills, or nausea.  No shortness of breath.      REVIEW OF SYSTEMS:   See HPI, all other pertinent systems on review are negative.    Active Ambulatory Problems     Diagnosis Date Noted     Joint Pain, Localized In The Right Shoulder      Rheumatoid arthritis      Opioid Dependence With Continuous Use      Postlaminectomy Syndrome (Lumbar)      Back pain with left-sided sciatica      Hypothyroidism      Bundle Branch Block      Secondary Hyperparathyroidism      Lumbar Disc Degeneration      Osteoporosis      Esophageal Reflux      Myalgia and myositis 06/08/2015     HTN (hypertension) 08/07/2017     Resolved Ambulatory Problems     Diagnosis Date Noted     No Resolved Ambulatory Problems     No Additional Past Medical History       Past Surgical History:   Procedure Laterality Date     BREAST CYST ASPIRATION Left 2000     ND EXCIS CERV DISK,ONE LEVEL      " Description: Laminectomy With Disc Removal;  Recorded: 11/03/2011;  Annotations: L5-S1     MN INJECT VERTEBRAL BODY, LUMBAR      Description: Spinal Percutaneous Vertebroplasty, Injection Lumbar;  Recorded: 11/03/2011;  Annotations: L5     MN REMOVAL GALLBLADDER      Description: Cholecystectomy;  Recorded: 07/22/2009;       VITALS:  Vitals:    05/21/18 1323   BP: 138/76   Patient Site: Left Arm   Patient Position: Sitting   Cuff Size: Adult Regular   Pulse: 78   Weight: 182 lb 12.8 oz (82.9 kg)     Wt Readings from Last 3 Encounters:   05/21/18 182 lb 12.8 oz (82.9 kg)   05/15/18 180 lb 3 oz (81.7 kg)   03/26/18 185 lb (83.9 kg)     PHYSICAL EXAM:  Constitutional:  In NAD, alert and oriented  Skin:   Bilateral lower leg and feet impetiginized dermatitis and very mild erythema, no ulceration, feet are warm  Extremities: bilateral 2+ edema,     DECISION TO OBTAIN OLD RECORDS AND/OR OBTAIN HISTORY FROM SOMEONE OTHER THAN PATIENT, AND/OR ACCESSING CARE EVERYWHERE):  1  0     REVIEW AND SUMMARIZATION OF OLD RECORDS, AND/OR OBTAINING HISTORY FROM SOMEONE OTHER THAN PATIENT, AND/OR DISCUSSION OF CASE WITH ANOTHER HEALTH CARE PROVIDER:  2 reviewed past lab testing, problem and medication list.     REVIEW AND/OR ORDER OF OF CLINICAL LAB TESTS: 1  CMP.    REVIEW AND/OR ORDER OF RADIOLOGY TESTS: 1 0.    REVIEW AND/OR ORDER OF MEDICAL TESTS (EKG/ECHO/COLONOSCOPY/EGD): 1  0    INDEPENDENT  VISUALIZATION OF IMAGE, TRACING, OR SPECIMEN ITSELF (2 EACH):  0     TOTAL: 3    Current Outpatient Prescriptions   Medication Sig Dispense Refill     albuterol (PROAIR HFA;PROVENTIL HFA;VENTOLIN HFA) 90 mcg/actuation inhaler Inhale 2 puffs every 4 (four) hours as needed for wheezing. And cough 1 Inhaler 3     amitriptyline (ELAVIL) 25 MG tablet One to two tabs bedtime 60 tablet 2     aspirin 81 mg chewable tablet Chew 81 mg daily.       b complex vitamins tablet Take 1 tablet by mouth daily.       CALCIUM CARBONATE/VITAMIN D3 (CALCIUM+D  ORAL) Take 3 tablets by mouth daily.       cyanocobalamin, vitamin B-12, (VITAMIN B-12) 500 mcg TbER Take 1 capsule by mouth daily.       denosumab 60 mg/mL Syrg Inject 60 mg under the skin every 6 (six) months.       DOCOSAHEXANOIC ACID/EPA (FISH OIL ORAL) Take 1 tablet by mouth daily.       ERGOCALCIFEROL, VITAMIN D2, (VITAMIN D2 ORAL) Take 1 tablet by mouth daily.       isosorbide mononitrate (ISMO,MONOKET) 10 MG tablet TAKE 1 TABLET BY MOUTH TWICE DAILY 180 tablet 0     levothyroxine (SYNTHROID, LEVOTHROID) 75 MCG tablet TAKE 1 TABLET DAILY AT 6:00 A.M. 90 tablet 0     lisinopril (PRINIVIL,ZESTRIL) 5 MG tablet Take 1 tablet (5 mg total) by mouth daily. 90 tablet 2     loratadine (CLARITIN) 10 mg tablet Take 10 mg by mouth daily.       methotrexate 2.5 MG tablet once a week. As directed       mometasone (NASONEX) 50 mcg/actuation nasal spray SHAKE WELL AND USE 2 SPRAYS IN EACH NOSTRIL EVERY DAY 17 g 5     multivitamin capsule Take 1 capsule by mouth daily.       omeprazole (PRILOSEC) 20 MG capsule Take 20 mg by mouth Daily before breakfast.       oxyCODONE (OXYCONTIN) 10 mg 12 hr tablet Two to three tablets bedtime 84 each 0     oxyCODONE (OXYCONTIN) 40 mg 12 hr tablet Take 1 tablet (40 mg total) by mouth daily. 28 each 0     predniSONE (DELTASONE) 5 MG tablet Take 5 mg by mouth.       sucralfate (CARAFATE) 100 mg/mL suspension Use As Directed.       amoxicillin-clavulanate (AUGMENTIN) 875-125 mg per tablet Take 1 tablet by mouth 2 (two) times a day for 7 days. 14 tablet 0     silver sulfADIAZINE (SILVADENE, SSD) 1 % cream Apply to affected area daily 50 g 1     No current facility-administered medications for this visit.      Jesu Deshpande MD  Internal Medicine  [facility]

## 2021-06-19 NOTE — PROGRESS NOTES
The following steps were completed to comply with the REMS program for Prolia:  1. Ordering provider has previously reviewed information in the Medication Guide and Patient Counseling Chart, including the serious risks of Prolia  and the symptoms of each risk and have been advised  to seek prompt medical attention if they have signs or symptoms of any of the serious risks.  2. Provided each patient a copy of the Medication Guide and Patient Brochure.  See MAR for administration details.   Indication: Prolia  (denosumab) is a prescription medicine used to treat osteoporosis in patients who:   Are at high risk for fracture, meaning patients who have had a fracture related to osteoporosis, or who have multiple risk factors for fracture; Cannot use another osteoporosis medicine or other osteoporosis medicines did not work well.   The timeline for early/late injections would be 4 weeks early and any time after the 6 month dinora. If a patient receives their injection late, then the subsequent injection would be 6 months from the date that they actually received the injection    1.  When was the last injection?  7/21/15  2.  Has insurance for this injection been verified?  Yes

## 2021-06-19 NOTE — PROGRESS NOTES
Jacqueline is seen with a friend.  She reviews having several course of antibiotics for cellulitis eventually hospitalized.  This was affecting her lower extremity flared up back pain.  She notes also in the hospital she was only given the Soma once a day and her oxycodone was only to 40 mg in the morning so took a while to recover.      She notes with the medical cannabis a trial did seem to help her sleep better, did not help with pain as she is still using a small amount.    She has not yet obtained a TENS unit.    She will be getting her MRI a couple days then showing that with Dr. rodrigez to consider injections.    She has been using the Contin 40 mg in the morning, and back up to 3 of the 10 mg at night, had decreased down to 1 for a while.    She is getting back to doing walking.      Current Outpatient Prescriptions:      albuterol (PROAIR HFA;PROVENTIL HFA;VENTOLIN HFA) 90 mcg/actuation inhaler, Inhale 2 puffs every 4 (four) hours as needed for wheezing. And cough, Disp: 1 Inhaler, Rfl: 3     amitriptyline (ELAVIL) 25 MG tablet, One to two tabs bedtime, Disp: 60 tablet, Rfl: 2     aspirin 81 mg chewable tablet, Chew 81 mg daily., Disp: , Rfl:      b complex vitamins tablet, Take 1 tablet by mouth daily., Disp: , Rfl:      CALCIUM CARBONATE/VITAMIN D3 (CALCIUM+D ORAL), Take 3 tablets by mouth daily., Disp: , Rfl:      carisoprodol (SOMA) 350 MG tablet, Take 1 tablet (350 mg total) by mouth 3 (three) times a day as needed for muscle spasms., Disp: 90 tablet, Rfl: 1     cyanocobalamin, vitamin B-12, (VITAMIN B-12) 500 mcg TbER, Take 1 capsule by mouth daily., Disp: , Rfl:      DOCOSAHEXANOIC ACID/EPA (FISH OIL ORAL), Take 1 tablet by mouth daily., Disp: , Rfl:      ERGOCALCIFEROL, VITAMIN D2, (VITAMIN D2 ORAL), Take 1 tablet by mouth daily., Disp: , Rfl:      folic acid (FOLVITE) 1 MG tablet, TK 1 T PO ONCE DAILY., Disp: , Rfl: 2     isosorbide mononitrate (ISMO,MONOKET) 10 MG tablet, TAKE 1 TABLET BY MOUTH TWICE  "DAILY, Disp: 180 tablet, Rfl: 0     leflunomide (ARAVA) 20 MG tablet, Take 20 mg by mouth., Disp: , Rfl:      levothyroxine (SYNTHROID, LEVOTHROID) 75 MCG tablet, Take 1 tablet (75 mcg total) by mouth Daily at 6:00 am., Disp: 90 tablet, Rfl: 0     loratadine (CLARITIN) 10 mg tablet, Take 10 mg by mouth daily., Disp: , Rfl:      methotrexate 2.5 MG tablet, Take 2.5 mg by mouth once a week. (Thursday) As directed, Disp: , Rfl:      mometasone (NASONEX) 50 mcg/actuation nasal spray, SHAKE WELL AND USE 2 SPRAYS IN EACH NOSTRIL EVERY DAY, Disp: 17 g, Rfl: 5     multivitamin capsule, Take 1 capsule by mouth daily., Disp: , Rfl:      omeprazole (PRILOSEC) 20 MG capsule, Take 20 mg by mouth Daily before breakfast., Disp: , Rfl:      oxyCODONE (OXYCONTIN) 10 mg 12 hr tablet, Two to three tablets bedtime, Disp: 84 each, Rfl: 0     oxyCODONE (OXYCONTIN) 40 mg 12 hr tablet, Take 1 tablet (40 mg total) by mouth daily for 4 days., Disp: 28 each, Rfl: 0     predniSONE (DELTASONE) 5 MG tablet, Take 5 mg by mouth daily. , Disp: , Rfl:      sucralfate (CARAFATE) 100 mg/mL suspension, Use As Directed., Disp: , Rfl:      triamcinolone (KENALOG) 0.1 % ointment, Apply to both legs twice daily, Disp: 30 g, Rfl: 0     white petrolatum (AQUAPHOR NATURAL HEALING) 41 % Oint, Apply to both legs twice daily, Disp: , Rfl: 0  Blood pressure 126/71, pulse 73, resp. rate 16, height 5' 1\" (1.549 m), weight 175 lb (79.4 kg).    Score 8.  She is alert with a clear sensorium good eye contact thought process.  Affect fairly full range.  Wearing compression stockings.    Impression: Chronic pain relates to history of lumbar radiculopathy.    Plan she will continue with her efforts to establish care again with Dr. Blake for injections.    Continue the opioids as above.    She will continue adjust the medical cannabis.      she will look into obtaining the TENS unit that she has discussed previously.    Time spent 15 minutes face-to-face more than 50% count " about above condition coordination treatment plan

## 2021-06-19 NOTE — PROGRESS NOTES
[facility] Follow Up Note    Assessment/Plan:  1. Osteoporosis  She has steroid-induced/immunosuppressive induced osteoporosis.  She is currently weaning from a recent course of prolonged prednisone.  She is currently free from infectious disease.  Recommendation: We will proceed with Prolia injection today as she is currently free from infectious diseases.  - denosumab 60 mg (PROLIA 60 mg/ml); Inject 1 mL (60 mg total) under the skin every 6 (six) months.  - HM2(CBC w/o Differential)  - Comprehensive Metabolic Panel  - Vitamin D, Total (25-Hydroxy)    2. Cellulitis of lower extremity, unspecified laterality  Resolved.  Her legs look quite good.  There are no signs of active infection    3. HTN (hypertension)  She has discontinued lisinopril.  Blood pressures are holding and are stable.    4. Venous insufficiency of both lower extremities  She continues to use the juxta lite wraps.    5. Acquired hypothyroidism  We will update lab  - Thyroid [place]    6. Preventative health care  We will update labs.  Bone density is scheduled as well  - Lipid Deale FASTING      Follow-up in 6 months    Jordana Reynolds MD    Chief Complaint:  Chief Complaint   Patient presents with     Follow-up       History of Present Illness:  Jacqueline is a 78 y.o. female who is here today accompanied by her daughter Dinorah for follow-up with regard to a variety of issues that were discussed at last visit.  First, she has not completely recovered from cellulitis.  She is off antibiotics.  She has been afebrile for greater than a couple of weeks.  Her legs look quite good.    Secondly, she has discontinued lisinopril.  Her blood pressures are running in the 120-130 range.  She is feeling more energetic.  She is without chest pain or shortness of breath.    Thirdly, her osteoporosis treatment had been interrupted due to noncompliance.  Also, she has had some recent infection and therefore she has not been receiving treatment for  her immune suppressant induced osteoporosis.  She is here today to resume care.  She is taking vitamin D.  She is working on increasing activity.  She has had a several year history of using Evista with ongoing bone loss.  She is on medications for severe GERD.    Review of Systems:  A comprehensive review of systems was performed and was otherwise negative    PFSH:  Social History: She is single and is living in a senior living apartment  History   Smoking Status     Former Smoker     Packs/day: 0.50     Types: Cigarettes     Quit date: 6/1/2018   Smokeless Tobacco     Current User     Comment: started smoking when she was 21 years old       Past History: Complex    Past Medical History:   Diagnosis Date     Bundle branch block     Created by Conversion  Replacement Utility updated for latest IMO load     Cellulitis      Chronic venous stasis dermatitis      Impetigo        Current Outpatient Prescriptions   Medication Sig Dispense Refill     albuterol (PROAIR HFA;PROVENTIL HFA;VENTOLIN HFA) 90 mcg/actuation inhaler Inhale 2 puffs every 4 (four) hours as needed for wheezing. And cough 1 Inhaler 3     amitriptyline (ELAVIL) 25 MG tablet One to two tabs bedtime 60 tablet 2     aspirin 81 mg chewable tablet Chew 81 mg daily.       b complex vitamins tablet Take 1 tablet by mouth daily.       CALCIUM CARBONATE/VITAMIN D3 (CALCIUM+D ORAL) Take 3 tablets by mouth daily.       carisoprodol (SOMA) 350 MG tablet Take 1 tablet (350 mg total) by mouth 3 (three) times a day as needed for muscle spasms. 90 tablet 1     cyanocobalamin, vitamin B-12, (VITAMIN B-12) 500 mcg TbER Take 1 capsule by mouth daily.       DOCOSAHEXANOIC ACID/EPA (FISH OIL ORAL) Take 1 tablet by mouth daily.       ERGOCALCIFEROL, VITAMIN D2, (VITAMIN D2 ORAL) Take 1 tablet by mouth daily.       folic acid (FOLVITE) 1 MG tablet TK 1 T PO ONCE DAILY.  2     isosorbide mononitrate (ISMO,MONOKET) 10 MG tablet TAKE 1 TABLET BY MOUTH TWICE DAILY 180 tablet 0      leflunomide (ARAVA) 20 MG tablet Take 20 mg by mouth.       levothyroxine (SYNTHROID, LEVOTHROID) 75 MCG tablet Take 1 tablet (75 mcg total) by mouth Daily at 6:00 am. 90 tablet 0     loratadine (CLARITIN) 10 mg tablet Take 10 mg by mouth daily.       methotrexate 2.5 MG tablet Take 2.5 mg by mouth once a week. (Thursday) As directed       mometasone (NASONEX) 50 mcg/actuation nasal spray SHAKE WELL AND USE 2 SPRAYS IN EACH NOSTRIL EVERY DAY 17 g 5     multivitamin capsule Take 1 capsule by mouth daily.       omeprazole (PRILOSEC) 20 MG capsule Take 20 mg by mouth Daily before breakfast.       oxyCODONE (OXYCONTIN) 10 mg 12 hr tablet Two to three tablets bedtime 84 each 0     predniSONE (DELTASONE) 5 MG tablet Take 5 mg by mouth daily.        sucralfate (CARAFATE) 100 mg/mL suspension Use As Directed.       triamcinolone (KENALOG) 0.1 % ointment Apply to both legs twice daily 30 g 0     white petrolatum (AQUAPHOR NATURAL HEALING) 41 % Oint Apply to both legs twice daily  0     Current Facility-Administered Medications   Medication Dose Route Frequency Provider Last Rate Last Dose     denosumab 60 mg (PROLIA 60 mg/ml)  60 mg Subcutaneous Q6 Months Jordana Reynolds MD   60 mg at 07/11/18 1338       Family History:     Physical Exam:  General Appearance:   She appears well and in no acute distress  Vitals:    07/11/18 1334   BP: 136/78   Patient Site: Left Arm   Patient Position: Sitting   Cuff Size: Adult Regular   Pulse: 80   Weight: 169 lb (76.7 kg)     Wt Readings from Last 3 Encounters:   07/11/18 169 lb (76.7 kg)   06/29/18 175 lb (79.4 kg)   06/19/18 174 lb (78.9 kg)     Body mass index is 31.93 kg/(m^2).    Lower extremities are examined.  There are signs of brawny induration but absolutely no cellulitis.  Edema is markedly resolved and improved

## 2021-06-20 NOTE — PROGRESS NOTES
"Jacqueline reviews today is not such a good day.  Yesterday was okay.  No clear precipitant although she is trying to walk more and may be more stiff today, May have some low back spasms.    Her cellulitis is improving appears resolved, has lower extremity wraps.    She has found Dr. Blake, had her MRI, and is scheduled next week for medial branch blocks.    She describes walking on the sidewalks as a hard surface though for her \"proprioception leg\" stable to walk on.    She is use medical cannabis, finds the vaporizer may help her sleep a little bit better but does not help for the pain.    Reviewed she has tried glucosamine in the past with limited benefit.    Has not yet been able to obtain a TENS unit.    Reviews are opioid regimen, OxyContin 40 mg once a day and OxyContin 10 mg 3 times a day helps \"95%\" of the time.   reviewed      Current Outpatient Prescriptions:      albuterol (PROAIR HFA;PROVENTIL HFA;VENTOLIN HFA) 90 mcg/actuation inhaler, Inhale 2 puffs every 4 (four) hours as needed for wheezing. And cough, Disp: 1 Inhaler, Rfl: 3     amitriptyline (ELAVIL) 25 MG tablet, One to two tabs bedtime, Disp: 60 tablet, Rfl: 2     aspirin 81 mg chewable tablet, Chew 81 mg daily., Disp: , Rfl:      b complex vitamins tablet, Take 1 tablet by mouth daily., Disp: , Rfl:      CALCIUM CARBONATE/VITAMIN D3 (CALCIUM+D ORAL), Take 3 tablets by mouth daily., Disp: , Rfl:      carisoprodol (SOMA) 350 MG tablet, Take 1 tablet (350 mg total) by mouth 3 (three) times a day as needed for muscle spasms., Disp: 90 tablet, Rfl: 1     cyanocobalamin, vitamin B-12, (VITAMIN B-12) 500 mcg TbER, Take 1 capsule by mouth daily., Disp: , Rfl:      DOCOSAHEXANOIC ACID/EPA (FISH OIL ORAL), Take 1 tablet by mouth daily., Disp: , Rfl:      ERGOCALCIFEROL, VITAMIN D2, (VITAMIN D2 ORAL), Take 1 tablet by mouth daily., Disp: , Rfl:      folic acid (FOLVITE) 1 MG tablet, TK 1 T PO ONCE DAILY., Disp: , Rfl: 2     isosorbide mononitrate " "(ISMO,MONOKET) 10 MG tablet, TAKE 1 TABLET BY MOUTH TWICE DAILY, Disp: 180 tablet, Rfl: 1     leflunomide (ARAVA) 20 MG tablet, Take 20 mg by mouth., Disp: , Rfl:      levothyroxine (SYNTHROID, LEVOTHROID) 75 MCG tablet, TAKE 1 TABLET DAILY AT 6:00 A.M., Disp: 90 tablet, Rfl: 2     loratadine (CLARITIN) 10 mg tablet, Take 10 mg by mouth daily., Disp: , Rfl:      methotrexate 2.5 MG tablet, Take 2.5 mg by mouth once a week. (Thursday) As directed, Disp: , Rfl:      mometasone (NASONEX) 50 mcg/actuation nasal spray, SHAKE WELL AND USE 2 SPRAYS IN EACH NOSTRIL EVERY DAY, Disp: 17 g, Rfl: 5     multivitamin capsule, Take 1 capsule by mouth daily., Disp: , Rfl:      omeprazole (PRILOSEC) 20 MG capsule, Take 20 mg by mouth Daily before breakfast., Disp: , Rfl:      predniSONE (DELTASONE) 5 MG tablet, Take 5 mg by mouth daily. , Disp: , Rfl:      sucralfate (CARAFATE) 100 mg/mL suspension, Use As Directed., Disp: , Rfl:      triamcinolone (KENALOG) 0.1 % ointment, Apply to both legs twice daily, Disp: 30 g, Rfl: 0     white petrolatum (AQUAPHOR NATURAL HEALING) 41 % Oint, Apply to both legs twice daily, Disp: , Rfl: 0     [START ON 9/13/2018] oxyCODONE (OXYCONTIN) 10 mg 12 hr tablet, Take 1 tablet (10 mg total) by mouth 3 (three) times a day. Two to three tablets bedtime, Disp: 84 each, Rfl: 0     [START ON 9/3/2018] oxyCODONE (OXYCONTIN) 40 mg 12 hr tablet, Take 1 tablet (40 mg total) by mouth Daily at 8:00 am.. Take 1 tablet by mouth daily, Disp: 28 each, Rfl: 0    Current Facility-Administered Medications:      denosumab 60 mg (PROLIA 60 mg/ml), 60 mg, Subcutaneous, Q6 Months, Jordana Reynolds MD, 60 mg at 07/11/18 1338  Blood pressure 131/72, pulse 78, resp. rate 16, height 5' 1\" (1.549 m), weight 174 lb 4 oz (79 kg).    Pain score 8.  She is alert with a clear sensorium good eye contact thought process tight logical.    Lower extremities have compression stockings.    Impression: Chronic pain relates a history of " lumbar radiculopathy.    Plan she will be having medial branch block schedule with     We will try to help obtain a TENS unit through [facility] supply.    Discussed the supplement Cetyl Myrestolaote to help with some of the degenerative changes.    She may continues the medical cannabis to the extent helpful.    Time spent 15 minutes face-to-face more than 50% count about above condition coordination treatment plan

## 2021-06-20 NOTE — PROGRESS NOTES
Date of Service: 10/03/2018     Date last seen by Dr. Davidson:  2018    PCP: Jordana Reynolds MD    Impression:  1.  Bilateral leg swelling-improved  2.  Long standing dependent swelling with venous insufficiency and hypertension of both legs-improved  3.  Secondary lymphedema  4.  History of infection/cellulitis     Plan:  1. Patient and daughter present.  Continue compression and exercises.  2. Continue velcro-juxtalite medium short.  Will supply with new liners and also showed how to get better stockinettes which should fit better over her toes.  3. Patient will follow up in 6 months or when needed.       Time spent with patient 15 minutes with greater than 50% time in consultation, education and coordination of care, excluding procedures.     ---------------------------------------------------------------------------------------------------------------------    Chief Complaint: Bilateral leg swelling and cellulitis history     History of Present Illness:     Jacqueline Prado returns to the [facility] for her bilateral leg swelling and cellulitis history felt to be due to long-standing dependent swelling and venous insufficiency with hypertension leading to some acquired lymphedema.  This was further complicated by underlying rheumatoid arthritis and long history of tobacco use.  She was having problems putting on regular compression stockings and her swelling was worsening.  We talked about Velcro compression and she now has this.  She is here for follow-up.  She says the leg swelling is come down greatly and she is very pleased.  She feels the inner lining is slightly hot for her and wants to know if something else can be used.   Previous treatments have included elevation and short stretch compression bandaging.  There is no significant pain with elevation of the legs.  She does not wake up at night with pain in the legs.  There has been no new numbness,  tingling or weakness.  There have been no new masses, rashes, or swellings of any other joints. There has been no new abdominal bloating, bowel or bladder changes and irregular bleeding.          Past Medical History:   Diagnosis Date     Bundle branch block     Created by Conversion  Replacement Utility updated for latest IMO load     Cellulitis      Chronic venous stasis dermatitis      Impetigo        Past Surgical History:   Procedure Laterality Date     BREAST CYST ASPIRATION Left 2000     NM EXCIS CERV DISK,ONE LEVEL      Description: Laminectomy With Disc Removal;  Recorded: 11/03/2011;  Annotations: L5-S1     NM INJECT VERTEBRAL BODY, LUMBAR      Description: Spinal Percutaneous Vertebroplasty, Injection Lumbar;  Recorded: 11/03/2011;  Annotations: L5     NM REMOVAL GALLBLADDER      Description: Cholecystectomy;  Recorded: 07/22/2009;         Current Outpatient Prescriptions:      albuterol (PROAIR HFA;PROVENTIL HFA;VENTOLIN HFA) 90 mcg/actuation inhaler, Inhale 2 puffs every 4 (four) hours as needed for wheezing. And cough, Disp: 1 Inhaler, Rfl: 3     amitriptyline (ELAVIL) 25 MG tablet, One to two tabs bedtime, Disp: 60 tablet, Rfl: 2     aspirin 81 mg chewable tablet, Chew 81 mg daily., Disp: , Rfl:      b complex vitamins tablet, Take 1 tablet by mouth daily., Disp: , Rfl:      CALCIUM CARBONATE/VITAMIN D3 (CALCIUM+D ORAL), Take 3 tablets by mouth daily., Disp: , Rfl:      carisoprodol (SOMA) 350 MG tablet, Take 1 tablet (350 mg total) by mouth 3 (three) times a day as needed for muscle spasms., Disp: 90 tablet, Rfl: 1     cyanocobalamin, vitamin B-12, (VITAMIN B-12) 500 mcg TbER, Take 1 capsule by mouth daily., Disp: , Rfl:      DOCOSAHEXANOIC ACID/EPA (FISH OIL ORAL), Take 1 tablet by mouth daily., Disp: , Rfl:      ERGOCALCIFEROL, VITAMIN D2, (VITAMIN D2 ORAL), Take 1 tablet by mouth daily., Disp: , Rfl:      folic acid (FOLVITE) 1 MG tablet, TK 1 T PO ONCE DAILY., Disp: , Rfl: 2     isosorbide  mononitrate (ISMO,MONOKET) 10 MG tablet, TAKE 1 TABLET BY MOUTH TWICE DAILY, Disp: 180 tablet, Rfl: 1     leflunomide (ARAVA) 20 MG tablet, Take 20 mg by mouth., Disp: , Rfl:      levothyroxine (SYNTHROID, LEVOTHROID) 75 MCG tablet, TAKE 1 TABLET DAILY AT 6:00 A.M., Disp: 90 tablet, Rfl: 2     loratadine (CLARITIN) 10 mg tablet, Take 10 mg by mouth daily., Disp: , Rfl:      methotrexate 2.5 MG tablet, Take 2.5 mg by mouth once a week. (Thursday) As directed, Disp: , Rfl:      mometasone (NASONEX) 50 mcg/actuation nasal spray, SHAKE WELL AND USE 2 SPRAYS IN EACH NOSTRIL EVERY DAY, Disp: 17 g, Rfl: 5     multivitamin capsule, Take 1 capsule by mouth daily., Disp: , Rfl:      omeprazole (PRILOSEC) 20 MG capsule, Take 20 mg by mouth Daily before breakfast., Disp: , Rfl:      oxyCODONE (OXYCONTIN) 10 mg 12 hr tablet, Take 1 tablet (10 mg total) by mouth 3 (three) times a day. Two to three tablets bedtime, Disp: 84 each, Rfl: 0     OXYCONTIN 40 mg 12 hr tablet, TK 1 T PO D AT 8AM, Disp: , Rfl: 0     predniSONE (DELTASONE) 5 MG tablet, Take 5 mg by mouth daily. , Disp: , Rfl:      sucralfate (CARAFATE) 100 mg/mL suspension, Use As Directed., Disp: , Rfl:      triamcinolone (KENALOG) 0.1 % ointment, Apply to both legs twice daily, Disp: 30 g, Rfl: 0     white petrolatum (AQUAPHOR NATURAL HEALING) 41 % Oint, Apply to both legs twice daily, Disp: , Rfl: 0    Current Facility-Administered Medications:      denosumab 60 mg (PROLIA 60 mg/ml), 60 mg, Subcutaneous, Q6 Months, Jordana Reynolds MD, 60 mg at 07/11/18 1338    Allergies   Allergen Reactions     Tetracyclines Rash     Acetaminophen      Baclofen Nausea Only     Celecoxib      Erythromycin Base      Nsaids (Non-Steroidal Anti-Inflammatory Drug)      Varenicline Itching and Swelling     Erythromycin Rash       Social History     Social History     Marital status:      Spouse name: N/A     Number of children: N/A     Years of education: N/A     Occupational  History     Not on file.     Social History Main Topics     Smoking status: Former Smoker     Packs/day: 0.50     Types: Cigarettes     Quit date: 6/1/2018     Smokeless tobacco: Current User      Comment: started smoking when she was 21 years old     Alcohol use Yes      Comment: glass of wine during holiday     Drug use: Yes     Special: Marijuana      Comment: medical marijuana     Sexual activity: No     Other Topics Concern     Not on file     Social History Narrative    Lives in senior independent living.  .  3 kids.  Graduated from college.        Family History   Problem Relation Age of Onset     Breast cancer Sister 72     Leukemia Sister        Review of Systems:  Jacqueline De Jesusman no new numbess, tingling or weakness, redness or rashes, fevers, new masses, abdominal bloating or discomfort, unexplained weight loss, increased pain, new ulcers, shortness of breath and chest pain  Full 12 point review of systems was completed.    Imaging:    I personally reviewed the following imaging today and those on care everywhere, if indicated    No results found.    Labs:    I personally reviewed the following labs today and those on care everywhere, if indicated    No results found for: SEDRATE      Lab Results   Component Value Date    CRP 2.1 (H) 06/06/2018           Lab Results   Component Value Date    CREATININE 0.73 07/11/2018      No results found for: HGBA1C        Lab Results   Component Value Date    BUN 15 07/11/2018              Lab Results   Component Value Date    ALBUMIN 3.5 07/11/2018       Vitamin D, Total (25-Hydroxy)   Date Value Ref Range Status   07/11/2018 74.9 30.0 - 80.0 ng/mL Final       Lab Results   Component Value Date    TSH 0.55 07/11/2018     Lab Results   Component Value Date    WBC 9.5 07/11/2018    HGB 14.7 07/11/2018    HCT 44.3 07/11/2018    MCV 96 07/11/2018     07/11/2018         Physical Exam:  Vitals:    10/03/18 1352   BP: 121/59   Pulse: 85   Resp: 20   Temp: 97.8   F (36.6  C)   SpO2: 93%      BMI 33.07  Circumferential measures:    Vasc Edema 6/14/2018 10/3/2018   Right just above MTP 20.8 21.2   Right Ankle 24 22.2   Right Widest Calf 35.5 34.5   Right Thigh Up 10cm 46 49.5   Left - just above MTP 20.5 21   Left Ankle 3 21.8   Left Widest Calf 36 36.8   Left Thigh Up 10cm 46.5 49.3       General:  78 y.o. female in no apparent distress.      Psych: Alert and oriented x 3.  Cooperative. Affect normal.    Musculoskeletal:  Normal range of motion in hips, knees and ankles bilaterally throughout .  There is no active joint synovitis, erythema, swelling or joint laxity.      Neurological:  Sensation is intact to pin prick and light touch in both legs.  Strength testing is normal in ankle dorsiflexion and great toe extension bilaterally.       Vascular: Dorsalis pedis and posterior tibialis pulses are strong and equal bilaterally. There are significant telangietasias, medial ankle venous flares, venous varicosities  and spider veins.     Integumentary: Skin of the legs is uniformly warm and dry, and hyperpigmentated, with hyperkeratosis and keratoderma, with positive Stemmer's Sign.  There continues to be distal calf erythema.  The significant crusting of the legs has resolved.  There is much improved definition of the medial and lateral malleoli of the ankles bilaterally..  Nails are thickened, thin surrounding skin, decreased hair growth on toes, discolored.      Claribel Davidson MD, ABWMS, FACCWS, [facility]  Medical Director Wound Care and Lymphedema  [facility]  [phone]

## 2021-06-21 NOTE — LETTER
Letter by Jodrana Reynolds MD at      Author: Jordana Reynolds MD Service: -- Author Type: --    Filed:  Encounter Date: 4/14/2021 Status: (Other)         Jacqueline Prado  [address]  [place] [address]             April 14, 2021         Dear Ms. Prado,    Below are the results from your recent visit:    Resulted Orders   DXA Bone Density Scan    Narrative    4/8/2021      RE: Jacqueline Pardo  YOB: 1940        Dear Jordana Reynolds,    Patient Profile:  80 y.o. female, postmenopausal, is here for the follow up bone density   test.   History of fractures - Yes;  Lumbar vertebrae. Family history of   osteoporosis - Yes;  mother.  Family history of hip fracture: None.   Smoking history - Past. Osteoporosis treatment past -  Yes;    Bisphosphonates. Osteoporosis treatment current - Yes;  Prolia.  Chronic   medical problems - Rheumatoid arthritis and Spine surgery. High risk   medications -  None.    Assessment:    1. The spine bone density is assessed using L1-L2 with T-score of -0.2..  2. Femoral bone densities show left total hip T- score of -2.0, and right   total hip T-score of -1.7..  3.  Bone density was also checked in the wrist as an alternate site since   the spine measurement was influenced by degenerative change and a previous   vertebroplasty.  In the right 33% radius, T score is -2.7.  4.  Since the scan in 2018, bone density has declined a significant 6.4%   in the left total hip, 7.9% in the right total hip, and 11.6% in the right   33% radius.  At L1-L2, bone density has increased 17.7%  5.  The trabecular bone score reflects moderate micro architecture    80 y.o. female with OSTEOPOROSIS and HIGH predicted future fracture risk.        Recommendations:  Given the decline in bone density at multiple sites, a reassessment of   current management is advised with a recheck in 1 year.      Bone densitometry was performed on your patient using our [employer]    densitometer. The results are summarized and a copy of the actual scans   are included for your review. In conformity with the International Society   of Clinical Densitometry's most recent position statement for DXA   interpretation (2015), the diagnosis will be made on the lowest measured   T-score of the lumbar spine, femoral neck, total proximal femur or 33%   radius. Note the change in terminology for diagnostic classification from   OSTEOPENIA to LOW BONE MASS. All trending for sequential exams will be   done using multiple vertebrae or the total proximal femur. Fracture risk   is based on the WHO Fracture Risk Assessment Tool (FRAX). If additional   information is needed or if you would like to discuss the results, please   do not hesitate to call me.       Thank you for referring this patient to [facility]   [facility]. We are happy to be of service in support of you and your   practice. If you have any questions or suggestions to improve our service,   please call me at [phone].     Sincerely,     Isidro Young M.D. MARIA.  [facility]       Anise, the bone density results show bone loss because you have not been on track with your Prolia.  It looks like there are numerous instances where you have missed your injection.  We will try to do better!  It is very important for this to be scheduled vigilantly every 6 months.  Hopefully, the next bone density will be more favorable.    Please call with questions or contact us using [employer].    Sincerely,        Electronically signed by Jordana Reynolds MD

## 2021-06-21 NOTE — PROGRESS NOTES
"Jacqueline is seen with her daughter.  She reviews having medial branch blocks, then on 10/23 radiofrequency ablation with Dr. Blake..  She is told it may take 4-6 weeks to \"kick in\".  The pain was worse over the last few weeks now starting to improve and is not as bad as it was.  She reviews the OxyContin \"covers it\" taking 40 mg in the morning and usually 10 mg 2 tablets at bedtime, rarely is taking the third.    Pain is flared up the last few days as she reached under her sink to move a bin and seem to flareup her low back.    Reviews the medical cannabis did not help.    They have not yet looked into the TENS unit.    She describes traveling for the holidays visiting family reports she is looking forward to.      Current Outpatient Medications:      albuterol (PROAIR HFA;PROVENTIL HFA;VENTOLIN HFA) 90 mcg/actuation inhaler, Inhale 2 puffs every 4 (four) hours as needed for wheezing. And cough, Disp: 1 Inhaler, Rfl: 3     amitriptyline (ELAVIL) 25 MG tablet, One to two tabs bedtime, Disp: 60 tablet, Rfl: 2     aspirin 81 mg chewable tablet, Chew 81 mg daily., Disp: , Rfl:      b complex vitamins tablet, Take 1 tablet by mouth daily., Disp: , Rfl:      CALCIUM CARBONATE/VITAMIN D3 (CALCIUM+D ORAL), Take 3 tablets by mouth daily., Disp: , Rfl:      cyanocobalamin, vitamin B-12, (VITAMIN B-12) 500 mcg TbER, Take 1 capsule by mouth daily., Disp: , Rfl:      ERGOCALCIFEROL, VITAMIN D2, (VITAMIN D2 ORAL), Take 1 tablet by mouth daily., Disp: , Rfl:      folic acid (FOLVITE) 1 MG tablet, TK 1 T PO ONCE DAILY., Disp: , Rfl: 2     isosorbide mononitrate (ISMO,MONOKET) 10 MG tablet, TAKE 1 TABLET BY MOUTH TWICE DAILY, Disp: 180 tablet, Rfl: 1     levothyroxine (SYNTHROID, LEVOTHROID) 75 MCG tablet, TAKE 1 TABLET DAILY AT 6:00 A.M., Disp: 90 tablet, Rfl: 3     loratadine (CLARITIN) 10 mg tablet, Take 10 mg by mouth daily., Disp: , Rfl:      methotrexate 2.5 MG tablet, Take 2.5 mg by mouth once a week. (Thursday) As directed, " "Disp: , Rfl:      mometasone (NASONEX) 50 mcg/actuation nasal spray, SHAKE WELL AND USE 2 SPRAYS IN EACH NOSTRIL EVERY DAY, Disp: 17 g, Rfl: 5     multivitamin capsule, Take 1 capsule by mouth daily., Disp: , Rfl:      omeprazole (PRILOSEC) 20 MG capsule, Take 20 mg by mouth Daily before breakfast., Disp: , Rfl:      [START ON 11/21/2018] carisoprodol (SOMA) 350 MG tablet, Take 1 tablet (350 mg total) by mouth 3 (three) times a day as needed for muscle spasms., Disp: 90 tablet, Rfl: 1     DOCOSAHEXANOIC ACID/EPA (FISH OIL ORAL), Take 1 tablet by mouth daily., Disp: , Rfl:      leflunomide (ARAVA) 20 MG tablet, Take 20 mg by mouth., Disp: , Rfl:      oxyCODONE (OXYCONTIN) 10 mg 12 hr tablet, Two to three tabs bedtime., Disp: 84 each, Rfl: 0     OXYCONTIN 40 mg 12 hr tablet, Take 1 tablet (40 mg total) by mouth daily TK 1 T PO D AT 8AM., Disp: 28 tablet, Rfl: 0     predniSONE (DELTASONE) 5 MG tablet, Take 5 mg by mouth daily. , Disp: , Rfl:      sucralfate (CARAFATE) 100 mg/mL suspension, Use As Directed., Disp: , Rfl:      triamcinolone (KENALOG) 0.1 % ointment, Apply to both legs twice daily, Disp: 30 g, Rfl: 0     white petrolatum (AQUAPHOR NATURAL HEALING) 41 % Oint, Apply to both legs twice daily, Disp: , Rfl: 0    Current Facility-Administered Medications:      denosumab 60 mg (PROLIA 60 mg/ml), 60 mg, Subcutaneous, Q6 Months, Jordana Reynolds MD, 60 mg at 07/11/18 1338  Blood pressure 147/82, pulse 80, resp. rate 16, height 5' 1\" (1.549 m), weight 175 lb (79.4 kg).    Pain score 5.  She is alert with a clear sensorium good eye contact.  Thought process tight logical.    Assessment: Chronic pain relates to history of lumbar radiculopathy.    Plan she is hoping the radio frequency ablation will could have improvement.    We have given her the telephone number for the TENS unit to obtain through [employer], daughter thinks that office may have called.    Reviewed today the supplements cetyl " Edwige told late to help with degenerative changes discussed resources to obtain.      time spent 15 minutes face-to-face more than 50% count about above condition coronation treatment plan

## 2021-06-22 NOTE — TELEPHONE ENCOUNTER
Refill request: oxycontin 10 mg and oxycontin 40 mg  Last ov:11/19  Next ov:01/16  qued for: Dr. Ramirez    12/11/2018 2 11/19/2018 Oxycontin Er 40 MG Tablet 28 28 Br Nelda [id number] [id number]) 0 60.00 [place]   11/19/2018 1 11/19/2018 Oxycontin Er 10 MG Tablet 84 28 Br N[facility] [id number] [facility] ([id number][facility] 0 45.00 [place]

## 2021-06-22 NOTE — TELEPHONE ENCOUNTER
Prior Authorization Request  Who s requesting:  Hemal Ramirez MD/ Marilyn Meneses CMA  Pharmacy Name and Location: [place] [address]  Medication Name: Oxycontin 10MG Controlled Release tabs  Insurance Plan: Medicare  Insurance Member ID Number:  [id number]  Informed patient that prior authorizations can take up to 10 business days for response:   Yes; 72 hours  Okay to leave a detailed message: Yes

## 2021-06-22 NOTE — TELEPHONE ENCOUNTER
PA approved for Oxycontin 10MG ER Tabs. Coverage good 12/04/2018- 01/03/2020. Approval letter faxed to [employer].

## 2021-06-22 NOTE — TELEPHONE ENCOUNTER
Request has been submitted via Replaced by [facility]. Waiting for questions to generate before completing PA.

## 2021-06-23 NOTE — TELEPHONE ENCOUNTER
Spoke with pt and went over Dr. Alexis's instructions.  Pt understanding, but would prefer to not drive to go to pharmacy because of the weather.  Pt states she has 5 mg tabs of prednisone at home on hand.  Pt is not currently taking prednisone.  Verbally spoke with Dr. Alexis, who said pt can take 4 tabs of the 5mg prednisone daily x 4 days.  Pt understanding and will call back tomorrow to update on how she's doing.  Will route to PCP as well for FYI.

## 2021-06-23 NOTE — PROGRESS NOTES
The following steps were completed to comply with the REMS program for Prolia:  1. Ordering provider has previously reviewed information in the Medication Guide and Patient Counseling Chart, including the serious risks of Prolia  and the symptoms of each risk and have been advised  to seek prompt medical attention if they have signs or symptoms of any of the serious risks.  2. Provided each patient a copy of the Medication Guide and Patient Brochure.  See MAR for administration details.   Indication: Prolia  (denosumab) is a prescription medicine used to treat osteoporosis in patients who:   Are at high risk for fracture, meaning patients who have had a fracture related to osteoporosis, or who have multiple risk factors for fracture; Cannot use another osteoporosis medicine or other osteoporosis medicines did not work well.   The timeline for early/late injections would be 4 weeks early and any time after the 6 month dinora. If a patient receives their injection late, then the subsequent injection would be 6 months from the date that they actually received the injection    1.  When was the last injection?  7/11/18    2.  Has insurance for this injection been verified?  Yes    3.  Did you experience any new onset achiness or rashes that lasted for over a month with your previous Prolia injection?   No    4.  Do you have a fever over 101?F or a new deep cough that is unusual for you today? No    5.  Have you started any new medications in the last 6 months that you were told could affect your immune system? These may have been prescribed by oncologist, transplant, rheumatology, or dermatology.   No    6.  In the last 6 months have you have gastric bypass or parathyroid surgery?   No    7.  Do you plan dental work requiring drilling into the bone such as implants/extractions or oral surgery in the next 2-3 months?   No

## 2021-06-23 NOTE — TELEPHONE ENCOUNTER
"Yesterday evening she noticed a spot on her right  elbow that is hard and a lump and sore and is a \"little hot\".    Bigger than a golf ball but smaller than a tennis ball.     No injury to the area.      She does tend to rest her arm on the armrest of the chair.    Advised she be seen today.     Warm transfer to scheduling to assist patient.       Reason for Disposition    Large swelling or bruise and size > palm of person's hand    Protocols used: ELBOW INJURY-A-OH      Ira Munoz RN [facility] Triage     "

## 2021-06-23 NOTE — TELEPHONE ENCOUNTER
Refill Approved    Rx renewed per Medication Renewal Policy. Medication was last renewed on 8/2/18.    Vicky Caicedo, [facility] Triage/Med Refill 1/25/2019     Requested Prescriptions   Pending Prescriptions Disp Refills     isosorbide mononitrate (ISMO,MONOKET) 10 MG tablet 180 tablet 1     Sig: TAKE 1 TABLET BY MOUTH TWICE DAILY    Isosorbide Refill Protocol Passed - 1/25/2019 10:29 AM       Passed - Visit with PCP or prescribing provider visit in last 6 months or next 3 months    Last office visit with prescriber/PCP: 1/15/2019 OR same dept: 1/15/2019 Jordana Reynolds MD OR same specialty: 1/15/2019 Jordana Reynolds MD Last physical: Visit date not found Last MTM visit: Visit date not found     Next appt within 3 mo: Visit date not found  Next physical within 3 mo: Visit date not found  Prescriber OR PCP: Jordana Reynolds MD  Last diagnosis associated with med order: 1. Bundle branch block  - isosorbide mononitrate (ISMO,MONOKET) 10 MG tablet; TAKE 1 TABLET BY MOUTH TWICE DAILY  Dispense: 180 tablet; Refill: 1    If protocol passes may refill for 6 months if within 3 months of last provider visit (or a total of 9 months).             Passed - Blood pressure filed in past 12 months    BP Readings from Last 1 Encounters:   01/16/19 132/79

## 2021-06-23 NOTE — PROGRESS NOTES
[facility] Follow Up Note    Assessment/Plan:  1. Preventative health care  She is due for mammogram.  Would like her to receive the shingles vaccine as well when able.  - Mammo Screening Bilateral; Future    2. Encounter for screening mammogram for malignant neoplasm of breast   Orders placed  - Mammo Screening Bilateral; Future    3. Other osteoporosis without current pathological fracture  Prolia today.  Rems program complete.  Patient was educated on safety of Prolia utilizing Patient Counseling Chart for Healthcare Providers, as outlined by the Prolia REMS progam.     - Basic Metabolic Panel    4. Fall, initial encounter  1 week ago had a fall.  Could not pivot on left leg.  Some decreased sensation noted since nerve block procedure.  Lower extremity exam significant for hematoma with surrounding area of erythema on the anterior tibial of the left lower extremity.  Mild warmth and erythema.  Recommendation: Doubt fracture.  Worried about development of cellulitis.  Will continue treatment with Augmentin.  Recommend continued elevation of the left lower extremity along with wraps  - HM2(CBC w/o Differential)    5. Hematoma of skin  As above    6. Opioid type dependence, continuous (H)  Pain clinic note reviewed.    7. Rheumatoid arthritis, involving unspecified site, unspecified rheumatoid factor presence (H)  Continues on methotrexate.  Arava discontinued    8. Acquired hypothyroidism  We will update labs  - Thyroid Barren    9.  Psychosocial issues  Patient and family considering assisted living.  A discussion was had today regarding the benefits and finances of this.  They will begin looking.  A prescription was given for a rolling walker with wheels to reduce falls risk in a patient who is vulnerable secondary to osteoporosis, opioid dependence, rheumatoid arthritis and chronic pain with spinal stenosis.    Jordana Reynolds MD    Chief Complaint:  Here for follow-up of usual medical problems  "and Prolia injection    History of Present Illness:  Jacqueline is a 78 y.o. female who is here today, accompanied by her daughter Dinorah, for follow-up of her usual medical problems and for evaluation of a fall.  Of note, approximately 1 week ago, she fell onto her left side of the body.  She states that she attempted to pivot but had been experiencing decreased sensation in her left lower extremity.  This had occurred shortly after an injection-nerve block for chronic pain.  She states that her right leg got ahead of her left.  She ended up on the left side of her body.  Fortunately, she did not fracture anything.  However, she may have hit the left anterior tibial area against a table.  She noted a \"bruise\".  Followed by significant swelling.  Her daughter presents with pictures of the wound today.  With that, they have been wrapping with compressive wraps.  She has been doing leg elevation.  She had some Augmentin at home as she was worried about recurrent cellulitis.  She began this right away.  She denies any fever or chills.    Additional issues reviewed today include intolerance to Prolia.  She has not had any problems.  She describes a wonderful holiday.  She has had 1-2 falls in the past.    She denies any abdominal issues with regard to a history of abdominal hernia.  Health maintenance issues are reviewed with her as well.    She states that she believes she is doing well overall.  Her daughter would like to discuss assisted living.  The patient continues to drive short distances.    She continues to be seen at the pain clinic for chronic opioid dependence.  She sees Dr. Ezequiel rodrigez who is a pain medication for injections.  She is seen by rheumatology for her rheumatoid arthritis.    Review of Systems:  A comprehensive review of systems was performed and was otherwise negative    PFSH:  Social History: She is .  She lives independently in a senior living apartment.  She does not drink alcohol nor does " she smoke cigarettes  Social History     Tobacco Use   Smoking Status Former Smoker     Packs/day: 0.50     Types: Cigarettes     Last attempt to quit: 2018     Years since quittin.6   Smokeless Tobacco Current User   Tobacco Comment    started smoking when she was 21 years old       Past History:   Past Medical History:   Diagnosis Date     Bundle branch block     Created by Conversion  Replacement Utility updated for latest IMO load     Cellulitis      Chronic venous stasis dermatitis      Impetigo        Current Outpatient Medications   Medication Sig Dispense Refill     albuterol (PROAIR HFA;PROVENTIL HFA;VENTOLIN HFA) 90 mcg/actuation inhaler Inhale 2 puffs every 4 (four) hours as needed for wheezing. And cough 1 Inhaler 3     amitriptyline (ELAVIL) 25 MG tablet One to two tabs bedtime. 60 tablet 2     aspirin 81 mg chewable tablet Chew 81 mg daily.       b complex vitamins tablet Take 1 tablet by mouth daily.       CALCIUM CARBONATE/VITAMIN D3 (CALCIUM+D ORAL) Take 3 tablets by mouth daily.       cyanocobalamin, vitamin B-12, (VITAMIN B-12) 500 mcg TbER Take 1 capsule by mouth daily.       DOCOSAHEXANOIC ACID/EPA (FISH OIL ORAL) Take 1 tablet by mouth daily.       ERGOCALCIFEROL, VITAMIN D2, (VITAMIN D2 ORAL) Take 1 tablet by mouth daily.       folic acid (FOLVITE) 1 MG tablet TK 1 T PO ONCE DAILY.  2     isosorbide mononitrate (ISMO,MONOKET) 10 MG tablet TAKE 1 TABLET BY MOUTH TWICE DAILY 180 tablet 1     levothyroxine (SYNTHROID, LEVOTHROID) 75 MCG tablet TAKE 1 TABLET DAILY AT 6:00 A.M. 90 tablet 3     loratadine (CLARITIN) 10 mg tablet Take 10 mg by mouth daily.       methotrexate 2.5 MG tablet Take 2.5 mg by mouth once a week. (Thursday) As directed       mometasone (NASONEX) 50 mcg/actuation nasal spray SHAKE WELL AND USE 2 SPRAYS IN EACH NOSTRIL EVERY DAY 17 g 5     multivitamin capsule Take 1 capsule by mouth daily.       omeprazole (PRILOSEC) 20 MG capsule Take 20 mg by mouth Daily before  breakfast.       oxyCODONE (OXYCONTIN) 10 mg 12 hr tablet Two to three tabs bedtime 84 each 0     oxyCODONE (OXYCONTIN) 40 mg 12 hr tablet Take 1 tablet (40 mg total) by mouth Daily at 8:00 am.. Take 1 tablet by mouth daily 28 each 0     sucralfate (CARAFATE) 100 mg/mL suspension Use As Directed.       triamcinolone (KENALOG) 0.1 % ointment Apply to both legs twice daily 30 g 0     white petrolatum (AQUAPHOR NATURAL HEALING) 41 % Oint Apply to both legs twice daily  0     amoxicillin-clavulanate (AUGMENTIN) 875-125 mg per tablet Take 1 tablet by mouth 2 (two) times a day for 7 days. 14 tablet 0     No current facility-administered medications for this visit.        Family History: Nothing new    Physical Exam:  General Appearance:   Pleasant and well-appearing and in no acute distress  Vitals:    01/15/19 1337   BP: 106/62   Patient Site: Left Arm   Patient Position: Sitting   Cuff Size: Adult Large   Pulse: 88   SpO2: 95%   Weight: 176 lb 14.4 oz (80.2 kg)     Wt Readings from Last 3 Encounters:   01/15/19 176 lb 14.4 oz (80.2 kg)   11/19/18 175 lb (79.4 kg)   10/03/18 175 lb (79.4 kg)     Body mass index is 33.42 kg/m .    Lower extremity examined.  There is a hematoma about the size of a half dollar in the left anterior tibial area.  It is surrounded by some erythema but no warmth.  There is some minor ankle edema as well.  Pulses are intact    Data Review:    Analysis and Summary of Old Records (2): Reviewed pain clinic records    Records Requested (1):       Other History Summarized (from other people in the room) (2): Daughter contributes to the history    Radiology Tests Summarized (XRAY/CT/MRI/DXA) (1):     Labs Reviewed (1): Labs    Medicine Tests Reviewed (EKG/ECHO/COLONOSCOPY/EGD) (1):     Independent Review of EKG or X-RAY (2):     Time spent today about 45 minutes with more than half that time spent in counseling and discussion of falls risk, assisted living, etc.

## 2021-06-23 NOTE — TELEPHONE ENCOUNTER
"Reviewed last OV from Dr Reynolds    Was just on Augmentin for possible leg cellulitis    An elbow effusion and swelling is most usually inflammatory and not infectious    Underlying rheumatoid arthritis    \"Allergc\" to NSAIDS    Recommend ice  Recommend medrol dose pack for inflammation and swelling  If we are truly worried about infection, after a few days of anti inflammatory, she should see her rheumaologist, or directly go to orthopedics for aspiration and culture  "

## 2021-06-23 NOTE — TELEPHONE ENCOUNTER
The prescription has been sent.  She needs to keep us posted if the wound does not heal appropriately

## 2021-06-23 NOTE — TELEPHONE ENCOUNTER
Medication Request  Medication name:  Augmentin  Pharmacy Name and Location:  [facility]  Reason for request:  Patient has a wound on her left leg from a fall, she already had some amoxicillin, but son in law whom is a Dr. Is suggesting a Prescription for Augmentin to fend off infection  When did you use medication last?:  n/a  Okay to leave a detailed message: yes

## 2021-06-23 NOTE — TELEPHONE ENCOUNTER
Spoke with pt to get more information.    Right elbow, red, warm to the touch, swollen, x 24 hours, pt recently stopped augmentin, was prescribed d/t concerns of possible cellutitis LLE. No known injury, prolonged resting on armrest over the past two days.

## 2021-06-23 NOTE — TELEPHONE ENCOUNTER
"Daughter Lia on phone with her mother.     Daughter is sending a message through [employer] to Asia Reynolds. Daughter, states the appointment was cancelled for today and now    Patient has an open sore on leg and a sore on her elbow.     Daughter calling back with patient on the phone. The patient forgot to mention that she has an open leg wound. Please see email encounter to see images dated today.    The daughter states her \" is a surgeon with [employer]\".    They are wondering if they should take the Prednisone.     When discussing the need to be seen, the daughter states that \"even my  could not get patient in to the wound clinic\"    Call [phone] Dr. Curiel Or daughter Lia Curiel.       Ira Munoz RN [facility] Triage     "

## 2021-06-23 NOTE — TELEPHONE ENCOUNTER
Pt called and stated that she had a blood blister on her L lower leg that opened and she was wondering if she needed to be debrided. No openings for the next 2 weeks and she declined making an appt. Her son-in law is a doctor who informed her to apply bacitracin then she called her PCP for an abx. Informed her to send a picture via my chart. She will go to the ER if it worsens.

## 2021-06-23 NOTE — PATIENT INSTRUCTIONS - HE
PLAN:    Discussed going to [facility] at [place] for TENS unit, walker    Discussed use of CMO (cetyl Myristoleate) 500 mg twice a day for joint stiffness, may obtain on-line    Hydrocortisone cream 1% for hand, sent as prescription    Continue Oxycontin as you are are    Return in 8 weeks

## 2021-06-23 NOTE — TELEPHONE ENCOUNTER
It is ok to take the prednisone.   She may have olecrenon bursitis.  She could go to urgency room which is near her home to have things looked at if they are nervous or the walkin ckinic.  I think [facility] has a walk in clinic.  I agree the wound clinic would be a good option

## 2021-06-23 NOTE — PROGRESS NOTES
"Jacqueline is seen with her daughter.  She reviews it was a \"rough month: She went to [place] to join both daughters and the grandchildren.  Her daughter, and she could have done this a year or 2 ago with medication changes.  While traveling was difficult it was \"fantastic\".  \"Pain does not get to tell me what to do\".    She still is considering whether the radiofrequency ablation was helpful.  She described after the last one her left foot \"felt like a brick\" for a while.  The left leg still sometimes does not work well and she fell last week injuring her shoulder twisting her ankle.  She notes she is always had problems with \"proprioception\" if her left leg.    Her daughter notes she is getting more active, getting out to walk for 5 times a week.  Given that she is rather slow they want her to have a walker so that she does not fall.  She cannot use a cane given her arthritis in her hands.    While in [place] she did have a flareup of arthritis in her hands and could not close them.  She had a short course of steroids which was helpful.    She continues on the OxyContin 40 mg in the morning and 10-20 mg at bedtime.    She continues on Soma 3 times a day.  Is continue with the amitriptyline at night.  We have made efforts to decrease this and she has not done well.  She does not attribute that to neither problems with falls, or cognitive issues.    They are planning to go to the [facility] today to look into a TENS unit.    They did not look into the isa Gibbons told late discussed last time.      Current Outpatient Medications:      albuterol (PROAIR HFA;PROVENTIL HFA;VENTOLIN HFA) 90 mcg/actuation inhaler, Inhale 2 puffs every 4 (four) hours as needed for wheezing. And cough, Disp: 1 Inhaler, Rfl: 3     amoxicillin-clavulanate (AUGMENTIN) 875-125 mg per tablet, Take 1 tablet by mouth 2 (two) times a day for 7 days., Disp: 14 tablet, Rfl: 0     aspirin 81 mg chewable tablet, Chew 81 mg daily., Disp: , " Rfl:      b complex vitamins tablet, Take 1 tablet by mouth daily., Disp: , Rfl:      CALCIUM CARBONATE/VITAMIN D3 (CALCIUM+D ORAL), Take 3 tablets by mouth daily., Disp: , Rfl:      cyanocobalamin, vitamin B-12, (VITAMIN B-12) 500 mcg TbER, Take 1 capsule by mouth daily., Disp: , Rfl:      DOCOSAHEXANOIC ACID/EPA (FISH OIL ORAL), Take 1 tablet by mouth daily., Disp: , Rfl:      ERGOCALCIFEROL, VITAMIN D2, (VITAMIN D2 ORAL), Take 1 tablet by mouth daily., Disp: , Rfl:      folic acid (FOLVITE) 1 MG tablet, TK 1 T PO ONCE DAILY., Disp: , Rfl: 2     isosorbide mononitrate (ISMO,MONOKET) 10 MG tablet, TAKE 1 TABLET BY MOUTH TWICE DAILY, Disp: 180 tablet, Rfl: 1     levothyroxine (SYNTHROID, LEVOTHROID) 75 MCG tablet, TAKE 1 TABLET DAILY AT 6:00 A.M., Disp: 90 tablet, Rfl: 3     loratadine (CLARITIN) 10 mg tablet, Take 10 mg by mouth daily., Disp: , Rfl:      methotrexate 2.5 MG tablet, Take 2.5 mg by mouth once a week. (Thursday) As directed, Disp: , Rfl:      mometasone (NASONEX) 50 mcg/actuation nasal spray, SHAKE WELL AND USE 2 SPRAYS IN EACH NOSTRIL EVERY DAY, Disp: 17 g, Rfl: 5     multivitamin capsule, Take 1 capsule by mouth daily., Disp: , Rfl:      omeprazole (PRILOSEC) 20 MG capsule, Take 20 mg by mouth Daily before breakfast., Disp: , Rfl:      sucralfate (CARAFATE) 100 mg/mL suspension, Use As Directed., Disp: , Rfl:      triamcinolone (KENALOG) 0.1 % ointment, Apply to both legs twice daily, Disp: 30 g, Rfl: 0     white petrolatum (AQUAPHOR NATURAL HEALING) 41 % Oint, Apply to both legs twice daily, Disp: , Rfl: 0     amitriptyline (ELAVIL) 25 MG tablet, One to two tabs bedtime, Disp: 60 tablet, Rfl: 2     carisoprodol (SOMA) 350 MG tablet, Take 1 tablet (350 mg total) by mouth 3 (three) times a day as needed for muscle spasms., Disp: 90 tablet, Rfl: 2     hydrocortisone (CORTISONE, HYDROCORTISONE,) 1 % cream, Apply to hand three times a day, Disp: 30 g, Rfl: 2     [START ON 1/30/2019] oxyCODONE  "(OXYCONTIN) 10 mg 12 hr tablet, Two to three tabs bedtime, Disp: 84 each, Rfl: 0     [START ON 1/30/2019] oxyCODONE (OXYCONTIN) 40 mg 12 hr tablet, Take 1 tablet (40 mg total) by mouth Daily at 8:00 am.. Take 1 tablet by mouth daily, Disp: 28 each, Rfl: 0  Blood pressure 132/79, pulse 87, resp. rate 16, height 5' 1\" (1.549 m), weight 179 lb (81.2 kg).    Score 8.  She is alert with a clear sensorium good eye contact.  Thought process tight logical.  Affect is bright.  Ulnar deviation of fingers with arthritis noted.    Assessment: Chronic pain relates to lumbar radiculopathy.    Recent radicular radiofrequency ablation this fall.    Plan: She will obtain TENS unit today.    Discussed again use of cetyl Myristoleate for the joint pain.    Continue with opioids as above.    Time spent more than 15 minutes face-to-face more than 50% counseling about above condition coronation treatment plan    "

## 2021-06-23 NOTE — TELEPHONE ENCOUNTER
Message relayed to patient and daughter.     They will see a provider on Tuesday [facility] general surgery.      Ira Munoz RN [facility] Triage

## 2021-06-24 NOTE — TELEPHONE ENCOUNTER
"Calling for refills of Oxycontin 40, and 10 mg. Sorry for calling late, but has had some \"real\" health issues going on. Cued refills due to fill tomorrow.  "

## 2021-06-24 NOTE — TELEPHONE ENCOUNTER
Patient had an additional question for Dr. Arciniega after her appointment with him. Patient stated that she has been taking 5mg of prednisone x 7 days, for a separate issue, and it has also been helping her wound. Patient wanting to know if she should continue taking the prednisone?     Routing to Dr. Arciniega to advise.    Sushila Summers CMA 2/14/2019 11:23 AM

## 2021-06-24 NOTE — PROGRESS NOTES
"HPI: Pt is here for follow up of a left leg wound.  She injured her shin at the end of last year and developed a hematoma, and then there was subsequent breakdown of the skin of the hematoma leaving a wound.  This has been dealt with at the wound center by Dr. Claribel Davidson.  Dr. Davidson is away, and Ms. Prado was not able to get an appointment for debridement in a timely fashion, and I was asked to help manage the wound in Dr. Davidson's absence.  She is doing well. Her appetite is good, and bowel function regular.  No fevers or chills. Ambulating without problems.     Allergies, Medications, Social History, Past Medical History and Past Surgical History were reviewed and are noted in the chart.    /72 (Patient Site: Right Arm, Patient Position: Sitting, Cuff Size: Adult Regular)   Pulse 78   Ht 5' 1\" (1.549 m)   Wt 173 lb (78.5 kg)   SpO2 94%   BMI 32.69 kg/m        EXAM: This is a  78 y.o. female in no distress  GENERAL: Appears well  LEFT LEG: No significant edema. Wound approximately 4 x 3 cm on anterior shin. Granulating with significant fibrin deposition and some slough. I debrided this full thickness including subcutaneous tissues, using a scalpel. She tolerated it well with no complications.      Assessment/Plan: Jacqueline Prado is following up for her left leg wound. Doing well.  Return in 4 weeks or sooner if any problems.    Timothy Arciniega MD  [facility] Department of Surgery  "

## 2021-06-25 NOTE — TELEPHONE ENCOUNTER
Medication being requested: Soma  Last visit date: 3/5  Provider: BE  Next visit date: 6/11 Provider: ROXANA  Expected follow up: 3 months  MTM visit (Pain Center) date: na  Pertinent between visit information about requested medication (telephone, mychart, prior authorization, concerns): Continue the Soma 350 mg 3 times a day.  Last date prescribed: 5/3-6/2  Provider responsible: BE  Spoke with patient: yes  Script being sent to provider - dates and quantity:   Requested Prescriptions     Pending Prescriptions Disp Refills     carisoprodoL (SOMA) 350 MG tablet 90 tablet 0     Sig: Take 1 tablet (350 mg total) by mouth 3 (three) times a day as needed for muscle spasms.     Pharmacy cued: [facility]

## 2021-06-25 NOTE — TELEPHONE ENCOUNTER
Refill request: oxycontin 10 mg  Last ov with BE: 3/5/21  Follow up in 8 weeks  Next ov with BE: 6/11/21  UDS and CSA - 05/03/19: please obtain at upcoming apt on 6/11/21(this is an in clinic visit)    Will cue as a bridge to ensure that patient does make this apt    Requested Prescriptions     Pending Prescriptions Disp Refills     oxyCODONE (OXYCONTIN) 10 mg 12 hr tablet 24 each 0     Sig: Take 2-3 tablets (20-30 mg total) by mouth at bedtime for 8 days. Two to three tabs bedtime   brady

## 2021-06-25 NOTE — TELEPHONE ENCOUNTER
Received form for Boost    Pt is over due for follow up with pcp   Please schedule pt with pcp for VV and we will hold on to the form in     I couldn't find any discussion about boost with pcp

## 2021-06-25 NOTE — TELEPHONE ENCOUNTER
RN cannot approve Refill Request    RN can NOT refill this medication PCP messaged that patient is overdue for Labs. Last office visit: 3/19/2021 Jordana Reynolds MD Last Physical: 6/19/2018 Last MTM visit: Visit date not found Last visit same specialty: 3/19/2021 Jordana Reynolds MD.  Next visit within 3 mo: Visit date not found  Next physical within 3 mo: Visit date not found      [facility] Triage/Med Refill 6/12/2021    Requested Prescriptions   Pending Prescriptions Disp Refills     hydroCHLOROthiazide (MICROZIDE) 12.5 mg capsule [Pharmacy Med Name: HYDROCHLOROTHIAZIDE 12.5MG CAPSULES] 90 capsule 0     Sig: TAKE 1 CAPSULE(12.5 MG) BY MOUTH DAILY       Diuretics/Combination Diuretics Refill Protocol  Failed - 6/12/2021  4:14 PM        Failed - Serum Potassium in past 12 months      No results found for: LN-POTASSIUM          Failed - Serum Sodium in past 12 months      No results found for: LN-SODIUM          Failed - Serum Creatinine in past 12 months      Creatinine   Date Value Ref Range Status   01/15/2019 0.78 0.60 - 1.10 mg/dL Final             Passed - Visit with PCP or prescribing provider visit in past 12 months     Last office visit with prescriber/PCP: 3/19/2021 Jordana Reynolds MD OR same dept: 3/19/2021 Jordana Reynolds MD OR same specialty: 3/19/2021 Jordana Reynolds MD  Last physical: 6/19/2018 Last MTM visit: Visit date not found   Next visit within 3 mo: Visit date not found  Next physical within 3 mo: Visit date not found  Prescriber OR PCP: Jordana Reynolds MD  Last diagnosis associated with med order: 1. Lower extremity edema  - hydroCHLOROthiazide (MICROZIDE) 12.5 mg capsule [Pharmacy Med Name: HYDROCHLOROTHIAZIDE 12.5MG CAPSULES]; TAKE 1 CAPSULE(12.5 MG) BY MOUTH DAILY  Dispense: 90 capsule; Refill: 0    If protocol passes may refill for 12 months if within 3 months of last provider visit (or a total of 15 months).             Passed - Blood pressure  on file in past 12 months     BP Readings from Last 1 Encounters:   06/11/21 119/76

## 2021-06-26 NOTE — PROGRESS NOTES
Pain score: 7  Constant or intermittent: constant and intermittent  What does your pain feel like: dull, sharp, throbbing and radiates  Does the pain interfere with:   Work: yes  Walking/distance: yes  Sleep: no  Daily activities: yes  Relationships/social life: no  Mood: yes  F= 7     Miguelina Nicole LPN

## 2021-06-26 NOTE — PATIENT INSTRUCTIONS - HE
PLAN:  Referral to [facility] therapy for postural restoration and pool therapy.    Discussed to anticipate surgery for your left fifth finger, and cataract surgery.    Continue with the OxyContin 40 mg in the morning and the oxycodone 10 mg 2 to 3 tablets at bedtime.    Continue with the Soma 3 and 50 mg twice a day.    Continue working with a rheumatologist.    Follow with Dr. Ramirez in 3 months

## 2021-06-26 NOTE — PROGRESS NOTES
Jacqueline is a 81 y.o. female being evaluated via a billable phone visit, and would like to be contacted via the following Home number on file [phone] (home)  Advised to have pill bottles ready and pen for instructions?  Yes     ASSESSMENT:    1. Essential hypertension  No reported problems    2. Rheumatoid arthritis involving multiple sites, unspecified whether rheumatoid factor present (H)  Follows with rheumatology-labs updated in March-these are reviewed    3. Venous stasis dermatitis of both lower extremities  Some increased edema on occasion.  Lower extremity ulceration from last visit completely healed    4. Acquired hypothyroidism  Will be due for labs in fall    5. Secondary renal hyperparathyroidism (H)  Noted    6. Social isolation  Experiencing some slight cognitive decline and loneliness from pandemic.    Readdress all issues when she returns in October for wellness visit along with Prolia injection    Preventive Health Care:      PLAN:  There are no Patient Instructions on file for this visit.  No follow-ups on file.      CHIEF COMPLAINT:  Chief Complaint   Patient presents with     Forms Request     Boost       HISTORY OF PRESENT ILLNESS:  Jacqueline is a 81 y.o. female contacting the clinic today via phone for discussion of general medical problems and to assist with completion of forms for boost.  Of note, she is on boost for extra protein as her diet is low in meat and sources of protein.  She has rheumatoid arthritis, chronic pain on opioids, chronic back pain and osteoporosis.  The boost is helping maintain protein levels/muscle mass and therefore reducing falls risk.    She states that her appetite has been decreased.  She does state that she also is experiencing social isolation from the pandemic.  She acknowledges that she is looking forward to getting out and doing some gardening with some neighbors.  She states she is still weary and a bit frightened as restrictions become loosened.  She is  immunized.    She continues to follow with Dr. Ramirez for chronic pain.  She continues to follow with rheumatology.  She has no other complaints    REVIEW OF SYSTEMS:         PFS:  Social History     Social History Narrative    Lives in senior independent living.  .  3 kids.  Graduated from college.      As above-her son passed away from Covid.    TOBACCO USE:  Social History     Tobacco Use   Smoking Status Former Smoker     Packs/day: 0.50     Types: Cigarettes     Quit date: 6/1/2018     Years since quitting: 3.0   Smokeless Tobacco Current User   Tobacco Comment    started smoking when she was 21 years old, vaping       VITALS:  There were no vitals filed for this visit.  Wt Readings from Last 3 Encounters:   03/19/21 172 lb (78 kg)   01/07/20 178 lb (80.7 kg)   11/05/19 178 lb (80.7 kg)       PHYSICAL EXAM:  (observations via Phone)  Her voice sounds at baseline.  She has dry mouth and therefore slight slurring.    MEDICATIONS  Current Outpatient Medications   Medication Sig Dispense Refill     albuterol (PROAIR HFA;PROVENTIL HFA;VENTOLIN HFA) 90 mcg/actuation inhaler Inhale 2 puffs every 4 (four) hours as needed for wheezing. And cough 1 Inhaler 3     aspirin 81 mg chewable tablet Chew 81 mg daily.       b complex vitamins tablet Take 1 tablet by mouth daily.       benzonatate (TESSALON PERLES) 100 MG capsule Take 1 capsule (100 mg total) by mouth 3 (three) times a day as needed for cough. 30 capsule 0     CALCIUM CARBONATE/VITAMIN D3 (CALCIUM+D ORAL) Take 3 tablets by mouth daily.       cyanocobalamin, vitamin B-12, (VITAMIN B-12) 500 mcg TbER Take 1 capsule by mouth daily.       DOCOSAHEXANOIC ACID/EPA (FISH OIL ORAL) Take 1 tablet by mouth daily.       ERGOCALCIFEROL, VITAMIN D2, (VITAMIN D2 ORAL) Take 1 tablet by mouth daily.       folic acid (FOLVITE) 1 MG tablet TK 1 T PO ONCE DAILY.  2     hydroCHLOROthiazide (MICROZIDE) 12.5 mg capsule TAKE 1 CAPSULE(12.5 MG) BY MOUTH DAILY 90 capsule 0      hydrocortisone (CORTISONE, HYDROCORTISONE,) 1 % cream Apply to hand three times a day 30 g 2     isosorbide mononitrate (ISMO,MONOKET) 10 MG tablet Take 1 tablet (10 mg total) by mouth 2 (two) times a day. 180 tablet 3     leflunomide (ARAVA) 20 MG tablet        levothyroxine (SYNTHROID, LEVOTHROID) 75 MCG tablet TAKE 1 TABLET BY MOUTH DAILY AT 6 AM 90 tablet 3     loratadine (CLARITIN) 10 mg tablet Take 10 mg by mouth daily.       methotrexate 2.5 MG tablet Take 2.5 mg by mouth once a week. (Thursday) As directed       mometasone (NASONEX) 50 mcg/actuation nasal spray SHAKE WELL AND USE 2 SPRAYS IN EACH NOSTRIL EVERY DAY (Patient taking differently: 2 sprays into each nostril 2 (two) times a day as needed. SHAKE WELL AND USE 2 SPRAYS IN EACH NOSTRIL EVERY DAY, as needed) 17 g 5     multivitamin capsule Take 1 capsule by mouth daily.       NON FORMULARY Apply 1 application topically daily. Sera Ve       nystatin (MYCOSTATIN) cream Apply 1 application topically 2 (two) times a day as needed.  1     omeprazole (PRILOSEC) 20 MG capsule Take 20 mg by mouth Daily before breakfast.       oxyCODONE (OXYCONTIN) 10 mg 12 hr tablet Take 2-3 tablets (20-30 mg total) by mouth at bedtime for 8 days. Two to three tabs bedtime 24 each 0     oxyCODONE (OXYCONTIN) 40 mg 12 hr tablet Take 1 tablet (40 mg total) by mouth daily. 28 tablet 0     potassium gluconate 550 mg (90 mg) tablet Take 1 tablet by mouth daily as needed.       sulfamethoxazole-trimethoprim (SEPTRA DS) 800-160 mg per tablet Take 1 tablet by mouth 2 (two) times a day.       triamcinolone (KENALOG) 0.1 % ointment Apply to both legs twice daily 30 g 0     vitamin E 400 unit capsule Take 400 Units by mouth daily.       amitriptyline (ELAVIL) 25 MG tablet One to two tabs bedtime 60 tablet 5     carisoprodoL (SOMA) 350 MG tablet Take 1 tablet (350 mg total) by mouth 3 (three) times a day as needed for muscle spasms. 90 tablet 0     No current facility-administered  medications for this visit.        Notes summarized:   Labs, x-rays, cardiology, GI tests reviewed:   New orders: No orders of the defined types were placed in this encounter.      Independent review of:  Supplemental history by:      Phone Start Time: 1140 AM  Phone End time:  1156 AM  Conversation plus orders: 18 minutes  Dictation time: 2 minutes    The visit lasted a total of 20 minutes     Patient would like to receive their AVS by AVS Preference: [place].     Jordana Reynolds MD

## 2021-06-26 NOTE — PROGRESS NOTES
"Assessment/Plan:     Problem List Items Addressed This Visit     Rheumatoid arthritis (H)    Relevant Medications    oxyCODONE (OXYCONTIN) 10 mg 12 hr tablet    carisoprodoL (SOMA) 350 MG tablet (Start on 2021)    Back pain with left-sided sciatica    Relevant Medications    amitriptyline (ELAVIL) 25 MG tablet    oxyCODONE (OXYCONTIN) 40 mg 12 hr tablet (Start on 2021)    Other Relevant Orders    Ambulatory referral to Physical Therapy      Other Visit Diagnoses     Chronic pain syndrome                  No follow-ups on file.    Patient Instructions   PLAN:  Referral to [facility] for postural restoration and pool therapy.    Discussed to anticipate surgery for your left fifth finger, and cataract surgery.    Continue with the OxyContin 40 mg in the morning and the oxycodone 10 mg 2 to 3 tablets at bedtime.    Continue with the Soma 3 and 50 mg twice a day.    Continue working with a rheumatologist.    Follow with Dr. Ramirez in 3 months        Subjective:       81 y.o. female follow-up for lumbar degenerative changes, rheumatoid arthritis.    She is seen today with her daughter.    She describes coming out of the \"severe isolation related to the Covid virus.    Daughter mentions patient's son did die of Covid during this time.  Patient becomes tearful, states she does not want to talk about it.  Did not explore  or other experiences.    Reviews with delays in getting the Prolia shot, there is concerned that the osteoporosis has progressed.  She has lost an inch and appears more kyphotic according to the daughter.    She is also working with her primary care provider, has lost about 8 pounds, is getting boost.    She did have cellulitis in her lower extremity in March.  Daughter describes a cough year service that came in for home visits doing a nice job with debridement and wound care is doing much better.    Pain varies.  She is not using the Biologics with her rheumatologist aside " from methotrexate and may be starting Areva.    There have been no falls, that have been concern in the past.  She continues on Soma and amitriptyline.    Daughter reports when is stayed with her describes sometimes with her but the pain in troubles at night.    Continues with the OxyContin 40 mg in the morning and the 10 mg 2 tablets at night, occasionally increases to 3 tablets at night about every third night.    Reviews with the isolation, concern other people in her living situation were not wearing masks in the hallway.    She is also now addressing other medical concerns including needing cataract surgery, and demonstrates her left fifth finger easily can deviate, will be having likely joint replacement surgery as well.      Current Outpatient Medications:      albuterol (PROAIR HFA;PROVENTIL HFA;VENTOLIN HFA) 90 mcg/actuation inhaler, Inhale 2 puffs every 4 (four) hours as needed for wheezing. And cough, Disp: 1 Inhaler, Rfl: 3     amitriptyline (ELAVIL) 25 MG tablet, two tablets by mouth at bedtime, Disp: 60 tablet, Rfl: 5     aspirin 81 mg chewable tablet, Chew 81 mg daily., Disp: , Rfl:      b complex vitamins tablet, Take 1 tablet by mouth daily., Disp: , Rfl:      CALCIUM CARBONATE/VITAMIN D3 (CALCIUM+D ORAL), Take 3 tablets by mouth daily., Disp: , Rfl:      [START ON 7/9/2021] carisoprodoL (SOMA) 350 MG tablet, Take 1 tablet (350 mg total) by mouth 3 (three) times a day as needed for muscle spasms., Disp: 90 tablet, Rfl: 0     cholecalciferol, vitamin D3, 1,000 unit (25 mcg) tablet, Take 1,000 Units by mouth daily., Disp: , Rfl:      cyanocobalamin, vitamin B-12, (VITAMIN B-12) 500 mcg TbER, Take 1 capsule by mouth daily., Disp: , Rfl:      DOCOSAHEXANOIC ACID/EPA (FISH OIL ORAL), Take 1 tablet by mouth daily., Disp: , Rfl:      folic acid (FOLVITE) 1 MG tablet, TK 1 T PO ONCE DAILY., Disp: , Rfl: 2     hydroCHLOROthiazide (MICROZIDE) 12.5 mg capsule, TAKE 1 CAPSULE(12.5 MG) BY MOUTH DAILY, Disp: 90  capsule, Rfl: 0     hydrocortisone (CORTISONE, HYDROCORTISONE,) 1 % cream, Apply to hand three times a day, Disp: 30 g, Rfl: 2     isosorbide mononitrate (ISMO,MONOKET) 10 MG tablet, Take 1 tablet (10 mg total) by mouth 2 (two) times a day., Disp: 180 tablet, Rfl: 3     leflunomide (ARAVA) 20 MG tablet, Take 20 mg by mouth daily. , Disp: , Rfl:      levothyroxine (SYNTHROID, LEVOTHROID) 75 MCG tablet, TAKE 1 TABLET BY MOUTH DAILY AT 6 AM, Disp: 90 tablet, Rfl: 3     loratadine (CLARITIN) 10 mg tablet, Take 10 mg by mouth daily., Disp: , Rfl:      mometasone (NASONEX) 50 mcg/actuation nasal spray, SHAKE WELL AND USE 2 SPRAYS IN EACH NOSTRIL EVERY DAY (Patient taking differently: 2 sprays into each nostril 2 (two) times a day as needed. SHAKE WELL AND USE 2 SPRAYS IN EACH NOSTRIL EVERY DAY, as needed), Disp: 17 g, Rfl: 5     multivitamin capsule, Take 1 capsule by mouth daily., Disp: , Rfl:      NON FORMULARY, Apply 1 application topically daily. Sera Ve, Disp: , Rfl:      nystatin (MYCOSTATIN) cream, Apply 1 application topically 2 (two) times a day as needed., Disp: , Rfl: 1     omeprazole (PRILOSEC) 20 MG capsule, Take 20 mg by mouth Daily before breakfast., Disp: , Rfl:      oxyCODONE (OXYCONTIN) 10 mg 12 hr tablet, Take 2-3 tablets (20-30 mg total) by mouth at bedtime. Two to three tabs bedtime, Disp: 24 each, Rfl: 0     [START ON 6/17/2021] oxyCODONE (OXYCONTIN) 40 mg 12 hr tablet, Take 1 tablet (40 mg total) by mouth daily., Disp: 28 tablet, Rfl: 0     potassium gluconate 550 mg (90 mg) tablet, Take 1 tablet by mouth daily as needed., Disp: , Rfl:      triamcinolone (KENALOG) 0.1 % ointment, Apply to both legs twice daily, Disp: 30 g, Rfl: 0     vitamin E 400 unit capsule, Take 400 Units by mouth daily., Disp: , Rfl:      ERGOCALCIFEROL, VITAMIN D2, (VITAMIN D2 ORAL), Take 1 tablet by mouth daily., Disp: , Rfl:      methotrexate 2.5 MG tablet, Take 2.5 mg by mouth once a week. (Thursday) As directed, Disp: ,  "Rfl:     She is alert with a clear sensorium good eye contact.  Thought process logical.  Affect is congruent.  She does indeed appear a bit more kyphotic.          Objective:     Vitals:    06/11/21 1324   BP: 119/76   Pulse: 97   Weight: 164 lb (74.4 kg)   Height: 4' 11.5\" (1.511 m)   PainSc:   7   PainLoc: Back         Assessment: Osteoporosis, history of lumbar fusion, we discussed the goal of improving gait instability to minimize risk of falls.  Reviewed [facility] physical therapy with the pool therapy.  She described being there several years ago, concerned with some interactions with staff, reviewed my experience with patients recently finding that has gone well.          This note has been dictated using voice recognition software. Any grammatical or context distortions are unintentional and inherent to the software  "

## 2021-06-29 NOTE — PROGRESS NOTES
Progress Notes by Claribel Davidson MD at 2020  2:45 PM     Author: Claribel Davidson MD Service: -- Author Type: Physician    Filed: 2020  8:30 AM Encounter Date: 2020 Status: Addendum    : Claribel Davidson MD (Physician)    Related Notes: Original Note by Claribel Davidson MD (Physician) filed at 2020  2:44 PM                     Type of service:  Phone      Start and End Time : 2:29-2:41 pm    Date of Service: 2020     Date last seen by Dr. Davidson:  2019    PCP: Jordana Reynolds MD    Impression:  1. Bilateral leg swelling-stable  2. Long standing dependent swelling with venous insufficiency and hypertension of both legs-stable  3. Secondary lymphedema-stable  4. Right anterior calf with mass has resolved  5. History of infection/cellulitis  6. History of rheumatoid arthritis     Plan:  1. Questions answered.  2. Continue compression and exercises.  Continue to update regularly.    3. Discussed importance of and how to exercise on a regular basis.  Modifications reviewed with recommendations on using the internet and cable for additional options.  4. Patient will follow up in 1 year, or when needed.  She is to call with any concerns.      Duration of phone call:  12 minutes  ---------------------------------------------------------------------------------------------------------------------    Chief Complaint: Bilateral leg swelling and cellulitis history     History of Present Illness:     Jacqueline Prado returns to the [facility] for bilateral leg swelling and cellulitis history due to long-standing dependent swelling with venous insufficiency/hypertension leading to acquired lymphedema complicated by underlying rheumatoid arthritis and long history of tobacco use.  At her last visit she was instructed to continue her compression and exercises and update her compression regularly.  The right anterior leg  mass was sable and unchanged.  She was to monitor this and if there was a change to contact us.  Today she is seen as a telephone telehealth visit.  She reports the swelling is under good control.  She wears the compression stockings daily.  The mass on the front of the right leg is gone.  There is no pain.   Her RA has been stable.   There has been no new numbness, tingling or weakness.  There have been no new masses, rashes, or swellings of any other joints. There has been no new abdominal bloating, bowel or bladder changes and irregular bleeding. She has not run a fever.      Past Medical History:   Diagnosis Date   ? Bundle branch block     Created by Conversion  Replacement Utility updated for latest IMO load   ? Cellulitis    ? Chronic venous stasis dermatitis    ? Impetigo    ? Rheumatoid arthritis (H)        Past Surgical History:   Procedure Laterality Date   ? BREAST CYST ASPIRATION Left 2000   ? IN EXCIS CERV DISK,ONE LEVEL      Description: Laminectomy With Disc Removal;  Recorded: 11/03/2011;  Annotations: L5-S1   ? IN INJECT VERTEBRAL BODY, LUMBAR      Description: Spinal Percutaneous Vertebroplasty, Injection Lumbar;  Recorded: 11/03/2011;  Annotations: L5   ? IN REMOVAL GALLBLADDER      Description: Cholecystectomy;  Recorded: 07/22/2009;         Current Outpatient Medications:   ?  albuterol (PROAIR HFA;PROVENTIL HFA;VENTOLIN HFA) 90 mcg/actuation inhaler, Inhale 2 puffs every 4 (four) hours as needed for wheezing. And cough, Disp: 1 Inhaler, Rfl: 3  ?  amitriptyline (ELAVIL) 25 MG tablet, One to two tabs bedtime, Disp: 60 tablet, Rfl: 2  ?  aspirin 81 mg chewable tablet, Chew 81 mg daily., Disp: , Rfl:   ?  b complex vitamins tablet, Take 1 tablet by mouth daily., Disp: , Rfl:   ?  CALCIUM CARBONATE/VITAMIN D3 (CALCIUM+D ORAL), Take 3 tablets by mouth daily., Disp: , Rfl:   ?  carisoprodoL (SOMA) 350 MG tablet, Take 1 tablet (350 mg total) by mouth 3 (three) times a day as needed for muscle  spasms., Disp: 90 tablet, Rfl: 0  ?  cyanocobalamin, vitamin B-12, (VITAMIN B-12) 500 mcg TbER, Take 1 capsule by mouth daily., Disp: , Rfl:   ?  DOCOSAHEXANOIC ACID/EPA (FISH OIL ORAL), Take 1 tablet by mouth daily., Disp: , Rfl:   ?  ERGOCALCIFEROL, VITAMIN D2, (VITAMIN D2 ORAL), Take 1 tablet by mouth daily., Disp: , Rfl:   ?  folic acid (FOLVITE) 1 MG tablet, TK 1 T PO ONCE DAILY., Disp: , Rfl: 2  ?  hydrocortisone (CORTISONE, HYDROCORTISONE,) 1 % cream, Apply to hand three times a day, Disp: 30 g, Rfl: 2  ?  isosorbide mononitrate (ISMO,MONOKET) 10 MG tablet, TAKE 1 TABLET BY MOUTH TWICE DAILY (Patient taking differently: Take 10 mg by mouth daily. TAKE 1 TABLET BY MOUTH TWICE DAILY), Disp: 180 tablet, Rfl: 3  ?  levothyroxine (SYNTHROID, LEVOTHROID) 75 MCG tablet, TAKE 1 TABLET BY MOUTH DAILY AT 6 AM, Disp: 90 tablet, Rfl: 3  ?  loratadine (CLARITIN) 10 mg tablet, Take 10 mg by mouth daily., Disp: , Rfl:   ?  methotrexate 2.5 MG tablet, Take 2.5 mg by mouth once a week. (Thursday) As directed, Disp: , Rfl:   ?  mometasone (NASONEX) 50 mcg/actuation nasal spray, SHAKE WELL AND USE 2 SPRAYS IN EACH NOSTRIL EVERY DAY (Patient taking differently: 2 sprays into each nostril 2 (two) times a day as needed. SHAKE WELL AND USE 2 SPRAYS IN EACH NOSTRIL EVERY DAY, as needed), Disp: 17 g, Rfl: 5  ?  multivitamin capsule, Take 1 capsule by mouth daily., Disp: , Rfl:   ?  NON FORMULARY, Apply 1 application topically daily. Sera Ve, Disp: , Rfl:   ?  nystatin (MYCOSTATIN) cream, Apply 1 application topically 2 (two) times a day as needed., Disp: , Rfl: 1  ?  omeprazole (PRILOSEC) 20 MG capsule, Take 20 mg by mouth Daily before breakfast., Disp: , Rfl:   ?  oxyCODONE (OXYCONTIN) 40 mg 12 hr tablet, Take 1 tablet (40 mg total) by mouth daily., Disp: 28 tablet, Rfl: 0  ?  potassium gluconate 550 mg (90 mg) tablet, Take 1 tablet by mouth daily as needed., Disp: , Rfl:   ?  triamcinolone (KENALOG) 0.1 % ointment, Apply to both  legs twice daily, Disp: 30 g, Rfl: 0  ?  vitamin E 400 unit capsule, Take 400 Units by mouth daily., Disp: , Rfl:   ?  white petrolatum (AQUAPHOR NATURAL HEALING) 41 % Oint, Apply to both legs twice daily, Disp: , Rfl: 0  ?  oxyCODONE (OXYCONTIN) 10 mg 12 hr tablet, Take 2-3 tablets (20-30 mg total) by mouth at bedtime. Two to three tabs bedtime, Disp: 84 each, Rfl: 0    Allergies   Allergen Reactions   ? Tetracyclines Rash   ? Acetaminophen    ? Baclofen Nausea Only   ? Celecoxib    ? Erythromycin Base    ? Nsaids (Non-Steroidal Anti-Inflammatory Drug)    ? Pentolinium Itching   ? Varenicline Itching and Swelling   ? Erythromycin Rash       Social History     Socioeconomic History   ? Marital status:      Spouse name: Not on file   ? Number of children: Not on file   ? Years of education: Not on file   ? Highest education level: Not on file   Occupational History   ? Not on file   Social Needs   ? Financial resource strain: Not on file   ? Food insecurity     Worry: Not on file     Inability: Not on file   ? Transportation needs     Medical: Not on file     Non-medical: Not on file   Tobacco Use   ? Smoking status: Former Smoker     Packs/day: 0.50     Types: Cigarettes     Last attempt to quit: 2018     Years since quittin.2   ? Smokeless tobacco: Current User   ? Tobacco comment: started smoking when she was 21 years old, vaping   Substance and Sexual Activity   ? Alcohol use: Yes     Comment: glass of wine during holiday   ? Drug use: Yes     Types: Marijuana     Comment: medical marijuana   ? Sexual activity: Never   Lifestyle   ? Physical activity     Days per week: Not on file     Minutes per session: Not on file   ? Stress: Not on file   Relationships   ? Social connections     Talks on phone: Not on file     Gets together: Not on file     Attends [employer]ism service: Not on file     Active member of club or organization: Not on file     Attends meetings of clubs or organizations: Not on file      Relationship status: Not on file   ? Intimate partner violence     Fear of current or ex partner: Not on file     Emotionally abused: Not on file     Physically abused: Not on file     Forced sexual activity: Not on file   Other Topics Concern   ? Not on file   Social History Narrative    Lives in senior independent living.  .  3 kids.  Graduated from college.        Family History   Problem Relation Age of Onset   ? Breast cancer Sister 72   ? Leukemia Sister        Review of Systems:    Jacqueline Prado no new numbess, tingling or weakness, redness or rashes, fevers, new masses, abdominal bloating or discomfort, unexplained weight loss, increased pain, new ulcers, shortness of breath and chest pain  Full 12 point review of systems was completed.    Imaging:    I personally reviewed the following imaging results today and those on care everywhere, if indicated    Nothing new to review    Labs:    I personally reviewed the following labresults today and those on care everywhere, if indicated    No results found for: SEDRATE      Lab Results   Component Value Date    CRP 2.1 (H) 06/06/2018           Lab Results   Component Value Date    CREATININE 0.78 01/15/2019      No results found for: HGBA1C        Lab Results   Component Value Date    BUN 13 01/15/2019              Lab Results   Component Value Date    ALBUMIN 3.5 07/11/2018       Vitamin D, Total (25-Hydroxy)   Date Value Ref Range Status   07/11/2018 74.9 30.0 - 80.0 ng/mL Final       Lab Results   Component Value Date    TSH 1.18 01/15/2019     Lab Results   Component Value Date    WBC 6.5 01/15/2019    HGB 13.1 01/15/2019    HCT 38.5 01/15/2019    MCV 94 01/15/2019     01/15/2019     6/24/2019 white blood cell count 9.6 hemoglobin 14.9 platelets 355 ALT 17 AST 20 creatinine 0.86 GFR greater than 60    Nothing new to review    Physical Exam:  There were no vitals filed for this visit. No fevers per patient.  BMI 33.63 (stable) weight 178  pounds (per previous visit)    Circumferential measures:    Vasc Edema 6/14/2018 10/3/2018 5/15/2019 9/18/2019   Right just above MTP 20.8 21.2 20.8 20   Right Ankle 24 22.2 21.9 21.7   Right Widest Calf 35.5 34.5 33.3 33.8   Right Thigh Up 10cm 46 49.5 - -   Left - just above MTP 20.5 21 20 19.7   Left Ankle 3 21.8 21.8 22.1   Left Widest Calf 36 36.8 32 33.2   Left Thigh Up 10cm 46.5 49.3 - -     Circumferential measures previously.    General:  80 y.o. female in no apparent distress.  Per phone conversation.    Psych: Alert and oriented x 3.  Cooperative. Affect normal.  Per phone conversation.      Claribel Davidson MD, Founding Diplomate ABWMS, FACCWS, FAAPMR  Medical Director Wound Care and Lymphedema  [facility]  [phone]

## 2021-06-29 NOTE — PROGRESS NOTES
"Progress Notes by Miguelina Nicole LPN at 5/8/2020  3:00 PM     Author: Miguelina Nicole LPN Service: -- Author Type: Licensed Nurse    Filed: 5/9/2020  7:46 AM Date of Service: 5/8/2020  3:00 PM Status: Signed    : Miguelina Nicole LPN (Licensed Nurse)       Jacqueline Prado is a 80 y.o. female who is being evaluated via a billable telephone visit.      The patient has been notified of following:     \"This telephone visit will be conducted via a call between you and your physician/provider. We have found that certain health care needs can be provided without the need for a physical exam.  This service lets us provide the care you need with a short phone conversation.  If a prescription is necessary we can send it directly to your pharmacy.  If lab work is needed we can place an order for that and you can then stop by our lab to have the test done at a later time.    Telephone visits are billed at different rates depending on your insurance coverage. During this emergency period, for some insurers they may be billed the same as an in-person visit.  Please reach out to your insurance provider with any questions.    If during the course of the call the physician/provider feels a telephone visit is not appropriate, you will not be charged for this service.\"    Patient has given verbal consent to a Telephone visit? Yes    What phone number would you like to be contacted at? [phone]    Patient would like to receive their AVS by AVS Preference: [facility].     Patient is here for a follow up appointment with Dr. Ramirez. Patient has left leg, left foot and low back pain, describes the pain as constant, throbbing and spasms,rates the pain as 9/10. Patient needs refills for Soma. CSA, RASHAUN, NDI, UDT are deferred due to COVID 19. Functionality is 6. Patient states her pain interferes with her walking, work and social/relationships.           Miguelina Nicole LPN           "

## 2021-07-03 NOTE — ADDENDUM NOTE
Addendum Note by Lizzy Foley MD at 6/10/2020  9:17 AM     Author: Lizzy Foley MD Service: -- Author Type: Physician    Filed: 6/10/2020  9:17 AM Encounter Date: 5/27/2020 Status: Signed    : Lizzy Foley MD (Physician)    Addended by: LIZZY FOLEY on: 6/10/2020 09:17 AM        Modules accepted: Orders

## 2021-07-03 NOTE — ADDENDUM NOTE
Addendum Note by Joceline Alvarado CMA at 5/16/2017 11:59 PM     Author: Joceline Alvarado CMA Service: -- Author Type: Certified Medical Assistant    Filed: 11/21/2017  2:09 PM Date of Service: 5/16/2017 11:59 PM Status: Signed    : Joceline Alvarado CMA (Certified Medical Assistant)    Encounter addended by: Joceline Alvarado CMA on: 11/21/2017  2:09 PM<BR>     Actions taken: Visit diagnoses modified, Charge Capture section accepted

## 2021-07-03 NOTE — ADDENDUM NOTE
Addendum Note by Zeynep Arroyo RN at 8/19/2020  8:47 AM     Author: Zeynep Arroyo RN Service: -- Author Type: Registered Nurse    Filed: 8/19/2020  8:47 AM Encounter Date: 8/4/2020 Status: Signed    : Zeynep Arroyo RN (Registered Nurse)    Addended by: ZEYNEP ARROYO on: 8/19/2020 08:47 AM        Modules accepted: Orders

## 2021-07-03 NOTE — ADDENDUM NOTE
Addendum Note by Dana Barrios CMA at 8/29/2018 11:59 PM     Author: Dana Barrios CMA Service: -- Author Type: Certified Medical Assistant    Filed: 10/11/2018  8:29 AM Date of Service: 8/29/2018 11:59 PM Status: Signed    : Dana Barrios CMA (Certified Medical Assistant)    Encounter addended by: Dana Barrios CMA on: 10/11/2018  8:29 AM<BR>     Actions taken: Benefit plan changed

## 2021-07-03 NOTE — ADDENDUM NOTE
Addendum Note by Nicole Duval PharmD at 5/16/2017 11:59 PM     Author: Nicole Duval PharmD Service: -- Author Type: Pharmacist    Filed: 5/18/2017 12:45 PM Date of Service: 5/16/2017 11:59 PM Status: Signed    : Nicole Duval PharmD (Pharmacist)    Encounter addended by: Nicole Duval PharmD on: 5/18/2017 12:45 PM<BR>     Actions taken: SmartForm saved, Follow-up modified

## 2021-07-03 NOTE — ADDENDUM NOTE
Addendum Note by Grupo Alexis MD at 2/7/2019  1:15 PM     Author: Grupo Alexis MD Service: -- Author Type: Physician    Filed: 2/7/2019  1:15 PM Encounter Date: 2/7/2019 Status: Signed    : Grupo Alexis MD (Physician)    Addended by: GRUPO ALEXIS on: 2/7/2019 01:15 PM        Modules accepted: Orders

## 2021-07-03 NOTE — ADDENDUM NOTE
Addendum Note by Kitty Looney at 10/14/2020  1:20 PM     Author: Kitty Looney Service: -- Author Type: --    Filed: 10/19/2020  7:47 AM Date of Service: 10/14/2020  1:20 PM Status: Signed    : Kitty Looney    Encounter addended by: Kitty Looney on: 10/19/2020  7:47 AM      Actions taken: Charge Capture section accepted

## 2021-07-03 NOTE — ADDENDUM NOTE
Addendum Note by Zeynep Arroyo RN at 6/10/2020  8:14 AM     Author: Zeynep Arroyo RN Service: -- Author Type: Registered Nurse    Filed: 6/10/2020  8:14 AM Encounter Date: 5/27/2020 Status: Signed    : Zeynep Arroyo RN (Registered Nurse)    Addended by: ZEYNEP RAROYO on: 6/10/2020 08:14 AM        Modules accepted: Orders

## 2021-07-03 NOTE — ADDENDUM NOTE
Addendum Note by Swati Davis LPN at 2/7/2019  1:11 PM     Author: Swati Davis LPN Service: -- Author Type: Licensed Nurse    Filed: 2/7/2019  1:11 PM Encounter Date: 2/7/2019 Status: Signed    : Swati Davis LPN (Licensed Nurse)    Addended by: SWATI DAVIS on: 2/7/2019 01:11 PM        Modules accepted: Orders

## 2021-07-03 NOTE — ADDENDUM NOTE
Addendum Note by Kitty Looney at 5/8/2020  3:00 PM     Author: Kitty Looney Service: -- Author Type: --    Filed: 5/11/2020 12:34 PM Date of Service: 5/8/2020  3:00 PM Status: Signed    : Kitty Looney    Encounter addended by: Kitty Looney on: 5/11/2020 12:34 PM      Actions taken: Charge Capture section accepted

## 2021-07-03 NOTE — ADDENDUM NOTE
Addendum Note by Joceline Alvarado CMA at 6/15/2017  1:45 PM     Author: Joceline Alvarado CMA Service: -- Author Type: Certified Medical Assistant    Filed: 11/21/2017  3:47 PM Date of Service: 6/15/2017  1:45 PM Status: Signed    : Joceline Alvarado CMA (Certified Medical Assistant)    Encounter addended by: Joceline Alvarado CMA on: 11/21/2017  3:47 PM<BR>     Actions taken: Charge Capture section accepted

## 2021-07-03 NOTE — ADDENDUM NOTE
Addendum Note by Zeynep Arroyo RN at 10/12/2020  3:07 PM     Author: Zeynep Arroyo RN Service: -- Author Type: Registered Nurse    Filed: 10/12/2020  3:07 PM Encounter Date: 9/25/2020 Status: Signed    : Zeynep Arroyo RN (Registered Nurse)    Addended by: ZEYNPE ARROYO on: 10/12/2020 03:07 PM        Modules accepted: Orders

## 2021-07-03 NOTE — ADDENDUM NOTE
Addendum Note by Kitty Looney at 1/19/2021 12:40 PM     Author: Kitty Looney Service: -- Author Type: --    Filed: 1/25/2021  9:42 AM Date of Service: 1/19/2021 12:40 PM Status: Signed    : Kitty Looney    Encounter addended by: Kitty Looney on: 1/25/2021  9:42 AM      Actions taken: Charge Capture section accepted

## 2021-07-03 NOTE — ADDENDUM NOTE
Addendum Note by Gary Reynolds MD at 6/27/2018  2:58 PM     Author: Gary Reynolds MD Service: -- Author Type: Physician    Filed: 6/27/2018  2:58 PM Encounter Date: 6/19/2018 Status: Signed    : Gary Reynolds MD (Physician)    Addended by: GARY REYNOLDS on: 6/27/2018 02:58 PM        Modules accepted: Orders

## 2021-07-03 NOTE — ADDENDUM NOTE
Addendum Note by Zeynep Arroyo RN at 9/17/2020 10:27 AM     Author: Zeynep Arroyo RN Service: -- Author Type: Registered Nurse    Filed: 9/17/2020 10:27 AM Encounter Date: 9/14/2020 Status: Signed    : Zeynep Arroyo RN (Registered Nurse)    Addended by: ZEYNEP ARROYO on: 9/17/2020 10:27 AM        Modules accepted: Orders

## 2021-07-03 NOTE — ADDENDUM NOTE
Addendum Note by Hemal Ramirez MD at 1/29/2018 11:59 PM     Author: Hemal Ramirez MD Service: -- Author Type: Physician    Filed: 1/31/2018  4:47 PM Date of Service: 1/29/2018 11:59 PM Status: Signed    : Hemal Ramirez MD (Physician)    Encounter addended by: Hemal Ramirez MD on: 1/31/2018  4:47 PM<BR>     Actions taken: LOS modified

## 2021-07-03 NOTE — ADDENDUM NOTE
Addendum Note by Lizzy Foley MD at 10/12/2020  4:39 PM     Author: Lizzy Foley MD Service: -- Author Type: Physician    Filed: 10/12/2020  4:39 PM Encounter Date: 9/25/2020 Status: Signed    : Lizzy Foley MD (Physician)    Addended by: LIZZY FOLEY on: 10/12/2020 04:39 PM        Modules accepted: Orders

## 2021-07-03 NOTE — ADDENDUM NOTE
Addendum Note by Zeynep Arroyo RN at 8/18/2020  3:36 PM     Author: Zeynep Arroyo RN Service: -- Author Type: Registered Nurse    Filed: 8/18/2020  3:36 PM Encounter Date: 8/4/2020 Status: Signed    : Zeynep Arroyo RN (Registered Nurse)    Addended by: ZEYNEP ARROYO on: 8/18/2020 03:36 PM        Modules accepted: Orders

## 2021-07-03 NOTE — ADDENDUM NOTE
Addendum Note by Zeynep Arroyo RN at 10/22/2019  4:00 PM     Author: Zeynep Arroyo RN Service: -- Author Type: Registered Nurse    Filed: 10/22/2019  4:00 PM Encounter Date: 10/22/2019 Status: Signed    : Zeynep Arroyo RN (Registered Nurse)    Addended by: ZEYNEP ARROYO on: 10/22/2019 04:00 PM        Modules accepted: Orders

## 2021-07-04 NOTE — ADDENDUM NOTE
Addendum Note by Zeynep Arroyo RN at 3/2/2021  1:34 PM     Author: Zeynep Arroyo RN Service: -- Author Type: Registered Nurse    Filed: 3/2/2021  1:34 PM Encounter Date: 2/15/2021 Status: Signed    : Zeynep Arroyo RN (Registered Nurse)    Addended by: ZEYNEP ARROYO on: 3/2/2021 01:34 PM        Modules accepted: Orders

## 2021-07-04 NOTE — ADDENDUM NOTE
Addendum Note by Zeynep Arroyo RN at 5/3/2021 10:25 AM     Author: Zeynep Arroyo RN Service: -- Author Type: Registered Nurse    Filed: 5/3/2021 10:25 AM Encounter Date: 4/20/2021 Status: Signed    : Zeynep Arroyo RN (Registered Nurse)    Addended by: ZEYNEP ARROYO on: 5/3/2021 10:25 AM        Modules accepted: Orders

## 2021-07-04 NOTE — ADDENDUM NOTE
Addendum Note by Kitty Looney at 3/5/2021  3:20 PM     Author: Kitty Looney Service: -- Author Type: --    Filed: 3/16/2021  6:42 AM Date of Service: 3/5/2021  3:20 PM Status: Signed    : Kitty Looney    Encounter addended by: Kitty Looney on: 3/16/2021  6:42 AM      Actions taken: Charge Capture section accepted

## 2021-07-06 VITALS — WEIGHT: 164 LBS | BODY MASS INDEX: 32.2 KG/M2 | HEIGHT: 60 IN

## 2021-07-07 ENCOUNTER — COMMUNICATION - HEALTHEAST (OUTPATIENT)
Dept: PALLIATIVE MEDICINE | Facility: OTHER | Age: 81
End: 2021-07-07

## 2021-07-09 ENCOUNTER — COMMUNICATION - HEALTHEAST (OUTPATIENT)
Dept: PALLIATIVE MEDICINE | Facility: OTHER | Age: 81
End: 2021-07-09

## 2021-07-09 DIAGNOSIS — M54.32 BACK PAIN WITH LEFT-SIDED SCIATICA: ICD-10-CM

## 2021-07-09 DIAGNOSIS — M25.519 ARTHRALGIA OF SHOULDER, UNSPECIFIED LATERALITY: Primary | ICD-10-CM

## 2021-07-09 DIAGNOSIS — M06.9 RHEUMATOID ARTHRITIS (H): ICD-10-CM

## 2021-07-09 NOTE — TELEPHONE ENCOUNTER
Telephone Encounter by Zoë Zuniga RN at 7/9/2021  3:03 PM     Author: Zoë Zuniga RN Service: -- Author Type: Registered Nurse    Filed: 7/9/2021  3:17 PM Encounter Date: 7/9/2021 Status: Signed    : Zoë Zuniga RN (Registered Nurse)       Medication being requested: Oxycontin 10 mg and 40 mg  Last visit date: 6/11.  Provider: BE  Next visit date:9/3   Provider: BE  Expected follow up: 3 mo  MTM visit ([facility]) date: N/A  UDS - 05/03/19; unable to go to the bathroom, per Dr. James dave to call and schedule a UDT when they are in the area.    CSA = 6/11/2021   (Last fill date; name; strength; provider; MME; quantity):  Last filled Oxycontin 40 mg 6/18 #28 tabs  Filled Oxycontin 10 mg 6/11  Pertinent between visit information about requested medication (telephone, mychart, prior authorization, concerns, comments): Overdue for UDT but has been unable to come to clinic due to 's covid exposure  Script being sent to provider by nurse- dates and quantity:   Requested Prescriptions     Pending Prescriptions Disp Refills   ? oxyCODONE (OXYCONTIN) 40 mg 12 hr tablet 28 tablet 0     Sig: Take 1 tablet (40 mg total) by mouth daily.   ? oxyCODONE (OXYCONTIN) 10 mg 12 hr tablet 24 each 0     Sig: Take 2-3 tablets (20-30 mg total) by mouth at bedtime. Two to three tabs bedtime    Pt requests to  Oxycontin 40 mg on Tuesday as she her  can pick it up that day. Pt can't drive and family is unable due to covid exposure and distance  Pharmacy cued: [facility]  Standing orders for withdrawal protocol implemented: N/A

## 2021-07-11 ENCOUNTER — HEALTH MAINTENANCE LETTER (OUTPATIENT)
Age: 81
End: 2021-07-11

## 2021-07-12 RX ORDER — OXYCODONE HCL 10 MG/1
20-30 TABLET, FILM COATED, EXTENDED RELEASE ORAL AT BEDTIME
Qty: 24 TABLET | Refills: 0 | Status: SHIPPED | OUTPATIENT
Start: 2021-07-12 | End: 2021-08-12

## 2021-07-12 RX ORDER — OXYCODONE HCL 40 MG/1
40 TABLET, FILM COATED, EXTENDED RELEASE ORAL EVERY 24 HOURS
Qty: 28 TABLET | Refills: 0 | Status: SHIPPED | OUTPATIENT
Start: 2021-07-16 | End: 2021-08-09

## 2021-07-12 NOTE — TELEPHONE ENCOUNTER
Medication being requested: Oxycontin 10 mg and 40 mg  Last visit date: 6/11.  Provider: BE  Next visit date:9/3   Provider: ROXANA  Expected follow up: 3 mo  MTM visit ([facility]) date: N/A  UDS - 05/03/19; unable to go to the bathroom, per Dr. Ramirez ok to call and schedule a UDT when they are in the area.    CSA = 6/11/2021   (Last fill date; name; strength; provider; MME; quantity):  Last filled Oxycontin 40 mg 6/18 #28 tabs  Filled Oxycontin 10 mg 6/11  Pertinent between visit information about requested medication (telephone, mychart, prior authorization, concerns, comments): Overdue for UDT but has been unable to come to clinic due to 's covid exposure  Script being sent to provider by nurse- dates and quantity:   Requested Prescriptions             Pending Prescriptions Disp Refills     oxyCODONE (OXYCONTIN) 40 mg 12 hr tablet 28 tablet 0       Sig: Take 1 tablet (40 mg total) by mouth daily.     oxyCODONE (OXYCONTIN) 10 mg 12 hr tablet 24 each 0       Sig: Take 2-3 tablets (20-30 mg total) by mouth at bedtime. Two to three tabs bedtime    Pt requests to  Oxycontin 40 mg on Tuesday as she her  can pick it up that day. Pt can't drive and family is unable due to covid exposure and distance  Pharmacy cued: [facility]  Standing orders for withdrawal protocol implemented: N/A

## 2021-07-15 ENCOUNTER — TELEPHONE (OUTPATIENT)
Dept: PALLIATIVE MEDICINE | Facility: OTHER | Age: 81
End: 2021-07-15

## 2021-07-15 ENCOUNTER — TELEPHONE (OUTPATIENT)
Dept: INTERNAL MEDICINE | Facility: CLINIC | Age: 81
End: 2021-07-15

## 2021-07-15 DIAGNOSIS — M06.9 RHEUMATOID ARTHRITIS (H): ICD-10-CM

## 2021-07-15 NOTE — TELEPHONE ENCOUNTER
Patient calls for a refill of Soma.  After review, patient should have a refill at pharmacy from 6/11/21 to start on 7/9/21.

## 2021-07-15 NOTE — TELEPHONE ENCOUNTER
Received in mail today. Please have Dr Reynolds complete home health certification and plan of care. When done, please mail back to [facility]. Mail with postage attached to form.

## 2021-07-20 NOTE — TELEPHONE ENCOUNTER
Original in outgoing mail queue. Faxed to [facility] on orange folder.     Copy in faxed out file will be sent to [facility].

## 2021-07-21 ENCOUNTER — RECORDS - HEALTHEAST (OUTPATIENT)
Dept: ADMINISTRATIVE | Facility: CLINIC | Age: 81
End: 2021-07-21

## 2021-08-09 DIAGNOSIS — M54.32 BACK PAIN WITH LEFT-SIDED SCIATICA: ICD-10-CM

## 2021-08-09 RX ORDER — OXYCODONE HCL 40 MG/1
40 TABLET, FILM COATED, EXTENDED RELEASE ORAL EVERY 24 HOURS
Qty: 28 TABLET | Refills: 0 | Status: SHIPPED | OUTPATIENT
Start: 2021-08-13 | End: 2021-09-03

## 2021-08-09 NOTE — TELEPHONE ENCOUNTER
Pending Prescriptions:                       Disp   Refills    oxyCODONE (OXYCONTIN) 40 MG 12 hr tablet  28 tab*0            Sig: Take 1 tablet (40 mg) by mouth every 24 hours for           28 days    brady

## 2021-08-09 NOTE — TELEPHONE ENCOUNTER
Received call from patient requesting refill(s) of oxyCODONE (OXYCONTIN) 40 MG 12 hr tablet     Last dispensed from pharmacy on 7/16/21    Patient's last office/virtual visit by prescribing provider on 6/11/21  Next office/virtual appointment scheduled for 9/3/21    Last urine drug screen date 5/3/19  Current opioid agreement on file (completed within the last year) Yes Date of opioid agreement: 6/11/21    E-prescribe to [id number] [place], [place]  pharmacy    Will route to nursing Rew for review and preparation of prescription(s).

## 2021-08-12 RX ORDER — OXYCODONE HCL 10 MG/1
20-30 TABLET, FILM COATED, EXTENDED RELEASE ORAL AT BEDTIME
Qty: 24 TABLET | Refills: 0 | Status: SHIPPED | OUTPATIENT
Start: 2021-08-12 | End: 2021-08-24

## 2021-08-12 NOTE — TELEPHONE ENCOUNTER
Adding Oxycontin 10mg    Pending Prescriptions:                       Disp   Refills    oxyCODONE (OXYCONTIN) 10 MG 12 hr tablet  24 tab*0            Sig: Take 2-3 tablets (20-30 mg) by mouth At Bedtime    Signed Prescriptions:                        Disp   Refills    oxyCODONE (OXYCONTIN) 40 MG 12 hr tablet   28 tab*0        Sig: Take 1 tablet (40 mg) by mouth every 24 hours for 28           days  Authorizing Provider: LIZZY FOLEY

## 2021-08-18 ENCOUNTER — LAB (OUTPATIENT)
Dept: LAB | Facility: HOSPITAL | Age: 81
End: 2021-08-18
Payer: MEDICARE

## 2021-08-18 DIAGNOSIS — G89.4 CHRONIC PAIN SYNDROME: Primary | ICD-10-CM

## 2021-08-18 DIAGNOSIS — G89.4 CHRONIC PAIN SYNDROME: ICD-10-CM

## 2021-08-18 LAB
ALBUMIN SERPL-MCNC: 3.8 G/DL (ref 3.5–5)
ALP SERPL-CCNC: 65 U/L (ref 45–120)
ALT SERPL W P-5'-P-CCNC: 15 U/L (ref 0–45)
AST SERPL W P-5'-P-CCNC: 25 U/L (ref 0–40)
BILIRUB DIRECT SERPL-MCNC: 0.1 MG/DL
BILIRUB SERPL-MCNC: 0.3 MG/DL (ref 0–1)
PROT SERPL-MCNC: 7.7 G/DL (ref 6–8)

## 2021-08-18 PROCEDURE — 80076 HEPATIC FUNCTION PANEL: CPT

## 2021-08-18 PROCEDURE — 82040 ASSAY OF SERUM ALBUMIN: CPT

## 2021-08-18 PROCEDURE — 36415 COLL VENOUS BLD VENIPUNCTURE: CPT

## 2021-08-19 ENCOUNTER — TELEPHONE (OUTPATIENT)
Dept: PALLIATIVE MEDICINE | Facility: OTHER | Age: 81
End: 2021-08-19

## 2021-08-19 DIAGNOSIS — G89.4 CHRONIC PAIN SYNDROME: Primary | ICD-10-CM

## 2021-08-19 NOTE — TELEPHONE ENCOUNTER
Received a call from [facility] today, stating they were unable to draw enough blood on Jacqueline, for her Drug Screen. They were only able to get enough for her Hepatic Panel.   If you would like Jacqueline to go back to the Lab to draw her Drug Screen, please place a future order in this phone call. Also, please alert Miguelina to call the pt.

## 2021-08-20 DIAGNOSIS — M54.32 BACK PAIN WITH LEFT-SIDED SCIATICA: Primary | ICD-10-CM

## 2021-08-20 DIAGNOSIS — M06.9 RHEUMATOID ARTHRITIS (H): ICD-10-CM

## 2021-08-20 RX ORDER — CARISOPRODOL 350 MG/1
350 TABLET ORAL 3 TIMES DAILY PRN
Qty: 90 TABLET | Refills: 0 | Status: SHIPPED | OUTPATIENT
Start: 2021-08-20 | End: 2021-09-03

## 2021-08-20 NOTE — TELEPHONE ENCOUNTER
Medication being requested: Soma  Last visit date: 6/11/21 Provider: ROXANA  Next visit date: 9/3/21 Provider: ROXANA  Expected follow up: 3 months  MTM visit ([facility]) date: No  Pertinent between visit information about requested medication (telephone, mychart, prior authorization, concerns): No  Last date prescribed: 6/11/21  Provider responsible: BE  Spoke with patient: voicemail left  Script being sent to provider - dates and quantity:   Pending Prescriptions:                       Disp   Refills    carisoprodol (SOMA) 350 MG tablet         90 tab*0            Sig: Take 1 tablet (350 mg) by mouth 3 times daily as           needed for muscle spasms    Pharmacy cued: [facility]

## 2021-08-24 DIAGNOSIS — M54.32 BACK PAIN WITH LEFT-SIDED SCIATICA: ICD-10-CM

## 2021-08-24 RX ORDER — OXYCODONE HCL 10 MG/1
20-30 TABLET, FILM COATED, EXTENDED RELEASE ORAL AT BEDTIME
Qty: 24 TABLET | Refills: 0 | Status: SHIPPED | OUTPATIENT
Start: 2021-08-24 | End: 2021-09-03

## 2021-08-24 NOTE — TELEPHONE ENCOUNTER
Refill request: oxycontin 10 mg  Last apt with BE:6/11/21  Next apt with BE:9/3/21  UDS - 05/03/19; unable to go to the bathroom, per Dr. Ramirez ok to call and schedule a UDT when they are in the area   CSA = 6/11/2021    Patient is still needing to provide an updated UDT  Next apt is 9/3/21: please obtain at that time.    Pending Prescriptions:                       Disp   Refills    oxyCODONE (OXYCONTIN) 10 MG 12 hr tablet  24 tab*0            Sig: Take 2-3 tablets (20-30 mg) by mouth At Bedtime    This should last until next apt: 9/3/21    brady

## 2021-08-26 NOTE — TELEPHONE ENCOUNTER
----- Message from Hemal Ramirez MD sent at 8/19/2021  5:00 PM CDT -----  Please tell pt did not have enough blood, to do test, needs to go back.  Miguelina Nicole  Spoke with patient and she will try to go in for another blood draw this week to the [facility].  Miguelina Nicole LPN

## 2021-08-30 ENCOUNTER — TELEPHONE (OUTPATIENT)
Dept: LAB | Facility: CLINIC | Age: 81
End: 2021-08-30

## 2021-08-30 ENCOUNTER — LAB (OUTPATIENT)
Dept: LAB | Facility: CLINIC | Age: 81
End: 2021-08-30
Payer: MEDICARE

## 2021-08-30 DIAGNOSIS — G89.4 CHRONIC PAIN SYNDROME: ICD-10-CM

## 2021-08-30 DIAGNOSIS — G89.4 CHRONIC PAIN SYNDROME: Primary | ICD-10-CM

## 2021-08-30 PROCEDURE — 80307 DRUG TEST PRSMV CHEM ANLYZR: CPT | Mod: 90 | Performed by: INTERNAL MEDICINE

## 2021-08-30 PROCEDURE — 99000 SPECIMEN HANDLING OFFICE-LAB: CPT | Performed by: INTERNAL MEDICINE

## 2021-08-30 PROCEDURE — G0480 DRUG TEST DEF 1-7 CLASSES: HCPCS | Mod: 90 | Performed by: INTERNAL MEDICINE

## 2021-08-30 PROCEDURE — 36415 COLL VENOUS BLD VENIPUNCTURE: CPT | Performed by: INTERNAL MEDICINE

## 2021-08-30 NOTE — PROGRESS NOTES
Dr. Ramirez, your patient Jacqueline has an upcoming lab appointment. Your order for the blood drug screen is incorrect. Please place a new order using lab code: [id number]. The name is Drug Abuse Screen Bld w Reflex Confirm.     Her appointment is at 2pm today. Thank you.

## 2021-09-03 ENCOUNTER — OFFICE VISIT (OUTPATIENT)
Dept: PALLIATIVE MEDICINE | Facility: OTHER | Age: 81
End: 2021-09-03
Payer: MEDICARE

## 2021-09-03 VITALS
HEIGHT: 60 IN | HEART RATE: 95 BPM | WEIGHT: 156 LBS | BODY MASS INDEX: 30.63 KG/M2 | OXYGEN SATURATION: 95 % | SYSTOLIC BLOOD PRESSURE: 113 MMHG | DIASTOLIC BLOOD PRESSURE: 64 MMHG

## 2021-09-03 DIAGNOSIS — G89.4 CHRONIC PAIN SYNDROME: ICD-10-CM

## 2021-09-03 DIAGNOSIS — I45.4 BUNDLE BRANCH BLOCK: ICD-10-CM

## 2021-09-03 DIAGNOSIS — M54.32 BACK PAIN WITH LEFT-SIDED SCIATICA: ICD-10-CM

## 2021-09-03 PROCEDURE — G0463 HOSPITAL OUTPT CLINIC VISIT: HCPCS

## 2021-09-03 PROCEDURE — 99213 OFFICE O/P EST LOW 20 MIN: CPT | Performed by: ANESTHESIOLOGY

## 2021-09-03 RX ORDER — CARISOPRODOL 350 MG/1
350 TABLET ORAL 3 TIMES DAILY PRN
Qty: 90 TABLET | Refills: 3 | Status: SHIPPED | OUTPATIENT
Start: 2021-09-03 | End: 2022-02-11

## 2021-09-03 RX ORDER — CYANOCOBALAMIN (VITAMIN B-12) 500 MCG
1 TABLET ORAL DAILY
COMMUNITY
End: 2022-03-07

## 2021-09-03 RX ORDER — PREDNISONE 5 MG/1
5 TABLET ORAL DAILY
COMMUNITY
Start: 2021-08-16 | End: 2021-11-19

## 2021-09-03 RX ORDER — OXYCODONE HCL 40 MG/1
40 TABLET, FILM COATED, EXTENDED RELEASE ORAL EVERY 24 HOURS
Qty: 28 TABLET | Refills: 0 | Status: SHIPPED | OUTPATIENT
Start: 2021-09-10 | End: 2021-10-04

## 2021-09-03 RX ORDER — OXYCODONE HCL 10 MG/1
20-30 TABLET, FILM COATED, EXTENDED RELEASE ORAL AT BEDTIME
Qty: 21 TABLET | Refills: 0 | Status: SHIPPED | OUTPATIENT
Start: 2021-09-03 | End: 2021-09-03

## 2021-09-03 RX ORDER — OXYCODONE HCL 10 MG/1
30 TABLET, FILM COATED, EXTENDED RELEASE ORAL AT BEDTIME
Qty: 84 TABLET | Refills: 0 | Status: SHIPPED | OUTPATIENT
Start: 2021-09-10 | End: 2021-10-08

## 2021-09-03 RX ORDER — BENZONATATE 100 MG/1
CAPSULE ORAL
COMMUNITY
Start: 2021-04-08 | End: 2021-11-19

## 2021-09-03 ASSESSMENT — MIFFLIN-ST. JEOR: SCORE: 1081.17

## 2021-09-03 ASSESSMENT — PAIN SCALES - GENERAL: PAINLEVEL: MODERATE PAIN (5)

## 2021-09-03 NOTE — LETTER
"    9/3/2021         RE: Jacqueline Prado  [address]  [place] [address]        Dear Colleague,    Thank you for referring your patient, Jacqueline Prado, to the [facility]. Please see a copy of my visit note below.    Patient is here for a follow up appointment with Dr. Hemal Ramirez.   Pain score: 7  Constant or intermittent: constant and intermittent  What does your pain feel like: dull, sharp, throbbing, aching, nagging and burning  Does the pain interfere with:   Work: yes  Walking/distance: yes  Sleep: no  Daily activities: no  Relationships/social life: yes  Mood: yes  F= 7    Miguelina Nicole LPN          Subjective   Jacqueline is a 81 year old who presents for the following health issues     HPI follow-up for history of lumbar fusion, rheumatoid arthritis, osteoarthritis.    She is seen today with her daughter.  She reports she is \"mentally good\".  Reviews her back is not doing as well.  It can be achy, if she moves wrong \"pinches\".  Pain can radiate down her left leg.    Daughter notes that during Covid they did not get the Prolia shots time and has since learned that when there are gaps in the treatment there can be progression more rapidly of symptoms.  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  They are now back on Prolia.    Her rheumatologist has been having her use [place], and prednisone 5 mg twice a day is down to 5 mg once a day and will be tapered.  They reviewed concerns with the Covid and vaccines and vulnerability.    She did have cataract surgery which were several appointments.  She has new dentures which are several appointments.  They are postponing the finger surgery.  Daughter acknowledges that she can be overwhelmed if she is the main person driving.    She continues on the OxyContin 40 mg in the morning and the OxyContin 10 mg 3 at night.  This regimen has done fairly well, constipation is manageable.   reviewed as expected.  " Continues with amitriptyline 50 mg at bedtime and Soma 3 times a day, these medications been very helpful, decreasing the doses has not been tolerated with poor function.      Current Outpatient Medications:      albuterol (PROAIR HFA;PROVENTIL HFA;VENTOLIN HFA) 90 mcg/actuation inhaler, [ALBUTEROL (PROAIR HFA;PROVENTIL HFA;VENTOLIN HFA) 90 MCG/ACTUATION INHALER] Inhale 2 puffs every 4 (four) hours as needed for wheezing. And cough, Disp: 1 Inhaler, Rfl: 3     amitriptyline (ELAVIL) 25 MG tablet, [AMITRIPTYLINE (ELAVIL) 25 MG TABLET] two tablets by mouth at bedtime, Disp: 60 tablet, Rfl: 5     aspirin 81 mg chewable tablet, [ASPIRIN 81 MG CHEWABLE TABLET] Chew 81 mg daily., Disp: , Rfl:      b complex vitamins tablet, [B COMPLEX VITAMINS TABLET] Take 1 tablet by mouth daily., Disp: , Rfl:      benzonatate (TESSALON) 100 MG capsule, , Disp: , Rfl:      CALCIUM CARBONATE/VITAMIN D3 (CALCIUM+D ORAL), [CALCIUM CARBONATE/VITAMIN D3 (CALCIUM+D ORAL)] Take 3 tablets by mouth daily., Disp: , Rfl:      carisoprodol (SOMA) 350 MG tablet, Take 1 tablet (350 mg) by mouth 3 times daily as needed for muscle spasms, Disp: 90 tablet, Rfl: 3     cholecalciferol, vitamin D3, 1,000 unit (25 mcg) tablet, [CHOLECALCIFEROL, VITAMIN D3, 1,000 UNIT (25 MCG) TABLET] Take 1,000 Units by mouth daily., Disp: , Rfl:      cyanocobalamin (VITAMIN B-12) 250 mcg half-tab, Take 1 capsule by mouth daily, Disp: , Rfl:      folic acid (FOLVITE) 1 MG tablet, [FOLIC ACID (FOLVITE) 1 MG TABLET] TK 1 T PO ONCE DAILY., Disp: , Rfl: 2     hydroCHLOROthiazide (MICROZIDE) 12.5 mg capsule, [HYDROCHLOROTHIAZIDE (MICROZIDE) 12.5 MG CAPSULE] TAKE 1 CAPSULE(12.5 MG) BY MOUTH DAILY, Disp: 90 capsule, Rfl: 0     hydrocortisone (CORTISONE, HYDROCORTISONE,) 1 % cream, [HYDROCORTISONE (CORTISONE, HYDROCORTISONE,) 1 % CREAM] Apply to hand three times a day, Disp: 30 g, Rfl: 2     isosorbide mononitrate (ISMO,MONOKET) 10 MG tablet, [ISOSORBIDE MONONITRATE (ISMO,MONOKET)  "10 MG TABLET] Take 1 tablet (10 mg total) by mouth 2 (two) times a day., Disp: 180 tablet, Rfl: 3     leflunomide (ARAVA) 20 MG tablet, [LEFLUNOMIDE (ARAVA) 20 MG TABLET] Take 20 mg by mouth daily. , Disp: , Rfl:      levothyroxine (SYNTHROID, LEVOTHROID) 75 MCG tablet, [LEVOTHYROXINE (SYNTHROID, LEVOTHROID) 75 MCG TABLET] TAKE 1 TABLET BY MOUTH DAILY AT 6 AM, Disp: 90 tablet, Rfl: 3     loratadine (CLARITIN) 10 mg tablet, [LORATADINE (CLARITIN) 10 MG TABLET] Take 10 mg by mouth daily., Disp: , Rfl:      nystatin (MYCOSTATIN) cream, [NYSTATIN (MYCOSTATIN) CREAM] Apply 1 application topically 2 (two) times a day as needed., Disp: , Rfl: 1     omeprazole (PRILOSEC) 20 MG capsule, [OMEPRAZOLE (PRILOSEC) 20 MG CAPSULE] Take 20 mg by mouth Daily before breakfast., Disp: , Rfl:      [START ON 9/10/2021] oxyCODONE (OXYCONTIN) 10 MG 12 hr tablet, Take 3 tablets (30 mg) by mouth At Bedtime, Disp: 84 tablet, Rfl: 0     [START ON 9/10/2021] oxyCODONE (OXYCONTIN) 40 MG 12 hr tablet, Take 1 tablet (40 mg) by mouth every 24 hours, Disp: 28 tablet, Rfl: 0     potassium gluconate 550 mg (90 mg) tablet, [POTASSIUM GLUCONATE 550 MG (90 MG) TABLET] Take 1 tablet by mouth daily as needed., Disp: , Rfl:      predniSONE (DELTASONE) 5 MG tablet, Take 5 mg by mouth daily, Disp: , Rfl:      triamcinolone (KENALOG) 0.1 % ointment, [TRIAMCINOLONE (KENALOG) 0.1 % OINTMENT] Apply to both legs twice daily, Disp: 30 g, Rfl: 0    She is alert with a clear sensorium good eye contact.  Thought process logical.  No respiratory distress.  Ambulates with cane.  Does indeed appear more aphonic.        Review of Systems         Objective    /64   Pulse 95   Ht 1.511 m (4' 11.5\")   Wt 70.8 kg (156 lb)   SpO2 95%   BMI 30.98 kg/m    Body mass index is 30.98 kg/m .  Physical Exam       Total time more than 15 minutes.            Again, thank you for allowing me to participate in the care of your patient.        Sincerely,        LIZZY FOLEY, " MD

## 2021-09-03 NOTE — PROGRESS NOTES
"      James Phillips is a 81 year old who presents for the following health issues     HPI follow-up for history of lumbar fusion, rheumatoid arthritis, osteoarthritis.    She is seen today with her daughter.  She reports she is \"mentally good\".  Reviews her back is not doing as well.  It can be achy, if she moves wrong \"pinches\".  Pain can radiate down her left leg.    Daughter notes that during Covid they did not get the Prolia shots time and has since learned that when there are gaps in the treatment there can be progression more rapidly of symptoms.  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  They are now back on Prolia.    Her rheumatologist has been having her use [place], and prednisone 5 mg twice a day is down to 5 mg once a day and will be tapered.  They reviewed concerns with the Covid and vaccines and vulnerability.    She did have cataract surgery which were several appointments.  She has new dentures which are several appointments.  They are postponing the finger surgery.  Daughter acknowledges that she can be overwhelmed if she is the main person driving.    She continues on the OxyContin 40 mg in the morning and the OxyContin 10 mg 3 at night.  This regimen has done fairly well, constipation is manageable.   reviewed as expected.  Continues with amitriptyline 50 mg at bedtime and Soma 3 times a day, these medications been very helpful, decreasing the doses has not been tolerated with poor function.      Current Outpatient Medications:      albuterol (PROAIR HFA;PROVENTIL HFA;VENTOLIN HFA) 90 mcg/actuation inhaler, [ALBUTEROL (PROAIR HFA;PROVENTIL HFA;VENTOLIN HFA) 90 MCG/ACTUATION INHALER] Inhale 2 puffs every 4 (four) hours as needed for wheezing. And cough, Disp: 1 Inhaler, Rfl: 3     amitriptyline (ELAVIL) 25 MG tablet, [AMITRIPTYLINE (ELAVIL) 25 MG TABLET] two tablets by mouth at bedtime, Disp: 60 tablet, Rfl: 5     aspirin 81 mg chewable tablet, [ASPIRIN 81 MG " CHEWABLE TABLET] Chew 81 mg daily., Disp: , Rfl:      b complex vitamins tablet, [B COMPLEX VITAMINS TABLET] Take 1 tablet by mouth daily., Disp: , Rfl:      benzonatate (TESSALON) 100 MG capsule, , Disp: , Rfl:      CALCIUM CARBONATE/VITAMIN D3 (CALCIUM+D ORAL), [CALCIUM CARBONATE/VITAMIN D3 (CALCIUM+D ORAL)] Take 3 tablets by mouth daily., Disp: , Rfl:      carisoprodol (SOMA) 350 MG tablet, Take 1 tablet (350 mg) by mouth 3 times daily as needed for muscle spasms, Disp: 90 tablet, Rfl: 3     cholecalciferol, vitamin D3, 1,000 unit (25 mcg) tablet, [CHOLECALCIFEROL, VITAMIN D3, 1,000 UNIT (25 MCG) TABLET] Take 1,000 Units by mouth daily., Disp: , Rfl:      cyanocobalamin (VITAMIN B-12) 250 mcg half-tab, Take 1 capsule by mouth daily, Disp: , Rfl:      folic acid (FOLVITE) 1 MG tablet, [FOLIC ACID (FOLVITE) 1 MG TABLET] TK 1 T PO ONCE DAILY., Disp: , Rfl: 2     hydroCHLOROthiazide (MICROZIDE) 12.5 mg capsule, [HYDROCHLOROTHIAZIDE (MICROZIDE) 12.5 MG CAPSULE] TAKE 1 CAPSULE(12.5 MG) BY MOUTH DAILY, Disp: 90 capsule, Rfl: 0     hydrocortisone (CORTISONE, HYDROCORTISONE,) 1 % cream, [HYDROCORTISONE (CORTISONE, HYDROCORTISONE,) 1 % CREAM] Apply to hand three times a day, Disp: 30 g, Rfl: 2     isosorbide mononitrate (ISMO,MONOKET) 10 MG tablet, [ISOSORBIDE MONONITRATE (ISMO,MONOKET) 10 MG TABLET] Take 1 tablet (10 mg total) by mouth 2 (two) times a day., Disp: 180 tablet, Rfl: 3     leflunomide (ARAVA) 20 MG tablet, [LEFLUNOMIDE (ARAVA) 20 MG TABLET] Take 20 mg by mouth daily. , Disp: , Rfl:      levothyroxine (SYNTHROID, LEVOTHROID) 75 MCG tablet, [LEVOTHYROXINE (SYNTHROID, LEVOTHROID) 75 MCG TABLET] TAKE 1 TABLET BY MOUTH DAILY AT 6 AM, Disp: 90 tablet, Rfl: 3     loratadine (CLARITIN) 10 mg tablet, [LORATADINE (CLARITIN) 10 MG TABLET] Take 10 mg by mouth daily., Disp: , Rfl:      nystatin (MYCOSTATIN) cream, [NYSTATIN (MYCOSTATIN) CREAM] Apply 1 application topically 2 (two) times a day as needed., Disp: , Rfl: 1     " omeprazole (PRILOSEC) 20 MG capsule, [OMEPRAZOLE (PRILOSEC) 20 MG CAPSULE] Take 20 mg by mouth Daily before breakfast., Disp: , Rfl:      [START ON 9/10/2021] oxyCODONE (OXYCONTIN) 10 MG 12 hr tablet, Take 3 tablets (30 mg) by mouth At Bedtime, Disp: 84 tablet, Rfl: 0     [START ON 9/10/2021] oxyCODONE (OXYCONTIN) 40 MG 12 hr tablet, Take 1 tablet (40 mg) by mouth every 24 hours, Disp: 28 tablet, Rfl: 0     potassium gluconate 550 mg (90 mg) tablet, [POTASSIUM GLUCONATE 550 MG (90 MG) TABLET] Take 1 tablet by mouth daily as needed., Disp: , Rfl:      predniSONE (DELTASONE) 5 MG tablet, Take 5 mg by mouth daily, Disp: , Rfl:      triamcinolone (KENALOG) 0.1 % ointment, [TRIAMCINOLONE (KENALOG) 0.1 % OINTMENT] Apply to both legs twice daily, Disp: 30 g, Rfl: 0    She is alert with a clear sensorium good eye contact.  Thought process logical.  No respiratory distress.  Ambulates with cane.  Does indeed appear more aphonic.        Review of Systems         Objective    /64   Pulse 95   Ht 1.511 m (4' 11.5\")   Wt 70.8 kg (156 lb)   SpO2 95%   BMI 30.98 kg/m    Body mass index is 30.98 kg/m .  Physical Exam       Total time more than 15 minutes.        "

## 2021-09-03 NOTE — PROGRESS NOTES
Patient is here for a follow up appointment with Dr. Hemal Ramirez.   Pain score: 7  Constant or intermittent: constant and intermittent  What does your pain feel like: dull, sharp, throbbing, aching, nagging and burning  Does the pain interfere with:   Work: yes  Walking/distance: yes  Sleep: no  Daily activities: no  Relationships/social life: yes  Mood: yes  F= 7    Miguelina Nicole LPN

## 2021-09-03 NOTE — PATIENT INSTRUCTIONS
PLAN:  OxyContin 10 mg 3 at bedtime, to fill today.    May next fill the OxyContin 10 mg 3 at bedtime and OxyContin 40 mg 1 morning due on 9/10, may  on 9/9.    Continue with the Soma 3 times a day.    Continue management of the rheumatologic occasions as you are.    Follow-up with Dr. Ramirez in 3 months

## 2021-09-05 ENCOUNTER — HEALTH MAINTENANCE LETTER (OUTPATIENT)
Age: 81
End: 2021-09-05

## 2021-09-09 ENCOUNTER — TELEPHONE (OUTPATIENT)
Dept: PALLIATIVE MEDICINE | Facility: OTHER | Age: 81
End: 2021-09-09
Payer: MEDICARE

## 2021-09-15 LAB
AMPHETAMINES SERPL QL SCN: NEGATIVE NG/ML
BARBITURATES SERPL QL SCN: NEGATIVE UG/ML
BENZODIAZ SERPL QL SCN: NEGATIVE NG/ML
CANNABINOIDS SERPL QL SCN: NEGATIVE NG/ML
COCAINE SERPL QL SCN: NEGATIVE NG/ML
ETHANOL SERPL-MCNC: NEGATIVE GM/DL
METHADONE SERPL QL SCN: NEGATIVE NG/ML
OPIATES SERPL QL SCN: NEGATIVE NG/ML
OXYCODONE SERPL QL: ABNORMAL NG/ML
OXYCODONE SERPL-MCNC: POSITIVE NG/ML
OXYCODONE SERPLBLD CFM-MCNC: 38.7 NG/ML
OXYMORPHONE SERPL-MCNC: 1.1 NG/ML
PCP SERPL QL SCN: NEGATIVE NG/ML
PROPOXYPH SERPL QL SCN: NEGATIVE NG/ML

## 2021-09-16 ENCOUNTER — OFFICE VISIT (OUTPATIENT)
Dept: FAMILY MEDICINE | Facility: CLINIC | Age: 81
End: 2021-09-16
Payer: MEDICARE

## 2021-09-16 VITALS
OXYGEN SATURATION: 95 % | DIASTOLIC BLOOD PRESSURE: 48 MMHG | HEART RATE: 102 BPM | WEIGHT: 151 LBS | SYSTOLIC BLOOD PRESSURE: 104 MMHG | BODY MASS INDEX: 29.99 KG/M2

## 2021-09-16 DIAGNOSIS — I10 ESSENTIAL HYPERTENSION: ICD-10-CM

## 2021-09-16 DIAGNOSIS — Z01.818 PREOP GENERAL PHYSICAL EXAM: Primary | ICD-10-CM

## 2021-09-16 DIAGNOSIS — H25.9 AGE-RELATED CATARACT OF BOTH EYES, UNSPECIFIED AGE-RELATED CATARACT TYPE: ICD-10-CM

## 2021-09-16 DIAGNOSIS — F11.20 OPIOID TYPE DEPENDENCE, CONTINUOUS (H): ICD-10-CM

## 2021-09-16 LAB
ANION GAP SERPL CALCULATED.3IONS-SCNC: 12 MMOL/L (ref 5–18)
BUN SERPL-MCNC: 16 MG/DL (ref 8–28)
CALCIUM SERPL-MCNC: 9.1 MG/DL (ref 8.5–10.5)
CHLORIDE BLD-SCNC: 107 MMOL/L (ref 98–107)
CO2 SERPL-SCNC: 26 MMOL/L (ref 22–31)
CREAT SERPL-MCNC: 0.78 MG/DL (ref 0.6–1.1)
GFR SERPL CREATININE-BSD FRML MDRD: 71 ML/MIN/1.73M2
GLUCOSE BLD-MCNC: 103 MG/DL (ref 70–125)
MAGNESIUM SERPL-MCNC: 2.2 MG/DL (ref 1.8–2.6)
POTASSIUM BLD-SCNC: 3.9 MMOL/L (ref 3.5–5)
SODIUM SERPL-SCNC: 145 MMOL/L (ref 136–145)

## 2021-09-16 PROCEDURE — 36415 COLL VENOUS BLD VENIPUNCTURE: CPT | Performed by: NURSE PRACTITIONER

## 2021-09-16 PROCEDURE — 99204 OFFICE O/P NEW MOD 45 MIN: CPT | Mod: 25 | Performed by: NURSE PRACTITIONER

## 2021-09-16 PROCEDURE — 90662 IIV NO PRSV INCREASED AG IM: CPT | Performed by: NURSE PRACTITIONER

## 2021-09-16 PROCEDURE — 80048 BASIC METABOLIC PNL TOTAL CA: CPT | Performed by: NURSE PRACTITIONER

## 2021-09-16 PROCEDURE — 83735 ASSAY OF MAGNESIUM: CPT | Performed by: NURSE PRACTITIONER

## 2021-09-16 PROCEDURE — G0008 ADMIN INFLUENZA VIRUS VAC: HCPCS | Performed by: NURSE PRACTITIONER

## 2021-09-16 NOTE — PROGRESS NOTES
[facility]  [address] [place] [address]  Phone: [phone]  Fax: [phone]  Primary Provider: Jordana Reynolds  Pre-op Performing Provider: INDIRA BARNES      PREOPERATIVE EVALUATION:  Today's date: 9/16/2021    Jacqueline Prado is a 81 year old female who presents for a preoperative evaluation.    Surgical Information:  Surgery/Procedure: cataract   Surgery Location: [facility]  Surgeon: Dr Buck  Surgery Date: 9/20, 10/11  Time of Surgery: TBD  Where patient plans to recover: At home with family  Fax number for surgical facility: [facility] [phone]    Type of Anesthesia Anticipated: Local with MAC    Assessment & Plan     The proposed surgical procedure is considered LOW risk.    1. Preop general physical exam  - Basic metabolic panel  (Ca, Cl, CO2, Creat, Gluc, K, Na, BUN); Future  - Magnesium; Future    2. Age-related cataract of both eyes, unspecified age-related cataract type    3. Essential hypertension  Well controlled, on lower end today though denies lightheadedness, dizziness. Hold hydrochlorothiazide morning of procedure.  Check metabolic panel and magnesium after starting this medication earlier this year.      4. Opioid Dependence With Continuous Use  Continues on oxycontin 40mg AM, 30mg PM long term.  Will take morning medication with a small sip of water.     Possible Sleep Apnea: denies symptoms       Risks and Recommendations:  The patient has the following additional risks and recommendations for perioperative complications:   - No identified additional risk factors other than previously addressed    Medication Instructions:   - Diuretics: HOLD on the day of surgery.    RECOMMENDATION:  APPROVAL GIVEN to proceed with proposed procedure, without further diagnostic evaluation.        Subjective     HPI related to upcoming procedure:     Will be undergoing cataract surgery.   Patient of Dr. Reynolds.  Medical history  notable for RA, chronic pain, hypothyroidism, hypertension, venous stasis.     hydrochlorothiazide started in March for LE edema, due for follow up labs.      Preop Questions 9/16/2021   1. Have you ever had a heart attack or stroke? No   2. Have you ever had surgery on your heart or blood vessels, such as a stent placement, a coronary artery bypass, or surgery on an artery in your head, neck, heart, or legs? No   3. Do you have chest pain with activity? No   4. Do you have a history of  heart failure? No   5. Do you currently have a cold, bronchitis or symptoms of other infection? No   6. Do you have a cough, shortness of breath, or wheezing? No   7. Do you or anyone in your family have previous history of blood clots? No   8. Do you or does anyone in your family have a serious bleeding problem such as prolonged bleeding following surgeries or cuts? No   9. Have you ever had problems with anemia or been told to take iron pills? No   10. Have you had any abnormal blood loss such as black, tarry or bloody stools, or abnormal vaginal bleeding? No   11. Have you ever had a blood transfusion? No   12. Are you willing to have a blood transfusion if it is medically needed before, during, or after your surgery? Yes   13. Have you or any of your relatives ever had problems with anesthesia? No   14. Do you have sleep apnea, excessive snoring or daytime drowsiness? YES - snoring   14a. Do you have a CPAP machine? No   15. Do you have any artifical heart valves or other implanted medical devices like a pacemaker, defibrillator, or continuous glucose monitor? No   16. Do you have artificial joints? No   17. Are you allergic to latex? No       Health Care Directive:  Patient does not have a Health Care Directive or Living Will: Discussed advance care planning with patient; information given to patient to review.    Preoperative Review of :   reviewed - controlled substances reflected in medication list.      Status of  Chronic Conditions:  See problem list for active medical problems.  Problems all longstanding and stable, except as noted/documented.  See ROS for pertinent symptoms related to these conditions.      Review of Systems  CONSTITUTIONAL: NEGATIVE for fever, chills, change in weight  ENT/MOUTH: NEGATIVE for ear, mouth and throat problems  RESP: NEGATIVE for significant cough or SOB  CV: NEGATIVE for chest pain, palpitations or peripheral edema    Patient Active Problem List    Diagnosis Date Noted     Lower leg mass, left 05/15/2019     Priority: Medium     Left leg pain 05/15/2019     Priority: Medium     Venous stasis dermatitis of both lower extremities      Priority: Medium     Acquired lymphedema of leg 10/03/2018     Priority: Medium     Venous hypertension of both lower extremities 06/14/2018     Priority: Medium     Venous insufficiency of both lower extremities 06/14/2018     Priority: Medium     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     Priority: Medium     Rheumatoid arthritis (H)      Priority: Medium     Created by Conversion         Opioid Dependence With Continuous Use      Priority: Medium     Created by Conversion         Hypothyroidism      Priority: Medium     Created by Conversion  Replacement Utility updated for latest IMO load         Esophageal reflux      Priority: Medium     Created by Conversion         HTN (hypertension) 08/07/2017     Priority: Medium     Bundle Branch Block      Priority: Medium     Created by Conversion  Replacement Utility updated for latest IMO load         Secondary Hyperparathyroidism      Priority: Medium     Created by Conversion  Replacement Utility updated for latest IMO load         Osteoporosis      Priority: Medium     Created by Conversion  Replacement Utility updated for latest IMO load         Back pain with left-sided sciatica      Priority: Medium     Created by Conversion         Myalgia and myositis 06/08/2015     Priority: Medium     Postlaminectomy Syndrome (Lumbar)       Priority: Medium     Created by Conversion         Lumbar Disc Degeneration      Priority: Medium     Created by Conversion         Joint Pain, Localized In The Right Shoulder      Priority: Medium     Created by Conversion         Insomnia 07/16/2007     Priority: Medium     Osteoarthritis 07/16/2007     Priority: Medium     Symptomatic menopausal or female climacteric states 07/16/2007     Priority: Medium      Past Medical History:   Diagnosis Date     Bundle branch block     Created by Conversion  Replacement Utility updated for latest IMO load     Cellulitis      Chronic venous stasis dermatitis      Impetigo      Rheumatoid arthritis (H)      Past Surgical History:   Procedure Laterality Date     BREAST CYST ASPIRATION Left 2000     C EXCIS CERV DISK,ONE LEVEL      Description: Laminectomy With Disc Removal;  Recorded: 11/03/2011;  Annotations: L5-S1     HC REMOVAL GALLBLADDER      Description: Cholecystectomy;  Recorded: 07/22/2009;     IR LUMBAR EPIDURAL STEROID INJECTION  6/23/2003     IR MISCELLANEOUS PROCEDURE  12/1/2006     WA INJECT VERTEBRAL BODY, LUMBAR      Description: Spinal Percutaneous Vertebroplasty, Injection Lumbar;  Recorded: 11/03/2011;  Annotations: L5     Current Outpatient Medications   Medication Sig Dispense Refill     albuterol (PROAIR HFA;PROVENTIL HFA;VENTOLIN HFA) 90 mcg/actuation inhaler [ALBUTEROL (PROAIR HFA;PROVENTIL HFA;VENTOLIN HFA) 90 MCG/ACTUATION INHALER] Inhale 2 puffs every 4 (four) hours as needed for wheezing. And cough 1 Inhaler 3     amitriptyline (ELAVIL) 25 MG tablet [AMITRIPTYLINE (ELAVIL) 25 MG TABLET] two tablets by mouth at bedtime 60 tablet 5     aspirin 81 mg chewable tablet [ASPIRIN 81 MG CHEWABLE TABLET] Chew 81 mg daily.       b complex vitamins tablet [B COMPLEX VITAMINS TABLET] Take 1 tablet by mouth daily.       benzonatate (TESSALON) 100 MG capsule        CALCIUM CARBONATE/VITAMIN D3 (CALCIUM+D ORAL) [CALCIUM CARBONATE/VITAMIN D3 (CALCIUM+D ORAL)] Take  3 tablets by mouth daily.       carisoprodol (SOMA) 350 MG tablet Take 1 tablet (350 mg) by mouth 3 times daily as needed for muscle spasms 90 tablet 3     cholecalciferol, vitamin D3, 1,000 unit (25 mcg) tablet [CHOLECALCIFEROL, VITAMIN D3, 1,000 UNIT (25 MCG) TABLET] Take 1,000 Units by mouth daily.       cyanocobalamin (VITAMIN B-12) 250 mcg half-tab Take 1 capsule by mouth daily       folic acid (FOLVITE) 1 MG tablet [FOLIC ACID (FOLVITE) 1 MG TABLET] TK 1 T PO ONCE DAILY.  2     hydroCHLOROthiazide (MICROZIDE) 12.5 mg capsule [HYDROCHLOROTHIAZIDE (MICROZIDE) 12.5 MG CAPSULE] TAKE 1 CAPSULE(12.5 MG) BY MOUTH DAILY 90 capsule 0     hydrocortisone (CORTISONE, HYDROCORTISONE,) 1 % cream [HYDROCORTISONE (CORTISONE, HYDROCORTISONE,) 1 % CREAM] Apply to hand three times a day 30 g 2     isosorbide mononitrate (ISMO,MONOKET) 10 MG tablet [ISOSORBIDE MONONITRATE (ISMO,MONOKET) 10 MG TABLET] Take 1 tablet (10 mg total) by mouth 2 (two) times a day. 180 tablet 3     leflunomide (ARAVA) 20 MG tablet [LEFLUNOMIDE (ARAVA) 20 MG TABLET] Take 20 mg by mouth daily.        levothyroxine (SYNTHROID, LEVOTHROID) 75 MCG tablet [LEVOTHYROXINE (SYNTHROID, LEVOTHROID) 75 MCG TABLET] TAKE 1 TABLET BY MOUTH DAILY AT 6 AM 90 tablet 3     loratadine (CLARITIN) 10 mg tablet [LORATADINE (CLARITIN) 10 MG TABLET] Take 10 mg by mouth daily.       nystatin (MYCOSTATIN) cream [NYSTATIN (MYCOSTATIN) CREAM] Apply 1 application topically 2 (two) times a day as needed.  1     omeprazole (PRILOSEC) 20 MG capsule [OMEPRAZOLE (PRILOSEC) 20 MG CAPSULE] Take 20 mg by mouth Daily before breakfast.       oxyCODONE (OXYCONTIN) 10 MG 12 hr tablet Take 3 tablets (30 mg) by mouth At Bedtime 84 tablet 0     oxyCODONE (OXYCONTIN) 40 MG 12 hr tablet Take 1 tablet (40 mg) by mouth every 24 hours 28 tablet 0     potassium gluconate 550 mg (90 mg) tablet [POTASSIUM GLUCONATE 550 MG (90 MG) TABLET] Take 1 tablet by mouth daily as needed.       predniSONE (DELTASONE)  5 MG tablet Take 5 mg by mouth daily       triamcinolone (KENALOG) 0.1 % ointment [TRIAMCINOLONE (KENALOG) 0.1 % OINTMENT] Apply to both legs twice daily 30 g 0       Allergies   Allergen Reactions     Tetracyclines Rash     Acetaminophen Unknown     Baclofen Nausea     Celecoxib Unknown     Erythromycin Base [Erythromycin] Unknown     Nsaids (Non-Steroidal Anti-Inflammatory Drug) [Nsaids] Unknown     Pentolinium Itching     Varenicline Itching and Swelling     Erythromycin Rash     Petroleum Jelly [Petrolatum] Itching and Rash        Social History     Tobacco Use     Smoking status: Former Smoker     Packs/day: 0.50     Types: Cigarettes, Cigarettes     Quit date: 6/1/2018     Years since quitting: 3.2     Smokeless tobacco: Current User     Tobacco comment: started smoking when she was 21 years old, vaping   Substance Use Topics     Alcohol use: Yes     Comment: Alcoholic Drinks/day: glass of wine during holiday     Family History   Problem Relation Age of Onset     Breast Cancer Sister 72.00     Leukemia Sister      History   Drug Use     Types: Marijuana     Comment: Drug use: medical marijuana         Objective     There were no vitals taken for this visit.    Physical Exam  GENERAL APPEARANCE: healthy, alert and no distress  HENT: ear canals and TM's normal and nose and mouth without ulcers or lesions  RESP: lungs clear to auscultation - no rales, rhonchi or wheezes  CV: regular rate and rhythm, normal S1 S2, no S3 or S4 and no murmur, click or rub   ABDOMEN: soft, nontender, no HSM or masses and bowel sounds normal  NEURO: Normal strength and tone, sensory exam grossly normal, mentation intact and speech normal    No results for input(s): HGB, PLT, INR, NA, POTASSIUM, CR, A1C in the last 30563 hours.     Diagnostics:  Recent Results (from the past 168 hour(s))   Basic metabolic panel  (Ca, Cl, CO2, Creat, Gluc, K, Na, BUN)    Collection Time: 09/16/21 10:41 AM   Result Value Ref Range    Sodium 145 136 - 145  mmol/L    Potassium 3.9 3.5 - 5.0 mmol/L    Chloride 107 98 - 107 mmol/L    Carbon Dioxide (CO2) 26 22 - 31 mmol/L    Anion Gap 12 5 - 18 mmol/L    Urea Nitrogen 16 8 - 28 mg/dL    Creatinine 0.78 0.60 - 1.10 mg/dL    Calcium 9.1 8.5 - 10.5 mg/dL    Glucose 103 70 - 125 mg/dL    GFR Estimate 71 >60 mL/min/1.73m2   Magnesium    Collection Time: 09/16/21 10:41 AM   Result Value Ref Range    Magnesium 2.2 1.8 - 2.6 mg/dL      No EKG required for low risk surgery (cataract, skin procedure, breast biopsy, etc).    Revised Cardiac Risk Index (RCRI):  The patient has the following serious cardiovascular risks for perioperative complications:   - No serious cardiac risks = 0 points     RCRI Interpretation: 0 points: Class I (very low risk - 0.4% complication rate)           Signed Electronically by: Kiah St CNP  Copy of this evaluation report is provided to requesting physician.

## 2021-09-16 NOTE — PATIENT INSTRUCTIONS

## 2021-09-17 ENCOUNTER — TELEPHONE (OUTPATIENT)
Dept: INTERNAL MEDICINE | Facility: CLINIC | Age: 81
End: 2021-09-17

## 2021-09-17 NOTE — TELEPHONE ENCOUNTER
Reason for Call:  Other call back    Detailed comments: Surgery center calling stating that the preop is not signed -  Patient is having surgery on Monday 09/20/2021 at the [facility]    Please fax a signed copy of preop to:  [phone]    Phone Number Patient can be reached at: Other phone number:  [phone]    Best Time: ASAP    Can we leave a detailed message on this number? YES    Call taken on 9/17/2021 at 11:04 AM by Monie Frederick

## 2021-09-21 DIAGNOSIS — R60.0 LOWER EXTREMITY EDEMA: ICD-10-CM

## 2021-09-22 RX ORDER — HYDROCHLOROTHIAZIDE 12.5 MG/1
CAPSULE ORAL
Qty: 90 CAPSULE | Refills: 0 | Status: SHIPPED | OUTPATIENT
Start: 2021-09-22 | End: 2021-11-19

## 2021-09-22 NOTE — TELEPHONE ENCOUNTER
"Routing refill request to provider for review/approval because:  PCP to review.  Medication listed as \"discontinued\" in [facility]    Last Written Prescription Date:  3/19/2021  Last Fill Quantity: 90,  # refills: 0   Last office visit provider:  9/16/2021 Kiah St CNP     hydroCHLOROthiazide (MICROZIDE) 12.5 mg capsule (Discontinued) 90 capsule 0 3/19/2021  No   Sig: TAKE 1 CAPSULE(12.5 MG) BY MOUTH DAILY   Sent to pharmacy as: hydroCHLOROthiazide 12.5 mg capsule (MICROZIDE)   Notes to Pharmacy: **Patient requests 90 days supply**   Reason for Discontinue: Reorder   E-Prescribing Status: Receipt confirmed by pharmacy (3/19/2021  4:04 PM CDT          Requested Prescriptions   Pending Prescriptions Disp Refills     hydrochlorothiazide (MICROZIDE) 12.5 MG capsule [Pharmacy Med Name: HYDROCHLOROTHIAZIDE 12.5MG CAPSULES] 90 capsule 0     Sig: TAKE 1 CAPSULE(12.5 MG) BY MOUTH DAILY       Diuretics (Including Combos) Protocol Passed - 9/21/2021  1:50 PM        Passed - Blood pressure under 140/90 in past 12 months     BP Readings from Last 3 Encounters:   09/16/21 104/48   09/03/21 113/64   03/19/21 138/70                 Passed - Recent (12 mo) or future (30 days) visit within the authorizing provider's specialty     Patient has had an office visit with the authorizing provider or a provider within the authorizing providers department within the previous 12 mos or has a future within next 30 days. See \"Patient Info\" tab in inbasket, or \"Choose Columns\" in Meds & Orders section of the refill encounter.              Passed - Medication is active on med list        Passed - Patient is age 18 or older        Passed - No active pregancy on record        Passed - Normal serum creatinine on file in past 12 months     Recent Labs   Lab Test 09/16/21  1041   CR 0.78              Passed - Normal serum potassium on file in past 12 months     Recent Labs   Lab Test 09/16/21  1041   POTASSIUM 3.9                    Passed - Normal serum " sodium on file in past 12 months     Recent Labs   Lab Test 09/16/21  1041                 Passed - No positive pregnancy test in past 12 months             Ira Luciano RN 09/21/21 8:04 PM

## 2021-10-04 DIAGNOSIS — M54.32 BACK PAIN WITH LEFT-SIDED SCIATICA: ICD-10-CM

## 2021-10-04 RX ORDER — OXYCODONE HCL 40 MG/1
40 TABLET, FILM COATED, EXTENDED RELEASE ORAL EVERY 24 HOURS
Qty: 28 TABLET | Refills: 0 | Status: SHIPPED | OUTPATIENT
Start: 2021-10-08 | End: 2021-11-09

## 2021-10-04 NOTE — TELEPHONE ENCOUNTER
Received call from patient requesting refill(s) of Soma (multiple refills at pharmacy) and Oxycontin 40mg     Last dispensed from pharmacy on   Soma 9/11/21  Oxycontin 9/10/21    Patient's last office/virtual visit by prescribing provider on 9/3/21  Next office/virtual appointment scheduled for 11/19/21    Last urine drug screen date 8/2021  Current opioid agreement on file (completed within the last year) Yes Date of opioid agreement: 6/2021    E-prescribe to [facility] pharmacy  Pending Prescriptions:                       Disp   Refills    oxyCODONE (OXYCONTIN) 40 MG 12 hr tablet  28 tab*0            Sig: Take 1 tablet (40 mg) by mouth every 24 hours for           28 days

## 2021-10-06 ENCOUNTER — OFFICE VISIT (OUTPATIENT)
Dept: VASCULAR SURGERY | Facility: CLINIC | Age: 81
End: 2021-10-06
Attending: PHYSICAL MEDICINE & REHABILITATION
Payer: MEDICARE

## 2021-10-06 VITALS
DIASTOLIC BLOOD PRESSURE: 62 MMHG | TEMPERATURE: 98.7 F | HEART RATE: 100 BPM | SYSTOLIC BLOOD PRESSURE: 118 MMHG | RESPIRATION RATE: 12 BRPM

## 2021-10-06 DIAGNOSIS — I87.303 VENOUS HYPERTENSION OF BOTH LOWER EXTREMITIES: ICD-10-CM

## 2021-10-06 DIAGNOSIS — M79.89 LEG SWELLING: Primary | ICD-10-CM

## 2021-10-06 DIAGNOSIS — I87.2 VENOUS INSUFFICIENCY OF BOTH LOWER EXTREMITIES: ICD-10-CM

## 2021-10-06 DIAGNOSIS — M06.9 RHEUMATOID ARTHRITIS, INVOLVING UNSPECIFIED SITE, UNSPECIFIED WHETHER RHEUMATOID FACTOR PRESENT (H): ICD-10-CM

## 2021-10-06 DIAGNOSIS — I87.2 VENOUS STASIS DERMATITIS OF BOTH LOWER EXTREMITIES: ICD-10-CM

## 2021-10-06 DIAGNOSIS — I89.0 ACQUIRED LYMPHEDEMA OF LEG: ICD-10-CM

## 2021-10-06 PROCEDURE — 99214 OFFICE O/P EST MOD 30 MIN: CPT | Performed by: PHYSICAL MEDICINE & REHABILITATION

## 2021-10-06 NOTE — PROGRESS NOTES
Leg Swelling Follow Up     Date of Service: October 6, 2021     Date last seen by Dr. Davidson:  September 17, 2020    PCP: Jordana Reynolds MD     Impression:  1. Bilateral leg swelling-slightly worsened on left   2. Long standing dependent swelling with venous insufficiency and hypertension of both legs-stable  3. Secondary lymphedema-stable  4. Left leg ulcer near closed with increased swelling  5. History of infection/cellulitis  6. Underlying rheumatoid arthritis      Plan:  1. Questions answered.  2. Continue compression and exercises.  Continue to update regularly. Doing well   3. Wound treatment will include irrigation and dressings to promote autolytic debridement and will be: left ant lat shin-lite wound gel then foam then short stretch.  Wear for 3 days then can remove and go back to regular compression socks.  If still at all open should return to clinic to have nursing redo.    4. Patient will follow up in 4 weeks with CNP.  To se Dr. Nails when needed.  She is to call with any concerns.     On day of encounter time spent in chart review and with patient in consultation, exam, education and coordination of care, excluding procedures:  31 minutes          ---------------------------------------------------------------------------------------------------------------------     Chief Complaint: Bilateral leg swelling and cellulitis history     History of Present Illness:     Jacqueline Pardo returns to the [facility] for bilateral leg swelling and cellulitis history due to long-standing dependent swelling with venous insufficiency/hypertension leading to acquired lymphedema complicated by underlying rheumatoid arthritis and long history of tobacco use.  At her last visit she was instructed to continue her compression and exercises and update her compression regularly.  At follow up today she reports she hit the left lower leg on a table in February.  She treated  the open area with Bacitracin.  It has improved except for a few open areas, but the left leg calf swelling is up. She wears the compression stockings daily.  There is no pain.   Her RA has been stable.   There has been no new numbness, tingling or weakness.  There have been no new masses, rashes, or swellings of any other joints. There has been no new abdominal bloating, bowel or bladder changes and irregular bleeding. She has not run a fever.      Past Medical History:    Past Medical History:   Diagnosis Date     Bundle branch block     Created by Conversion  Replacement Utility updated for latest IMO load     Cellulitis      Chronic venous stasis dermatitis      Impetigo      Rheumatoid arthritis (H)         Surgical History:   Past Surgical History:   Procedure Laterality Date     BREAST CYST ASPIRATION Left 2000     C EXCIS CERV DISK,ONE LEVEL      Description: Laminectomy With Disc Removal;  Recorded: 11/03/2011;  Annotations: L5-S1     HC REMOVAL GALLBLADDER      Description: Cholecystectomy;  Recorded: 07/22/2009;     IR LUMBAR EPIDURAL STEROID INJECTION  6/23/2003     IR MISCELLANEOUS PROCEDURE  12/1/2006     TN INJECT VERTEBRAL BODY, LUMBAR      Description: Spinal Percutaneous Vertebroplasty, Injection Lumbar;  Recorded: 11/03/2011;  Annotations: L5        Medications:    Current Outpatient Medications   Medication     albuterol (PROAIR HFA;PROVENTIL HFA;VENTOLIN HFA) 90 mcg/actuation inhaler     amitriptyline (ELAVIL) 25 MG tablet     aspirin 81 mg chewable tablet     b complex vitamins tablet     benzonatate (TESSALON) 100 MG capsule     CALCIUM CARBONATE/VITAMIN D3 (CALCIUM+D ORAL)     carisoprodol (SOMA) 350 MG tablet     cholecalciferol, vitamin D3, 1,000 unit (25 mcg) tablet     cyanocobalamin (VITAMIN B-12) 250 mcg half-tab     folic acid (FOLVITE) 1 MG tablet     hydrochlorothiazide (MICROZIDE) 12.5 MG capsule     hydrocortisone (CORTISONE, HYDROCORTISONE,) 1 % cream     isosorbide mononitrate  (ISMO,MONOKET) 10 MG tablet     leflunomide (ARAVA) 20 MG tablet     levothyroxine (SYNTHROID, LEVOTHROID) 75 MCG tablet     loratadine (CLARITIN) 10 mg tablet     nystatin (MYCOSTATIN) cream     omeprazole (PRILOSEC) 20 MG capsule     oxyCODONE (OXYCONTIN) 10 MG 12 hr tablet     [START ON 10/8/2021] oxyCODONE (OXYCONTIN) 40 MG 12 hr tablet     potassium gluconate 550 mg (90 mg) tablet     predniSONE (DELTASONE) 5 MG tablet     triamcinolone (KENALOG) 0.1 % ointment     No current facility-administered medications for this visit.        Allergies:    Allergies   Allergen Reactions     Tetracyclines Rash     Acetaminophen Unknown     Baclofen Nausea     Celecoxib Unknown     Erythromycin Base [Erythromycin] Unknown     Nsaids (Non-Steroidal Anti-Inflammatory Drug) [Nsaids] Unknown     Pentolinium Itching     Varenicline Itching and Swelling     Erythromycin Rash     Petroleum Jelly [Petrolatum] Itching and Rash        Family history:   Family History   Problem Relation Age of Onset     Breast Cancer Sister 72.00     Leukemia Sister         Social History:   Social History     Tobacco Use     Smoking status: Former Smoker     Packs/day: 0.50     Types: Cigarettes, Cigarettes     Quit date: 6/1/2018     Years since quitting: 3.3     Smokeless tobacco: Current User     Tobacco comment: started smoking when she was 21 years old, vaping   Substance Use Topics     Alcohol use: Yes     Comment: Alcoholic Drinks/day: glass of wine during holiday     Drug use: Yes     Types: Marijuana     Comment: Drug use: medical marijuana          Review of Systems:     ROS negative except as noted in HPI.     Labs:      I personally reviewed the following lab results today and those on care everywhere, if indicated     CRP   Date Value Ref Range Status   06/06/2018 2.1 (H) 0.0 - 0.8 mg/dL Final      No results found for: SED   Last Renal Panel:  Sodium   Date Value Ref Range Status   09/16/2021 145 136 - 145 mmol/L Final     Potassium    Date Value Ref Range Status   09/16/2021 3.9 3.5 - 5.0 mmol/L Final     Chloride   Date Value Ref Range Status   09/16/2021 107 98 - 107 mmol/L Final     Carbon Dioxide (CO2)   Date Value Ref Range Status   09/16/2021 26 22 - 31 mmol/L Final     Anion Gap   Date Value Ref Range Status   09/16/2021 12 5 - 18 mmol/L Final     Glucose   Date Value Ref Range Status   09/16/2021 103 70 - 125 mg/dL Final     Urea Nitrogen   Date Value Ref Range Status   09/16/2021 16 8 - 28 mg/dL Final     Creatinine   Date Value Ref Range Status   09/16/2021 0.78 0.60 - 1.10 mg/dL Final     GFR Estimate   Date Value Ref Range Status   09/16/2021 71 >60 mL/min/1.73m2 Final     Comment:     As of July 11, 2021, eGFR is calculated by the CKD-EPI creatinine equation, without race adjustment. eGFR can be influenced by muscle mass, exercise, and diet. The reported eGFR is an estimation only and is only applicable if the renal function is stable.   01/15/2019 >60 >60 mL/min/1.73m2 Final     Calcium   Date Value Ref Range Status   09/16/2021 9.1 8.5 - 10.5 mg/dL Final     Albumin   Date Value Ref Range Status   08/18/2021 3.8 3.5 - 5.0 g/dL Final      Lab Results   Component Value Date    WBC 6.5 01/15/2019     Lab Results   Component Value Date    RBC 4.10 01/15/2019     Lab Results   Component Value Date    HGB 13.1 01/15/2019     Lab Results   Component Value Date    HCT 38.5 01/15/2019     No components found for: MCT  Lab Results   Component Value Date    MCV 94 01/15/2019     Lab Results   Component Value Date    MCH 31.9 01/15/2019     Lab Results   Component Value Date    MCHC 33.9 01/15/2019     Lab Results   Component Value Date    RDW 10.9 01/15/2019     Lab Results   Component Value Date     01/15/2019      No results found for: A1C   TSH   Date Value Ref Range Status   01/15/2019 1.18 0.30 - 5.00 uIU/mL Final      No results found for: MARILU     @LABNT[cult:*@      Imaging:     I personally reviewed the following  imaging results today and those on care everywhere, if indicated     No results found for this visit on 10/06/21.            Physical Exam:     Vital Signs: /62   Pulse 100   Temp 98.7  F (37.1  C) (Oral)   Resp 12  There is no height or weight on file to calculate BMI.   Wt Readings from Last 2 Encounters:   09/16/21 151 lb (68.5 kg)   09/03/21 156 lb (70.8 kg)   .    Circumferential volume measures:    Circumferential Measures 9/18/2019 10/6/2021   Right just above MTP 20 20.3   Right Ankle 21.7 21.8   Right Widest Calf 33.8 33.4   Left - just above MTP 19.7 20.5   Left Ankle 22.1 22.5   Left Widest Calf 33.2 35.5     General: In no apparent distress.      Psych: Alert and oriented X 3. Affect normal.     HEENT: Atraumatic, normocephalic     Musculoskeletal:  Normal range of motion in knees and ankles bilaterally.  There is no active joint synovitis, erythema, swelling or joint laxity.      Neurological:  Sensation is intact to pin prick and light touch in both legs. Strength testing is normal in hip flexion, knee flexion, knee extension, ankle dorsiflexion and great toe extension bilaterally.       Vascular: Dorsalis pedis and posterior tibialis pulses are strong and equal bilaterally and biphasic when heard with handheld Doppler. There are significant telangietasias, medial ankle venous flares, venous varicosities or spider veins.     Integumentary: Skin of the legs is uniformly warm and dry, and hyperpigmentated, with hyperkeratosis and keratoderma, with positive Stemmer's Sign.  On the left anterior shin there is non painful erythema with very small almost closed ulceration on the left ant lateral calf.  Nails are thickened, thin surrounding skin, decreased hair growth on toes, discolored.        Left lower leg    Claribel Davidson MD, Founding Diplomate ABWMS, FACCWS, FAAPMR   Medical Director Wound Care and Lymphedema   [facility]   [phone]         This  note was dictated using a voice recognition software.  Any grammatical or context distortion are unintentional and inherent to the software.

## 2021-10-06 NOTE — PATIENT INSTRUCTIONS
Left shin wound  - Today in clinic we are applying wound gel then foam dressing then short stretch bandaging  Wear for 3 days then can remove and go back to regular compression socks.  If still at all open should return to clinic to have nursing redo.    Continue compression and exercises

## 2021-10-06 NOTE — PROGRESS NOTES
She wears compression daily, her daughter helps her order new ones. No new concerns. She got her 3rd covid vaccine.     Left shin has been red since February when she injured it. She bumped into a piece of furniture.     Having cataract surgery in 2 weeks for left eye. Already had the right eye.

## 2021-10-08 RX ORDER — OXYCODONE HCL 10 MG/1
30 TABLET, FILM COATED, EXTENDED RELEASE ORAL AT BEDTIME
Qty: 84 TABLET | Refills: 0 | Status: SHIPPED | OUTPATIENT
Start: 2021-10-08 | End: 2021-11-09

## 2021-10-08 NOTE — TELEPHONE ENCOUNTER
Adding oxycontin 10 mg  Last dispensed on 9/3/21    Pending Prescriptions:                       Disp   Refills    oxyCODONE (OXYCONTIN) 10 MG 12 hr tablet  84 tab*0            Sig: Take 3 tablets (30 mg) by mouth At Bedtime    Signed Prescriptions:                        Disp   Refills    oxyCODONE (OXYCONTIN) 40 MG 12 hr tablet   28 tab*0        Sig: Take 1 tablet (40 mg) by mouth every 24 hours for 28           days  Authorizing Provider: LIZZY FOLEY

## 2021-10-10 ENCOUNTER — MYC MEDICAL ADVICE (OUTPATIENT)
Dept: INTERNAL MEDICINE | Facility: CLINIC | Age: 81
End: 2021-10-10

## 2021-10-10 DIAGNOSIS — M81.0 SENILE OSTEOPOROSIS: Primary | ICD-10-CM

## 2021-10-10 DIAGNOSIS — Z92.29 PERSONAL HISTORY OF OTHER DRUG THERAPY: ICD-10-CM

## 2021-10-14 NOTE — ADDENDUM NOTE
Addended by: GARY CORTES on: 10/14/2021 01:20 PM     Modules accepted: Orders    
Patient with one or more new problems requiring additional work-up/treatment.

## 2021-10-22 ENCOUNTER — MYC MEDICAL ADVICE (OUTPATIENT)
Dept: INTERNAL MEDICINE | Facility: CLINIC | Age: 81
End: 2021-10-22

## 2021-10-24 DIAGNOSIS — E03.9 HYPOTHYROIDISM: ICD-10-CM

## 2021-10-25 ENCOUNTER — ALLIED HEALTH/NURSE VISIT (OUTPATIENT)
Dept: FAMILY MEDICINE | Facility: CLINIC | Age: 81
End: 2021-10-25
Payer: MEDICARE

## 2021-10-25 DIAGNOSIS — M19.90 OSTEOARTHRITIS: Primary | ICD-10-CM

## 2021-10-25 PROCEDURE — 99207 PR NO CHARGE NURSE ONLY: CPT

## 2021-10-25 PROCEDURE — 96372 THER/PROPH/DIAG INJ SC/IM: CPT | Performed by: INTERNAL MEDICINE

## 2021-10-25 NOTE — PROGRESS NOTES
"Prolia Injection Phone Screen      Screening questions have been asked 2-3 days prior to administration visit for Prolia. If any questions are answered with \"Yes,\" this phone encounter were will routed to ordering provider for further evaluation.     1.  When was the last injection?  4/8/21    2.  Has insurance for this injection been verified?  Yes    3.  Did you experience any new onset achiness or rashes that lasted for over a month with your previous Prolia injection?   No    4.  Do you have a fever over 101?F or a new deep cough that is unusual for you today? No    5.  Have you started any new medications in the last 6 months that you were told could affect your immune system? These may have been prescribed by oncologist, transplant, rheumatology, or dermatology.   No    6.  In the last 6 months have you have gastric bypass or parathyroid surgery?   No    7.  Do you plan dental work requiring drilling into the bone such as implants/extractions or oral surgery in the next 2-3 months?   No    Patient informed if symptoms discussed above present prior to their administration appointment, they are to notify clinic immediately.     Nuria Del Cid    The following steps were completed to comply with the REMS program for Prolia:  1. Ordering provider has previously reviewed information in the Medication Guide and Patient Counseling Chart, including the serious risks of Prolia  and the symptoms of each risk and have been advised to seek prompt medical attention if they have signs or symptoms of any of the serious risks.  2. Provided each patient a copy of the Medication Guide and Patient Brochure.  See MAR for administration details.   Indication: Prolia  (denosumab) is a prescription medicine used to treat osteoporosis in patients who:   Are at high risk for fracture, meaning patients who have had a fracture related to osteoporosis, or who have multiple risk factors for fracture; Cannot use another osteoporosis " medicine or other osteoporosis medicines did not work well.   The timeline for early/late injections would be 4 weeks early and any time after the 6 month dinora. If a patient receives their injection late, then the subsequent injection would be 6 months from the date that they actually received the injection    Have the screening questions been asked prior to this administration? Yes

## 2021-10-26 RX ORDER — LEVOTHYROXINE SODIUM 75 UG/1
TABLET ORAL
Qty: 90 TABLET | Refills: 3 | OUTPATIENT
Start: 2021-10-26

## 2021-11-03 ENCOUNTER — OFFICE VISIT (OUTPATIENT)
Dept: VASCULAR SURGERY | Facility: CLINIC | Age: 81
End: 2021-11-03
Attending: NURSE PRACTITIONER
Payer: MEDICARE

## 2021-11-03 VITALS
SYSTOLIC BLOOD PRESSURE: 122 MMHG | RESPIRATION RATE: 16 BRPM | DIASTOLIC BLOOD PRESSURE: 74 MMHG | TEMPERATURE: 99.7 F | OXYGEN SATURATION: 97 % | WEIGHT: 148 LBS | BODY MASS INDEX: 29.06 KG/M2 | HEART RATE: 93 BPM | HEIGHT: 60 IN

## 2021-11-03 DIAGNOSIS — I89.0 ACQUIRED LYMPHEDEMA OF LEG: ICD-10-CM

## 2021-11-03 DIAGNOSIS — I87.2 VENOUS INSUFFICIENCY OF BOTH LOWER EXTREMITIES: Primary | ICD-10-CM

## 2021-11-03 DIAGNOSIS — M79.89 LEG SWELLING: ICD-10-CM

## 2021-11-03 DIAGNOSIS — I87.2 VENOUS STASIS DERMATITIS OF BOTH LOWER EXTREMITIES: ICD-10-CM

## 2021-11-03 DIAGNOSIS — I87.303 VENOUS HYPERTENSION OF BOTH LOWER EXTREMITIES: ICD-10-CM

## 2021-11-03 PROCEDURE — 99213 OFFICE O/P EST LOW 20 MIN: CPT | Performed by: NURSE PRACTITIONER

## 2021-11-03 PROCEDURE — G0463 HOSPITAL OUTPT CLINIC VISIT: HCPCS | Performed by: NURSE PRACTITIONER

## 2021-11-03 ASSESSMENT — PAIN SCALES - GENERAL: PAINLEVEL: NO PAIN (0)

## 2021-11-03 ASSESSMENT — MIFFLIN-ST. JEOR: SCORE: 1044.88

## 2021-11-03 NOTE — PATIENT INSTRUCTIONS
Make follow up appt if you have any new wounds or worsening edema      Continue to wear your compression stockings or velcro wraps every day; put them on first thing in the morning and remove at bedtime    Replace your compression stockings every 3-4 months; these garments will lose their elasticity and become ineffective    Replace velcro wraps every 1-2 years    Elevate your legs periodically throughout the day, 30-60 minutes 1-3 times per day    Apply lotion to your legs 1-2 times per day; some good name brands are Cetaphil, Sarna, Aveeno, VaniCream, CeraVe    Continue to walk and exercise    If you are taking a diuretic continue to do so at the direction of your primary care provider    Make an appointment at the vascular clinic again if you have worsening swelling, need a prescription for new compression garments; and/or develop new wounds

## 2021-11-03 NOTE — PROGRESS NOTES
Follow up Vascular Visit       Date of Service:11/03/21      Chief Complaint: LLE ulcer; BLE edema; chronic      Pt returns to [facility] with regards to their LLE ulcer and BLE edema; chronic.  They arrive today alone. They are currently using nothing to the wounds; she feels the wounds are healed. She occasionally will put antibiotic ointment on the old scar tissue. This is being done by the patient PRN. They are using compression stocking for compression. They are feeling well today. Denies fevers, chills. No shortness of breath. Pt was last seen by Dr. Davidson 10/6/21 at that time the LLE wound was nearly healed. Pertinent PMHx consistent for rheumatoid arthritis; history of cellulitis; secondary lymphedema; dependent edema; venous insufficiency and venous hypertension. She is on chronic prednisone.     Allergies:   Allergies   Allergen Reactions     Tetracyclines Rash     Acetaminophen Unknown     Baclofen Nausea     Celecoxib Unknown     Erythromycin Base [Erythromycin] Unknown     Nsaids (Non-Steroidal Anti-Inflammatory Drug) [Nsaids] Unknown     Pentolinium Itching     Varenicline Itching and Swelling     Erythromycin Rash     Petroleum Jelly [Petrolatum] Itching and Rash       Medications:   Current Outpatient Medications:      albuterol (PROAIR HFA;PROVENTIL HFA;VENTOLIN HFA) 90 mcg/actuation inhaler, [ALBUTEROL (PROAIR HFA;PROVENTIL HFA;VENTOLIN HFA) 90 MCG/ACTUATION INHALER] Inhale 2 puffs every 4 (four) hours as needed for wheezing. And cough, Disp: 1 Inhaler, Rfl: 3     amitriptyline (ELAVIL) 25 MG tablet, [AMITRIPTYLINE (ELAVIL) 25 MG TABLET] two tablets by mouth at bedtime, Disp: 60 tablet, Rfl: 5     aspirin 81 mg chewable tablet, [ASPIRIN 81 MG CHEWABLE TABLET] Chew 81 mg daily., Disp: , Rfl:      b complex vitamins tablet, [B COMPLEX VITAMINS TABLET] Take 1 tablet by mouth daily., Disp: , Rfl:      benzonatate (TESSALON) 100 MG capsule, , Disp: , Rfl:      CALCIUM  CARBONATE/VITAMIN D3 (CALCIUM+D ORAL), [CALCIUM CARBONATE/VITAMIN D3 (CALCIUM+D ORAL)] Take 3 tablets by mouth daily., Disp: , Rfl:      carisoprodol (SOMA) 350 MG tablet, Take 1 tablet (350 mg) by mouth 3 times daily as needed for muscle spasms, Disp: 90 tablet, Rfl: 3     cholecalciferol, vitamin D3, 1,000 unit (25 mcg) tablet, [CHOLECALCIFEROL, VITAMIN D3, 1,000 UNIT (25 MCG) TABLET] Take 1,000 Units by mouth daily., Disp: , Rfl:      cyanocobalamin (VITAMIN B-12) 250 mcg half-tab, Take 1 capsule by mouth daily, Disp: , Rfl:      folic acid (FOLVITE) 1 MG tablet, [FOLIC ACID (FOLVITE) 1 MG TABLET] TK 1 T PO ONCE DAILY., Disp: , Rfl: 2     hydrochlorothiazide (MICROZIDE) 12.5 MG capsule, TAKE 1 CAPSULE(12.5 MG) BY MOUTH DAILY, Disp: 90 capsule, Rfl: 0     hydrocortisone (CORTISONE, HYDROCORTISONE,) 1 % cream, [HYDROCORTISONE (CORTISONE, HYDROCORTISONE,) 1 % CREAM] Apply to hand three times a day, Disp: 30 g, Rfl: 2     isosorbide mononitrate (ISMO,MONOKET) 10 MG tablet, [ISOSORBIDE MONONITRATE (ISMO,MONOKET) 10 MG TABLET] Take 1 tablet (10 mg total) by mouth 2 (two) times a day., Disp: 180 tablet, Rfl: 3     leflunomide (ARAVA) 20 MG tablet, [LEFLUNOMIDE (ARAVA) 20 MG TABLET] Take 20 mg by mouth daily. , Disp: , Rfl:      levothyroxine (SYNTHROID, LEVOTHROID) 75 MCG tablet, [LEVOTHYROXINE (SYNTHROID, LEVOTHROID) 75 MCG TABLET] TAKE 1 TABLET BY MOUTH DAILY AT 6 AM, Disp: 90 tablet, Rfl: 3     loratadine (CLARITIN) 10 mg tablet, [LORATADINE (CLARITIN) 10 MG TABLET] Take 10 mg by mouth daily., Disp: , Rfl:      nystatin (MYCOSTATIN) cream, [NYSTATIN (MYCOSTATIN) CREAM] Apply 1 application topically 2 (two) times a day as needed., Disp: , Rfl: 1     omeprazole (PRILOSEC) 20 MG capsule, [OMEPRAZOLE (PRILOSEC) 20 MG CAPSULE] Take 20 mg by mouth Daily before breakfast., Disp: , Rfl:      oxyCODONE (OXYCONTIN) 10 MG 12 hr tablet, Take 3 tablets (30 mg) by mouth At Bedtime, Disp: 84 tablet, Rfl: 0     oxyCODONE (OXYCONTIN)  "40 MG 12 hr tablet, Take 1 tablet (40 mg) by mouth every 24 hours for 28 days, Disp: 28 tablet, Rfl: 0     potassium gluconate 550 mg (90 mg) tablet, [POTASSIUM GLUCONATE 550 MG (90 MG) TABLET] Take 1 tablet by mouth daily as needed., Disp: , Rfl:      predniSONE (DELTASONE) 5 MG tablet, Take 5 mg by mouth daily, Disp: , Rfl:      triamcinolone (KENALOG) 0.1 % ointment, [TRIAMCINOLONE (KENALOG) 0.1 % OINTMENT] Apply to both legs twice daily, Disp: 30 g, Rfl: 0    Current Facility-Administered Medications:      denosumab (PROLIA) injection 60 mg, 60 mg, Subcutaneous, Once, Jordana Reynolds MD     denosumab (PROLIA) injection 60 mg, 60 mg, Subcutaneous, Q6 Months, Kb Alexis MD, 60 mg at 10/25/21 1416    History:   Past Medical History:   Diagnosis Date     Bundle branch block     Created by Conversion  Replacement Utility updated for latest IMO load     Cellulitis      Chronic venous stasis dermatitis      Impetigo      Rheumatoid arthritis (H)        Physical Exam:    /74   Pulse 93   Temp 99.7  F (37.6  C)   Resp 16   Ht 4' 11.5\" (1.511 m)   Wt 148 lb (67.1 kg)   SpO2 97%   BMI 29.39 kg/m      General:  Patient presents to clinic in no apparent distress.  Head: normocephalic atraumatic  Psychiatric:  Alert and oriented x3.   Respiratory: unlabored breathing; no cough  Integumentary:  Skin is uniformly warm, dry and pink.    Extremities: left leg with sturdy scar tissue; edema stable; hallux valgus deformity on the left; with thickened nail; webbing clear; skin well moisturized      VASC Wound Left shin (Active)   Pre Size Length 4 10/06/21 1400   Pre Size Width 1.5 10/06/21 1400   Pre Size Depth 0.1 10/06/21 1400   Pre Total Sq cm 6 10/06/21 1400   Number of days: 28            Circumferential volume measures:      Circumferential Measures 9/18/2019 10/6/2021 11/3/2021   Right just above MTP 20 20.3 -   Right Ankle 21.7 21.8 -   Right Widest Calf 33.8 33.4 -   Left - just above MTP 19.7 20.5 " 20.5   Left Ankle 22.1 22.5 22.5   Left Widest Calf 33.2 35.5 36.5   Left Knee to Ankle - - 36       Labs:    I personally reviewed the following lab results today and those on care everywhere, if indicated     CRP   Date Value Ref Range Status   06/06/2018 2.1 (H) 0.0 - 0.8 mg/dL Final      No results found for: SED   Last Renal Panel:  Sodium   Date Value Ref Range Status   09/16/2021 145 136 - 145 mmol/L Final     Potassium   Date Value Ref Range Status   09/16/2021 3.9 3.5 - 5.0 mmol/L Final     Chloride   Date Value Ref Range Status   09/16/2021 107 98 - 107 mmol/L Final     Carbon Dioxide (CO2)   Date Value Ref Range Status   09/16/2021 26 22 - 31 mmol/L Final     Anion Gap   Date Value Ref Range Status   09/16/2021 12 5 - 18 mmol/L Final     Glucose   Date Value Ref Range Status   09/16/2021 103 70 - 125 mg/dL Final     Urea Nitrogen   Date Value Ref Range Status   09/16/2021 16 8 - 28 mg/dL Final     Creatinine   Date Value Ref Range Status   09/16/2021 0.78 0.60 - 1.10 mg/dL Final     GFR Estimate   Date Value Ref Range Status   09/16/2021 71 >60 mL/min/1.73m2 Final     Comment:     As of July 11, 2021, eGFR is calculated by the CKD-EPI creatinine equation, without race adjustment. eGFR can be influenced by muscle mass, exercise, and diet. The reported eGFR is an estimation only and is only applicable if the renal function is stable.   01/15/2019 >60 >60 mL/min/1.73m2 Final     Calcium   Date Value Ref Range Status   09/16/2021 9.1 8.5 - 10.5 mg/dL Final     Albumin   Date Value Ref Range Status   08/18/2021 3.8 3.5 - 5.0 g/dL Final      Lab Results   Component Value Date    WBC 6.5 01/15/2019     Lab Results   Component Value Date    RBC 4.10 01/15/2019     Lab Results   Component Value Date    HGB 13.1 01/15/2019     Lab Results   Component Value Date    HCT 38.5 01/15/2019     No components found for: MCT  Lab Results   Component Value Date    MCV 94 01/15/2019     Lab Results   Component Value Date     MCH 31.9 01/15/2019     Lab Results   Component Value Date    MCHC 33.9 01/15/2019     Lab Results   Component Value Date    RDW 10.9 01/15/2019     Lab Results   Component Value Date     01/15/2019      No results found for: A1C   TSH   Date Value Ref Range Status   01/15/2019 1.18 0.30 - 5.00 uIU/mL Final      No results found for: VITDT                Impression:  Encounter Diagnoses   Name Primary?     Venous insufficiency of both lower extremities Yes     Venous hypertension of both lower extremities      Leg swelling      Venous stasis dermatitis of both lower extremities      Acquired lymphedema of leg                        Are any of these wounds new today: No; Location: na    Assessment/Plan:          1. Debridement: na     2.  Wound treatment: wound treatment will include irrigation and dressings to promote autolytic debridement which will include:wound healed; allergy to petrolatum; no aquaphor; no vaseline; recommend cetaphil 1-2 times per day; she likes to use eucerin at night; no dressings needed Stable            3. Edema: continue to wear compression daily; encouraged elevation; she understands to replace her compression every 4-6 months; she is able to don/doff indendently. The compression wraps were applied today in clinic. Stable            4. Nutrition: focus on low sodium diet           5. Offloading: she does not have diabetes would not qualify for diabetic shoes or specialty shoes; she has hallux valgus deformity on the left; recommend [facility] [employer] [facility] or [employer] for professional fitting with wider deeper toe box to accommodate the deformity.     Patient will follow up with me PRN weeks for reevaluation. They were instructed to call the clinic sooner with any signs or symptoms of infection or any further questions/concerns. Answered all questions.          Vicky Ha DNP, RN, CNP, CWOCN, CFCN, CLT  [facility]   [phone]        This note was electronically  signed by Vicky Ha NP

## 2021-11-09 DIAGNOSIS — M54.32 BACK PAIN WITH LEFT-SIDED SCIATICA: ICD-10-CM

## 2021-11-09 RX ORDER — OXYCODONE HCL 10 MG/1
30 TABLET, FILM COATED, EXTENDED RELEASE ORAL AT BEDTIME
Qty: 33 TABLET | Refills: 0 | Status: SHIPPED | OUTPATIENT
Start: 2021-11-09 | End: 2021-11-19

## 2021-11-09 RX ORDER — OXYCODONE HCL 40 MG/1
40 TABLET, FILM COATED, EXTENDED RELEASE ORAL EVERY 24 HOURS
Qty: 11 TABLET | Refills: 0 | Status: SHIPPED | OUTPATIENT
Start: 2021-11-09 | End: 2021-11-19

## 2021-11-09 NOTE — TELEPHONE ENCOUNTER
Received call from patient requesting refill(s) of Oxycontin 40mg and 10mg    Last dispensed from pharmacy on 10/12/21    Patient's last office/virtual visit by prescribing provider on 9/3/21  Next office/virtual appointment scheduled for 11/19/21    Last urine drug screen date 8/2021  Current opioid agreement on file (completed within the last year) Yes Date of opioid agreement: 6/2021    E-prescribe to [facility] pharmacy  Pending Prescriptions:                       Disp   Refills    oxyCODONE (OXYCONTIN) 40 MG 12 hr tablet  11 tab*0            Sig: Take 1 tablet (40 mg) by mouth every 24 hours for           11 days    oxyCODONE (OXYCONTIN) 10 MG 12 hr tablet  33 tab*0            Sig: Take 3 tablets (30 mg) by mouth At Bedtime for 11           days

## 2021-11-19 ENCOUNTER — OFFICE VISIT (OUTPATIENT)
Dept: PALLIATIVE MEDICINE | Facility: OTHER | Age: 81
End: 2021-11-19
Payer: MEDICARE

## 2021-11-19 VITALS
WEIGHT: 150 LBS | DIASTOLIC BLOOD PRESSURE: 89 MMHG | HEART RATE: 103 BPM | SYSTOLIC BLOOD PRESSURE: 149 MMHG | BODY MASS INDEX: 29.45 KG/M2 | HEIGHT: 60 IN

## 2021-11-19 DIAGNOSIS — M54.32 BACK PAIN WITH LEFT-SIDED SCIATICA: ICD-10-CM

## 2021-11-19 DIAGNOSIS — G89.4 CHRONIC PAIN SYNDROME: ICD-10-CM

## 2021-11-19 PROCEDURE — G0463 HOSPITAL OUTPT CLINIC VISIT: HCPCS

## 2021-11-19 PROCEDURE — 99213 OFFICE O/P EST LOW 20 MIN: CPT | Performed by: ANESTHESIOLOGY

## 2021-11-19 RX ORDER — OXYCODONE HCL 10 MG/1
30 TABLET, FILM COATED, EXTENDED RELEASE ORAL AT BEDTIME
Qty: 90 TABLET | Refills: 0 | Status: SHIPPED | OUTPATIENT
Start: 2021-11-19 | End: 2022-02-11

## 2021-11-19 RX ORDER — OXYCODONE HCL 40 MG/1
40 TABLET, FILM COATED, EXTENDED RELEASE ORAL EVERY 24 HOURS
Qty: 30 TABLET | Refills: 0 | Status: SHIPPED | OUTPATIENT
Start: 2021-11-19 | End: 2021-12-27

## 2021-11-19 ASSESSMENT — MIFFLIN-ST. JEOR: SCORE: 1053.96

## 2021-11-19 ASSESSMENT — PAIN SCALES - GENERAL: PAINLEVEL: SEVERE PAIN (7)

## 2021-11-19 NOTE — PROGRESS NOTES
Patient presents to the clinic today for a follow up with LIZZY FOLEY MD regarding Pain Management        Patient describes their pain as pain is always there.        Her/His function score is 8.        Pain score: 7/10.        Constant or intermittent: constant.        Does the pain interfere with:        Work: yes.        Walking/distance: yes.        Sleep: no.        Daily activities: yes.        Relationships/social life: yes.        Mood: yes.       Last dose of scheduled medication: Oxycontin 10 mg at 2 am on 11/19/2021 and Oxycontin 40 mg at 215 pm on 11/19/2021.      Miguelina Nicole LPN

## 2021-11-19 NOTE — PATIENT INSTRUCTIONS
PLAN:    Continue OxyContin 40 mg 1 daily, and OxyContin 10 mg 3 at bedtime.    Continue the Soma 350 mg 3 times a day.    Discussed you may review primary care provider managing the opioid regimen if comfortable, otherwise follow-up with Dr. Ramirez in 3 months.

## 2021-11-19 NOTE — LETTER
11/19/2021         RE: Jacqueline Prado  [address]  [place] [address]        Dear Colleague,    Thank you for referring your patient, Jacqueline Prado, to the [facility]. Please see a copy of my visit note below.    Patient presents to the clinic today for a follow up with LIZZY FOLEY MD regarding Pain Management        Patient describes their pain as pain is always there.        Her/His function score is 8.        Pain score: 7/10.        Constant or intermittent: constant.        Does the pain interfere with:        Work: yes.        Walking/distance: yes.        Sleep: no.        Daily activities: yes.        Relationships/social life: yes.        Mood: yes.       Last dose of scheduled medication: Oxycontin 10 mg at 2 am on 11/19/2021 and Oxycontin 40 mg at 215 pm on 11/19/2021.      Miguelina Nicole LPN                  Subjective   Jacqueline is a 81 year old who presents for the following health issues     Follow-up for lumbar degenerative changes, rheumatoid arthritis.    HPI     She is seen with her daughter.  Reports not much change presently, though concerned that she will be moving a couple of months to a condo, in the same building where her daughter lives.  Daughter reviews having had a lot of appointments, challenge to go back and forth to get her 15 miles from home.  Has had many appointments regarding cataracts.  Daughter also concerned in the senior living where she lives there is not much encouragement for the others to wear masks, wears in the condo where they live there is strict enforcement of masks so she feels be relatively safer.    Radha does indicate her with packing she demonstrates moving forward with the packing and moving may flareup her back.    She has had her cataract surgery, feels her vision is better, may start driving again.    She is no longer taking the methotrexate, continues on Areva through her rheumatologist    She continues on the  OxyContin 10 mg 3 at bedtime and 40 mg 1 daily.  No side effects such as constipation.  Feels this regimen helps her maintain her fairly independent living.   reviewed as expected last urine drug test in August.      Current Outpatient Medications:      albuterol (PROAIR HFA;PROVENTIL HFA;VENTOLIN HFA) 90 mcg/actuation inhaler, [ALBUTEROL (PROAIR HFA;PROVENTIL HFA;VENTOLIN HFA) 90 MCG/ACTUATION INHALER] Inhale 2 puffs every 4 (four) hours as needed for wheezing. And cough, Disp: 1 Inhaler, Rfl: 3     amitriptyline (ELAVIL) 25 MG tablet, [AMITRIPTYLINE (ELAVIL) 25 MG TABLET] two tablets by mouth at bedtime, Disp: 60 tablet, Rfl: 5     aspirin 81 mg chewable tablet, [ASPIRIN 81 MG CHEWABLE TABLET] Chew 81 mg daily., Disp: , Rfl:      b complex vitamins tablet, [B COMPLEX VITAMINS TABLET] Take 1 tablet by mouth daily., Disp: , Rfl:      CALCIUM CARBONATE/VITAMIN D3 (CALCIUM+D ORAL), [CALCIUM CARBONATE/VITAMIN D3 (CALCIUM+D ORAL)] Take 3 tablets by mouth daily., Disp: , Rfl:      carisoprodol (SOMA) 350 MG tablet, Take 1 tablet (350 mg) by mouth 3 times daily as needed for muscle spasms, Disp: 90 tablet, Rfl: 3     cholecalciferol, vitamin D3, 1,000 unit (25 mcg) tablet, [CHOLECALCIFEROL, VITAMIN D3, 1,000 UNIT (25 MCG) TABLET] Take 1,000 Units by mouth daily., Disp: , Rfl:      cyanocobalamin (VITAMIN B-12) 250 mcg half-tab, Take 1 capsule by mouth daily, Disp: , Rfl:      folic acid (FOLVITE) 1 MG tablet, [FOLIC ACID (FOLVITE) 1 MG TABLET] TK 1 T PO ONCE DAILY., Disp: , Rfl: 2     hydrocortisone (CORTISONE, HYDROCORTISONE,) 1 % cream, [HYDROCORTISONE (CORTISONE, HYDROCORTISONE,) 1 % CREAM] Apply to hand three times a day, Disp: 30 g, Rfl: 2     isosorbide mononitrate (ISMO,MONOKET) 10 MG tablet, [ISOSORBIDE MONONITRATE (ISMO,MONOKET) 10 MG TABLET] Take 1 tablet (10 mg total) by mouth 2 (two) times a day., Disp: 180 tablet, Rfl: 3     leflunomide (ARAVA) 20 MG tablet, [LEFLUNOMIDE (ARAVA) 20 MG TABLET] Take 20 mg by  "mouth daily. , Disp: , Rfl:      levothyroxine (SYNTHROID, LEVOTHROID) 75 MCG tablet, [LEVOTHYROXINE (SYNTHROID, LEVOTHROID) 75 MCG TABLET] TAKE 1 TABLET BY MOUTH DAILY AT 6 AM, Disp: 90 tablet, Rfl: 3     loratadine (CLARITIN) 10 mg tablet, [LORATADINE (CLARITIN) 10 MG TABLET] Take 10 mg by mouth daily., Disp: , Rfl:      nystatin (MYCOSTATIN) cream, [NYSTATIN (MYCOSTATIN) CREAM] Apply 1 application topically 2 (two) times a day as needed., Disp: , Rfl: 1     omeprazole (PRILOSEC) 20 MG capsule, [OMEPRAZOLE (PRILOSEC) 20 MG CAPSULE] Take 20 mg by mouth Daily before breakfast., Disp: , Rfl:      oxyCODONE (OXYCONTIN) 10 MG 12 hr tablet, Take 3 tablets (30 mg) by mouth At Bedtime, Disp: 90 tablet, Rfl: 0     oxyCODONE (OXYCONTIN) 40 MG 12 hr tablet, Take 1 tablet (40 mg) by mouth every 24 hours, Disp: 30 tablet, Rfl: 0     triamcinolone (KENALOG) 0.1 % ointment, [TRIAMCINOLONE (KENALOG) 0.1 % OINTMENT] Apply to both legs twice daily, Disp: 30 g, Rfl: 0    Current Facility-Administered Medications:      denosumab (PROLIA) injection 60 mg, 60 mg, Subcutaneous, Once, Jordana Reynolds MD     denosumab (PROLIA) injection 60 mg, 60 mg, Subcutaneous, Q6 Months, Kb Alexis MD, 60 mg at 10/25/21 1416    Is alert with a clear sensorium good eye contact.  Thought process logical.  Gait shows marked kyphosis.        Review of Systems         Objective    BP (!) 149/89   Pulse 103   Ht 1.511 m (4' 11.5\")   Wt 68 kg (150 lb)   BMI 29.79 kg/m    Body mass index is 29.79 kg/m .  Physical Exam         Assessment: History of lumbar surgeries degenerative changes.  She has been stable on this regimen of opioids, reviewed when initially met and has not tolerated a lower dose.    I did discuss with the patient and daughter, that our model has been now to have patients return to her primary care providers, though her regimen may be more complex than usual.  Daughter would like to ask if the primary care provider will take " over as lives just minutes from where they do.  I'm happy to collaborate in that regard.    Total time more than 20 minutes.           Again, thank you for allowing me to participate in the care of your patient.        Sincerely,        LIZZY FOLEY MD

## 2021-11-19 NOTE — PROGRESS NOTES
Subjective   Jacqueline is a 81 year old who presents for the following health issues     Follow-up for lumbar degenerative changes, rheumatoid arthritis.    HPI     She is seen with her daughter.  Reports not much change presently, though concerned that she will be moving a couple of months to a condo, in the same building where her daughter lives.  Daughter reviews having had a lot of appointments, challenge to go back and forth to get her 15 miles from home.  Has had many appointments regarding cataracts.  Daughter also concerned in the senior living where she lives there is not much encouragement for the others to wear masks, wears in the condo where they live there is strict enforcement of masks so she feels be relatively safer.    Radha does indicate her with packing she demonstrates moving forward with the packing and moving may flareup her back.    She has had her cataract surgery, feels her vision is better, may start driving again.    She is no longer taking the methotrexate, continues on Areva through her rheumatologist    She continues on the OxyContin 10 mg 3 at bedtime and 40 mg 1 daily.  No side effects such as constipation.  Feels this regimen helps her maintain her fairly independent living.   reviewed as expected last urine drug test in August.      Current Outpatient Medications:      albuterol (PROAIR HFA;PROVENTIL HFA;VENTOLIN HFA) 90 mcg/actuation inhaler, [ALBUTEROL (PROAIR HFA;PROVENTIL HFA;VENTOLIN HFA) 90 MCG/ACTUATION INHALER] Inhale 2 puffs every 4 (four) hours as needed for wheezing. And cough, Disp: 1 Inhaler, Rfl: 3     amitriptyline (ELAVIL) 25 MG tablet, [AMITRIPTYLINE (ELAVIL) 25 MG TABLET] two tablets by mouth at bedtime, Disp: 60 tablet, Rfl: 5     aspirin 81 mg chewable tablet, [ASPIRIN 81 MG CHEWABLE TABLET] Chew 81 mg daily., Disp: , Rfl:      b complex vitamins tablet, [B COMPLEX VITAMINS TABLET] Take 1 tablet by mouth daily., Disp: , Rfl:      CALCIUM CARBONATE/VITAMIN  D3 (CALCIUM+D ORAL), [CALCIUM CARBONATE/VITAMIN D3 (CALCIUM+D ORAL)] Take 3 tablets by mouth daily., Disp: , Rfl:      carisoprodol (SOMA) 350 MG tablet, Take 1 tablet (350 mg) by mouth 3 times daily as needed for muscle spasms, Disp: 90 tablet, Rfl: 3     cholecalciferol, vitamin D3, 1,000 unit (25 mcg) tablet, [CHOLECALCIFEROL, VITAMIN D3, 1,000 UNIT (25 MCG) TABLET] Take 1,000 Units by mouth daily., Disp: , Rfl:      cyanocobalamin (VITAMIN B-12) 250 mcg half-tab, Take 1 capsule by mouth daily, Disp: , Rfl:      folic acid (FOLVITE) 1 MG tablet, [FOLIC ACID (FOLVITE) 1 MG TABLET] TK 1 T PO ONCE DAILY., Disp: , Rfl: 2     hydrocortisone (CORTISONE, HYDROCORTISONE,) 1 % cream, [HYDROCORTISONE (CORTISONE, HYDROCORTISONE,) 1 % CREAM] Apply to hand three times a day, Disp: 30 g, Rfl: 2     isosorbide mononitrate (ISMO,MONOKET) 10 MG tablet, [ISOSORBIDE MONONITRATE (ISMO,MONOKET) 10 MG TABLET] Take 1 tablet (10 mg total) by mouth 2 (two) times a day., Disp: 180 tablet, Rfl: 3     leflunomide (ARAVA) 20 MG tablet, [LEFLUNOMIDE (ARAVA) 20 MG TABLET] Take 20 mg by mouth daily. , Disp: , Rfl:      levothyroxine (SYNTHROID, LEVOTHROID) 75 MCG tablet, [LEVOTHYROXINE (SYNTHROID, LEVOTHROID) 75 MCG TABLET] TAKE 1 TABLET BY MOUTH DAILY AT 6 AM, Disp: 90 tablet, Rfl: 3     loratadine (CLARITIN) 10 mg tablet, [LORATADINE (CLARITIN) 10 MG TABLET] Take 10 mg by mouth daily., Disp: , Rfl:      nystatin (MYCOSTATIN) cream, [NYSTATIN (MYCOSTATIN) CREAM] Apply 1 application topically 2 (two) times a day as needed., Disp: , Rfl: 1     omeprazole (PRILOSEC) 20 MG capsule, [OMEPRAZOLE (PRILOSEC) 20 MG CAPSULE] Take 20 mg by mouth Daily before breakfast., Disp: , Rfl:      oxyCODONE (OXYCONTIN) 10 MG 12 hr tablet, Take 3 tablets (30 mg) by mouth At Bedtime, Disp: 90 tablet, Rfl: 0     oxyCODONE (OXYCONTIN) 40 MG 12 hr tablet, Take 1 tablet (40 mg) by mouth every 24 hours, Disp: 30 tablet, Rfl: 0     triamcinolone (KENALOG) 0.1 % ointment,  "[TRIAMCINOLONE (KENALOG) 0.1 % OINTMENT] Apply to both legs twice daily, Disp: 30 g, Rfl: 0    Current Facility-Administered Medications:      denosumab (PROLIA) injection 60 mg, 60 mg, Subcutaneous, Once, Jordana Reyonlds MD     denosumab (PROLIA) injection 60 mg, 60 mg, Subcutaneous, Q6 Months, Kb Alexis MD, 60 mg at 10/25/21 1416    Is alert with a clear sensorium good eye contact.  Thought process logical.  Gait shows marked kyphosis.        Review of Systems         Objective    BP (!) 149/89   Pulse 103   Ht 1.511 m (4' 11.5\")   Wt 68 kg (150 lb)   BMI 29.79 kg/m    Body mass index is 29.79 kg/m .  Physical Exam         Assessment: History of lumbar surgeries degenerative changes.  She has been stable on this regimen of opioids, reviewed when initially met and has not tolerated a lower dose.    I did discuss with the patient and daughter, that our model has been now to have patients return to her primary care providers, though her regimen may be more complex than usual.  Daughter would like to ask if the primary care provider will take over as lives just minutes from where they do.  I'm happy to collaborate in that regard.    Total time more than 20 minutes.       "

## 2021-12-22 DIAGNOSIS — M54.32 BACK PAIN WITH LEFT-SIDED SCIATICA: ICD-10-CM

## 2021-12-22 NOTE — TELEPHONE ENCOUNTER
Reason for Call:  Medication or medication refill:    Do you use a [facility]?  Name of the pharmacy and phone number for the current request:    [address]    Name of the medication requested:   Oxycotin 40mg    Other request: n/a    Can we leave a detailed message on this number? YES    Phone number patient can be reached at: Home number on file [phone] (home)    Best Time: anytime    Call taken on 12/22/2021 at 2:05 PM by Monie Frederick

## 2021-12-23 NOTE — TELEPHONE ENCOUNTER
This was the plan for on the AVS 11/19/21  Discussed you may review primary care provider managing the opioid regimen if comfortable, otherwise follow-up with Dr. Ramirez in 3 months.  Do you want the  to schedule in 3 months with Dr Ramirez?

## 2021-12-23 NOTE — TELEPHONE ENCOUNTER
Last dispense date of 11/19/21-30 days    Last apt with BE: 11/19/21    UDS - 8/2021  CSA = 6/11/2021    Patient needs to schedule a follow up apt    Pending Prescriptions:                       Disp   Refills    oxyCODONE (OXYCONTIN) 40 MG 12 hr tablet  30 tab*0            Sig: Take 1 tablet (40 mg) by mouth every 24 hours    [facility]

## 2021-12-27 DIAGNOSIS — M54.32 BACK PAIN WITH LEFT-SIDED SCIATICA: Primary | ICD-10-CM

## 2021-12-27 RX ORDER — OXYCODONE HCL 40 MG/1
TABLET, FILM COATED, EXTENDED RELEASE ORAL
Qty: 28 TABLET | Refills: 0 | Status: SHIPPED | OUTPATIENT
Start: 2021-12-27 | End: 2022-02-18

## 2022-01-05 DIAGNOSIS — I45.4 BUNDLE BRANCH BLOCK: ICD-10-CM

## 2022-01-07 ENCOUNTER — TELEPHONE (OUTPATIENT)
Dept: INTERNAL MEDICINE | Facility: CLINIC | Age: 82
End: 2022-01-07
Payer: MEDICARE

## 2022-01-07 RX ORDER — ISOSORBIDE MONONITRATE 10 MG/1
TABLET ORAL
Qty: 180 TABLET | Refills: 3 | Status: ON HOLD | OUTPATIENT
Start: 2022-01-07 | End: 2022-03-09

## 2022-01-07 NOTE — TELEPHONE ENCOUNTER
"Routing refill request to provider for review/approval because:  BP not in range.  Early refill requested.    Last Written Prescription Date:  3/8/21  Last Fill Quantity: 180,  # refills: 3   Last office visit provider:  9/16/21     Requested Prescriptions   Pending Prescriptions Disp Refills     isosorbide mononitrate (ISMO/MONOKET) 10 MG tablet [Pharmacy Med Name: ISOSORBIDE MONONITRATE 10MG TABS] 180 tablet 3     Sig: TAKE 1 TABLET(10 MG) BY MOUTH TWICE DAILY       Nitrates Failed - 1/5/2022  5:15 AM        Failed - Blood pressure under 140/90 in past 12 months     BP Readings from Last 3 Encounters:   11/19/21 (!) 149/89   11/03/21 122/74   10/06/21 118/62                 Passed - Pt is not on erectile dysfunction medications        Passed - Recent (12 mo) or future (30 days) visit within the authorizing provider's specialty     Patient has had an office visit with the authorizing provider or a provider within the authorizing providers department within the previous 12 mos or has a future within next 30 days. See \"Patient Info\" tab in inbasket, or \"Choose Columns\" in Meds & Orders section of the refill encounter.              Passed - Medication is active on med list        Passed - Patient is age 18 or older             Yohannes Rodríguez RN 01/07/22 11:47 AM  "

## 2022-01-07 NOTE — TELEPHONE ENCOUNTER
Patient calling to request refill of oxycodone.     [id number] - [place], [place] - [address]  AT [address] [facility]  [phone]

## 2022-01-10 NOTE — TELEPHONE ENCOUNTER
Spoke with Jacqueline and she will check with brady as it does show Dr Ramirez sent in a rx 12/27 and she was not aware.

## 2022-01-11 ENCOUNTER — TELEPHONE (OUTPATIENT)
Dept: PALLIATIVE MEDICINE | Facility: OTHER | Age: 82
End: 2022-01-11
Payer: MEDICARE

## 2022-01-11 NOTE — TELEPHONE ENCOUNTER
PA Initiation 1/10/2022    Medication: Oxycontin 40 mg is nonformulary  Insurance Company:  [place]  Pharmacy Filling the Rx: [facility]    Filling Pharmacy Phone:  [phone]  Filling Pharmacy Fax:  [phone]  Start Date:  12/27/2021

## 2022-01-19 ENCOUNTER — TELEPHONE (OUTPATIENT)
Dept: INTERNAL MEDICINE | Facility: CLINIC | Age: 82
End: 2022-01-19
Payer: MEDICARE

## 2022-01-19 NOTE — TELEPHONE ENCOUNTER
01/18/22 received a home health certification for this pt from [facility], put inmailbox downstairs to be brought up on Thursday to sign

## 2022-01-21 RX ORDER — OXYCODONE HCL 40 MG/1
40 TABLET, FILM COATED, EXTENDED RELEASE ORAL EVERY 24 HOURS
Qty: 30 TABLET | Refills: 0 | Status: ON HOLD | OUTPATIENT
Start: 2022-01-21 | End: 2022-03-09

## 2022-02-07 ENCOUNTER — TELEPHONE (OUTPATIENT)
Dept: INTERNAL MEDICINE | Facility: CLINIC | Age: 82
End: 2022-02-07

## 2022-02-07 DIAGNOSIS — M54.32 BACK PAIN WITH LEFT-SIDED SCIATICA: ICD-10-CM

## 2022-02-07 NOTE — TELEPHONE ENCOUNTER
Reason for Call:  Medication or medication refill:    Do you use a [facility]?  Name of the pharmacy and phone number for the current request:    [facility] [place]    Name of the medication requested:   Oxycodone 10mg   Carisoprodol 350mg    Other request: n/a    Can we leave a detailed message on this number? YES    Phone number patient can be reached at: Cell number on file:    Telephone Information:   Mobile [phone]       Best Time: anytime    Call taken on 2/7/2022 at 1:23 PM by Monie Frederick

## 2022-02-10 NOTE — TELEPHONE ENCOUNTER
Medication: carisoprodol/ oxyCODONE    CSA in last year: YES    Random Utox in last year: NO  (if any of the above answer NO - schedule with PCP)     On opioids in addition to benzodiazepines? NO  (if the above answer YES - schedule with PCP every 6 months)           PROVIDER TO PULL THE FOLLOWING FROM  :    1. Last date filled?  2.   Only PCP Prescribing?  3.   Taken as prescribed from physician notes?

## 2022-02-11 RX ORDER — CARISOPRODOL 350 MG/1
350 TABLET ORAL 3 TIMES DAILY PRN
Qty: 90 TABLET | Refills: 3 | Status: ON HOLD | OUTPATIENT
Start: 2022-02-11 | End: 2022-03-09

## 2022-02-11 RX ORDER — OXYCODONE HCL 10 MG/1
30 TABLET, FILM COATED, EXTENDED RELEASE ORAL AT BEDTIME
Qty: 90 TABLET | Refills: 0 | Status: SHIPPED | OUTPATIENT
Start: 2022-02-11 | End: 2022-02-18

## 2022-02-18 ENCOUNTER — VIRTUAL VISIT (OUTPATIENT)
Dept: INTERNAL MEDICINE | Facility: CLINIC | Age: 82
End: 2022-02-18
Payer: MEDICARE

## 2022-02-18 DIAGNOSIS — M79.89 TOE SWELLING: Primary | ICD-10-CM

## 2022-02-18 DIAGNOSIS — I10 ESSENTIAL HYPERTENSION: ICD-10-CM

## 2022-02-18 DIAGNOSIS — I89.0 ACQUIRED LYMPHEDEMA OF LEG: ICD-10-CM

## 2022-02-18 DIAGNOSIS — F11.20 OPIOID TYPE DEPENDENCE, CONTINUOUS (H): ICD-10-CM

## 2022-02-18 DIAGNOSIS — G89.4 CHRONIC PAIN SYNDROME: ICD-10-CM

## 2022-02-18 DIAGNOSIS — E03.9 ACQUIRED HYPOTHYROIDISM: ICD-10-CM

## 2022-02-18 DIAGNOSIS — M81.0 AGE-RELATED OSTEOPOROSIS WITHOUT CURRENT PATHOLOGICAL FRACTURE: ICD-10-CM

## 2022-02-18 DIAGNOSIS — M54.32 BACK PAIN WITH LEFT-SIDED SCIATICA: ICD-10-CM

## 2022-02-18 PROCEDURE — 99213 OFFICE O/P EST LOW 20 MIN: CPT | Mod: 95 | Performed by: INTERNAL MEDICINE

## 2022-02-18 RX ORDER — CEPHALEXIN 500 MG/1
500 CAPSULE ORAL 3 TIMES DAILY
Qty: 30 CAPSULE | Refills: 0 | Status: ON HOLD | OUTPATIENT
Start: 2022-02-18 | End: 2022-03-09

## 2022-02-18 RX ORDER — OXYCODONE HCL 10 MG/1
10 TABLET, FILM COATED, EXTENDED RELEASE ORAL EVERY 12 HOURS
Qty: 60 TABLET | Refills: 0 | Status: ON HOLD
Start: 2022-02-18 | End: 2022-03-09

## 2022-02-18 NOTE — PROGRESS NOTES
Jacqueline is a 81 year old female contacting the clinic today via video, who will use the platform: Postify for the visit.  Phone # for Doximity, or if Ammeg drops:   Telephone Information:   Mobile [phone]          ASSESSMENT and PLAN:  1. Toe swelling  Second toe swelling and tenderness-appears to be caused by hammertoes with some component of cellulitis.  Verified by her son-in-law who is a surgeon.  Recommendation: Continue with Epsom salts soaks and will start Keflex-prescription ordered.  - cephALEXin (KEFLEX) 500 MG capsule; Take 1 capsule (500 mg) by mouth 3 times daily  Dispense: 30 capsule; Refill: 0  - CBC with Platelets & Differential; Future    2. Opioid Dependence With Continuous Use  She is on chronic oxycodone for chronic pain syndrome related to her inflammatory arthritis/fibromyalgia and musculoskeletal pain.  She is current on her prescriptions.  Last urine done in August.  She does not have a Narcan nasal spray for emergency and this will be provided.  - naloxone (NARCAN) 4 MG/0.1ML nasal spray; Spray 1 spray (4 mg) into one nostril alternating nostrils once as needed for opioid reversal every 2-3 minutes until assistance arrives  Dispense: 0.2 mL; Refill: 0  - Comprehensive metabolic panel; Future    3. Chronic pain syndrome  As above    4. Acquired hypothyroidism  Labs will be drawn in the spring when she comes for Prolia  - TSH with free T4 reflex; Future    5. Acquired lymphedema of leg  Stable    6. Essential hypertension  No BP today    7. Back pain with left-sided sciatica  She has been able to reduce her oxycodone to 10 mg twice daily as opposed to 3 times daily.  Will reflect that and prescription  - oxyCODONE (OXYCONTIN) 10 MG 12 hr tablet; Take 1 tablet (10 mg) by mouth every 12 hours  Dispense: 60 tablet; Refill: 0    8. Age-related osteoporosis without current pathological fracture  Prolia in April  - Vitamin D Deficiency; Future       Preventive Care Assessed:          Medication  "list carefully reviewed and corrected  Epic Merger Problem list addressed and updated    CHIEF COMPLAINT:  Chief Complaint   Patient presents with     Follow Up     Medication Update     Discuss refills with pcp     Toe Pain       HISTORY OF PRESENT ILLNESS:  Jacqueline is a 81 year old female contacting the clinic today via video for discussion of her usual medical problems and for discussion of toe inflammation.  Of note, she sent a note a couple of days ago stating that she has had a 5-day history of toe swelling.  Is the second left toe.  She has been soaking it.  She denies any systemic symptoms with it.  Her son in law who is a surgeon feels that it is infected and cellulitic.  She has an appointment with Dr. Pedraza next week.  She has a callus that rubs on the area.    With regard to chronic pain management, she has shifted her course to our clinic.  She has been stable on her current regimen has not needed more.  In fact, she is using less short acting cocci codon since she has moved from [place] to her [place] home in [place].    She has no other complaints.  She has osteoporosis but denies any falls or fractures.  She is due for Prolia in April.    REVIEW OF SYSTEMS:         PFSH:  Social History     Social History Narrative    Lives in senior independent living.  .  3 kids.  Graduated from college.        TOBACCO USE:  History   Smoking Status     Former Smoker     Packs/day: 0.50     Types: Cigarettes, Cigarettes     Quit date: 6/1/2018   Smokeless Tobacco     Current User     Comment: started smoking when she was 21 years old, vaping       VITALS:  There were no vitals filed for this visit.  There were no vitals taken for this visit. Estimated body mass index is 29.79 kg/m  as calculated from the following:    Height as of 11/19/21: 1.511 m (4' 11.5\").    Weight as of 11/19/21: 68 kg (150 lb).    PHYSICAL EXAM:  (observations via Video)  She appears well and in no acute distress-toe was only partially " visualized.  There is erythema of the second toe    MEDICATIONS:   Current Outpatient Medications   Medication Sig Dispense Refill     albuterol (PROAIR HFA;PROVENTIL HFA;VENTOLIN HFA) 90 mcg/actuation inhaler [ALBUTEROL (PROAIR HFA;PROVENTIL HFA;VENTOLIN HFA) 90 MCG/ACTUATION INHALER] Inhale 2 puffs every 4 (four) hours as needed for wheezing. And cough 1 Inhaler 3     amitriptyline (ELAVIL) 25 MG tablet [AMITRIPTYLINE (ELAVIL) 25 MG TABLET] two tablets by mouth at bedtime 60 tablet 5     aspirin 81 mg chewable tablet [ASPIRIN 81 MG CHEWABLE TABLET] Chew 81 mg daily.       b complex vitamins tablet [B COMPLEX VITAMINS TABLET] Take 1 tablet by mouth daily.       CALCIUM CARBONATE/VITAMIN D3 (CALCIUM+D ORAL) [CALCIUM CARBONATE/VITAMIN D3 (CALCIUM+D ORAL)] Take 3 tablets by mouth daily.       carisoprodol (SOMA) 350 MG tablet Take 1 tablet (350 mg) by mouth 3 times daily as needed for muscle spasms 90 tablet 3     cholecalciferol, vitamin D3, 1,000 unit (25 mcg) tablet [CHOLECALCIFEROL, VITAMIN D3, 1,000 UNIT (25 MCG) TABLET] Take 1,000 Units by mouth daily.       cyanocobalamin (VITAMIN B-12) 250 mcg half-tab Take 1 capsule by mouth daily       folic acid (FOLVITE) 1 MG tablet [FOLIC ACID (FOLVITE) 1 MG TABLET] TK 1 T PO ONCE DAILY.  2     hydrocortisone (CORTISONE, HYDROCORTISONE,) 1 % cream [HYDROCORTISONE (CORTISONE, HYDROCORTISONE,) 1 % CREAM] Apply to hand three times a day 30 g 2     isosorbide mononitrate (ISMO/MONOKET) 10 MG tablet TAKE 1 TABLET(10 MG) BY MOUTH TWICE DAILY 180 tablet 3     leflunomide (ARAVA) 20 MG tablet [LEFLUNOMIDE (ARAVA) 20 MG TABLET] Take 20 mg by mouth daily.        levothyroxine (SYNTHROID, LEVOTHROID) 75 MCG tablet [LEVOTHYROXINE (SYNTHROID, LEVOTHROID) 75 MCG TABLET] TAKE 1 TABLET BY MOUTH DAILY AT 6 AM 90 tablet 3     loratadine (CLARITIN) 10 mg tablet [LORATADINE (CLARITIN) 10 MG TABLET] Take 10 mg by mouth daily.       nystatin (MYCOSTATIN) cream [NYSTATIN (MYCOSTATIN) CREAM]  Apply 1 application topically 2 (two) times a day as needed.  1     omeprazole (PRILOSEC) 20 MG capsule [OMEPRAZOLE (PRILOSEC) 20 MG CAPSULE] Take 20 mg by mouth Daily before breakfast.       oxyCODONE (OXYCONTIN) 10 MG 12 hr tablet Take 3 tablets (30 mg) by mouth At Bedtime 90 tablet 0     oxyCODONE (OXYCONTIN) 40 MG 12 hr tablet Take 1 tablet (40 mg) by mouth every 24 hours 30 tablet 0     triamcinolone (KENALOG) 0.1 % ointment [TRIAMCINOLONE (KENALOG) 0.1 % OINTMENT] Apply to both legs twice daily 30 g 0     oxyCODONE (OXYCONTIN) 40 MG 12 hr tablet Take 1 tablet (40 mg) by mouth every 24 hours for 28 days, THEN 1 tablet (40 mg) every 24 hours for 28 days. 28 tablet 0       Outside Notes summarized:   Labs, x-rays, cardiology, GI tests reviewed:   Recent Labs   Lab Test 09/16/21  1041      POTASSIUM 3.9   CR 0.78     No results found for: HHBOH84SCD  No results found for: VITDT  No components found for: VITD  Lab Results   Component Value Date    CHOL 155 07/11/2018     New orders: No orders of the defined types were placed in this encounter.      Independent review of:    Supplemental history by: Son in law Dr. Livan Curiel    Patient has given verbal consent to a Video visit?  Yes  How would you like to obtain your AVS?  MyChart  Will anyone else be joining your video visit? No       Video-Visit Details  Type of service:  Video Visit  Originating Location (pt. Location): Home  Distant Location (provider location):   [facility]    Ghaad SHERICE Mason  [facility]    Video Start Time: 1:11 PM  Video End time:  1:24 PM  Face to face plus orders: 15 minutes  Documentation time:  3 minutes     The visit lasted a total of 18 minutes        Answers for HPI/ROS submitted by the patient on 2/17/2022  How many servings of fruits and vegetables do you eat daily?: 0-1  On average, how many sweetened beverages do you drink each day (Examples: soda, juice, sweet tea,  etc.  Do NOT count diet or artificially sweetened beverages)?: 0  How many minutes a day do you exercise enough to make your heart beat faster?: 9 or less  How many days a week do you exercise enough to make your heart beat faster?: 3 or less  How many days per week do you miss taking your medication?: 0  What is the reason for your visit today?: Med Check and Toe Ulcer  When did your symptoms begin?: 1-2 weeks ago  What are your symptoms?: swollen, red toe with ulcer  How would you describe these symptoms?: Moderate  Are your symptoms:: Staying the same  Have you had these symptoms before?: No  Is there anything that makes you feel worse?: walking  Is there anything that makes you feel better?: soaking my foot and elevating

## 2022-02-23 ENCOUNTER — DOCUMENTATION ONLY (OUTPATIENT)
Dept: PODIATRY | Facility: CLINIC | Age: 82
End: 2022-02-23
Payer: MEDICARE

## 2022-02-23 ENCOUNTER — OFFICE VISIT (OUTPATIENT)
Dept: VASCULAR SURGERY | Facility: CLINIC | Age: 82
End: 2022-02-23
Attending: PODIATRIST
Payer: MEDICARE

## 2022-02-23 ENCOUNTER — PREP FOR PROCEDURE (OUTPATIENT)
Dept: PODIATRY | Facility: CLINIC | Age: 82
End: 2022-02-23
Payer: MEDICARE

## 2022-02-23 ENCOUNTER — ANCILLARY PROCEDURE (OUTPATIENT)
Dept: VASCULAR ULTRASOUND | Facility: CLINIC | Age: 82
End: 2022-02-23
Attending: PODIATRIST
Payer: MEDICARE

## 2022-02-23 VITALS — SYSTOLIC BLOOD PRESSURE: 110 MMHG | DIASTOLIC BLOOD PRESSURE: 62 MMHG | TEMPERATURE: 97.8 F | HEART RATE: 80 BPM

## 2022-02-23 DIAGNOSIS — Z20.822 COVID-19 RULED OUT: ICD-10-CM

## 2022-02-23 DIAGNOSIS — L97.524 ULCER OF LEFT FOOT WITH NECROSIS OF BONE (H): Primary | ICD-10-CM

## 2022-02-23 DIAGNOSIS — I73.9 PAD (PERIPHERAL ARTERY DISEASE) (H): ICD-10-CM

## 2022-02-23 DIAGNOSIS — M86.9 OSTEOMYELITIS OF ANKLE AND FOOT (H): Primary | ICD-10-CM

## 2022-02-23 PROCEDURE — 99204 OFFICE O/P NEW MOD 45 MIN: CPT | Mod: 25 | Performed by: PODIATRIST

## 2022-02-23 PROCEDURE — 87077 CULTURE AEROBIC IDENTIFY: CPT | Performed by: PODIATRIST

## 2022-02-23 PROCEDURE — 11044 DBRDMT BONE 1ST 20 SQ CM/<: CPT | Performed by: PODIATRIST

## 2022-02-23 PROCEDURE — 93922 UPR/L XTREMITY ART 2 LEVELS: CPT

## 2022-02-23 PROCEDURE — G0463 HOSPITAL OUTPT CLINIC VISIT: HCPCS | Mod: 25

## 2022-02-23 RX ORDER — CEPHALEXIN 500 MG/1
500 CAPSULE ORAL 3 TIMES DAILY
Qty: 30 CAPSULE | Refills: 0 | Status: SHIPPED | OUTPATIENT
Start: 2022-02-23 | End: 2022-03-07

## 2022-02-23 RX ORDER — CEFAZOLIN SODIUM/WATER 2 G/20 ML
2 SYRINGE (ML) INTRAVENOUS
Status: CANCELLED | OUTPATIENT
Start: 2022-03-08

## 2022-02-23 RX ORDER — CEFAZOLIN SODIUM/WATER 2 G/20 ML
2 SYRINGE (ML) INTRAVENOUS SEE ADMIN INSTRUCTIONS
Status: CANCELLED | OUTPATIENT
Start: 2022-03-08

## 2022-02-23 ASSESSMENT — PAIN SCALES - GENERAL: PAINLEVEL: EXTREME PAIN (8)

## 2022-02-23 NOTE — PROGRESS NOTES
FOOT AND ANKLE SURGERY/PODIATRY CONSULT NOTE        ASSESSMENT:   Ulceration 2nd digit left foot  Cellulitis left foot  Hammertoe      TREATMENT:  -The ulceration along the medial 2nd digit left foot at PIPJ probes to bone with localized erythema, no active drainage.     -I reviewed the above with the patient today and discussed that the presence of exposed bone does typically indicate osteomyelitis. Due to the presence of osteomyelitis, I reviewed the treatment options to include either 6 weeks IV antibiotics with Infectious Disease/surgical debridement with removal of bone/use of wound vac/non-weight bearing vs amputation of the involved bone. She would like to proceed with amputation at this time for a more definitive procedure and shorter healing time.    -We reviewed post-op course to include strict non-weight bearing x3-4 weeks or until fully healed. After receiving permission, I did discuss the above with her son-in-law who is currently out of town. He would prefer that we delay surgery until he returns on 3/7 and is able, along with family members, to assist with post-op cares. I think this is reasonable given how stable the toe appears.     -I have asked her to finish current course of keflex. I will extend keflex for an additional 10 days. Wound culture obtained today.    -I have referred her for ABELARDO's which were completed today and indicate good TBI.     -After discussion of risk factors and consent obtained 2% Lidocaine HCL jelly was applied, under clean conditions, the left and foot ulceration(s) were debrided using into bone. Devitalized and nonviable tissue, along with any fibrin and slough, was removed to improve granulation tissue formation, stimulate wound healing, decrease overall bacteria load, disrupt biofilm formation and decrease edge senescence. Wound drainage was scant No. Total excisional debridement was 0.12 sq cm bone with a depth of 0.5 cm.   Ulcers were improved afterwards and  .  Measures were as noted on the flow sheet. A gauze dressing was applied. She will continue to apply a gauze dressing qday.      Does the patient have mobility limitation significantly impairs his/her ability to participate in one or more mobility-related activities of daily living such as toileting, dressing, grooming, and bathing in customary locations in the home?  Yes       Can the patient's mobility limitation be sufficiently resolved by the use of an appropriately fitted cane, crutches or walker?  No   Patient cannot use a cane, crutches, or walker due to the need for non-weight bearing status.       Does the patient's home provide adequate access between rooms, maneuvering space, and surfaces for the use of a manual wheelchair?  Yes       Is the patient or caregiver able to safely use/maneuver the wheelchair and expressed willingness to use the manual wheelchair in the home on a regular basis?  Yes      Can the patient's functional mobility deficit be sufficiently resolved by the use of a manual wheelchair and significantly improve the patient's ability to participate in mobility-related activities of daily living?  Yes       Does the patient have sufficient upper extremity function and other physical and mental capabilities needed to safely propel the manual wheel chair during a typical day?  Yes      Does the patient have a caregiver who is willing, available, and is able to provide assistance with the wheelchair.  Yes       If a standard colin-wheelchair is ordered does the patient require a lower seat height because of short stature or to enable the patient to place his/her feet on the ground for propulsion?  No       If a lightweight wheelchair is ordered is the patient unable to self-propel in a standard weight wheelchair and would be able to self-propel in a lightweight chair?  Yes       Does your patient require height adjustable arms because they require an arm height that is different than  that available using nonadjustable arms?  No       Does your patient spend more than 2 hours per day in the wheelchair?  Yes      Does your patient require a safety belt because they have weak upper body muscles, upper body instability, muscle spasticity which requires use of this item for positioning? No        Does the beneficiary self-propel and require anti-rollback devices because of ramps?  No       If the patient is in need of a bariatric wheelchair is their weight over 250 lbs?  No       Does the patient require elevating leg rests?   Yes   Elevating leg rests are needed due to a musculoskeletal condition or presence of a cast or brace which prevents 90 degree flexion at the knee.    -She will follow-up with me in one week. I will ask my office to coordinate surgery at [facility] later in the week of 3/7.     Kendrick Pedraza DPM  [facility] [place]      HPI: Jacqueline Prado was seen today for a sore with infection on the 2nd digit left foot. The patient states she first noticed the sore last week and was placed on Keflex by Dr. Reynolds on 2/18. She denies previous foot ulcers and states that she would like to have the toe removed. She lives in a condo on her own. PMH significant for RA.     Past Medical History:   Diagnosis Date     Bundle branch block     Created by Conversion  Replacement Utility updated for latest IMO load     Cellulitis      Chronic venous stasis dermatitis      Impetigo      Rheumatoid arthritis (H)        Past Surgical History:   Procedure Laterality Date     BREAST CYST ASPIRATION Left 2000     HC REMOVAL GALLBLADDER      Description: Cholecystectomy;  Recorded: 07/22/2009;     IR LUMBAR EPIDURAL STEROID INJECTION  6/23/2003     IR MISCELLANEOUS PROCEDURE  12/1/2006     CT INJECT VERTEBRAL BODY, LUMBAR      Description: Spinal Percutaneous Vertebroplasty, Injection Lumbar;  Recorded: 11/03/2011;  Annotations: L5     ZZC EXCIS CERV DISK,ONE LEVEL      Description:  Laminectomy With Disc Removal;  Recorded: 11/03/2011;  Annotations: L5-S1        Allergies   Allergen Reactions     Tetracyclines Rash     Acetaminophen Unknown     Baclofen Nausea     Celecoxib Unknown     Erythromycin Base [Erythromycin] Unknown     Nsaids (Non-Steroidal Anti-Inflammatory Drug) [Nsaids] Unknown     Pentolinium Itching     Varenicline Itching and Swelling     Erythromycin Rash     Petroleum Jelly [Petrolatum] Itching and Rash         Current Outpatient Medications:      albuterol (PROAIR HFA;PROVENTIL HFA;VENTOLIN HFA) 90 mcg/actuation inhaler, [ALBUTEROL (PROAIR HFA;PROVENTIL HFA;VENTOLIN HFA) 90 MCG/ACTUATION INHALER] Inhale 2 puffs every 4 (four) hours as needed for wheezing. And cough, Disp: 1 Inhaler, Rfl: 3     amitriptyline (ELAVIL) 25 MG tablet, [AMITRIPTYLINE (ELAVIL) 25 MG TABLET] two tablets by mouth at bedtime, Disp: 60 tablet, Rfl: 5     aspirin 81 mg chewable tablet, [ASPIRIN 81 MG CHEWABLE TABLET] Chew 81 mg daily., Disp: , Rfl:      b complex vitamins tablet, [B COMPLEX VITAMINS TABLET] Take 1 tablet by mouth daily., Disp: , Rfl:      CALCIUM CARBONATE/VITAMIN D3 (CALCIUM+D ORAL), [CALCIUM CARBONATE/VITAMIN D3 (CALCIUM+D ORAL)] Take 3 tablets by mouth daily., Disp: , Rfl:      carisoprodol (SOMA) 350 MG tablet, Take 1 tablet (350 mg) by mouth 3 times daily as needed for muscle spasms, Disp: 90 tablet, Rfl: 3     cephALEXin (KEFLEX) 500 MG capsule, Take 1 capsule (500 mg) by mouth 3 times daily, Disp: 30 capsule, Rfl: 0     cholecalciferol, vitamin D3, 1,000 unit (25 mcg) tablet, [CHOLECALCIFEROL, VITAMIN D3, 1,000 UNIT (25 MCG) TABLET] Take 1,000 Units by mouth daily., Disp: , Rfl:      cyanocobalamin (VITAMIN B-12) 250 mcg half-tab, Take 1 capsule by mouth daily, Disp: , Rfl:      folic acid (FOLVITE) 1 MG tablet, [FOLIC ACID (FOLVITE) 1 MG TABLET] TK 1 T PO ONCE DAILY., Disp: , Rfl: 2     hydrocortisone (CORTISONE, HYDROCORTISONE,) 1 % cream, [HYDROCORTISONE (CORTISONE,  HYDROCORTISONE,) 1 % CREAM] Apply to hand three times a day, Disp: 30 g, Rfl: 2     isosorbide mononitrate (ISMO/MONOKET) 10 MG tablet, TAKE 1 TABLET(10 MG) BY MOUTH TWICE DAILY, Disp: 180 tablet, Rfl: 3     leflunomide (ARAVA) 20 MG tablet, [LEFLUNOMIDE (ARAVA) 20 MG TABLET] Take 20 mg by mouth daily. , Disp: , Rfl:      levothyroxine (SYNTHROID, LEVOTHROID) 75 MCG tablet, [LEVOTHYROXINE (SYNTHROID, LEVOTHROID) 75 MCG TABLET] TAKE 1 TABLET BY MOUTH DAILY AT 6 AM, Disp: 90 tablet, Rfl: 3     loratadine (CLARITIN) 10 mg tablet, [LORATADINE (CLARITIN) 10 MG TABLET] Take 10 mg by mouth daily., Disp: , Rfl:      naloxone (NARCAN) 4 MG/0.1ML nasal spray, Spray 1 spray (4 mg) into one nostril alternating nostrils once as needed for opioid reversal every 2-3 minutes until assistance arrives, Disp: 0.2 mL, Rfl: 0     nystatin (MYCOSTATIN) cream, [NYSTATIN (MYCOSTATIN) CREAM] Apply 1 application topically 2 (two) times a day as needed., Disp: , Rfl: 1     omeprazole (PRILOSEC) 20 MG capsule, [OMEPRAZOLE (PRILOSEC) 20 MG CAPSULE] Take 20 mg by mouth Daily before breakfast., Disp: , Rfl:      oxyCODONE (OXYCONTIN) 10 MG 12 hr tablet, Take 1 tablet (10 mg) by mouth every 12 hours, Disp: 60 tablet, Rfl: 0     oxyCODONE (OXYCONTIN) 40 MG 12 hr tablet, Take 1 tablet (40 mg) by mouth every 24 hours, Disp: 30 tablet, Rfl: 0     triamcinolone (KENALOG) 0.1 % ointment, [TRIAMCINOLONE (KENALOG) 0.1 % OINTMENT] Apply to both legs twice daily, Disp: 30 g, Rfl: 0    Current Facility-Administered Medications:      denosumab (PROLIA) injection 60 mg, 60 mg, Subcutaneous, Once, Jordana Reynolds MD     denosumab (PROLIA) injection 60 mg, 60 mg, Subcutaneous, Q6 Months, Kb Alexis MD, 60 mg at 10/25/21 1416    Social History     Social History Narrative    Lives in senior independent living.  .  3 kids.  Graduated from college.        Family History   Problem Relation Age of Onset     Breast Cancer Sister 72.00     Leukemia  Sister        Review of Systems - 10 point Review of Systems is negative except for left foot ulcer which is noted in HPI.      OBJECTIVE:  Appearance: alert, well appearing, and in no distress.    /62   Pulse 80   Temp 97.8  F (36.6  C)     BMI= There is no height or weight on file to calculate BMI.    General appearance: Patient is alert and fully cooperative with history & exam.  No sign of distress is noted during the visit.     Psychiatric: Affect is pleasant & appropriate.  Patient appears motivated to improve health.     Respiratory: Breathing is regular & unlabored while sitting.     HEENT: Hearing is intact to spoken word.  Speech is clear.  No gross evidence of visual impairment that would impact ambulation.    Vascular: Dorsalis pedis palpableLeft.  Dermatologic:   VASC Wound Left shin (Active)       VASC Wound left second toe  (Active)   Post Size Length 0.4 02/23/22 1400   Post Size Width 0.3 02/23/22 1400   Post Size Depth 0.5 02/23/22 1400   Post Total Sq cm 0.12 02/23/22 1400   Ulceration medial 2nd digit left foot at PIPJ probes to bone with localized erythema, no active drainage.   Neurologic: Diminished to light touch Left.  Musculoskeletal: Contracted digits noted Left.    Imaging:     No results found.

## 2022-02-23 NOTE — PATIENT INSTRUCTIONS
Gauze applied today, change daily and as needed.          Jacqueline,    Your surgery with [facility] has been scheduled. Please read thoroughly and follow instructions.     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  Surgeon:   Dr. Kendrick Pedraza  Admission Type:   Outpatient  Procedure Location:   [facility]:  [address] [place] [id number] (phone: [phone], Fax: [phone])    Covid Test: SEE APPOINTMENTS  Post Operative Appointment: SEE APPOINTMENTS you will need to get an xray of your foot prior to this appointment, please arrive 30 minutes early and report to xray prior to checking in to your appointment with us.      Preparation Instructions to complete:    []  You will need a Pre-op Physical within 30 days before surgery with your primary care provider. This exam is required by the anesthesiologist to ensure a safe surgical experience.      Failure  to obtain your pre-op physical will lead to cancellation of your surgery    Call them right away to schedule this. Please ensure your Preoperative Physical is faxed to the Hospital (numbers listed above)    [] Preoperative Medication Instructions    We would like you to stop your anticoagulation medications 3-5 days before surgery HOWEVER contact your prescribing provider for instructions before discontinuing any medications. (Examples: Coumadin, Plavix, Xarelto, Eliquis, Pradaxa, Effient, Brilinta) If you are on Coumadin, we would like the goal INR ? 1.2.    IT IS OK TO STAY ON ASPIRIN PRIOR TO SURGERY.     Hold Ibuprofen, Herbal Supplements and Vitamin E 7 days before    Stop Cialis, Levitra and Viagra 2-3 days before surgery    [] Fasting Requirements    Nothing to eat for 8 hours before surgery unless instructed differently by the surgery nurse.    You may have clear  "liquids up to two hours before your arrival time (coffee, tea, water, or Gatorade. No cream or milk)    If your primary care provider has instructed you to continue oral medications, you may take them on the morning of surgery with a small sip of water.      No alcohol or smoking after 12:00am the day of surgery    [] PCR COVID-19 test is required within 4 days of surgery    If your test is positive or you fail to get tested, your surgery will be postponed.     [] Contact your insurance regarding coverage    If you would like a Good [place] Estimate for your upcoming procedure at [facility] Location, contact Cost of Care Estimates     Advocates are available Monday through Friday 8am - 5pm   [phone]    You may also submit a request online at [email or web address]  - Complete the secure online form found under \"Services and Procedure Pricing\"     If your procedure is at [facility] please contact the numbers below for Cost of Care Estimates.   - Facility Charge: [phone]    Anesthesiology charge:  [phone]      [] DO NOT BRING FMLA WITH TO SURGERY.  These should be sent to the provider's office by fax to [phone].     [] Day of Surgery    Medications - Take as indicated with sips of water.     Wear comfortable loose fitting clothes. Wear your glasses-Not contacts. Do not wear jewelry and remove body piercing's. Surgery may be cancelled if they are not removed.     You may have 1 family member wait in the lobby during your surgery. Visitor restrictions are subject to change. Please verify with the surgery nurse when they call.     If same day surgery-Have a someone come with you to surgery that can help you understand the surgeon's instructions, drive you home and stay with you overnight the first night.    [] If the community sees a new COVID-19 surge, your procedure may need to be postponed. We will contact you if this happens.    [] You will receive a call from a " surgery nurse 1-3 days prior to surgery. They will go over more details with you.             ** AFTER SURGERY INSTRUCTIONS **    [] You are to remain NON WEIGHT BEARING on your left foot Non-weight bearing means NO PRESSURE on your foot or heel at any time for any reason.    [] Prior to surgery you should already have a POST OP SHOE which you will need to Wear this anytime you are up and out of bed, it is okay to remove when you are sleeping. Please bring this with you on the day of surgery.    [] You will need to obtain a A WHEELCHAIR to help you ambulate after surgery. Please also bring this with you on the day of surgery.    [] During surgery Dr. Pedraza will apply a gauze dressing to your foot. This will remain intact until you are seen in clinic for follow up    [] It is NOT OKAY to get your surgical site wet while bathing, you will need to purchase a cast cover to protect your foot from getting wet. You can purchase this at [facility] or your local pharmacy.    [] It is important that you elevate your affected foot after surgery. Proper elevation is raising your foot above your waist. The fluid in your lower extremities needs to get back to your heart so it can get pumped to your kidneys and expelled through urination. So if you notice you have to go to the bathroom more frequently when you are elevating your leg this is a good sign that it is working.     [] It is important that you increase your protein intake after surgery. Protein is essential for wound healing. We recommend you take a protein supplement twice per day. This is in addition to your normal diet. Examples of protein supplements are Ensure, Boost, Glucerna (if you are diabetic), or protein powder. You can purchase these at your local retailer or grocery store.       Notify our office right away, if you have any changes in your health status, or if you develop a cold, flu, diarrhea, infection, fever or sore throat before your  scheduled surgery date. We can be reached at [phone] Monday-Friday 8 am-4:30 pm if you have any questions.     Thank you for trusting us with your care!        Before Your Procedure or Hospital Admission  Testing for COVID-19 (Coronavirus)    Thank you for choosing [facility] for your health care needs. The COVID-19 pandemic is a very challenging time for everyone. Our goal is to keep you and our team here at [facility] safe and healthy.       Before you come in, All patients must get tested for COVID-19. Your test needs to happen 2 to 4 days before you check in to the hospital or surgery site.    A clinic scheduler will call about a week in advance to set up a testing time at one of our labs. We ll take a swab of your nose or throat.    Note: If you go to a clinic or pharmacy like [facility] or [facility] for your test, make sure you get a test inside the nose. Tests inside the nose are:  - A naso-pharyngeal (NP) RT-PCR test  - An anterior nares RT-PCR test    Do NOT get a  rapid  test or a saliva (spit) test.    After the test, please stay at home and stay out of contact with other people. It will be harder for you to recover if you get COVID-19 before your treatment.    Please follow all current safety guidelines, including:    Limit trips outside your home.    Limit the number of people you see.    Always wear a mask outside your home.    Use social distancing. Stay 6 feet away from others whenever you can.    Wash your hands often.    If your test shows you have COVID-19  If your test is positive, we ll let you know. A positive test means that you have the virus.  We ll probably have to postpone your admission, surgery or procedure. Your doctor will discuss this with you. After that, we ll let you know what to do and when you can re-schedule.  We may need to cancel your treatment on short notice for other reasons, too.    If your test shows you DON T have COVID-19  Even if your test is negative, you  can still get COVID-19. It s rare but, sometimes, the test result is wrong. You could also catch the virus after taking the test.  There s a very small chance that you could catch COVID-19 in the hospital or surgery center.    Day of your surgery or procedure    Please come wearing a face covering that covers both your nose and mouth.    When you arrive, we ll ask you some questions to find out if you have any signs or symptoms of COVID-19.    Ask your care team if you can have visitors. All visitors must wear face coverings and will be screened for signs of COVID-19.    Even if no visitors are allowed, you can still have with you:  - Your legal guardian or legal decision maker  - A parent and one other visitor, if you are  younger than 18 years old  - A partner and a , if you are in labor    We might need to teach you about taking care of yourself after surgery. If so, a visitor can come into the hospital to learn about it, too.    The rules for visitors change often, depending on how much the virus is spreading. To learn more, see Visiting a Loved One in the Hospital during the COVID-19 Outbreak. Please call your care team, hospital or surgery center if you have any questions. We thank you for your understanding and for choosing [facility] for your care.    Questions and Answers  Does it matter where I get tested for COVID-19?  Yes. We urge you to get tested at one of our [facility] COVID-19 testing sites. We process these tests in our lab and can get the results quickly. Your [facility] care team needs to get your results before you check in.    What should I do if I can t get tested at [facility]?  You can get tested somewhere else, but you ll need to take these extra steps:  1. Contact your family doctor or clinic to arrange your test.  2. Take the test within 4 days of your surgery or procedure. We can t accept tests older than 4 days.  3. Make sure you re getting a test inside  the nose.  Tests inside the nose are:  - A naso-pharyngeal (NP) RT-PCR test  - An anterior nares RT-PCR test  -Many pharmacies use  rapid  tests or saliva (spit) tests. We do NOT accept those tests before surgery or procedures. Tests from inside the nose are the most accurate tests.  4. Make sure your doctor or clinic faxes your results to [facility] at [phone].    If we don t get your results in time, we may have to delay or cancel your treatment.      For informational purposes only. Not to replace the advice of your health care provider. Copyright   2020 [facility]. All rights reserved. Clinically reviewed by In[facility] [employer] and the [facility] COVID-19 Clinical Team.  [employer] 247906 - Rev 09/09/21.

## 2022-02-23 NOTE — PROGRESS NOTES
Surgery Instructions given to pt in clinic by Lora MERINO.    Surgery Scheduled    CPT: 16381    Surgery/Procedure: AMPUTATION, second digit left foot (left)    Equipment: pulse lavage.    Location: [facility]:  [address] [place] [address] (phone: [phone], Fax: [phone])    Date: 3/10/2022    Time: 4:30PM    Admission Type: Outpatient    Surgeon: Dr. Pedraza    OR Confirmed/ :  Yes with [facility] on 2/23/22    Orders In:  Yes    Post Op: 3/18/2022    Covid Scheduled: 3/7/2022    Wound Vac Needed: No    Home Care Needed: No    Blood Thinners Addressed: N/A

## 2022-02-23 NOTE — PROGRESS NOTES
Went over surgery instructions with patient in clinic and also with daughter Lia over the phone.  All questions answered and daughter has access to patient's mychart to see appointments and instructions.

## 2022-02-25 ENCOUNTER — TELEPHONE (OUTPATIENT)
Dept: VASCULAR SURGERY | Facility: CLINIC | Age: 82
End: 2022-02-25
Payer: MEDICARE

## 2022-02-25 LAB — BACTERIA SPEC CULT: ABNORMAL

## 2022-02-25 NOTE — TELEPHONE ENCOUNTER
Message sent to Marcie Gordon to approve ok for admission at [facility] after surgery on 3/10 for TCU placement

## 2022-03-03 DIAGNOSIS — Z11.59 ENCOUNTER FOR SCREENING FOR OTHER VIRAL DISEASES: Primary | ICD-10-CM

## 2022-03-03 NOTE — PROGRESS NOTES
Dr. Pedraza is not available on 3/10 for surgery.  With approval from patient's family, surgery was rescheduled for 3/8 at 4:30 PM and covid test for 3/4 at 2:30 PM.  Post op appointments to remain the same.  Spoke to patient's daughter and Clarisonic message sent with updated information for patient and family to review.

## 2022-03-04 ENCOUNTER — LAB (OUTPATIENT)
Dept: LAB | Facility: CLINIC | Age: 82
End: 2022-03-04
Attending: PODIATRIST
Payer: MEDICARE

## 2022-03-04 DIAGNOSIS — Z20.822 COVID-19 RULED OUT: ICD-10-CM

## 2022-03-04 DIAGNOSIS — Z11.59 ENCOUNTER FOR SCREENING FOR OTHER VIRAL DISEASES: ICD-10-CM

## 2022-03-04 PROCEDURE — U0005 INFEC AGEN DETEC AMPLI PROBE: HCPCS

## 2022-03-04 PROCEDURE — U0003 INFECTIOUS AGENT DETECTION BY NUCLEIC ACID (DNA OR RNA); SEVERE ACUTE RESPIRATORY SYNDROME CORONAVIRUS 2 (SARS-COV-2) (CORONAVIRUS DISEASE [COVID-19]), AMPLIFIED PROBE TECHNIQUE, MAKING USE OF HIGH THROUGHPUT TECHNOLOGIES AS DESCRIBED BY CMS-2020-01-R: HCPCS

## 2022-03-05 LAB — SARS-COV-2 RNA RESP QL NAA+PROBE: NEGATIVE

## 2022-03-07 ENCOUNTER — OFFICE VISIT (OUTPATIENT)
Dept: INTERNAL MEDICINE | Facility: CLINIC | Age: 82
End: 2022-03-07
Payer: MEDICARE

## 2022-03-07 VITALS
HEIGHT: 61 IN | WEIGHT: 135.7 LBS | HEART RATE: 84 BPM | DIASTOLIC BLOOD PRESSURE: 70 MMHG | SYSTOLIC BLOOD PRESSURE: 120 MMHG | OXYGEN SATURATION: 97 % | BODY MASS INDEX: 25.62 KG/M2

## 2022-03-07 DIAGNOSIS — E03.9 ACQUIRED HYPOTHYROIDISM: ICD-10-CM

## 2022-03-07 DIAGNOSIS — I10 PRIMARY HYPERTENSION: ICD-10-CM

## 2022-03-07 DIAGNOSIS — M81.0 AGE-RELATED OSTEOPOROSIS WITHOUT CURRENT PATHOLOGICAL FRACTURE: ICD-10-CM

## 2022-03-07 DIAGNOSIS — M06.9 RHEUMATOID ARTHRITIS INVOLVING MULTIPLE SITES, UNSPECIFIED WHETHER RHEUMATOID FACTOR PRESENT (H): ICD-10-CM

## 2022-03-07 DIAGNOSIS — F11.20 OPIOID TYPE DEPENDENCE, CONTINUOUS (H): ICD-10-CM

## 2022-03-07 DIAGNOSIS — L97.529 ULCER OF TOE OF LEFT FOOT, UNSPECIFIED ULCER STAGE (H): ICD-10-CM

## 2022-03-07 DIAGNOSIS — Z01.818 PRE-OP EXAM: Primary | ICD-10-CM

## 2022-03-07 LAB
ALBUMIN SERPL-MCNC: 3.8 G/DL (ref 3.5–5)
ALP SERPL-CCNC: 59 U/L (ref 45–120)
ALT SERPL W P-5'-P-CCNC: 17 U/L (ref 0–45)
ANION GAP SERPL CALCULATED.3IONS-SCNC: 11 MMOL/L (ref 5–18)
AST SERPL W P-5'-P-CCNC: 22 U/L (ref 0–40)
BASOPHILS # BLD AUTO: 0.1 10E3/UL (ref 0–0.2)
BASOPHILS NFR BLD AUTO: 1 %
BILIRUB SERPL-MCNC: 0.5 MG/DL (ref 0–1)
BUN SERPL-MCNC: 16 MG/DL (ref 8–28)
CALCIUM SERPL-MCNC: 9.7 MG/DL (ref 8.5–10.5)
CHLORIDE BLD-SCNC: 106 MMOL/L (ref 98–107)
CO2 SERPL-SCNC: 27 MMOL/L (ref 22–31)
CREAT SERPL-MCNC: 0.82 MG/DL (ref 0.6–1.1)
EOSINOPHIL # BLD AUTO: 0.4 10E3/UL (ref 0–0.7)
EOSINOPHIL NFR BLD AUTO: 4 %
ERYTHROCYTE [DISTWIDTH] IN BLOOD BY AUTOMATED COUNT: 13 % (ref 10–15)
GFR SERPL CREATININE-BSD FRML MDRD: 71 ML/MIN/1.73M2
GLUCOSE BLD-MCNC: 88 MG/DL (ref 70–125)
HCT VFR BLD AUTO: 43.6 % (ref 35–47)
HGB BLD-MCNC: 13.5 G/DL (ref 11.7–15.7)
IMM GRANULOCYTES # BLD: 0 10E3/UL
IMM GRANULOCYTES NFR BLD: 0 %
LYMPHOCYTES # BLD AUTO: 3.5 10E3/UL (ref 0.8–5.3)
LYMPHOCYTES NFR BLD AUTO: 36 %
MCH RBC QN AUTO: 29.7 PG (ref 26.5–33)
MCHC RBC AUTO-ENTMCNC: 31 G/DL (ref 31.5–36.5)
MCV RBC AUTO: 96 FL (ref 78–100)
MONOCYTES # BLD AUTO: 0.9 10E3/UL (ref 0–1.3)
MONOCYTES NFR BLD AUTO: 9 %
NEUTROPHILS # BLD AUTO: 5 10E3/UL (ref 1.6–8.3)
NEUTROPHILS NFR BLD AUTO: 51 %
PLATELET # BLD AUTO: 249 10E3/UL (ref 150–450)
POTASSIUM BLD-SCNC: 4.9 MMOL/L (ref 3.5–5)
PROT SERPL-MCNC: 7.2 G/DL (ref 6–8)
RBC # BLD AUTO: 4.54 10E6/UL (ref 3.8–5.2)
SODIUM SERPL-SCNC: 144 MMOL/L (ref 136–145)
TSH SERPL DL<=0.005 MIU/L-ACNC: 1.39 UIU/ML (ref 0.3–5)
WBC # BLD AUTO: 9.8 10E3/UL (ref 4–11)

## 2022-03-07 PROCEDURE — 80053 COMPREHEN METABOLIC PANEL: CPT | Performed by: INTERNAL MEDICINE

## 2022-03-07 PROCEDURE — 85025 COMPLETE CBC W/AUTO DIFF WBC: CPT | Performed by: INTERNAL MEDICINE

## 2022-03-07 PROCEDURE — 36415 COLL VENOUS BLD VENIPUNCTURE: CPT | Performed by: INTERNAL MEDICINE

## 2022-03-07 PROCEDURE — 99214 OFFICE O/P EST MOD 30 MIN: CPT | Performed by: INTERNAL MEDICINE

## 2022-03-07 PROCEDURE — 84443 ASSAY THYROID STIM HORMONE: CPT | Performed by: INTERNAL MEDICINE

## 2022-03-07 PROCEDURE — 93010 ELECTROCARDIOGRAM REPORT: CPT | Mod: OFF | Performed by: INTERNAL MEDICINE

## 2022-03-07 PROCEDURE — 93005 ELECTROCARDIOGRAM TRACING: CPT | Performed by: INTERNAL MEDICINE

## 2022-03-07 PROCEDURE — 82306 VITAMIN D 25 HYDROXY: CPT | Performed by: INTERNAL MEDICINE

## 2022-03-07 NOTE — TELEPHONE ENCOUNTER
Spoke with daughter Lia. Surgery 3/8/22 at [facility] with admidt post op. Patient received a call reviewed arrival time on 2pm and fasting orders.no blood thinners noted.

## 2022-03-07 NOTE — PROGRESS NOTES
[facility]  [address] [place] [address]  Phone: [phone]  Fax: [phone]  Primary Provider: Jordana Reynolds  :[id number]}  PREOPERATIVE EVALUATION:  Today's date: 3/7/2022    Jacqueline Prado is a 81 year old female who presents for a preoperative evaluation.    Surgical Information:  Surgery/Procedure: Amputation, 2nd digit, left foot  Surgery Location: [facility]  Surgeon: Dr. Pedraza  Surgery Date: 3/8/22  Where patient plans to recover: At a TCU (Transitional Care Unit)  Fax number for surgical facility: Note does not need to be faxed, will be available electronically in Epic.    Type of Anesthesia Anticipated: Local with MAC    Assessment/Plan:     ASSESSMENT and PLAN:  1. Pre-op exam  Patient is medically stable for this low risk surgery.  EKG showed left bundle branch block.  We will check preoperative blood work.  - EKG 12-lead, tracing only    2. Ulcer of toe of left foot, unspecified ulcer stage (H)  History of toe ulcers secondary to deformity secondary to rheumatoid arthritis.  Patient elected amputation.  She did have ABELARDO testing done and there is no evidence of peripheral vascular disease    3. Rheumatoid arthritis involving multiple sites, unspecified whether rheumatoid factor present (H)  She is on Arava and prednisone.  Discussed to hold Arava day of surgery.  She will take regular dose of prednisone morning of surgery.  Since it is a small dose and she recently has been only taking it for 1 week, she does not need stress dose steroids.  After the surgery since there is routine healing of her toe she may restart Arava or as recommended by surgeon  - predniSONE (CHARLENE) 2 MG EC tablet; Take 1 tablet (2 mg) by mouth At Bedtime    4. Primary hypertension  She takes isosorbide at 2 in the afternoon.  Surgery is at 4.  Discussed to hold isosorbide prior to surgery.    5. Opioid Dependence With Continuous Use  Is on long-acting  opioids.  - Comprehensive metabolic panel    6. Age-related osteoporosis without current pathological fracture  - Vitamin D Deficiency    7. Acquired hypothyroidism  She is on Synthroid  - TSH with free T4 reflex      Medication list carefully reviewed and corrected  Epic Merger Problem list addressed and updated         Subjective     HPI related to upcoming procedure:     Jacqueline is an 81-year-old female with history of severe rheumatoid arthritis, chronic pain due to that and chronic narcotic pain medications, hypothyroidism, hypertension and osteoporosis.    She developed left toe ulcer and necrosis due to deformity and currently is here for preoperative evaluation for toe amputation.  Recent ABELARDO testing was negative for peripheral vascular disease.    Previous surgical history significant for cholecystectomy, wrist surgery and cataract surgery.  She denies any bleeding, clotting or anesthesia problems.  No family history of such.    For rheumatoid arthritis she takes Arava medication daily.  She is also on prednisone 2 mg a day.  She has bilateral toe deformities and hand deformities.    For chronic pain she is on OxyContin 40 mg in the morning and 10 mg at bedtime.    She has no history of heart disease and denies any chest pains shortness of breath or palpitations.  She does have hypertension and takes isosorbide twice a day.    No history of asthma or recent upper respiratory infections.  No cough or shortness of breath.  Recent Covid screen was negative.        Preop Questions 3/7/2022   1. Have you ever had a heart attack or stroke? No   2. Have you ever had surgery on your heart or blood vessels, such as a stent placement, a coronary artery bypass, or surgery on an artery in your head, neck, heart, or legs? No   3. Do you have chest pain with activity? No   4. Do you have a history of  heart failure? No   5. Do you currently have a cold, bronchitis or symptoms of other infection? No   6. Do you have a  cough, shortness of breath, or wheezing? No   7. Do you or anyone in your family have previous history of blood clots? No   8. Do you or does anyone in your family have a serious bleeding problem such as prolonged bleeding following surgeries or cuts? No   9. Have you ever had problems with anemia or been told to take iron pills? No   10. Have you had any abnormal blood loss such as black, tarry or bloody stools, or abnormal vaginal bleeding? No   11. Have you ever had a blood transfusion? No   12. Are you willing to have a blood transfusion if it is medically needed before, during, or after your surgery? Yes   13. Have you or any of your relatives ever had problems with anesthesia? No   14. Do you have sleep apnea, excessive snoring or daytime drowsiness? No   14a. Do you have a CPAP machine? -   15. Do you have any artifical heart valves or other implanted medical devices like a pacemaker, defibrillator, or continuous glucose monitor? No   16. Do you have artificial joints? No   17. Are you allergic to latex? No         Patient Active Problem List    Diagnosis Date Noted     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) 02/23/2022     Priority: Medium     Lower leg mass, left 05/15/2019     Priority: Medium     Left leg pain 05/15/2019     Priority: Medium     Venous stasis dermatitis of both lower extremities      Priority: Medium     Acquired lymphedema of leg 10/03/2018     Priority: Medium     Venous hypertension of both lower extremities 06/14/2018     Priority: Medium     Venous insufficiency of both lower extremities 06/14/2018     Priority: Medium     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     Priority: Medium     Rheumatoid arthritis (H)      Priority: Medium     Created by Conversion         Opioid Dependence With Continuous Use      Priority: Medium     Created by Conversion         Hypothyroidism      Priority: Medium     Created by Conversion  Replacement Utility updated for latest IMO load         Esophageal reflux       Priority: Medium     Created by Conversion         HTN (hypertension) 08/07/2017     Priority: Medium     Bundle Branch Block      Priority: Medium     Created by Conversion  Replacement Utility updated for latest IMO load         Secondary Hyperparathyroidism      Priority: Medium     Created by Conversion  Replacement Utility updated for latest IMO load         Osteoporosis      Priority: Medium     Created by Conversion  Replacement Utility updated for latest IMO load         Back pain with left-sided sciatica      Priority: Medium     Created by Conversion         Myalgia and myositis 06/08/2015     Priority: Medium     Postlaminectomy Syndrome (Lumbar)      Priority: Medium     Created by Conversion         Lumbar Disc Degeneration      Priority: Medium     Created by Conversion         Joint Pain, Localized In The Right Shoulder      Priority: Medium     Created by Conversion         Insomnia 07/16/2007     Priority: Medium     Osteoarthritis 07/16/2007     Priority: Medium     Symptomatic menopausal or female climacteric states 07/16/2007     Priority: Medium      Past Medical History:   Diagnosis Date     Bundle branch block     Created by Conversion  Replacement Utility updated for latest IMO load     Cellulitis      Chronic venous stasis dermatitis      Impetigo      Rheumatoid arthritis (H)      Past Surgical History:   Procedure Laterality Date     BREAST CYST ASPIRATION Left 2000     HC REMOVAL GALLBLADDER      Description: Cholecystectomy;  Recorded: 07/22/2009;     IR LUMBAR EPIDURAL STEROID INJECTION  6/23/2003     IR MISCELLANEOUS PROCEDURE  12/1/2006     DE INJECT VERTEBRAL BODY, LUMBAR      Description: Spinal Percutaneous Vertebroplasty, Injection Lumbar;  Recorded: 11/03/2011;  Annotations: L5     ZZC EXCIS CERV DISK,ONE LEVEL      Description: Laminectomy With Disc Removal;  Recorded: 11/03/2011;  Annotations: L5-S1     Current Outpatient Medications   Medication Sig Dispense Refill      albuterol (PROAIR HFA;PROVENTIL HFA;VENTOLIN HFA) 90 mcg/actuation inhaler [ALBUTEROL (PROAIR HFA;PROVENTIL HFA;VENTOLIN HFA) 90 MCG/ACTUATION INHALER] Inhale 2 puffs every 4 (four) hours as needed for wheezing. And cough 1 Inhaler 3     amitriptyline (ELAVIL) 25 MG tablet [AMITRIPTYLINE (ELAVIL) 25 MG TABLET] two tablets by mouth at bedtime 60 tablet 5     aspirin 81 mg chewable tablet [ASPIRIN 81 MG CHEWABLE TABLET] Chew 81 mg daily.       b complex vitamins tablet [B COMPLEX VITAMINS TABLET] Take 1 tablet by mouth daily.       CALCIUM CARBONATE/VITAMIN D3 (CALCIUM+D ORAL) [CALCIUM CARBONATE/VITAMIN D3 (CALCIUM+D ORAL)] Take 3 tablets by mouth daily.       carisoprodol (SOMA) 350 MG tablet Take 1 tablet (350 mg) by mouth 3 times daily as needed for muscle spasms 90 tablet 3     cephALEXin (KEFLEX) 500 MG capsule Take 1 capsule (500 mg) by mouth 3 times daily 30 capsule 0     cholecalciferol, vitamin D3, 1,000 unit (25 mcg) tablet [CHOLECALCIFEROL, VITAMIN D3, 1,000 UNIT (25 MCG) TABLET] Take 1,000 Units by mouth daily.       cyanocobalamin (VITAMIN B-12) 250 mcg half-tab Take 1 capsule by mouth daily       folic acid (FOLVITE) 1 MG tablet [FOLIC ACID (FOLVITE) 1 MG TABLET] TK 1 T PO ONCE DAILY.  2     hydrocortisone (CORTISONE, HYDROCORTISONE,) 1 % cream [HYDROCORTISONE (CORTISONE, HYDROCORTISONE,) 1 % CREAM] Apply to hand three times a day 30 g 2     isosorbide mononitrate (ISMO/MONOKET) 10 MG tablet TAKE 1 TABLET(10 MG) BY MOUTH TWICE DAILY 180 tablet 3     leflunomide (ARAVA) 20 MG tablet [LEFLUNOMIDE (ARAVA) 20 MG TABLET] Take 20 mg by mouth daily.        levothyroxine (SYNTHROID, LEVOTHROID) 75 MCG tablet [LEVOTHYROXINE (SYNTHROID, LEVOTHROID) 75 MCG TABLET] TAKE 1 TABLET BY MOUTH DAILY AT 6 AM 90 tablet 3     loratadine (CLARITIN) 10 mg tablet [LORATADINE (CLARITIN) 10 MG TABLET] Take 10 mg by mouth daily.       naloxone (NARCAN) 4 MG/0.1ML nasal spray Spray 1 spray (4 mg) into one nostril alternating  "nostrils once as needed for opioid reversal every 2-3 minutes until assistance arrives 0.2 mL 0     nystatin (MYCOSTATIN) cream [NYSTATIN (MYCOSTATIN) CREAM] Apply 1 application topically 2 (two) times a day as needed.  1     omeprazole (PRILOSEC) 20 MG capsule [OMEPRAZOLE (PRILOSEC) 20 MG CAPSULE] Take 20 mg by mouth Daily before breakfast.       oxyCODONE (OXYCONTIN) 10 MG 12 hr tablet Take 1 tablet (10 mg) by mouth every 12 hours (Patient taking differently: Take 20 mg by mouth At Bedtime ) 60 tablet 0     oxyCODONE (OXYCONTIN) 40 MG 12 hr tablet Take 1 tablet (40 mg) by mouth every 24 hours 30 tablet 0     triamcinolone (KENALOG) 0.1 % ointment [TRIAMCINOLONE (KENALOG) 0.1 % OINTMENT] Apply to both legs twice daily 30 g 0     cephALEXin (KEFLEX) 500 MG capsule Take 1 capsule (500 mg) by mouth 3 times daily 30 capsule 0       Allergies   Allergen Reactions     Tetracyclines Rash     Acetaminophen Unknown     Baclofen Nausea     Celecoxib Unknown     Erythromycin Base [Erythromycin] Unknown     Nsaids (Non-Steroidal Anti-Inflammatory Drug) [Nsaids] Unknown     Pentolinium Itching     Varenicline Itching and Swelling     Erythromycin Rash     Petroleum Jelly [Petrolatum] Itching and Rash        Social History     Tobacco Use     Smoking status: Former Smoker     Packs/day: 0.50     Types: Cigarettes, Cigarettes     Quit date: 6/1/2018     Years since quitting: 3.7     Smokeless tobacco: Current User     Tobacco comment: started smoking when she was 21 years old, vaping   Substance Use Topics     Alcohol use: Yes     Comment: Alcoholic Drinks/day: glass of wine during holiday     History   Drug Use     Types: Marijuana     Comment: Drug use: medical marijuana         Objective     /70 (BP Location: Left arm, Patient Position: Sitting, Cuff Size: Adult Regular)   Pulse 84   Ht 1.549 m (5' 1\")   Wt 61.6 kg (135 lb 11.2 oz)   SpO2 97%   BMI 25.64 kg/m      General: Frail appearing well, alert and oriented " x3  EYES: Eyelids, conjunctiva, and sclera were normal. Pupils were normal.   HEAD, EARS, NOSE, MOUTH, AND THROAT: no cervical LAD, no thyromegaly or nodules appreciated.  oropharynx is clear.  RESPIRATORY: respirations non labored, CTA bl, no wheezes, rales, no forced expiratory wheezing.  Mild thoracic kyphosis noted.  CARDIOVASCULAR: Heart rate and rhythm were normal. No murmurs, rubs,gallops. There was trace peripheral edema. .  GASTROINTESTINAL: Positive bowel sounds, abdomen is soft, non tender, non distended.     MUSCULOSKELETAL: Muscle mass was normal for age.  She has toes deformities and hand deformities due to rheumatoid arthritis  LYMPHATIC: There were no enlarged nodes palpable.  SKIN/HAIR/NAILS: Skin color was normal.  No rashes.  Left second toe is wrapped, no other open areas or sores on her feet.  NEUROLOGIC: The patient was alert and oriented.  Speech was normal.  There is no facial asymmetry.   PSYCHIATRIC:  Mood and affect were normal.         Recent Labs   Lab Test 09/16/21  1041      POTASSIUM 3.9   CR 0.78     No results found for: VITDT  No components found for: VITD  Lab Results   Component Value Date    CHOL 155 07/11/2018        Diagnostics:  New orders:   Orders Placed This Encounter   Procedures     CBC with platelets and differential     EKG 12-lead, tracing only     Revised Cardiac Risk Index (RCRI):  The patient has the following serious cardiovascular risks for perioperative complications:   - No serious cardiac risks = 0 points     RCRI Interpretation: 0 points: Class I (very low risk - 0.4% complication rate)         Signed Electronically by: Pura Forde MD

## 2022-03-07 NOTE — LETTER
March 8, 2022      Jacqueline BALDERAS Eve  [address]  [place] [address]        Dear ,    We are writing to inform you of your test results.    Your blood work is safe for surgery.    Resulted Orders   Comprehensive metabolic panel   Result Value Ref Range    Sodium 144 136 - 145 mmol/L    Potassium 4.9 3.5 - 5.0 mmol/L    Chloride 106 98 - 107 mmol/L    Carbon Dioxide (CO2) 27 22 - 31 mmol/L    Anion Gap 11 5 - 18 mmol/L    Urea Nitrogen 16 8 - 28 mg/dL    Creatinine 0.82 0.60 - 1.10 mg/dL    Calcium 9.7 8.5 - 10.5 mg/dL    Glucose 88 70 - 125 mg/dL    Alkaline Phosphatase 59 45 - 120 U/L    AST 22 0 - 40 U/L    ALT 17 0 - 45 U/L    Protein Total 7.2 6.0 - 8.0 g/dL    Albumin 3.8 3.5 - 5.0 g/dL    Bilirubin Total 0.5 0.0 - 1.0 mg/dL    GFR Estimate 71 >60 mL/min/1.73m2      Comment:      Effective December 21, 2021 eGFRcr in adults is calculated using the 2021 CKD-EPI creatinine equation which includes age and gender (Alma et al., NE, DOI: 10.1056/QVTWub0283339)   Vitamin D Deficiency   Result Value Ref Range    Vitamin D, Total (25-Hydroxy) 72 30 - 80 ug/L    Narrative    Deficiency <10.0 ug/L  Insufficiency 10.0-29.9 ug/L  Sufficiency 30.0-80.0 ug/L  Toxicity (possible) >100.0 ug/L    TSH with free T4 reflex   Result Value Ref Range    TSH 1.39 0.30 - 5.00 uIU/mL   CBC with platelets and differential   Result Value Ref Range    WBC Count 9.8 4.0 - 11.0 10e3/uL    RBC Count 4.54 3.80 - 5.20 10e6/uL    Hemoglobin 13.5 11.7 - 15.7 g/dL    Hematocrit 43.6 35.0 - 47.0 %    MCV 96 78 - 100 fL    MCH 29.7 26.5 - 33.0 pg    MCHC 31.0 (L) 31.5 - 36.5 g/dL    RDW 13.0 10.0 - 15.0 %    Platelet Count 249 150 - 450 10e3/uL    % Neutrophils 51 %    % Lymphocytes 36 %    % Monocytes 9 %    % Eosinophils 4 %    % Basophils 1 %    % Immature Granulocytes 0 %    Absolute Neutrophils 5.0 1.6 - 8.3 10e3/uL    Absolute Lymphocytes 3.5 0.8 - 5.3 10e3/uL    Absolute Monocytes 0.9 0.0 - 1.3 10e3/uL    Absolute  Eosinophils 0.4 0.0 - 0.7 10e3/uL    Absolute Basophils 0.1 0.0 - 0.2 10e3/uL    Absolute Immature Granulocytes 0.0 <=0.4 10e3/uL       If you have any questions or concerns, please call the clinic at the number listed above.       Sincerely,      Jordana Reynolds MD

## 2022-03-07 NOTE — TELEPHONE ENCOUNTER
Lia asked for a call back today if possible. She has not heard from pre-op team with pre-surgery instructions and arrival time for surgery.     She also wants to know if her mother will stay at the hospital until there is a TCU available?

## 2022-03-07 NOTE — H&P (VIEW-ONLY)
[facility]  [address] [place] [address]  Phone: [phone]  Fax: [phone]  Primary Provider: Jordana Reynolds  :[id number]}  PREOPERATIVE EVALUATION:  Today's date: 3/7/2022    Jacqueline Prado is a 81 year old female who presents for a preoperative evaluation.    Surgical Information:  Surgery/Procedure: Amputation, 2nd digit, left foot  Surgery Location: [facility]  Surgeon: Dr. Pedraza  Surgery Date: 3/8/22  Where patient plans to recover: At a TCU (Transitional Care Unit)  Fax number for surgical facility: Note does not need to be faxed, will be available electronically in Epic.    Type of Anesthesia Anticipated: Local with MAC    Assessment/Plan:     ASSESSMENT and PLAN:  1. Pre-op exam  Patient is medically stable for this low risk surgery.  EKG showed left bundle branch block.  We will check preoperative blood work.  - EKG 12-lead, tracing only    2. Ulcer of toe of left foot, unspecified ulcer stage (H)  History of toe ulcers secondary to deformity secondary to rheumatoid arthritis.  Patient elected amputation.  She did have ABELARDO testing done and there is no evidence of peripheral vascular disease    3. Rheumatoid arthritis involving multiple sites, unspecified whether rheumatoid factor present (H)  She is on Arava and prednisone.  Discussed to hold Arava day of surgery.  She will take regular dose of prednisone morning of surgery.  Since it is a small dose and she recently has been only taking it for 1 week, she does not need stress dose steroids.  After the surgery since there is routine healing of her toe she may restart Arava or as recommended by surgeon  - predniSONE (CHARLENE) 2 MG EC tablet; Take 1 tablet (2 mg) by mouth At Bedtime    4. Primary hypertension  She takes isosorbide at 2 in the afternoon.  Surgery is at 4.  Discussed to hold isosorbide prior to surgery.    5. Opioid Dependence With Continuous Use  Is on long-acting  opioids.  - Comprehensive metabolic panel    6. Age-related osteoporosis without current pathological fracture  - Vitamin D Deficiency    7. Acquired hypothyroidism  She is on Synthroid  - TSH with free T4 reflex      Medication list carefully reviewed and corrected  Epic Merger Problem list addressed and updated         Subjective     HPI related to upcoming procedure:     Jacqueline is an 81-year-old female with history of severe rheumatoid arthritis, chronic pain due to that and chronic narcotic pain medications, hypothyroidism, hypertension and osteoporosis.    She developed left toe ulcer and necrosis due to deformity and currently is here for preoperative evaluation for toe amputation.  Recent ABELARDO testing was negative for peripheral vascular disease.    Previous surgical history significant for cholecystectomy, wrist surgery and cataract surgery.  She denies any bleeding, clotting or anesthesia problems.  No family history of such.    For rheumatoid arthritis she takes Arava medication daily.  She is also on prednisone 2 mg a day.  She has bilateral toe deformities and hand deformities.    For chronic pain she is on OxyContin 40 mg in the morning and 10 mg at bedtime.    She has no history of heart disease and denies any chest pains shortness of breath or palpitations.  She does have hypertension and takes isosorbide twice a day.    No history of asthma or recent upper respiratory infections.  No cough or shortness of breath.  Recent Covid screen was negative.        Preop Questions 3/7/2022   1. Have you ever had a heart attack or stroke? No   2. Have you ever had surgery on your heart or blood vessels, such as a stent placement, a coronary artery bypass, or surgery on an artery in your head, neck, heart, or legs? No   3. Do you have chest pain with activity? No   4. Do you have a history of  heart failure? No   5. Do you currently have a cold, bronchitis or symptoms of other infection? No   6. Do you have a  cough, shortness of breath, or wheezing? No   7. Do you or anyone in your family have previous history of blood clots? No   8. Do you or does anyone in your family have a serious bleeding problem such as prolonged bleeding following surgeries or cuts? No   9. Have you ever had problems with anemia or been told to take iron pills? No   10. Have you had any abnormal blood loss such as black, tarry or bloody stools, or abnormal vaginal bleeding? No   11. Have you ever had a blood transfusion? No   12. Are you willing to have a blood transfusion if it is medically needed before, during, or after your surgery? Yes   13. Have you or any of your relatives ever had problems with anesthesia? No   14. Do you have sleep apnea, excessive snoring or daytime drowsiness? No   14a. Do you have a CPAP machine? -   15. Do you have any artifical heart valves or other implanted medical devices like a pacemaker, defibrillator, or continuous glucose monitor? No   16. Do you have artificial joints? No   17. Are you allergic to latex? No         Patient Active Problem List    Diagnosis Date Noted     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) 02/23/2022     Priority: Medium     Lower leg mass, left 05/15/2019     Priority: Medium     Left leg pain 05/15/2019     Priority: Medium     Venous stasis dermatitis of both lower extremities      Priority: Medium     Acquired lymphedema of leg 10/03/2018     Priority: Medium     Venous hypertension of both lower extremities 06/14/2018     Priority: Medium     Venous insufficiency of both lower extremities 06/14/2018     Priority: Medium     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     Priority: Medium     Rheumatoid arthritis (H)      Priority: Medium     Created by Conversion         Opioid Dependence With Continuous Use      Priority: Medium     Created by Conversion         Hypothyroidism      Priority: Medium     Created by Conversion  Replacement Utility updated for latest IMO load         Esophageal reflux       Priority: Medium     Created by Conversion         HTN (hypertension) 08/07/2017     Priority: Medium     Bundle Branch Block      Priority: Medium     Created by Conversion  Replacement Utility updated for latest IMO load         Secondary Hyperparathyroidism      Priority: Medium     Created by Conversion  Replacement Utility updated for latest IMO load         Osteoporosis      Priority: Medium     Created by Conversion  Replacement Utility updated for latest IMO load         Back pain with left-sided sciatica      Priority: Medium     Created by Conversion         Myalgia and myositis 06/08/2015     Priority: Medium     Postlaminectomy Syndrome (Lumbar)      Priority: Medium     Created by Conversion         Lumbar Disc Degeneration      Priority: Medium     Created by Conversion         Joint Pain, Localized In The Right Shoulder      Priority: Medium     Created by Conversion         Insomnia 07/16/2007     Priority: Medium     Osteoarthritis 07/16/2007     Priority: Medium     Symptomatic menopausal or female climacteric states 07/16/2007     Priority: Medium      Past Medical History:   Diagnosis Date     Bundle branch block     Created by Conversion  Replacement Utility updated for latest IMO load     Cellulitis      Chronic venous stasis dermatitis      Impetigo      Rheumatoid arthritis (H)      Past Surgical History:   Procedure Laterality Date     BREAST CYST ASPIRATION Left 2000     HC REMOVAL GALLBLADDER      Description: Cholecystectomy;  Recorded: 07/22/2009;     IR LUMBAR EPIDURAL STEROID INJECTION  6/23/2003     IR MISCELLANEOUS PROCEDURE  12/1/2006     PA INJECT VERTEBRAL BODY, LUMBAR      Description: Spinal Percutaneous Vertebroplasty, Injection Lumbar;  Recorded: 11/03/2011;  Annotations: L5     ZZC EXCIS CERV DISK,ONE LEVEL      Description: Laminectomy With Disc Removal;  Recorded: 11/03/2011;  Annotations: L5-S1     Current Outpatient Medications   Medication Sig Dispense Refill      albuterol (PROAIR HFA;PROVENTIL HFA;VENTOLIN HFA) 90 mcg/actuation inhaler [ALBUTEROL (PROAIR HFA;PROVENTIL HFA;VENTOLIN HFA) 90 MCG/ACTUATION INHALER] Inhale 2 puffs every 4 (four) hours as needed for wheezing. And cough 1 Inhaler 3     amitriptyline (ELAVIL) 25 MG tablet [AMITRIPTYLINE (ELAVIL) 25 MG TABLET] two tablets by mouth at bedtime 60 tablet 5     aspirin 81 mg chewable tablet [ASPIRIN 81 MG CHEWABLE TABLET] Chew 81 mg daily.       b complex vitamins tablet [B COMPLEX VITAMINS TABLET] Take 1 tablet by mouth daily.       CALCIUM CARBONATE/VITAMIN D3 (CALCIUM+D ORAL) [CALCIUM CARBONATE/VITAMIN D3 (CALCIUM+D ORAL)] Take 3 tablets by mouth daily.       carisoprodol (SOMA) 350 MG tablet Take 1 tablet (350 mg) by mouth 3 times daily as needed for muscle spasms 90 tablet 3     cephALEXin (KEFLEX) 500 MG capsule Take 1 capsule (500 mg) by mouth 3 times daily 30 capsule 0     cholecalciferol, vitamin D3, 1,000 unit (25 mcg) tablet [CHOLECALCIFEROL, VITAMIN D3, 1,000 UNIT (25 MCG) TABLET] Take 1,000 Units by mouth daily.       cyanocobalamin (VITAMIN B-12) 250 mcg half-tab Take 1 capsule by mouth daily       folic acid (FOLVITE) 1 MG tablet [FOLIC ACID (FOLVITE) 1 MG TABLET] TK 1 T PO ONCE DAILY.  2     hydrocortisone (CORTISONE, HYDROCORTISONE,) 1 % cream [HYDROCORTISONE (CORTISONE, HYDROCORTISONE,) 1 % CREAM] Apply to hand three times a day 30 g 2     isosorbide mononitrate (ISMO/MONOKET) 10 MG tablet TAKE 1 TABLET(10 MG) BY MOUTH TWICE DAILY 180 tablet 3     leflunomide (ARAVA) 20 MG tablet [LEFLUNOMIDE (ARAVA) 20 MG TABLET] Take 20 mg by mouth daily.        levothyroxine (SYNTHROID, LEVOTHROID) 75 MCG tablet [LEVOTHYROXINE (SYNTHROID, LEVOTHROID) 75 MCG TABLET] TAKE 1 TABLET BY MOUTH DAILY AT 6 AM 90 tablet 3     loratadine (CLARITIN) 10 mg tablet [LORATADINE (CLARITIN) 10 MG TABLET] Take 10 mg by mouth daily.       naloxone (NARCAN) 4 MG/0.1ML nasal spray Spray 1 spray (4 mg) into one nostril alternating  "nostrils once as needed for opioid reversal every 2-3 minutes until assistance arrives 0.2 mL 0     nystatin (MYCOSTATIN) cream [NYSTATIN (MYCOSTATIN) CREAM] Apply 1 application topically 2 (two) times a day as needed.  1     omeprazole (PRILOSEC) 20 MG capsule [OMEPRAZOLE (PRILOSEC) 20 MG CAPSULE] Take 20 mg by mouth Daily before breakfast.       oxyCODONE (OXYCONTIN) 10 MG 12 hr tablet Take 1 tablet (10 mg) by mouth every 12 hours (Patient taking differently: Take 20 mg by mouth At Bedtime ) 60 tablet 0     oxyCODONE (OXYCONTIN) 40 MG 12 hr tablet Take 1 tablet (40 mg) by mouth every 24 hours 30 tablet 0     triamcinolone (KENALOG) 0.1 % ointment [TRIAMCINOLONE (KENALOG) 0.1 % OINTMENT] Apply to both legs twice daily 30 g 0     cephALEXin (KEFLEX) 500 MG capsule Take 1 capsule (500 mg) by mouth 3 times daily 30 capsule 0       Allergies   Allergen Reactions     Tetracyclines Rash     Acetaminophen Unknown     Baclofen Nausea     Celecoxib Unknown     Erythromycin Base [Erythromycin] Unknown     Nsaids (Non-Steroidal Anti-Inflammatory Drug) [Nsaids] Unknown     Pentolinium Itching     Varenicline Itching and Swelling     Erythromycin Rash     Petroleum Jelly [Petrolatum] Itching and Rash        Social History     Tobacco Use     Smoking status: Former Smoker     Packs/day: 0.50     Types: Cigarettes, Cigarettes     Quit date: 6/1/2018     Years since quitting: 3.7     Smokeless tobacco: Current User     Tobacco comment: started smoking when she was 21 years old, vaping   Substance Use Topics     Alcohol use: Yes     Comment: Alcoholic Drinks/day: glass of wine during holiday     History   Drug Use     Types: Marijuana     Comment: Drug use: medical marijuana         Objective     /70 (BP Location: Left arm, Patient Position: Sitting, Cuff Size: Adult Regular)   Pulse 84   Ht 1.549 m (5' 1\")   Wt 61.6 kg (135 lb 11.2 oz)   SpO2 97%   BMI 25.64 kg/m      General: Frail appearing well, alert and oriented " x3  EYES: Eyelids, conjunctiva, and sclera were normal. Pupils were normal.   HEAD, EARS, NOSE, MOUTH, AND THROAT: no cervical LAD, no thyromegaly or nodules appreciated.  oropharynx is clear.  RESPIRATORY: respirations non labored, CTA bl, no wheezes, rales, no forced expiratory wheezing.  Mild thoracic kyphosis noted.  CARDIOVASCULAR: Heart rate and rhythm were normal. No murmurs, rubs,gallops. There was trace peripheral edema. .  GASTROINTESTINAL: Positive bowel sounds, abdomen is soft, non tender, non distended.     MUSCULOSKELETAL: Muscle mass was normal for age.  She has toes deformities and hand deformities due to rheumatoid arthritis  LYMPHATIC: There were no enlarged nodes palpable.  SKIN/HAIR/NAILS: Skin color was normal.  No rashes.  Left second toe is wrapped, no other open areas or sores on her feet.  NEUROLOGIC: The patient was alert and oriented.  Speech was normal.  There is no facial asymmetry.   PSYCHIATRIC:  Mood and affect were normal.         Recent Labs   Lab Test 09/16/21  1041      POTASSIUM 3.9   CR 0.78     No results found for: VITDT  No components found for: VITD  Lab Results   Component Value Date    CHOL 155 07/11/2018        Diagnostics:  New orders:   Orders Placed This Encounter   Procedures     CBC with platelets and differential     EKG 12-lead, tracing only     Revised Cardiac Risk Index (RCRI):  The patient has the following serious cardiovascular risks for perioperative complications:   - No serious cardiac risks = 0 points     RCRI Interpretation: 0 points: Class I (very low risk - 0.4% complication rate)         Signed Electronically by: Pura Forde MD

## 2022-03-07 NOTE — PATIENT INSTRUCTIONS
1. Morning of surgery: O'K to take Synthroid  and prednisone . Hold Arava and short acting oxycodone.    2. Labs today.

## 2022-03-08 ENCOUNTER — ANESTHESIA EVENT (OUTPATIENT)
Dept: SURGERY | Facility: HOSPITAL | Age: 82
End: 2022-03-08
Payer: MEDICARE

## 2022-03-08 ENCOUNTER — HOSPITAL ENCOUNTER (OUTPATIENT)
Facility: HOSPITAL | Age: 82
Discharge: NURSING, REHAB OR LONG-TERM CARE | End: 2022-03-09
Attending: PODIATRIST | Admitting: INTERNAL MEDICINE
Payer: MEDICARE

## 2022-03-08 ENCOUNTER — ANESTHESIA (OUTPATIENT)
Dept: SURGERY | Facility: HOSPITAL | Age: 82
End: 2022-03-08
Payer: MEDICARE

## 2022-03-08 ENCOUNTER — APPOINTMENT (OUTPATIENT)
Dept: RADIOLOGY | Facility: HOSPITAL | Age: 82
End: 2022-03-08
Attending: PODIATRIST
Payer: MEDICARE

## 2022-03-08 DIAGNOSIS — M54.32 BACK PAIN WITH LEFT-SIDED SCIATICA: ICD-10-CM

## 2022-03-08 DIAGNOSIS — M86.9 OSTEOMYELITIS OF ANKLE AND FOOT (H): ICD-10-CM

## 2022-03-08 PROBLEM — I10 HTN (HYPERTENSION): Status: ACTIVE | Noted: 2017-08-07

## 2022-03-08 PROBLEM — G89.29 CHRONIC PAIN: Status: ACTIVE | Noted: 2022-03-08

## 2022-03-08 LAB
ATRIAL RATE - MUSE: 84 BPM
DEPRECATED CALCIDIOL+CALCIFEROL SERPL-MC: 72 UG/L (ref 30–80)
DIASTOLIC BLOOD PRESSURE - MUSE: NORMAL MMHG
INTERPRETATION ECG - MUSE: NORMAL
P AXIS - MUSE: 77 DEGREES
PR INTERVAL - MUSE: 184 MS
QRS DURATION - MUSE: 134 MS
QT - MUSE: 414 MS
QTC - MUSE: 489 MS
R AXIS - MUSE: -4 DEGREES
SYSTOLIC BLOOD PRESSURE - MUSE: NORMAL MMHG
T AXIS - MUSE: 10 DEGREES
VENTRICULAR RATE- MUSE: 84 BPM

## 2022-03-08 PROCEDURE — 87070 CULTURE OTHR SPECIMN AEROBIC: CPT | Performed by: PODIATRIST

## 2022-03-08 PROCEDURE — 250N000013 HC RX MED GY IP 250 OP 250 PS 637: Performed by: INTERNAL MEDICINE

## 2022-03-08 PROCEDURE — 370N000017 HC ANESTHESIA TECHNICAL FEE, PER MIN: Performed by: PODIATRIST

## 2022-03-08 PROCEDURE — 360N000075 HC SURGERY LEVEL 2, PER MIN: Performed by: PODIATRIST

## 2022-03-08 PROCEDURE — 250N000011 HC RX IP 250 OP 636: Performed by: ANESTHESIOLOGY

## 2022-03-08 PROCEDURE — 87205 SMEAR GRAM STAIN: CPT | Performed by: PODIATRIST

## 2022-03-08 PROCEDURE — 258N000003 HC RX IP 258 OP 636: Performed by: NURSE ANESTHETIST, CERTIFIED REGISTERED

## 2022-03-08 PROCEDURE — 250N000011 HC RX IP 250 OP 636: Performed by: NURSE ANESTHETIST, CERTIFIED REGISTERED

## 2022-03-08 PROCEDURE — 99233 SBSQ HOSP IP/OBS HIGH 50: CPT | Mod: AI | Performed by: INTERNAL MEDICINE

## 2022-03-08 PROCEDURE — 87075 CULTR BACTERIA EXCEPT BLOOD: CPT | Performed by: PODIATRIST

## 2022-03-08 PROCEDURE — 272N000001 HC OR GENERAL SUPPLY STERILE: Performed by: PODIATRIST

## 2022-03-08 PROCEDURE — 258N000001 HC RX 258: Performed by: PODIATRIST

## 2022-03-08 PROCEDURE — 250N000011 HC RX IP 250 OP 636

## 2022-03-08 PROCEDURE — P9041 ALBUMIN (HUMAN),5%, 50ML: HCPCS

## 2022-03-08 PROCEDURE — 258N000003 HC RX IP 258 OP 636: Performed by: PODIATRIST

## 2022-03-08 PROCEDURE — 28820 AMPUTATION OF TOE: CPT | Mod: T1 | Performed by: PODIATRIST

## 2022-03-08 PROCEDURE — 88305 TISSUE EXAM BY PATHOLOGIST: CPT | Mod: TC | Performed by: PODIATRIST

## 2022-03-08 PROCEDURE — 999N000065 XR FOOT LEFT G/E 3 VIEWS

## 2022-03-08 PROCEDURE — 73630 X-RAY EXAM OF FOOT: CPT | Mod: LT

## 2022-03-08 PROCEDURE — 999N000141 HC STATISTIC PRE-PROCEDURE NURSING ASSESSMENT: Performed by: PODIATRIST

## 2022-03-08 PROCEDURE — 258N000003 HC RX IP 258 OP 636: Performed by: ANESTHESIOLOGY

## 2022-03-08 RX ORDER — POLYETHYLENE GLYCOL 3350 17 G/17G
17 POWDER, FOR SOLUTION ORAL DAILY
Status: DISCONTINUED | OUTPATIENT
Start: 2022-03-09 | End: 2022-03-09 | Stop reason: HOSPADM

## 2022-03-08 RX ORDER — ALBUMIN, HUMAN INJ 5% 5 %
SOLUTION INTRAVENOUS
Status: COMPLETED
Start: 2022-03-08 | End: 2022-03-08

## 2022-03-08 RX ORDER — OXYCODONE HYDROCHLORIDE 5 MG/1
5 TABLET ORAL EVERY 4 HOURS PRN
Status: DISCONTINUED | OUTPATIENT
Start: 2022-03-08 | End: 2022-03-08 | Stop reason: HOSPADM

## 2022-03-08 RX ORDER — LEFLUNOMIDE 10 MG/1
20 TABLET ORAL DAILY
Status: DISCONTINUED | OUTPATIENT
Start: 2022-03-11 | End: 2022-03-08

## 2022-03-08 RX ORDER — PANTOPRAZOLE SODIUM 40 MG/1
40 TABLET, DELAYED RELEASE ORAL
Status: DISCONTINUED | OUTPATIENT
Start: 2022-03-09 | End: 2022-03-09 | Stop reason: HOSPADM

## 2022-03-08 RX ORDER — OXYCODONE HYDROCHLORIDE 5 MG/1
5-10 TABLET ORAL EVERY 4 HOURS PRN
Status: DISCONTINUED | OUTPATIENT
Start: 2022-03-08 | End: 2022-03-09

## 2022-03-08 RX ORDER — OXYCODONE HCL 20 MG/1
40 TABLET, FILM COATED, EXTENDED RELEASE ORAL EVERY 24 HOURS
Status: DISCONTINUED | OUTPATIENT
Start: 2022-03-09 | End: 2022-03-09 | Stop reason: HOSPADM

## 2022-03-08 RX ORDER — PREDNISONE 1 MG/1
2 TABLET ORAL AT BEDTIME
Status: DISCONTINUED | OUTPATIENT
Start: 2022-03-09 | End: 2022-03-09 | Stop reason: HOSPADM

## 2022-03-08 RX ORDER — ROPIVACAINE HYDROCHLORIDE 5 MG/ML
INJECTION, SOLUTION EPIDURAL; INFILTRATION; PERINEURAL
Status: DISCONTINUED | OUTPATIENT
Start: 2022-03-08 | End: 2022-03-08

## 2022-03-08 RX ORDER — CEFAZOLIN SODIUM/WATER 2 G/20 ML
2 SYRINGE (ML) INTRAVENOUS SEE ADMIN INSTRUCTIONS
Status: DISCONTINUED | OUTPATIENT
Start: 2022-03-08 | End: 2022-03-09 | Stop reason: HOSPADM

## 2022-03-08 RX ORDER — OXYCODONE HYDROCHLORIDE 5 MG/1
10 TABLET ORAL EVERY 4 HOURS PRN
Status: DISCONTINUED | OUTPATIENT
Start: 2022-03-08 | End: 2022-03-09 | Stop reason: HOSPADM

## 2022-03-08 RX ORDER — FENTANYL CITRATE 50 UG/ML
50 INJECTION, SOLUTION INTRAMUSCULAR; INTRAVENOUS
Status: DISCONTINUED | OUTPATIENT
Start: 2022-03-08 | End: 2022-03-08

## 2022-03-08 RX ORDER — AMOXICILLIN 250 MG
1 CAPSULE ORAL 2 TIMES DAILY
Status: DISCONTINUED | OUTPATIENT
Start: 2022-03-08 | End: 2022-03-09 | Stop reason: HOSPADM

## 2022-03-08 RX ORDER — OXYCODONE HCL 10 MG/1
10 TABLET, FILM COATED, EXTENDED RELEASE ORAL AT BEDTIME
Status: DISCONTINUED | OUTPATIENT
Start: 2022-03-09 | End: 2022-03-09

## 2022-03-08 RX ORDER — OXYCODONE HYDROCHLORIDE 5 MG/1
10 TABLET ORAL EVERY 4 HOURS PRN
Status: DISCONTINUED | OUTPATIENT
Start: 2022-03-08 | End: 2022-03-08

## 2022-03-08 RX ORDER — LIDOCAINE 40 MG/G
CREAM TOPICAL
Status: DISCONTINUED | OUTPATIENT
Start: 2022-03-08 | End: 2022-03-09 | Stop reason: HOSPADM

## 2022-03-08 RX ORDER — ALBUTEROL SULFATE 90 UG/1
2 AEROSOL, METERED RESPIRATORY (INHALATION) EVERY 4 HOURS PRN
Status: DISCONTINUED | OUTPATIENT
Start: 2022-03-08 | End: 2022-03-09 | Stop reason: HOSPADM

## 2022-03-08 RX ORDER — SODIUM CHLORIDE, SODIUM LACTATE, POTASSIUM CHLORIDE, CALCIUM CHLORIDE 600; 310; 30; 20 MG/100ML; MG/100ML; MG/100ML; MG/100ML
INJECTION, SOLUTION INTRAVENOUS CONTINUOUS
Status: DISCONTINUED | OUTPATIENT
Start: 2022-03-08 | End: 2022-03-09 | Stop reason: HOSPADM

## 2022-03-08 RX ORDER — ONDANSETRON 4 MG/1
4 TABLET, ORALLY DISINTEGRATING ORAL EVERY 6 HOURS PRN
Status: DISCONTINUED | OUTPATIENT
Start: 2022-03-08 | End: 2022-03-09 | Stop reason: HOSPADM

## 2022-03-08 RX ORDER — CARISOPRODOL 350 MG/1
350 TABLET ORAL 3 TIMES DAILY PRN
Status: DISCONTINUED | OUTPATIENT
Start: 2022-03-08 | End: 2022-03-09 | Stop reason: HOSPADM

## 2022-03-08 RX ORDER — ISOSORBIDE MONONITRATE 30 MG/1
30 TABLET, EXTENDED RELEASE ORAL 2 TIMES DAILY
COMMUNITY
End: 2022-08-10

## 2022-03-08 RX ORDER — BISACODYL 10 MG
10 SUPPOSITORY, RECTAL RECTAL DAILY PRN
Status: DISCONTINUED | OUTPATIENT
Start: 2022-03-08 | End: 2022-03-09 | Stop reason: HOSPADM

## 2022-03-08 RX ORDER — HYDRALAZINE HYDROCHLORIDE 20 MG/ML
10 INJECTION INTRAMUSCULAR; INTRAVENOUS EVERY 4 HOURS PRN
Status: DISCONTINUED | OUTPATIENT
Start: 2022-03-08 | End: 2022-03-09 | Stop reason: HOSPADM

## 2022-03-08 RX ORDER — LORATADINE 10 MG/1
10 TABLET ORAL DAILY
Status: DISCONTINUED | OUTPATIENT
Start: 2022-03-08 | End: 2022-03-09 | Stop reason: HOSPADM

## 2022-03-08 RX ORDER — PROCHLORPERAZINE MALEATE 5 MG
5 TABLET ORAL EVERY 6 HOURS PRN
Status: DISCONTINUED | OUTPATIENT
Start: 2022-03-08 | End: 2022-03-09 | Stop reason: HOSPADM

## 2022-03-08 RX ORDER — FOLIC ACID 1 MG/1
1 TABLET ORAL DAILY
Status: DISCONTINUED | OUTPATIENT
Start: 2022-03-09 | End: 2022-03-09 | Stop reason: HOSPADM

## 2022-03-08 RX ORDER — HYDROCHLOROTHIAZIDE 12.5 MG/1
12.5 CAPSULE ORAL DAILY PRN
Status: ON HOLD | COMMUNITY
End: 2022-03-09

## 2022-03-08 RX ORDER — ALBUMIN, HUMAN INJ 5% 5 %
250 SOLUTION INTRAVENOUS ONCE
Status: COMPLETED | OUTPATIENT
Start: 2022-03-08 | End: 2022-03-08

## 2022-03-08 RX ORDER — ONDANSETRON 4 MG/1
4 TABLET, ORALLY DISINTEGRATING ORAL EVERY 30 MIN PRN
Status: DISCONTINUED | OUTPATIENT
Start: 2022-03-08 | End: 2022-03-08 | Stop reason: HOSPADM

## 2022-03-08 RX ORDER — PROPOFOL 10 MG/ML
INJECTION, EMULSION INTRAVENOUS PRN
Status: DISCONTINUED | OUTPATIENT
Start: 2022-03-08 | End: 2022-03-08

## 2022-03-08 RX ORDER — PROPOFOL 10 MG/ML
INJECTION, EMULSION INTRAVENOUS CONTINUOUS PRN
Status: DISCONTINUED | OUTPATIENT
Start: 2022-03-08 | End: 2022-03-08

## 2022-03-08 RX ORDER — ONDANSETRON 2 MG/ML
INJECTION INTRAMUSCULAR; INTRAVENOUS PRN
Status: DISCONTINUED | OUTPATIENT
Start: 2022-03-08 | End: 2022-03-08

## 2022-03-08 RX ORDER — CEFAZOLIN SODIUM/WATER 2 G/20 ML
2 SYRINGE (ML) INTRAVENOUS
Status: DISCONTINUED | OUTPATIENT
Start: 2022-03-08 | End: 2022-03-09 | Stop reason: HOSPADM

## 2022-03-08 RX ORDER — LEVOTHYROXINE SODIUM 25 UG/1
75 TABLET ORAL
Status: DISCONTINUED | OUTPATIENT
Start: 2022-03-09 | End: 2022-03-09 | Stop reason: HOSPADM

## 2022-03-08 RX ORDER — ONDANSETRON 2 MG/ML
4 INJECTION INTRAMUSCULAR; INTRAVENOUS EVERY 30 MIN PRN
Status: DISCONTINUED | OUTPATIENT
Start: 2022-03-08 | End: 2022-03-08 | Stop reason: HOSPADM

## 2022-03-08 RX ORDER — ONDANSETRON 2 MG/ML
4 INJECTION INTRAMUSCULAR; INTRAVENOUS EVERY 6 HOURS PRN
Status: DISCONTINUED | OUTPATIENT
Start: 2022-03-08 | End: 2022-03-09 | Stop reason: HOSPADM

## 2022-03-08 RX ORDER — HYDROMORPHONE HYDROCHLORIDE 1 MG/ML
0.5 INJECTION, SOLUTION INTRAMUSCULAR; INTRAVENOUS; SUBCUTANEOUS
Status: DISCONTINUED | OUTPATIENT
Start: 2022-03-08 | End: 2022-03-09 | Stop reason: HOSPADM

## 2022-03-08 RX ORDER — FENTANYL CITRATE 50 UG/ML
50 INJECTION, SOLUTION INTRAMUSCULAR; INTRAVENOUS EVERY 5 MIN PRN
Status: DISCONTINUED | OUTPATIENT
Start: 2022-03-08 | End: 2022-03-08 | Stop reason: HOSPADM

## 2022-03-08 RX ORDER — SODIUM CHLORIDE, SODIUM LACTATE, POTASSIUM CHLORIDE, CALCIUM CHLORIDE 600; 310; 30; 20 MG/100ML; MG/100ML; MG/100ML; MG/100ML
INJECTION, SOLUTION INTRAVENOUS CONTINUOUS
Status: DISCONTINUED | OUTPATIENT
Start: 2022-03-08 | End: 2022-03-08 | Stop reason: HOSPADM

## 2022-03-08 RX ADMIN — SODIUM CHLORIDE, POTASSIUM CHLORIDE, SODIUM LACTATE AND CALCIUM CHLORIDE: 600; 310; 30; 20 INJECTION, SOLUTION INTRAVENOUS at 20:42

## 2022-03-08 RX ADMIN — ROPIVACAINE HYDROCHLORIDE 15 ML: 5 INJECTION, SOLUTION EPIDURAL; INFILTRATION; PERINEURAL at 19:52

## 2022-03-08 RX ADMIN — ALBUMIN HUMAN 250 ML: 50 SOLUTION INTRAVENOUS at 16:00

## 2022-03-08 RX ADMIN — ONDANSETRON 4 MG: 2 INJECTION INTRAMUSCULAR; INTRAVENOUS at 16:40

## 2022-03-08 RX ADMIN — SODIUM CHLORIDE, POTASSIUM CHLORIDE, SODIUM LACTATE AND CALCIUM CHLORIDE: 600; 310; 30; 20 INJECTION, SOLUTION INTRAVENOUS at 15:34

## 2022-03-08 RX ADMIN — CARISOPRODOL 350 MG: 350 TABLET ORAL at 18:36

## 2022-03-08 RX ADMIN — PHENYLEPHRINE HYDROCHLORIDE 100 MCG: 10 INJECTION INTRAVENOUS at 16:33

## 2022-03-08 RX ADMIN — PHENYLEPHRINE HYDROCHLORIDE 50 MCG: 10 INJECTION INTRAVENOUS at 16:41

## 2022-03-08 RX ADMIN — PROPOFOL 20 MG: 10 INJECTION, EMULSION INTRAVENOUS at 16:24

## 2022-03-08 RX ADMIN — LORATADINE 10 MG: 10 TABLET ORAL at 17:50

## 2022-03-08 RX ADMIN — ALBUMIN (HUMAN) 250 ML: 12.5 INJECTION, SOLUTION INTRAVENOUS at 16:00

## 2022-03-08 RX ADMIN — OXYCODONE HYDROCHLORIDE 10 MG: 5 TABLET ORAL at 17:50

## 2022-03-08 RX ADMIN — ROPIVACAINE HYDROCHLORIDE 10 ML: 5 INJECTION, SOLUTION EPIDURAL; INFILTRATION; PERINEURAL at 19:51

## 2022-03-08 RX ADMIN — FENTANYL CITRATE 50 MCG: 50 INJECTION, SOLUTION INTRAMUSCULAR; INTRAVENOUS at 15:36

## 2022-03-08 RX ADMIN — PHENYLEPHRINE HYDROCHLORIDE 100 MCG: 10 INJECTION INTRAVENOUS at 16:31

## 2022-03-08 RX ADMIN — MIDAZOLAM HYDROCHLORIDE 1 MG: 1 INJECTION, SOLUTION INTRAMUSCULAR; INTRAVENOUS at 15:38

## 2022-03-08 RX ADMIN — PROPOFOL 125 MCG/KG/MIN: 10 INJECTION, EMULSION INTRAVENOUS at 16:24

## 2022-03-08 NOTE — PROGRESS NOTES
Patient coming in this evening for surgery with Podiatry. Received requested from Manager to try to find TCU placement for patient this evening post op. Spoke with patients daughter Lia in regards to TCU placement and preferred facilities. Explained that TCU potentially would not be found, but would get referrals out and try to find placement. Family would prefer private room due to patients clinical status (immunocompromised). Writer explained that some facilities do have a private pay room fee potentially around $ per day; family would be open to paying private room fee if possible. Lia would prefer patient to remain as close to home as possible. Referrals faxed to the facilities below:     [facility]-No beds until Friday 3/11 the earliest.   [facility]-Left message with call back extension [phone].  [facility] potentially Friday 3/11 or Saturday 3/12.  [facility]-Left message with call back extension [phone].   [facility]-Left message with call back extension [phone].  [facility]Left message with call back extension [phone].  [facility]-Can not accept directly after surgery, earliest they would accept would be 3/9, pending review. Will call back to extension [phone].  [facility]-Left message with call back extension [phone]. Declined-no beds available.   [facility]-Cannot accept today 3/8, but will review for tomorrow 3/9.   [facility]-Earliest they could accept would be tomorrow 3/9 pending review.   [facility]-Left message with call back extension [phone].  [facility]-Left message with call back extension [phone].  [facility]-At capacity for admissions today 3/8, earliest they could accept would be 3/9 pending review.   [facility]-Left message with call back extension [phone].  [facility]-Billing department staff has gone for today, would not be able to potentially accept until tomorrow  pending their review.     Spoke with Lia again and provided update that TCU placement not found for today; earliest facilities would be able to accept would be tomorrow 3/9. Explained that facilities would like to see how she does after the procedure and that they need to review the insurance (some facilities billing department staff had gone home for the day). Explained that if patient was needing TCU after surgery patient would need to be admitted to the hospital (likely under outpatient status per surgeries discretion). Lia seemed frustrated stated she had spoke with Dr Oliva office and they were looking into TCU and pre approval for insurance. She was told someone would be reaching out to work on this and this writer was the first person to do so. Writer explained that this was an unusal case for them, that they typically work with patients admitted in the hospital and that they were just notified of the case today. Lia inquired what the typical process for finding TCU. Writer stated that we meet with patients admitted in the hospital and discuss TCU placement based on therapy recommendations, then send referrals per patient preference; explained that due to bed availability at facilities can sometimes take more than a day to find placement due to bed availability. Lia stated she would reach out to Dr. Oliva office to see where there was a misunderstanding in communication. Writer also explained that depending on insurance there would be potential for private pay. Writer provided Lia with call back extension [phone] and stated they would be back tomorrow at 7:30 AM.       Sent Additional referrals to [facility] and [facility]. Received message from Radha with [facility]-they would not be able to accept tonight 3/8, would like to see how patient does post procedure, weight bearing status on foot, and would like to see how she does with therapies. Stated at this time they would not have enough  information to accept patient. Received message from [facility] stating they would not be able to accept today 3/8; will watch for more information tomorrow 3/9 to determine if they would be able to accept. Writer will reach back out to TCU facilities on 3/9.     Delores Gardiner, RN Care Manager

## 2022-03-08 NOTE — ANESTHESIA PREPROCEDURE EVALUATION
Anesthesia Pre-Procedure Evaluation    Patient: Jacqueline Prado   MRN: [id number] : 1940        Procedure : Procedure(s):  AMPUTATION, second digit left foot          Past Medical History:   Diagnosis Date     Acquired hypothyroidism      Acquired lymphedema of leg      Bundle branch block     Created by Conversion  Replacement Utility updated for latest IMO load     Bundle branch block      Cellulitis      Hypertension      Impetigo      Opioid dependence (H)      Osteoporosis      RA (rheumatoid arthritis) (H)      Rheumatoid arthritis (H)      Secondary hyperparathyroidism (H)      Venous hypertension of both lower extremities      Venous insufficiency of both lower extremities      Venous stasis dermatitis of lower extremity       Past Surgical History:   Procedure Laterality Date     BREAST CYST ASPIRATION Left      HC REMOVAL GALLBLADDER      Description: Cholecystectomy;  Recorded: 2009;     IR LUMBAR EPIDURAL STEROID INJECTION  2003     IR MISCELLANEOUS PROCEDURE  2006     MI INJECT VERTEBRAL BODY, LUMBAR      Description: Spinal Percutaneous Vertebroplasty, Injection Lumbar;  Recorded: 2011;  Annotations: L5     ZZC EXCIS CERV DISK,ONE LEVEL      Description: Laminectomy With Disc Removal;  Recorded: 2011;  Annotations: L5-S1      Allergies   Allergen Reactions     Acetaminophen Rash     Nsaids (Non-Steroidal Anti-Inflammatory Drug) [Nsaids] Rash     Tetracyclines Rash     Baclofen Nausea     Celecoxib Unknown     Erythromycin Base [Erythromycin] Unknown     Pentolinium Itching     Varenicline Itching and Swelling     Erythromycin Rash     Petroleum Jelly [Petrolatum] Itching and Rash      Social History     Tobacco Use     Smoking status: Former Smoker     Packs/day: 0.50     Types: Cigarettes     Quit date: 2018     Years since quitting: 3.7     Smokeless tobacco: Current User     Tobacco comment: started smoking when she was 21 years old, vaping   Substance Use  Topics     Alcohol use: Yes     Comment: Alcoholic Drinks/day: glass of wine during holiday      Wt Readings from Last 1 Encounters:   03/07/22 61.6 kg (135 lb 11.2 oz)        Anesthesia Evaluation   Pt has had prior anesthetic.     No history of anesthetic complications       ROS/MED HX  ENT/Pulmonary:       Neurologic:       Cardiovascular:     (+) hypertension-----    METS/Exercise Tolerance:     Hematologic:       Musculoskeletal:       GI/Hepatic:     (+) GERD,     Renal/Genitourinary:       Endo:     (+) thyroid problem,     Psychiatric/Substance Use:       Infectious Disease:       Malignancy:       Other:            Physical Exam    Airway        Mallampati: II    Neck ROM: full     Respiratory Devices and Support         Dental       (+) upper dentures      Cardiovascular   cardiovascular exam normal          Pulmonary   pulmonary exam normal                OUTSIDE LABS:  CBC:   Lab Results   Component Value Date    WBC 9.8 03/07/2022    WBC 6.5 01/15/2019    HGB 13.5 03/07/2022    HGB 13.1 01/15/2019    HCT 43.6 03/07/2022    HCT 38.5 01/15/2019     03/07/2022     01/15/2019     BMP:   Lab Results   Component Value Date     03/07/2022     09/16/2021    POTASSIUM 4.9 03/07/2022    POTASSIUM 3.9 09/16/2021    CHLORIDE 106 03/07/2022    CHLORIDE 107 09/16/2021    CO2 27 03/07/2022    CO2 26 09/16/2021    BUN 16 03/07/2022    BUN 16 09/16/2021    CR 0.82 03/07/2022    CR 0.78 09/16/2021    GLC 88 03/07/2022     09/16/2021     COAGS: No results found for: PTT, INR, FIBR  POC: No results found for: BGM, HCG, HCGS  HEPATIC:   Lab Results   Component Value Date    ALBUMIN 3.8 03/07/2022    PROTTOTAL 7.2 03/07/2022    ALT 17 03/07/2022    AST 22 03/07/2022    ALKPHOS 59 03/07/2022    BILITOTAL 0.5 03/07/2022     OTHER:   Lab Results   Component Value Date    LACT 1.5 06/06/2018    ROE 9.7 03/07/2022    MAG 2.2 09/16/2021    TSH 1.39 03/07/2022    CRP 2.1 (H) 06/06/2018        Anesthesia Plan    ASA Status:  2      Anesthesia Type: MAC.              Consents    Anesthesia Plan(s) and associated risks, benefits, and realistic alternatives discussed. Questions answered and patient/representative(s) expressed understanding.     - Discussed: Risks, Benefits and Alternatives for the PROCEDURE were discussed     - Discussed with:  Patient         Postoperative Care    Pain management: Peripheral nerve block (Single Shot).        Comments:                Dayana Valencia MD

## 2022-03-08 NOTE — ANESTHESIA CARE TRANSFER NOTE
Patient: Jacqueline Prado    Procedure: Procedure(s):  AMPUTATION, second digit left foot       Diagnosis: Osteomyelitis of ankle and foot (H) [M86.9]  Diagnosis Additional Information: No value filed.    Anesthesia Type:   MAC     Note:    Oropharynx: oropharynx clear of all foreign objects  Level of Consciousness: drowsy  Oxygen Supplementation: face mask  Level of Supplemental Oxygen (L/min / FiO2): 8  Independent Airway: airway patency satisfactory and stable  Dentition: dentition unchanged  Vital Signs Stable: post-procedure vital signs reviewed and stable  Report to RN Given: handoff report given  Patient transferred to: Medical/Surgical Unit  Comments: Patient transferred to cart.  Taken to PACU on O2 at 8L via mask. SBAR report given to RN on floor per institutional policy and procedure.  Transport called.  Transfer of care.  Handoff Report: Identifed the Patient, Identified the Reponsible Provider, Reviewed the pertinent medical history, Discussed the surgical course, Reviewed Intra-OP anesthesia mangement and issues during anesthesia, Set expectations for post-procedure period and Allowed opportunity for questions and acknowledgement of understanding      Vitals:  Vitals Value Taken Time   /58 03/08/22 1656   Temp 36.6  C (97.8  F) 03/08/22 1656   Pulse     Resp 16 03/08/22 1656   SpO2 99 % 03/08/22 1656       Electronically Signed By: SAURAV Lutz CRNA  March 8, 2022  4:57 PM

## 2022-03-09 ENCOUNTER — APPOINTMENT (OUTPATIENT)
Dept: PHYSICAL THERAPY | Facility: HOSPITAL | Age: 82
End: 2022-03-09
Attending: PODIATRIST
Payer: MEDICARE

## 2022-03-09 ENCOUNTER — APPOINTMENT (OUTPATIENT)
Dept: OCCUPATIONAL THERAPY | Facility: HOSPITAL | Age: 82
End: 2022-03-09
Attending: PODIATRIST
Payer: MEDICARE

## 2022-03-09 VITALS
HEART RATE: 69 BPM | SYSTOLIC BLOOD PRESSURE: 129 MMHG | TEMPERATURE: 97.9 F | DIASTOLIC BLOOD PRESSURE: 69 MMHG | OXYGEN SATURATION: 94 % | RESPIRATION RATE: 18 BRPM

## 2022-03-09 PROBLEM — M86.9 OSTEOMYELITIS OF ANKLE AND FOOT (H): Status: ACTIVE | Noted: 2022-03-09

## 2022-03-09 LAB
GLUCOSE BLDC GLUCOMTR-MCNC: 107 MG/DL (ref 70–99)
GRAM STAIN RESULT: NORMAL
GRAM STAIN RESULT: NORMAL
SARS-COV-2 RNA RESP QL NAA+PROBE: NEGATIVE

## 2022-03-09 PROCEDURE — 97110 THERAPEUTIC EXERCISES: CPT | Mod: GO

## 2022-03-09 PROCEDURE — 99239 HOSP IP/OBS DSCHRG MGMT >30: CPT | Performed by: NURSE PRACTITIONER

## 2022-03-09 PROCEDURE — 250N000013 HC RX MED GY IP 250 OP 250 PS 637: Performed by: PODIATRIST

## 2022-03-09 PROCEDURE — 250N000013 HC RX MED GY IP 250 OP 250 PS 637: Performed by: INTERNAL MEDICINE

## 2022-03-09 PROCEDURE — 97162 PT EVAL MOD COMPLEX 30 MIN: CPT | Mod: GP | Performed by: PHYSICAL THERAPIST

## 2022-03-09 PROCEDURE — 97535 SELF CARE MNGMENT TRAINING: CPT | Mod: GO

## 2022-03-09 PROCEDURE — 93005 ELECTROCARDIOGRAM TRACING: CPT

## 2022-03-09 PROCEDURE — 99239 HOSP IP/OBS DSCHRG MGMT >30: CPT | Performed by: INTERNAL MEDICINE

## 2022-03-09 PROCEDURE — 250N000012 HC RX MED GY IP 250 OP 636 PS 637: Performed by: INTERNAL MEDICINE

## 2022-03-09 PROCEDURE — 93010 ELECTROCARDIOGRAM REPORT: CPT | Mod: HOP | Performed by: INTERNAL MEDICINE

## 2022-03-09 PROCEDURE — 250N000011 HC RX IP 250 OP 636: Performed by: PODIATRIST

## 2022-03-09 PROCEDURE — 97530 THERAPEUTIC ACTIVITIES: CPT | Mod: GP | Performed by: PHYSICAL THERAPIST

## 2022-03-09 PROCEDURE — 87635 SARS-COV-2 COVID-19 AMP PRB: CPT | Performed by: INTERNAL MEDICINE

## 2022-03-09 PROCEDURE — 97165 OT EVAL LOW COMPLEX 30 MIN: CPT | Mod: GO

## 2022-03-09 PROCEDURE — 82962 GLUCOSE BLOOD TEST: CPT

## 2022-03-09 RX ORDER — CARISOPRODOL 350 MG/1
350 TABLET ORAL 3 TIMES DAILY PRN
Qty: 15 TABLET | Refills: 0 | Status: SHIPPED | OUTPATIENT
Start: 2022-03-09 | End: 2022-03-10

## 2022-03-09 RX ORDER — OXYCODONE HCL 40 MG/1
40 TABLET, FILM COATED, EXTENDED RELEASE ORAL EVERY 24 HOURS
Qty: 5 TABLET | Refills: 0 | Status: SHIPPED | OUTPATIENT
Start: 2022-03-09 | End: 2022-03-10 | Stop reason: DRUGHIGH

## 2022-03-09 RX ORDER — OXYCODONE HCL 10 MG/1
10-30 TABLET, FILM COATED, EXTENDED RELEASE ORAL AT BEDTIME
Start: 2022-03-09 | End: 2022-03-09

## 2022-03-09 RX ORDER — OXYCODONE HCL 20 MG/1
20 TABLET, FILM COATED, EXTENDED RELEASE ORAL AT BEDTIME
Status: DISCONTINUED | OUTPATIENT
Start: 2022-03-10 | End: 2022-03-09 | Stop reason: HOSPADM

## 2022-03-09 RX ORDER — OXYCODONE HCL 10 MG/1
10-30 TABLET, FILM COATED, EXTENDED RELEASE ORAL AT BEDTIME
Qty: 9 TABLET | Refills: 0 | Status: SHIPPED | OUTPATIENT
Start: 2022-03-09 | End: 2022-03-10 | Stop reason: DRUGHIGH

## 2022-03-09 RX ADMIN — FOLIC ACID 1 MG: 1 TABLET ORAL at 08:30

## 2022-03-09 RX ADMIN — OXYCODONE HYDROCHLORIDE 10 MG: 5 TABLET ORAL at 04:44

## 2022-03-09 RX ADMIN — OXYCODONE HYDROCHLORIDE 10 MG: 5 TABLET ORAL at 09:30

## 2022-03-09 RX ADMIN — HYDROMORPHONE HYDROCHLORIDE 0.5 MG: 1 INJECTION, SOLUTION INTRAMUSCULAR; INTRAVENOUS; SUBCUTANEOUS at 06:06

## 2022-03-09 RX ADMIN — OXYCODONE HYDROCHLORIDE 10 MG: 5 TABLET ORAL at 00:30

## 2022-03-09 RX ADMIN — LORATADINE 10 MG: 10 TABLET ORAL at 11:12

## 2022-03-09 RX ADMIN — CARISOPRODOL 350 MG: 350 TABLET ORAL at 12:36

## 2022-03-09 RX ADMIN — PREDNISONE 2 MG: 1 TABLET ORAL at 00:32

## 2022-03-09 RX ADMIN — LEVOTHYROXINE SODIUM 75 MCG: 0.03 TABLET ORAL at 11:11

## 2022-03-09 RX ADMIN — OXYCODONE HYDROCHLORIDE 10 MG: 10 TABLET, FILM COATED, EXTENDED RELEASE ORAL at 00:30

## 2022-03-09 RX ADMIN — OXYCODONE HYDROCHLORIDE 40 MG: 20 TABLET, FILM COATED, EXTENDED RELEASE ORAL at 11:12

## 2022-03-09 RX ADMIN — POLYETHYLENE GLYCOL 3350 17 G: 17 POWDER, FOR SOLUTION ORAL at 08:30

## 2022-03-09 RX ADMIN — OXYCODONE HYDROCHLORIDE 10 MG: 5 TABLET ORAL at 18:10

## 2022-03-09 RX ADMIN — DOCUSATE SODIUM 50 MG AND SENNOSIDES 8.6 MG 1 TABLET: 8.6; 5 TABLET, FILM COATED ORAL at 08:30

## 2022-03-09 RX ADMIN — CARISOPRODOL 350 MG: 350 TABLET ORAL at 05:29

## 2022-03-09 RX ADMIN — PANTOPRAZOLE SODIUM 40 MG: 40 TABLET, DELAYED RELEASE ORAL at 11:12

## 2022-03-09 ASSESSMENT — ACTIVITIES OF DAILY LIVING (ADL)
ADLS_ACUITY_SCORE: 12
ADLS_ACUITY_SCORE: 12
ADLS_ACUITY_SCORE: 10
DEPENDENT_IADLS:: TRANSPORTATION
ADLS_ACUITY_SCORE: 10

## 2022-03-09 NOTE — ANESTHESIA PROCEDURE NOTES
Adductor canal Procedure Note    Pre-Procedure   Staff -        Anesthesiologist:  Dayana Valencia MD       Performed By: anesthesiologist       Location: pre-op       Procedure Start/Stop Times: 3/8/2022 4:08 PM and 3/8/2022 4:10 PM       Pre-Anesthestic Checklist: patient identified, IV checked, site marked, risks and benefits discussed, informed consent, monitors and equipment checked, pre-op evaluation, at physician/surgeon's request and post-op pain management  Timeout:       Correct Patient: Yes        Correct Procedure: Yes        Correct Site: Yes        Correct Position: Yes        Correct Laterality: Yes        Site Marked: Yes  Procedure Documentation  Procedure: Adductor canal       Laterality: left       Patient Position: supine       Skin prep: Chloraprep       Ultrasound guided       1. Ultrasound was used to identify targeted nerve, plexus, vascular marker, or fascial plane and place a needle adjacent to it in real-time.       2. Ultrasound was used to visualize the spread of anesthetic in close proximity to the above referenced structure.       3. A permanent image is entered into the patient's record.    Assessment/Narrative         The placement was negative for: blood aspirated, painful injection and site bleeding       Paresthesias: No.     Bolus given via needle..        Secured via.        Insertion/Infusion Method: Single Shot       Complications: none    Medication(s) Administered   Ropivacaine 0.5% PF (Infiltration), 10 mL

## 2022-03-09 NOTE — PROGRESS NOTES
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 Tub/shower w/ SC no GB standard height toilet.    Self-Care   Usual Activity Tolerance good   Current Activity Tolerance moderate   Regular Exercise No   Equipment Currently Used at Home none   Fall history within last six months no   Activity/Exercise/Self-Care Comment Ind. 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   Existing Precautions/Restrictions weight bearing  (NWB LLE )   Left Lower Extremity (Weight-bearing Status) non weight-bearing (NWB)   Cognitive Status Examination   Orientation Status orientation to person, place and time   Range of Motion Comprehensive   General Range of Motion no range of motion deficits identified   Strength Comprehensive (MMT)   General Manual Muscle Testing (MMT) Assessment no strength deficits identified   Transfers   Transfers bed-chair transfer;sit-stand transfer;toilet transfer   Transfer Skill: Bed to Chair/Chair to Bed   Bed-Chair [place] (Transfers) contact guard;verbal cues   Assistive Device (Bed-Chair Transfers) standard walker   Sit-Stand Transfer   Sit-Stand [place] (Transfers) contact guard;verbal cues;supervision   Assistive Device (Sit-Stand Transfers) walker, standard   Toilet Transfer   Type (Toilet  Transfer) sit-stand;stand-sit   [place] Level (Toilet Transfer) contact guard;verbal cues   Assistive Device (Toilet Transfer) walker, standard   Activities of Daily Living   BADL Assessment/Intervention upper body dressing;lower body dressing;toileting   Upper Body Dressing Assessment/Training   [place] Level (Upper Body Dressing) minimum assist (75% patient effort);verbal cues   Position (Upper Body Dressing) supported sitting   Lower Body Dressing Assessment/Training   [place] Level (Lower Body Dressing) minimum assist (75% patient effort);verbal cues   Position (Lower Body Dressing) unsupported standing   Toileting   [place] Level (Toileting) contact guard assist;verbal cues;supervision   Position (Toileting) unsupported sitting;unsupported standing   Clinical Impression   Criteria for Skilled Therapeutic Interventions Met (OT) Yes, treatment indicated   OT Diagnosis Decreased ADL independence    OT Problem List-Impairments impacting ADL problems related to;activity tolerance impaired;balance;mobility;strength   Assessment of Occupational Performance 1-3 Performance Deficits   Identified Performance Deficits LB dressing, trnf/mobility, NWB- LLE   Planned Therapy Interventions (OT) ADL retraining;IADL retraining;balance training;transfer training   Clinical Decision Making Complexity (OT) low complexity   Risk & Benefits of therapy have been explained evaluation/treatment results reviewed;patient   OT Discharge Planning   OT Discharge Recommendation (DC Rec) Transitional Care Facility   OT Rationale for DC Rec Assistx1 trnf/mobility/self cares NWB LLE- pt to d/c to TCU for further skilled therapy    Total Evaluation Time (Minutes)   Total Evaluation Time (Minutes) 4   OT Goals   Therapy Frequency (OT) Daily   OT Predicated Duration/Target Date for Goal Attainment 03/16/22   OT Goals Lower Body Dressing;Transfers;Toilet Transfer/Toileting   OT: Lower Body Dressing using adaptive  equipment;within precautions;Modified independent   OT: Transfer Supervision/stand-by assist;with assistive device;within precautions   OT: Toilet Transfer/Toileting Supervision/stand-by assist;within precautions;using adaptive equipment

## 2022-03-09 NOTE — PLAN OF CARE
Physical Therapy Discharge Summary    Reason for therapy discharge:    Discharged to transitional care facility.    Progress towards therapy goal(s). See goals on Care Plan in [facility] electronic health record for goal details.  Goals partially met.  Barriers to achieving goals:   discharge from facility.    Therapy recommendation(s):    Continued therapy is recommended.  Rationale/Recommendations:  Recommend PT at TCU..      Mary Ann Price, PT  3/9/2022

## 2022-03-09 NOTE — ADDENDUM NOTE
Addendum  created 03/08/22 1953 by Dayana Valencia MD    Child order released for a procedure order, Clinical Note Signed, Intraprocedure Blocks edited

## 2022-03-09 NOTE — CONSULTS
[facility] ACUTE PAIN SERVICE CONSULTATION     Date of Admission:  3/8/2022  Date of Consult (When I saw the patient): 03/09/22  Physician requesting consult: Dr Andre  Reason for consult: post op pain in chronic pain patient on chronic opioids     Assessment/Plan:     Jacqueline Prado is a 81 year old female who was admitted on 3/8/2022.  Pain team was asked to see the patient for post op pain in chronic pain patient on chronic opioids. Admitted for planned procedure. Patient presents for operative intervention for osteomyelitis of the 2nd digit left foot. History of  RA, opioid dependence, chronic pain, hypothyroidism, GERD, and osteomyelitis of the second digit of the left foot. Describes pain as 8/10 and aching, sharp in low back, denies post op pain in foot. The patient denies nausea, vomiting, constipation, diarrhea, chest pain, shortness of breath. The patient does not smoke and denies chemical dependency history.     Post op day: 1 Day Post-Op.     Opioid Induced Respiratory Depression Risk Assessment: high    PLAN:   1) Pain is consistent with chronic pain, denies post op pain at time of visit. Reports histroy of chronic pain pain. Follows with [facility] pain clinic, however reports no longer seeing patient and will now follow with Jordana Reynolds MD.    2)Multimodal Medication Therapy  Topical: none  NSAID'S: post op, daily prednisone   Muscle Relaxants: home dose soma 350 mg tid prn   Adjuvants: amitriptyline 25 mg at bedtime prn   Opioids: home dose oxycontin 40 mg in AM and 20 mg at bedtime  IV Pain medication: dilaudid 0.5 mg q2h prn   3)Non-medication interventions: rest, elevate   Acupuncture consult, Integrative consult - if patient agreeable   4)Constipation Prophylaxis: Scheduled and prn    -Opioid prescriber has been Dr Hemal Ramirez, Jordana Reynolds MD  -[place]  pulled from system on 3-9/22. This indicates ongoing oxycontin and soma.   2/11/22 oxycontin 10 mg 90 tabs  for 30 days   2/9/22 oxycontin 40 mg 30 tabs for 30 days  2/7/22 soma 350 mg 90 tabs for 30 days   Discharge Recommendations - We recommend prescribing the following at the time of discharge:continue home oxycontin and soma.     Pain team will sign off.      History of Present Illness (HPI):       Jacqueline Prado is a 81 year old old female who presented for post op pain in chronic pain patient on chronic opioids.  Past medical history as above. The pain is reported to be chronic low back pain, denies post op pain. Current pain is rated at 8/10 and goal is 2-4/10.      Per [place]  review, the patient does have an opioid tolerance. Opioid induced side effects noted and include: none     Reviewed medical record, labs, imaging, ED note, and care everywhere.     Past pain treatments have included: pain clinic, oxycontin     Home pain medications/psych medications/anticoagulation medications include: oxycontin 40 mg in AM and 10-30 mg at bedtime, amitriptyline, ASA, prednisone    Medical History   PAST MEDICAL HISTORY:   Past Medical History:   Diagnosis Date     Acquired hypothyroidism      Acquired lymphedema of leg      Bundle branch block     Created by Conversion  Replacement Utility updated for latest IMO load     Bundle branch block      Cellulitis      Hypertension      Impetigo      Opioid dependence (H)      Osteoporosis      RA (rheumatoid arthritis) (H)      Rheumatoid arthritis (H)      Secondary hyperparathyroidism (H)      Venous hypertension of both lower extremities      Venous insufficiency of both lower extremities      Venous stasis dermatitis of lower extremity        PAST SURGICAL HISTORY:   Past Surgical History:   Procedure Laterality Date     AMPUTATE TOE(S) Left 3/8/2022    Procedure: AMPUTATION, second digit left foot;  Surgeon: Kendrick Pedraza DPM;  Location: [facility]     BREAST CYST ASPIRATION Left 2000     HC REMOVAL GALLBLADDER      Description: Cholecystectomy;  Recorded:  07/22/2009;     IR LUMBAR EPIDURAL STEROID INJECTION  6/23/2003     IR MISCELLANEOUS PROCEDURE  12/1/2006     OH INJECT VERTEBRAL BODY, LUMBAR      Description: Spinal Percutaneous Vertebroplasty, Injection Lumbar;  Recorded: 11/03/2011;  Annotations: L5     ZZC EXCIS CERV DISK,ONE LEVEL      Description: Laminectomy With Disc Removal;  Recorded: 11/03/2011;  Annotations: L5-S1       FAMILY HISTORY:   Family History   Problem Relation Age of Onset     Breast Cancer Sister 72.00     Leukemia Sister        SOCIAL HISTORY:   Social History     Tobacco Use     Smoking status: Former Smoker     Packs/day: 0.50     Types: Cigarettes     Quit date: 6/1/2018     Years since quitting: 3.7     Smokeless tobacco: Current User     Tobacco comment: started smoking when she was 21 years old, vaping   Substance Use Topics     Alcohol use: Yes     Comment: Alcoholic Drinks/day: glass of wine during holiday        HEALTH & LIFESTYLE PRACTICES  Tobacco:  reports that she quit smoking about 3 years ago. Her smoking use included cigarettes. She smoked 0.50 packs per day. She uses smokeless tobacco.  Alcohol:  reports current alcohol use.  Illicit drugs:  reports previous drug use. Drug: Marijuana.    Allergies  Allergies   Allergen Reactions     Acetaminophen Rash     Nsaids (Non-Steroidal Anti-Inflammatory Drug) [Nsaids] Rash     Tetracyclines Rash     Baclofen Nausea     Celecoxib Unknown     Erythromycin Base [Erythromycin] Unknown     Pentolinium Itching     Varenicline Itching and Swelling     Erythromycin Rash     Petroleum Jelly [Petrolatum] Itching and Rash       Problem List  Patient Active Problem List    Diagnosis Date Noted     Osteomyelitis of ankle and foot (H) 03/09/2022     Priority: Medium     Chronic pain 03/08/2022     Priority: Medium     Osteomyelitis (H) 03/08/2022     Priority: Medium     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) 02/23/2022     Priority: Medium     Lower leg mass, left 05/15/2019     Priority:  Medium     Left leg pain 05/15/2019     Priority: Medium     Venous stasis dermatitis of both lower extremities      Priority: Medium     Acquired lymphedema of leg 10/03/2018     Priority: Medium     Venous hypertension of both lower extremities 06/14/2018     Priority: Medium     Venous insufficiency of both lower extremities 06/14/2018     Priority: Medium     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     Priority: Medium     Rheumatoid arthritis (H)      Priority: Medium     Created by Conversion         Opioid Dependence With Continuous Use      Priority: Medium     Created by Conversion         Hypothyroidism      Priority: Medium     Created by Conversion  Replacement Utility updated for latest IMO load         Esophageal reflux      Priority: Medium     Created by Conversion         HTN (hypertension) 08/07/2017     Priority: Medium     Bundle Branch Block      Priority: Medium     Created by Conversion  Replacement Utility updated for latest IMO load         Secondary Hyperparathyroidism      Priority: Medium     Created by Conversion  Replacement Utility updated for latest IMO load         Osteoporosis      Priority: Medium     Created by Conversion  Replacement Utility updated for latest IMO load         Back pain with left-sided sciatica      Priority: Medium     Created by Conversion         Myalgia and myositis 06/08/2015     Priority: Medium     Postlaminectomy Syndrome (Lumbar)      Priority: Medium     Created by Conversion         Lumbar Disc Degeneration      Priority: Medium     Created by Conversion         Joint Pain, Localized In The Right Shoulder      Priority: Medium     Created by Conversion         Insomnia 07/16/2007     Priority: Medium     Osteoarthritis 07/16/2007     Priority: Medium     Symptomatic menopausal or female climacteric states 07/16/2007     Priority: Medium       Prior to Admission Medications   Facility-Administered Medications Prior to Admission   Medication Dose Route Frequency Provider  Last Rate Last Admin     denosumab (PROLIA) injection 60 mg  60 mg Subcutaneous Q6 Months Kb Alexis MD   60 mg at 10/25/21 1416     [DISCONTINUED] denosumab (PROLIA) injection 60 mg  60 mg Subcutaneous Once Jordana Reynolds MD         Medications Prior to Admission   Medication Sig Dispense Refill Last Dose     albuterol (PROAIR HFA;PROVENTIL HFA;VENTOLIN HFA) 90 mcg/actuation inhaler [ALBUTEROL (PROAIR HFA;PROVENTIL HFA;VENTOLIN HFA) 90 MCG/ACTUATION INHALER] Inhale 2 puffs every 4 (four) hours as needed for wheezing. And cough 1 Inhaler 3  at PRN     aspirin 81 mg chewable tablet [ASPIRIN 81 MG CHEWABLE TABLET] Chew 81 mg daily.   Past Week at Unknown time     CALCIUM CARBONATE/VITAMIN D3 (CALCIUM+D ORAL) [CALCIUM CARBONATE/VITAMIN D3 (CALCIUM+D ORAL)] Take 3 tablets by mouth daily.   Past Week at Unknown time     carisoprodol (SOMA) 350 MG tablet Take 1 tablet (350 mg) by mouth 3 times daily as needed for muscle spasms 90 tablet 3 3/8/2022 at 1200     cholecalciferol, vitamin D3, 1,000 unit (25 mcg) tablet [CHOLECALCIFEROL, VITAMIN D3, 1,000 UNIT (25 MCG) TABLET] Take 1,000 Units by mouth daily.   Past Week at Unknown time     folic acid (FOLVITE) 1 MG tablet Take 1 mg by mouth daily   2 Past Week at Unknown time     hydrocortisone (CORTISONE, HYDROCORTISONE,) 1 % cream [HYDROCORTISONE (CORTISONE, HYDROCORTISONE,) 1 % CREAM] Apply to hand three times a day (Patient taking differently: Apply topically 3 times daily as needed ) 30 g 2  at PRN     isosorbide mononitrate (IMDUR) 30 MG 24 hr tablet Take 30 mg by mouth 2 times daily    3/8/2022 at 0000     leflunomide (ARAVA) 20 MG tablet [LEFLUNOMIDE (ARAVA) 20 MG TABLET] Take 20 mg by mouth daily.    3/5/2022     levothyroxine (SYNTHROID, LEVOTHROID) 75 MCG tablet [LEVOTHYROXINE (SYNTHROID, LEVOTHROID) 75 MCG TABLET] TAKE 1 TABLET BY MOUTH DAILY AT 6 AM 90 tablet 3 3/8/2022 at 1200     loratadine (CLARITIN) 10 mg tablet [LORATADINE (CLARITIN) 10 MG TABLET]  Take 10 mg by mouth daily.   3/8/2022 at 1200     naloxone (NARCAN) 4 MG/0.1ML nasal spray Spray 1 spray (4 mg) into one nostril alternating nostrils once as needed for opioid reversal every 2-3 minutes until assistance arrives 0.2 mL 0      nystatin (MYCOSTATIN) cream [NYSTATIN (MYCOSTATIN) CREAM] Apply 1 application topically 2 (two) times a day as needed.  1  at PRN     omeprazole (PRILOSEC) 20 MG capsule [OMEPRAZOLE (PRILOSEC) 20 MG CAPSULE] Take 20 mg by mouth Daily before breakfast.   3/8/2022 at 1200     oxyCODONE (OXYCONTIN) 40 MG 12 hr tablet Take 1 tablet (40 mg) by mouth every 24 hours 30 tablet 0 3/8/2022 at 1200     predniSONE (CHARLENE) 2 MG EC tablet Take 1 tablet (2 mg) by mouth At Bedtime   3/8/2022 at 0000     triamcinolone (KENALOG) 0.1 % ointment [TRIAMCINOLONE (KENALOG) 0.1 % OINTMENT] Apply to both legs twice daily (Patient taking differently: Apply topically 2 times daily as needed To legs) 30 g 0  at PRN     [DISCONTINUED] amitriptyline (ELAVIL) 25 MG tablet [AMITRIPTYLINE (ELAVIL) 25 MG TABLET] two tablets by mouth at bedtime (Patient taking differently: Take 25 mg by mouth nightly as needed ) 60 tablet 5  at PRN     [DISCONTINUED] cephALEXin (KEFLEX) 500 MG capsule Take 1 capsule (500 mg) by mouth 3 times daily 30 capsule 0 3/8/2022 at 1200     [DISCONTINUED] hydrochlorothiazide (MICROZIDE) 12.5 MG capsule Take 12.5 mg by mouth daily as needed    at PRN     [DISCONTINUED] isosorbide mononitrate (ISMO/MONOKET) 10 MG tablet TAKE 1 TABLET(10 MG) BY MOUTH TWICE DAILY (Patient not taking: Reported on 3/8/2022) 180 tablet 3 Not Taking at Unknown time     [DISCONTINUED] oxyCODONE (OXYCONTIN) 10 MG 12 hr tablet Take 1 tablet (10 mg) by mouth every 12 hours (Patient taking differently: Take 10-30 mg by mouth At Bedtime ) 60 tablet 0 3/8/2022 at 0000       Review of Systems  Complete ROS reviewed, unless noted in HPI, all other systems reviewed (with patient) and all others found to be negative.       Objective:     Physical Exam:  BP (!) 142/65 (BP Location: Left arm)   Pulse 71   Temp 98.4  F (36.9  C) (Oral)   Resp 18   SpO2 95%   Weight:   There were no vitals filed for this visit.   There is no height or weight on file to calculate BMI.    General Appearance:  Alert, cooperative, no distress   Head:  Normocephalic, without obvious abnormality, atraumatic   Eyes:  PERRL, conjunctiva/corneas clear, EOM's intact   ENT/Throat: Lips, mucosa, and tongue normal; teeth and gums normal   Lymph/Neck: Supple, symmetrical, trachea midline   Lungs:   Clear to auscultation bilaterally, respirations unlabored   Cardiovascular/Heart:  Regular rate and rhythm, S1, S2 normal,no murmur, rub or gallop.    Abdomen:   Soft, non-tender, bowel sounds active all four quadrants,  no masses, no organomegaly   Musculoskeletal: Incision is covered    Skin: Skin warm, dry    Neurologic: Alert and oriented X 3, Moves all 4 extremities     Psych: Affect is anxious     Imaging: Reviewed  US ABELARDO Doppler No Exercise    Result Date: 2/23/2022  BILATERAL RESTING ANKLE-BRACHIAL INDICES (ABELARDO'S) (Date: 02/23/22) Indication: Surveillance of PAD, decreased lower extremity pulses Previous: none History: Current Smoker, Skin Color Change and Vascular Ulcers  Resting ABELARDO's          Right: mmHg Index     Brachial: 80  Ankle-(PT): 101 1.17 Ankle-(DP): 95 1.10          Digit: 65 0.76               Left: mmHg Index     Brachial: 86  Ankle-(PT): 110 1.28 Ankle-(DP): 96 1.12          Digit: 71 0.83 Resting ankle-brachial index of 1.17 on the right. Toe Pressures of 65 mmHg and TBI of 0.76  Resting ankle-brachial index of 1.28 on the left. Toe Pressures of 71 mmHg and TBI of 0.83  WAVEFORMS: The right dorsalis pedis and posterior tibial arteries show triphasic waveforms. The left dorsalis pedis and posterior tibial arteries show triphasic waveforms.  Impression:  1. RIGHT LOWER EXTREMITY: ABELARDO is Normal with an ABELARDO of 1.17. and Normal toe pressures of 65  mmHg. 2. LEFT LOWER EXTREMITY: ABELARDO is Normal with an ABELARDO of 1.28. and Normal toe pressures of 71 mmHg. Reference: Wound classification Grade ABELARDO Ankle Systolic Pressure Toe Pressures 0 > 0.80 > 100 mmHg > 60 mmHg 1 0.6 - 0.79 70 - 100 mmHg 40 - 59 mmHg 2 0.4 - 0.59 50-70 mmHg 30 - 39 mmHg 3 < 0.39 < 50 mmHg < 30 mmHg Digit Pressures DBI Disease Category > 0.70 Normal < 0.70 Abnormal > 30 mmHg Potential wound healing < 30 mmHg Impaired wound healing Ankle Brachial Pressures ABELARDO Disease Category > 1.3  Likely vessel calcification with monophasic waveforms, non-diagnostic 0.95-1.30 Normal with multiphasic waveforms 0.50-0.95 Single level disease 0.30-0.50 Multilevel disease < 0.30 Critical limb ischema Volume Plethysmography Recording (VPR) at all levels Normal Sharp systolic peak, fast upstroke, prominent dicrotic notch in wave Mild Sharp systolic peak, fast upstroke, absent dicrotic notch in wave Moderate Flattened systolic peak, slowed upstroke, absent dicrotic notch in wave Severe Low-amplitude wave with = upslope and down slope Occluded Flat Line Multiple Level Segmental Pressures  Normal Abnormal Upper Thigh > 1.2 pressure indices, <30 mmHg between lateral and horizontal cuffs < 0.8 pressure indices suggest proximal occlusion, 0.8-1.2 pressure indices suggest inflow disease, > 30 mmHg between lateral and horizontal cuffs  Lower Thigh < 30 mmHg between lateral and horizontal cuffs > 30 mmHg between lateral and horizontal cuffs Upper Calf < 30 mmHg between lateral and horizontal cuffs > 30 mmHg between lateral and horizontal cuffs Note: As limb diameter increases, pressure measurements also increase.    XR Foot Left G/E 3 Views    Result Date: 3/8/2022  EXAM: XR FOOT LEFT G/E 3 VIEWS LOCATION: [facility] DATE/TIME: 3/8/2022 6:00 PM INDICATION: post op COMPARISON: None.     IMPRESSION: Amputations second toe. Subluxations and dislocations at the MTP joints of the third and fourth toes.  "Diffuse bony demineralization. No focal bony abnormality to suggest osteomyelitis. The needle of the great toe appears elevated. Hallux valgus. Mild degenerative arthritis of the first MTP and talonavicular joints.    POC US Guidance Needle Placement    Result Date: 3/8/2022  Ultrasound was performed as guidance to an anesthesia procedure.  Click \"PACS images\" hyperlink below to view any stored images.  For specific procedure details, view procedure note authored by anesthesia.      Labs: Reviewed   Recent Results (from the past 24 hour(s))   Gram Stain    Collection Time: 03/08/22  4:25 PM    Specimen: Foot, Left; Wound   Result Value Ref Range    Gram Stain Result No organisms seen     Gram Stain Result No white blood cells seen    Glucose by meter    Collection Time: 03/09/22  6:02 AM   Result Value Ref Range    GLUCOSE BY METER POCT 107 (H) 70 - 99 mg/dL   Asymptomatic COVID-19 Virus (Coronavirus) by PCR Nasopharyngeal    Collection Time: 03/09/22 12:40 PM    Specimen: Nasopharyngeal; Swab   Result Value Ref Range    SARS CoV2 PCR Negative Negative   ECG 12-LEAD WITH MUSE (L[facility])    Collection Time: 03/09/22 12:53 PM   Result Value Ref Range    Systolic Blood Pressure  mmHg    Diastolic Blood Pressure  mmHg    Ventricular Rate 71 BPM    Atrial Rate 71 BPM    SC Interval 204 ms    QRS Duration 136 ms     ms    QTc 473 ms    P Axis 61 degrees    R AXIS 8 degrees    T Axis -15 degrees    Interpretation ECG       Sinus rhythm  Non-specific intra-ventricular conduction block  Cannot rule out Anterior infarct , age undetermined  T wave abnormality, consider inferior ischemia  Abnormal ECG  When compared with ECG of 07-MAR-2022 11:59,  No significant change was found         Total time spent 55 minutes with greater than 50% in consultation, education and coordination of care, examination of patient, review of medical record, lab and imaging results, completion of documentation, and discussion with RN, MD, Senior " Acupuncturist, and pharmacist.      Thank you for this consultation.    Mariya MG, BRIANNAP-C  Acute Care Pain Management Program  [facility] ([place], [place], [place])  Monday-Friday 8a-4p   Page via online paging system or call [phone]

## 2022-03-09 NOTE — PROGRESS NOTES
[facility] [place]      ASSESSMENT:    Principal Problem:    Osteomyelitis (H)  Active Problems:    Rheumatoid arthritis (H)    Opioid Dependence With Continuous Use    Hypothyroidism    Esophageal reflux    HTN (hypertension)    Chronic pain      PLAN:   81-year-old female with history of RA, opioid dependence, chronic pain, hypothyroidism, GERD, and osteomyelitis of the second digit of the left foot presents for elective amputation.  [facility] [place] medical management of hypertension and RA.        Rheumatoid arthritis:   --Hold home Arava postop, continue prednisone 2 mg daily that patient was taking prior to admission.  --Resume Arava when okay with surgery pending wound healing progress        History of hypertension: Given soft blood pressures postoperatively will hold home Imdur and resume as BP tolerates.  As needed hydralazine available.        Chronic pain with opioid dependence:   --Continue home OxyContin 40 mg daily and 10 mg at bedtime, have extra as needed oxycodone available.    --Continue home Soma and Elavil  -- Ask pain team to follow        Hypothyroidism: continue home replacement         GERD: continue PPI        Osteomyelitis second digit left foot: Status post amputation left second digit by Dr. Ramirez 3/8/2022  --Postop cares per podiatry  --Anticipate need for TCU pending PT/OT eval's in the a.m.          DVT PPX: Defer DVT prophylaxis to podiatry            Andrez Andre D.O.              -------------------------------------------------------------------------------------------------------------  SUBJECTIVE: NAD. Denies any nausea, vomiting, abdominal pain, chest pain, SOB, new swelling, fevers, chills, confusion or headache.     Exam:  /58 (BP Location: Left arm, Patient Position: Supine, Cuff Size: Adult Small)   Pulse 63   Temp 97.6  F (36.4  C) (Oral)   Resp 18   SpO2 99%   General: NAD  RESPIRATORY: Breathing nonlabored  CARDIOVASCULAR: No le edema  bilat.   ABDOMEN: soft and non-tender  MSK: LLE dressed and wrapped  NEUROLOGIC: Motor grossly intact, speech clear, alert and oriented       Diagnostics Reviewed:      No results found for this or any previous visit (from the past 24 hour(s)).

## 2022-03-09 NOTE — CONSULTS
Care Management Discharge Note    Discharge Date: 03/09/2022       Discharge Disposition: Transitional Care    Discharge Services: None    Discharge DME: None    Discharge Transportation:  Family will transport    Private pay costs discussed: private room/amenity fees and transportation costs    PAS Confirmation Code: [id number]  Patient/family educated on Medicare website which has current facility and service quality ratings: yes    Education Provided on the Discharge Plan:  Yes, per team  Persons Notified of Discharge Plans: Daughter, TCU admissions, HUC, Floor nurse, Charge Nurse  Patient/Family in Agreement with the Plan: yes    Handoff Referral Completed: Yes    Additional Information:  Started working on TCU placement 3/8; referrals faxed to facilities with call back extension [phone]. Main family contact is eden Fitzgerald. No facility preference noted; just wanted to be close to home and have a private room.     Received call from [facility] stating they were able to accept patient today 3/9. Called and notified eden Fitzgerald. Updated that it was a private room, and there were no extra fees with private room. Discussed transport with Lia who stated they have a wheelchair accessible van for their son, wondering if hospital would have wheelchair they could borrow. Writer stated they would reach out to unit to determine if that was possible. Spoke with [facility] and provide update on plan to discharge to [facility] and stated writer would complete PAS and bring up to the unit. Inquired if family could borrow wheelchair for transport and then return to the unit. HUC will check in with charge nurse. Completed PAS and brought up to unit. [facility] stated charge going to check in with nurse supervisor in regards to borrowing wheelchair, will keep writer updated.     Received call from Lia inquiring if writer could send referral to [facility] in [place]; referral was sent. Per Lia their mother in law  had been their previously and she hadn't thought of it yesterday. Lia stated they are not wanting to go to [facility] due to Medicare.gov rating informations; specifically in regards to falls at facility. Writer stated understanding and updated they would try to find alternate placement if possible.    Left message for [facility] admissions with call back extension [phone]. Left message for [facility] TCU. Spoke with admissions at [facility] they would potentially be able to accept to a private room (no private room fees) pending repeat negative COVID test and if Arava (rheumatoid arthritis) medication could be held in TCU due to costs. Paged hospitalist requesting repeat COVID test and inquired if Arava could be held. COVID test ordered, spoke with floor nurse who planned to get as soon as they were able.     Spoke with daughter Lia and updated that message was left for admissions at [facility] and that patient was accepted at [facility] TCU to a private room pending repeat COVID test and medication. Per daughter Lia she did not feel that the Arava could be held as it would potentially cause a flare up and hinder healing. Inquired if family could bring in home medication. Writer stated they would check in with facility to confirm in home medication could be brought. Lia inquired if writer could check in with [facility] again, writer stated they would.     Spoke with admissions at [facility] to inquire about home medication being brought in by family. Admissions stated they would need to follow up with her DON and would call writer back. Message left for [facility] with call back extension [phone]. Spoke with [facility] admissions who stated family could bring in home medication and they could accept once negative COVID result was back.     Received call from [facility] admissions stating the COVID results had come back negative and the  patient was ok to come. Would like patient to come after 1400. Spoke with charge nurse and provided update on plan to discharge to [facility] TCU now. Inquired about wheelchair; unfortunately hospital wheelchairs not appropriate for transport as they are lacking the right equipment needed. Updated Daughter Lia on negative COVID test and ok to go to TCU after 1400. Lia stated understanding. Previously discussed private pay transport costs with Lia and they decline transport at this time. Family plans to transport later this afternoon. Lia stated her  plans to reach out to hospitalist to make sure discharge medications are correct.     Received call back from PT who worked with patient and feels that TCU is appropriate. Patient need assistance x 1. OT plans to work with patient as well.     Left message for [facility] updating on families decision to go with alternate placement, provided call back extension [phone]. Called back to [facility] stating placement had been found for today (had bed for 3/10). Updated admissions at [facility] family had elected to go with an alternate facility. No response from [facility].     Updated HUC on discharge plan; she will fax orders to [facility] once in.       Delores Gardiner, RN

## 2022-03-09 NOTE — PLAN OF CARE
"  Problem: Plan of Care - These are the overarching goals to be used throughout the patient stay.    Goal: Plan of Care Review/Shift Note  Description: The Plan of Care Review/Shift note should be completed every shift.  The Outcome Evaluation is a brief statement about your assessment that the patient is improving, declining, or no change.  This information will be displayed automatically on your shift note.  Outcome: Ongoing, Progressing  Goal: Patient-Specific Goal (Individualized)  Description: You can add care plan individualizations to a care plan. Examples of Individualization might be:  \"Parent requests to be called daily at 9am for status\", \"I have a hard time hearing out of my right ear\", or \"Do not touch me to wake me up as it startles me\".  Outcome: Ongoing, Progressing  Goal: Absence of Hospital-Acquired Illness or Injury  Outcome: Ongoing, Progressing  Intervention: Identify and Manage Fall Risk  Recent Flowsheet Documentation  Taken 3/9/2022 0444 by Marnie Luque, RN  Safety Promotion/Fall Prevention:   bed alarm on   lighting adjusted  Taken 3/9/2022 0030 by Marnie Luque RN  Safety Promotion/Fall Prevention:   bed alarm on   lighting adjusted  Intervention: Prevent Skin Injury  Recent Flowsheet Documentation  Taken 3/9/2022 0444 by Marnie Luque, RN  Body Position: legs elevated  Taken 3/9/2022 0030 by Marnie Luque RN  Body Position:   position changed independently   legs elevated  Intervention: Prevent and Manage VTE (Venous Thromboembolism) Risk  Recent Flowsheet Documentation  Taken 3/9/2022 0444 by Marnie Luque, RN  Activity Management: up in chair  Taken 3/9/2022 0030 by Marnie Luque RN  Activity Management: bedrest  Goal: Optimal Comfort and Wellbeing  Outcome: Ongoing, Progressing  Goal: Readiness for Transition of Care  Outcome: Ongoing, Progressing     Problem: Pain Acute  Goal: Acceptable Pain Control and Functional Ability  Outcome: Ongoing, " Progressing  Intervention: Prevent or Manage Pain  Recent Flowsheet Documentation  Taken 3/9/2022 0444 by Marnie Luque RN  Medication Review/Management: medications reviewed  Taken 3/9/2022 0030 by Marnie Luque RN  Medication Review/Management: medications reviewed     Problem: Pain Chronic (Persistent)  Goal: Acceptable Pain Control and Functional Ability  Outcome: Ongoing, Progressing  Intervention: Manage Persistent Pain  Recent Flowsheet Documentation  Taken 3/9/2022 0444 by Marnie Luque RN  Medication Review/Management: medications reviewed  Taken 3/9/2022 0030 by Marnie Luque RN  Medication Review/Management: medications reviewed     Problem: Surgical Site Infection  Goal: Absence of Infection Signs and Symptoms  Outcome: Ongoing, Progressing     Problem: Mobility Impairment  Goal: Optimal Mobility  Outcome: Ongoing, Progressing  Intervention: Optimize Mobility  Recent Flowsheet Documentation  Taken 3/9/2022 0444 by Marnie Luque RN  Activity Management: up in chair  Positioning/Transfer Devices: pillows  Taken 3/9/2022 0030 by Marnie Luque RN  Activity Management: bedrest  Positioning/Transfer Devices: pillows   Goal Outcome Evaluation:           Pt a/o with c/o pain d/t not having her at home doses of oxycontin that she has been on for 20 + years. Pt given oxycodone prn and iv dilaudid prn. Pt able to void 400 ml this shift with a PVR of 450. Pt refusing straight cath at this time, wanting to try again in a couple hours. Will continue to monitor.

## 2022-03-09 NOTE — DISCHARGE SUMMARY
[facility]  Hospitalist Discharge Summary      Date of Admission:  3/8/2022  Date of Discharge:  3/9/2022  Discharging Provider: Vivian Cueva MD  Discharge Service: Hospitalist Service    Discharge Diagnoses    Osteomyelitis 2nd digit left foot    Follow-ups Needed After Discharge   Follow-up Appointments     Follow Up and recommended labs and tests      Follow up with senior care physician.  The following labs/tests are   recommended: cbc.         Follow up with podiatrist within one week   Unresulted Labs Ordered in the Past 30 Days of this Admission     Date and Time Order Name Status Description    3/8/2022  4:45 PM Surgical Pathology Exam In process     3/8/2022  4:37 PM Wound Aerobic Bacterial Culture Routine In process     3/8/2022  4:37 PM Anaerobic Bacterial Culture Routine In process           Discharge Disposition   Discharged to short-term care facility  Condition at discharge: Stable      Hospital Course   Jacqueline Prado is 81-year-old female with history of RA, hypertension, opioid dependence, chronic pain on oxycontin , hypothyroidism, GERD, and osteomyelitis of the second digit of the left foot admitted on 3/7/2022 for elective amputation.  Amputation of 2nd digit left foot was performed on 3/8/2022.   PTA Arava was held post op briefly due to concern for poor wound healing and restarted at discharge after being cleared by the podiatrist. PTA prednisone 2 mg daily was continued during hospital stay. EKG showed new left bundle branch block compared with ECG of 2/3/2001. Patient states she has had LBBB for several years and denies chest pain, sob, palpitation or dizziness    Pain is well controlled post op and patient is felling better. She is clinically stable for discharge to TCU at this time.       Consultations This Hospital Stay   SOCIAL WORK IP CONSULT  PHYSICAL THERAPY ADULT IP CONSULT  OCCUPATIONAL THERAPY ADULT IP CONSULT  HOSPITALIST IP CONSULT  PAIN  MANAGEMENT ADULT IP CONSULT  PHYSICAL THERAPY ADULT IP CONSULT  OCCUPATIONAL THERAPY ADULT IP CONSULT    Code Status   No CPR- Do NOT Intubate ; discussed with patient on 3/9/22    Time Spent on this Encounter   I, Vivian Cueva MD, personally saw the patient today and spent greater than 30 minutes discharging this patient.       Vivian Cueva MD  [address] [address]  Phone: [phone]  Fax: [phone]  ______________________________________________________________________    Physical Exam   Vital Signs: Temp: 98.4  F (36.9  C) Temp src: Oral BP: (!) 142/65 Pulse: 71   Resp: 18 SpO2: 95 % O2 Device: None (Room air) Oxygen Delivery: 1 LPM  Weight: 0 lbs 0 oz  General: NAD  RESPIRATORY: Breathing nonlabored  CARDIOVASCULAR: No edema   ABDOMEN: soft and non-tender  MSK: LLE wrapped in ace wrap- clean and dry  NEUROLOGIC: Motor grossly intact, normal speech, alert and oriented    Primary Care Physician   Jordana Reynolds    Discharge Orders      General info for SNF    Length of Stay Estimate: Short Term Care: Estimated # of Days <30  Condition at Discharge: Stable  Level of care:skilled   Rehabilitation Potential: Good  Admission H&P remains valid and up-to-date: Yes  Recent Chemotherapy: N/A  Use Nursing Home Standing Orders: Yes     Follow Up and recommended labs and tests    Follow up with [facility] physician.  The following labs/tests are recommended: cbc.     Reason for your hospital stay    Osteomyelitis 2nd digit left foot     Activity - Up ad coty     Physical Therapy Adult Consult    Evaluate and treat as clinically indicated.    Reason:   Osteomyelitis 2nd digit left foot s/p amputation     Occupational Therapy Adult Consult    Evaluate and treat as clinically indicated.    Reason:   Osteomyelitis 2nd digit left foot s/p amputataion     Fall precautions     Diet    Follow this diet upon discharge: Orders Placed This Encounter      " Regular Diet Adult       Significant Results and Procedures   Results for orders placed or performed during the hospital encounter of 03/08/22   POC US Guidance Needle Placement    Narrative    Ultrasound was performed as guidance to an anesthesia procedure.  Click   \"PACS images\" hyperlink below to view any stored images.  For specific   procedure details, view procedure note authored by anesthesia.   XR Foot Left G/E 3 Views    Narrative    EXAM: XR FOOT LEFT G/E 3 VIEWS  LOCATION: [facility]  DATE/TIME: 3/8/2022 6:00 PM    INDICATION: post op  COMPARISON: None.      Impression    IMPRESSION: Amputations second toe. Subluxations and dislocations at the MTP joints of the third and fourth toes. Diffuse bony demineralization. No focal bony abnormality to suggest osteomyelitis. The needle of the great toe appears elevated. Hallux   valgus. Mild degenerative arthritis of the first MTP and talonavicular joints.       Discharge Medications   Current Discharge Medication List      CONTINUE these medications which have CHANGED    Details   amitriptyline (ELAVIL) 25 MG tablet Take 1 tablet (25 mg) by mouth At Bedtime  Qty: 30 tablet, Refills: 0    Associated Diagnoses: Back pain with left-sided sciatica      !! oxyCODONE (OXYCONTIN) 10 MG 12 hr tablet Take 1-3 tablets (10-30 mg) by mouth At Bedtime    Associated Diagnoses: Back pain with left-sided sciatica       !! - Potential duplicate medications found. Please discuss with provider.      CONTINUE these medications which have NOT CHANGED    Details   albuterol (PROAIR HFA;PROVENTIL HFA;VENTOLIN HFA) 90 mcg/actuation inhaler [ALBUTEROL (PROAIR HFA;PROVENTIL HFA;VENTOLIN HFA) 90 MCG/ACTUATION INHALER] Inhale 2 puffs every 4 (four) hours as needed for wheezing. And cough  Qty: 1 Inhaler, Refills: 3    Comments: May substitute the equivalent medication per insurance preference.  Associated Diagnoses: Wheezing      aspirin 81 mg chewable tablet " [ASPIRIN 81 MG CHEWABLE TABLET] Chew 81 mg daily.      CALCIUM CARBONATE/VITAMIN D3 (CALCIUM+D ORAL) [CALCIUM CARBONATE/VITAMIN D3 (CALCIUM+D ORAL)] Take 3 tablets by mouth daily.      carisoprodol (SOMA) 350 MG tablet Take 1 tablet (350 mg) by mouth 3 times daily as needed for muscle spasms  Qty: 90 tablet, Refills: 3    Associated Diagnoses: Back pain with left-sided sciatica      cholecalciferol, vitamin D3, 1,000 unit (25 mcg) tablet [CHOLECALCIFEROL, VITAMIN D3, 1,000 UNIT (25 MCG) TABLET] Take 1,000 Units by mouth daily.      folic acid (FOLVITE) 1 MG tablet Take 1 mg by mouth daily   Refills: 2      hydrocortisone (CORTISONE, HYDROCORTISONE,) 1 % cream [HYDROCORTISONE (CORTISONE, HYDROCORTISONE,) 1 % CREAM] Apply to hand three times a day  Qty: 30 g, Refills: 2    Associated Diagnoses: Rheumatoid arthritis, involving unspecified site, unspecified rheumatoid factor presence      isosorbide mononitrate (IMDUR) 30 MG 24 hr tablet Take 30 mg by mouth 2 times daily       leflunomide (ARAVA) 20 MG tablet [LEFLUNOMIDE (ARAVA) 20 MG TABLET] Take 20 mg by mouth daily.       levothyroxine (SYNTHROID, LEVOTHROID) 75 MCG tablet [LEVOTHYROXINE (SYNTHROID, LEVOTHROID) 75 MCG TABLET] TAKE 1 TABLET BY MOUTH DAILY AT 6 AM  Qty: 90 tablet, Refills: 3    Associated Diagnoses: Hypothyroidism      loratadine (CLARITIN) 10 mg tablet [LORATADINE (CLARITIN) 10 MG TABLET] Take 10 mg by mouth daily.      naloxone (NARCAN) 4 MG/0.1ML nasal spray Spray 1 spray (4 mg) into one nostril alternating nostrils once as needed for opioid reversal every 2-3 minutes until assistance arrives  Qty: 0.2 mL, Refills: 0    Associated Diagnoses: Opioid type dependence, continuous (H)      nystatin (MYCOSTATIN) cream [NYSTATIN (MYCOSTATIN) CREAM] Apply 1 application topically 2 (two) times a day as needed.  Refills: 1      omeprazole (PRILOSEC) 20 MG capsule [OMEPRAZOLE (PRILOSEC) 20 MG CAPSULE] Take 20 mg by mouth Daily before breakfast.      !!  oxyCODONE (OXYCONTIN) 40 MG 12 hr tablet Take 1 tablet (40 mg) by mouth every 24 hours  Qty: 30 tablet, Refills: 0    Associated Diagnoses: Back pain with left-sided sciatica      predniSONE (CHARLENE) 2 MG EC tablet Take 1 tablet (2 mg) by mouth At Bedtime    Associated Diagnoses: Rheumatoid arthritis involving multiple sites, unspecified whether rheumatoid factor present (H)      triamcinolone (KENALOG) 0.1 % ointment [TRIAMCINOLONE (KENALOG) 0.1 % OINTMENT] Apply to both legs twice daily  Qty: 30 g, Refills: 0    Associated Diagnoses: Venous stasis dermatitis of both lower extremities       !! - Potential duplicate medications found. Please discuss with provider.      STOP taking these medications       cephALEXin (KEFLEX) 500 MG capsule Comments:   Reason for Stopping:         hydrochlorothiazide (MICROZIDE) 12.5 MG capsule Comments:   Reason for Stopping:         isosorbide mononitrate (ISMO/MONOKET) 10 MG tablet Comments:   Reason for Stopping:             Allergies   Allergies   Allergen Reactions     Acetaminophen Rash     Nsaids (Non-Steroidal Anti-Inflammatory Drug) [Nsaids] Rash     Tetracyclines Rash     Baclofen Nausea     Celecoxib Unknown     Erythromycin Base [Erythromycin] Unknown     Pentolinium Itching     Varenicline Itching and Swelling     Erythromycin Rash     Petroleum Jelly [Petrolatum] Itching and Rash

## 2022-03-09 NOTE — PLAN OF CARE
"  Problem: Pain Acute  Goal: Acceptable Pain Control and Functional Ability  Outcome: Ongoing, Progressing  Intervention: Prevent or Manage Pain  Recent Flowsheet Documentation  Taken 3/9/2022 0800 by Vicky Stacy, RN  Medication Review/Management: medications reviewed   Goal Outcome Evaluation:        Pain controlled with scheduled oxycotin and prn oxycodone, soma also given prn.  \"It is not my foot that hurts, it's the chronic pain in my back.\"  Non-weight bearing to left foot, but activity as tolerated.  Sat in chair most of shift, transferred back and forth into bed with assist of one.  Plan to discharge to TCU this afternoon.    Vicky Stacy RN              "

## 2022-03-09 NOTE — PROGRESS NOTES
03/09/22 1358   Quick Adds   Type of Visit Initial PT Evaluation   Living Environment   People in Home alone   Current Living Arrangements condominium   Home Accessibility no concerns  (elevator)   Self-Care   Equipment Currently Used at Home none   Fall history within last six months no   General Information   Onset of Illness/Injury or Date of Surgery 03/08/22   Referring Physician Kendrick Pedraza DPM    Patient/Family Therapy Goals Statement (PT) None stated.   Pertinent History of Current Problem (include personal factors and/or comorbidities that impact the POC) Pt s/p 2nd toe amputation.   Existing Precautions/Restrictions fall;weight bearing   Weight-Bearing Status - LLE nonweight-bearing   Range of Motion (ROM)   Range of Motion ROM is WFL   Strength (Manual Muscle Testing)   Strength (Manual Muscle Testing) strength is WFL   Bed Mobility   Bed Mobility supine-sit   Supine-Sit Darke (Bed Mobility) supervision;verbal cues   Bed Mobility Limitations decreased ability to use legs for bridging/pushing   Assistive Device (Bed Mobility) bed rails   Transfers   Transfers sit-stand transfer   Transfer Safety Concerns Noted decreased weight-shifting ability   Impairments Contributing to Impaired Transfers impaired balance;decreased strength   Sit-Stand Transfer   Sit-Stand Darke (Transfers) minimum assist (75% patient effort);verbal cues   Assistive Device (Sit-Stand Transfers) walker, front-wheeled   Gait/Stairs (Locomotion)   Darke Level (Gait) minimum assist (75% patient effort);verbal cues   Assistive Device (Gait) walker, front-wheeled   Distance in Feet (Required for LE Total Joints) 3' bed > chair   Pattern (Gait) step-to   Deviations/Abnormal Patterns (Gait) gabi decreased;gait speed decreased   Clinical Impression   Criteria for Skilled Therapeutic Intervention Yes, treatment indicated   PT Diagnosis (PT) impaired functional mobility   Influenced by the following impairments  weight-bearing restrictions, impaired balance   Functional limitations due to impairments difficulty with transfers, ambulation   Clinical Presentation (PT Evaluation Complexity) Evolving/Changing   Clinical Presentation Rationale Pt presents as medically diagnosed.   Clinical Decision Making (Complexity) moderate complexity   Planned Therapy Interventions (PT) balance training;bed mobility training;gait training;home exercise program;neuromuscular re-education;patient/family education;stair training;strengthening;transfer training   Anticipated Equipment Needs at Discharge (PT) walker, rolling   Risk & Benefits of therapy have been explained evaluation/treatment results reviewed;participants voiced agreement with care plan;participants included;patient   PT Discharge Planning   PT Discharge Recommendation (DC Rec) Transitional Care Facility   PT Rationale for DC Rec Pt lives alone at baseline. Recommend TCU at discharge.   Plan of Care Review   Plan of Care Reviewed With patient   Total Evaluation Time   Total Evaluation Time (Minutes) 10   Physical Therapy Goals   PT Frequency Daily   PT Predicated Duration/Target Date for Goal Attainment 03/16/22   PT Goals Bed Mobility;Transfers;Gait;Stairs   PT: Bed Mobility Supervision/stand-by assist;Supine to/from sit   PT: Transfers Supervision/stand-by assist;Sit to/from stand;Assistive device   PT: Gait Supervision/stand-by assist;Assistive device;Rolling walker;50 feet     Mary Ann Price, PT  3/9/2022

## 2022-03-09 NOTE — PLAN OF CARE
Problem: Surgical Site Infection  Goal: Absence of Infection Signs and Symptoms  Outcome: Ongoing, Progressing     Problem: Mobility Impairment  Goal: Optimal Mobility  Outcome: Ongoing, Progressing     Problem: Pain Chronic (Persistent)  Goal: Acceptable Pain Control and Functional Ability  Intervention: Manage Persistent Pain  Recent Flowsheet Documentation  Taken 3/8/2022 1800 by Keegan Blevins RN  Medication Review/Management: medications reviewed   Goal Outcome Evaluation:      Patient A ad O times 4. Can verbalize needs and desires. Patient arrived from PACU at 1730. Patient has HX of opoid use and a possible tolerance. Given 10 mg of oxy for chronic back pain, proved effective. Dressing on LLE wrapped, ace wrap CDI. Patient on bed rest with commode at bedside.  100 ml/hr LR running since 1700. No urine output. Bladder scan showed 900 ml. Patient urinated out 200 in commode. Straight cath got 650 out. Patient tolerated procedure well. VSS. Patient currently resting in bed, call light in reach.

## 2022-03-09 NOTE — UTILIZATION REVIEW
"Inpatient to Outpatient note:    Admission Status; Secondary Review Determination     Under the authority of the Utilization Management Committee, the utilization review process indicated a secondary review on the above patient.  The review outcome is based on review of the medical records, discussions with staff, and applying clinical experience noted on the date of the review.          (x) Outpatient Status Appropriate - This patient does not meet hospital inpatient criteria and is placed in observation status. If this patient's primary payer is Medicare and was admitted as an inpatient, Condition Code 44 should be used and patient status changed to \"observation\".     RATIONALE FOR DETERMINATION    81-year-old female with history of RA, hypertension, opioid dependence, chronic pain on oxycontin , hypothyroidism, GERD, and osteomyelitis of the second digit of the left foot admitted on 3/7/2022 for elective amputation.  Amputation of 2nd digit left foot was performed on 3/8/2022 and unevenfully . Pt discharge pod #1.     The severity of illness, intensity of service provided, expected LOS and risk for adverse outcome make the care appropriate for further observation; however, doesn't meet criteria for hospital inpatient admission. Dr Cueva notified of this determination.        The information on this document is developed by the utilization review team in order for the business office to ensure compliance.  This only denotes the appropriateness of proper admission status and does not reflect the quality of care rendered.         The definitions of Inpatient Status and Observation Status used in making the determination above are those provided in the CMS Coverage Manual, Chapter 1 and Chapter 6, section 70.4.      Sincerely,  Ra Montelongo MD  Utilization Review  Physician Advisor  [facility].    "

## 2022-03-09 NOTE — ANESTHESIA PROCEDURE NOTES
Sciatic Procedure Note    Pre-Procedure   Staff -        Anesthesiologist:  Dayana Valencia MD       Performed By: anesthesiologist       Location: pre-op       Procedure Start/Stop Times: 3/8/2022 4:04 PM and 3/8/2022 4:07 PM       Pre-Anesthestic Checklist: patient identified, IV checked, site marked, risks and benefits discussed, informed consent, monitors and equipment checked, pre-op evaluation, at physician/surgeon's request and post-op pain management  Timeout:       Correct Patient: Yes        Correct Procedure: Yes        Correct Site: Yes        Correct Position: Yes        Correct Laterality: Yes        Site Marked: Yes  Procedure Documentation  Procedure: Sciatic       Laterality: left       Patient Position: lateral       Skin prep: Chloraprep (popliteal approach).       Ultrasound guided       1. Ultrasound was used to identify targeted nerve, plexus, vascular marker, or fascial plane and place a needle adjacent to it in real-time.       2. Ultrasound was used to visualize the spread of anesthetic in close proximity to the above referenced structure.       3. A permanent image is entered into the patient's record.    Assessment/Narrative         The placement was negative for: blood aspirated, painful injection and site bleeding       Paresthesias: No.     Bolus given via needle..        Secured via.        Insertion/Infusion Method: Single Shot       Complications: none    Medication(s) Administered   Ropivacaine 0.5% PF (Infiltration), 15 mL

## 2022-03-09 NOTE — ANESTHESIA POSTPROCEDURE EVALUATION
Patient: Jacqueline Prado    Procedure: Procedure(s):  AMPUTATION, second digit left foot       Anesthesia Type:  MAC    Note:  Disposition: Inpatient   Postop Pain Control: Uneventful            Sign Out: Well controlled pain   PONV: No   Neuro/Psych: Uneventful            Sign Out: Acceptable/Baseline neuro status   Airway/Respiratory: Uneventful            Sign Out: Acceptable/Baseline resp. status   CV/Hemodynamics: Uneventful            Sign Out: Acceptable CV status; No obvious hypovolemia; No obvious fluid overload   Other NRE: NONE   DID A NON-ROUTINE EVENT OCCUR? No           Last vitals:  Vitals Value Taken Time   /55 03/08/22 1929   Temp 36.7  C (98  F) 03/08/22 1929   Pulse 72 03/08/22 1929   Resp 16 03/08/22 1929   SpO2 94 % 03/08/22 1929       Electronically Signed By: Dayana Valencia MD  March 8, 2022  7:50 PM

## 2022-03-10 ENCOUNTER — DOCUMENTATION ONLY (OUTPATIENT)
Dept: GERIATRICS | Facility: CLINIC | Age: 82
End: 2022-03-10

## 2022-03-10 ENCOUNTER — TRANSITIONAL CARE UNIT VISIT (OUTPATIENT)
Dept: GERIATRICS | Facility: CLINIC | Age: 82
End: 2022-03-10
Payer: MEDICARE

## 2022-03-10 VITALS
DIASTOLIC BLOOD PRESSURE: 83 MMHG | TEMPERATURE: 98.2 F | OXYGEN SATURATION: 95 % | HEIGHT: 61 IN | BODY MASS INDEX: 26.06 KG/M2 | RESPIRATION RATE: 12 BRPM | SYSTOLIC BLOOD PRESSURE: 173 MMHG | WEIGHT: 138 LBS | HEART RATE: 81 BPM

## 2022-03-10 DIAGNOSIS — G89.29 OTHER CHRONIC PAIN: Primary | ICD-10-CM

## 2022-03-10 DIAGNOSIS — I87.2 VENOUS STASIS DERMATITIS OF BOTH LOWER EXTREMITIES: ICD-10-CM

## 2022-03-10 DIAGNOSIS — M86.9 OSTEOMYELITIS OF ANKLE AND FOOT (H): ICD-10-CM

## 2022-03-10 DIAGNOSIS — I89.0 ACQUIRED LYMPHEDEMA OF LEG: ICD-10-CM

## 2022-03-10 DIAGNOSIS — N25.81 SECONDARY RENAL HYPERPARATHYROIDISM (H): ICD-10-CM

## 2022-03-10 DIAGNOSIS — M06.9 RHEUMATOID ARTHRITIS, INVOLVING UNSPECIFIED SITE, UNSPECIFIED WHETHER RHEUMATOID FACTOR PRESENT (H): ICD-10-CM

## 2022-03-10 DIAGNOSIS — M54.32 BACK PAIN WITH LEFT-SIDED SCIATICA: ICD-10-CM

## 2022-03-10 DIAGNOSIS — F11.20 OPIOID TYPE DEPENDENCE, CONTINUOUS (H): ICD-10-CM

## 2022-03-10 LAB
ATRIAL RATE - MUSE: 71 BPM
DIASTOLIC BLOOD PRESSURE - MUSE: NORMAL MMHG
INTERPRETATION ECG - MUSE: NORMAL
P AXIS - MUSE: 61 DEGREES
PR INTERVAL - MUSE: 204 MS
QRS DURATION - MUSE: 136 MS
QT - MUSE: 436 MS
QTC - MUSE: 473 MS
R AXIS - MUSE: 8 DEGREES
SYSTOLIC BLOOD PRESSURE - MUSE: NORMAL MMHG
T AXIS - MUSE: -15 DEGREES
VENTRICULAR RATE- MUSE: 71 BPM

## 2022-03-10 PROCEDURE — 99306 1ST NF CARE HIGH MDM 50: CPT | Performed by: FAMILY MEDICINE

## 2022-03-10 RX ORDER — CARISOPRODOL 350 MG/1
350 TABLET ORAL 3 TIMES DAILY PRN
Qty: 60 TABLET | Refills: 1 | Status: SHIPPED | OUTPATIENT
Start: 2022-03-10 | End: 2022-08-04

## 2022-03-10 RX ORDER — OXYCODONE HCL 20 MG/1
TABLET, FILM COATED, EXTENDED RELEASE ORAL
Qty: 60 TABLET | Refills: 0 | Status: SHIPPED | OUTPATIENT
Start: 2022-03-10 | End: 2022-04-13

## 2022-03-10 RX ORDER — OXYCODONE HCL 20 MG/1
TABLET, FILM COATED, EXTENDED RELEASE ORAL EVERY 12 HOURS
COMMUNITY
Start: 2022-03-10 | End: 2022-03-10
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
## 2022-03-10 NOTE — PLAN OF CARE
Problem: Pain Chronic (Persistent)  Goal: Acceptable Pain Control and Functional Ability  Outcome: Adequate for Care Transition   Goal Outcome Evaluation:      Patient transferring to TCU. All paperwork and prescriptions faxed to TCU. Patient hand family had all questions answered to best of of ability of writer. Patient brought down,  via wheelchair, to car driven by family. Patient helped into car.

## 2022-03-10 NOTE — PLAN OF CARE
Occupational Therapy Discharge Summary    Reason for therapy discharge:    Discharged to transitional care facility.    Progress towards therapy goal(s). See goals on Care Plan in [facility] electronic health record for goal details.  Goals partially met.  Barriers to achieving goals:   discharge from facility.    Therapy recommendation(s):    Continued therapy is recommended.  Rationale/Recommendations:  To improve overall strength/endurance and trnf safety .    Goal Outcome Evaluation:

## 2022-03-11 LAB
BACTERIA WND CULT: NO GROWTH
PATH REPORT.COMMENTS IMP SPEC: NORMAL
PATH REPORT.FINAL DX SPEC: NORMAL
PATH REPORT.GROSS SPEC: NORMAL
PATH REPORT.MICROSCOPIC SPEC OTHER STN: NORMAL
PATH REPORT.RELEVANT HX SPEC: NORMAL
PHOTO IMAGE: NORMAL

## 2022-03-11 PROCEDURE — 88311 DECALCIFY TISSUE: CPT | Mod: 26 | Performed by: PATHOLOGY

## 2022-03-11 PROCEDURE — 88305 TISSUE EXAM BY PATHOLOGIST: CPT | Mod: 26 | Performed by: PATHOLOGY

## 2022-03-14 ENCOUNTER — TRANSITIONAL CARE UNIT VISIT (OUTPATIENT)
Dept: GERIATRICS | Facility: CLINIC | Age: 82
End: 2022-03-14
Payer: MEDICARE

## 2022-03-14 VITALS
OXYGEN SATURATION: 96 % | SYSTOLIC BLOOD PRESSURE: 139 MMHG | WEIGHT: 140 LBS | HEART RATE: 70 BPM | DIASTOLIC BLOOD PRESSURE: 78 MMHG | RESPIRATION RATE: 18 BRPM | BODY MASS INDEX: 26.43 KG/M2 | TEMPERATURE: 96.8 F | HEIGHT: 61 IN

## 2022-03-14 DIAGNOSIS — G89.29 OTHER CHRONIC PAIN: Primary | ICD-10-CM

## 2022-03-14 DIAGNOSIS — R53.81 PHYSICAL DECONDITIONING: ICD-10-CM

## 2022-03-14 DIAGNOSIS — M86.9 OSTEOMYELITIS OF ANKLE AND FOOT (H): ICD-10-CM

## 2022-03-14 PROCEDURE — 99310 SBSQ NF CARE HIGH MDM 45: CPT | Performed by: NURSE PRACTITIONER

## 2022-03-14 NOTE — PROGRESS NOTES
[facility]ERIATRIC [facility]  Chief Complaint   Patient presents with     RECHECK     [facility] Medical Record Number:  [id number]  Place of Service where encounter took place:  [facility] ([facility]) [[id number]]  Code Status:  DNR    HISTORY:      HPI:  Jacqueline Prado  is 81 year old (1940) undergoing physical and occupational therapy. She is with history of RA, hypertension, opioid dependence, chronic pain on oxycontin , hypothyroidism, GERD, and osteomyelitis of the second digit of the left foot admitted on 3/7/2022 for elective amputation.   Per hospital records: Amputation of 2nd digit left foot was performed on 3/8/2022.     PTA Arava was held post op briefly due to concern for poor wound healing and restarted at discharge after being cleared by the podiatrist. PTA prednisone 2 mg daily was continued during hospital stay. EKG showed new left bundle branch block compared with ECG of 2/3/2001. Patient states she has had LBBB for several years and denies chest pain, sob, palpitation or dizziness      Today she was seen to review vital signs, labs, follow-up left second toe amputation and to establish care.  She denied chest pain shortness of breath cough congestion constipation or diarrhea she reported her pain medications are working well.  She did have an isolated elevated blood pressure however her blood pressures overall have been stable with pulse 69-84.  Labs reviewed and last TSH 1.39 vitamin D 72 BMP and CBC within normal limits.  Her left foot was covered in a dressing.  Toes warm to touch.  Surgical dressing left foot      ALLERGIES:Acetaminophen, Nsaids (non-steroidal anti-inflammatory drug) [nsaids], Tetracyclines, Baclofen, Celecoxib, Erythromycin base [erythromycin], Pentolinium, Varenicline, Erythromycin, and Petroleum jelly [petrolatum]    PAST MEDICAL HISTORY:   Past Medical History:   Diagnosis Date     Acquired hypothyroidism      Acquired lymphedema of leg      Bundle branch block      Created by Conversion  Replacement Utility updated for latest IMO load     Bundle branch block      Cellulitis      Hypertension      Impetigo      Opioid dependence (H)      Osteoporosis      RA (rheumatoid arthritis) (H)      Rheumatoid arthritis (H)      Secondary hyperparathyroidism (H)      Venous hypertension of both lower extremities      Venous insufficiency of both lower extremities      Venous stasis dermatitis of lower extremity        PAST SURGICAL HISTORY:   has a past surgical history that includes IR Lumbar Epidural Steroid Injection (6/23/2003); IR Miscellaneous Procedure (12/1/2006); REMOVAL GALLBLADDER; EXCIS CERV DISK,ONE LEVEL; Pr Inject Vertebral Body, Lumbar; Breast Cyst Aspiration (Left, 2000); and Amputate toe(s) (Left, 3/8/2022).    FAMILY HISTORY: family history includes Breast Cancer (age of onset: 72.00) in her sister; Leukemia in her sister.    SOCIAL HISTORY:  reports that she quit smoking about 3 years ago. Her smoking use included cigarettes. She smoked 0.50 packs per day. She uses smokeless tobacco. She reports current alcohol use. She reports previous drug use. Drug: Marijuana.    ROS:  Constitutional: Negative for activity change, appetite change, fatigue and fever.   HENT: Negative for congestion.    Respiratory: Negative for cough, shortness of breath and wheezing.    Cardiovascular: Negative for chest pain and leg swelling.   Gastrointestinal: Negative for abdominal distention, abdominal pain, constipation, diarrhea and nausea.   Genitourinary: Negative for dysuria.   Musculoskeletal: Negative for arthralgia. Negative for back pain.   Skin: Negative for color change and wound.   Neurological: Negative for dizziness.   Psychiatric/Behavioral: Negative for agitation, behavioral problems and confusion.     Physical Exam:  Constitutional:       Appearance: Patient is well-developed.   HENT:      Head: Normocephalic.   Eyes:      Conjunctiva/sclera: Conjunctivae normal.   Neck:       "Musculoskeletal: Normal range of motion.   Cardiovascular:      Rate and Rhythm: Normal rate and regular rhythm.      Heart sounds: Normal heart sounds. No murmur.   Pulmonary:      Effort: No respiratory distress.      Breath sounds: Normal breath sounds. No wheezing or rales.   Abdominal:      General: Bowel sounds are normal. There is no distension.      Palpations: Abdomen is soft.      Tenderness: There is no abdominal tenderness.   Musculoskeletal:       Normal range of motion. Status post left foot second digit amputation on 3/8/2022  Skin:General:        Skin is warm.   Neurological:         Mental Status: Patient is alert and oriented to person, place, and time.   Psychiatric:         Behavior: Behavior normal.     Vitals:/78   Pulse 70   Temp 96.8  F (36  C)   Resp 18   Ht 1.549 m (5' 1\")   Wt 63.5 kg (140 lb)   SpO2 96%   BMI 26.45 kg/m   and Body mass index is 26.45 kg/m .    Lab/Diagnostic data:   Recent Results (from the past 240 hour(s))   EKG 12-lead, tracing only    Collection Time: 03/07/22 11:59 AM   Result Value Ref Range    Systolic Blood Pressure  mmHg    Diastolic Blood Pressure  mmHg    Ventricular Rate 84 BPM    Atrial Rate 84 BPM    ID Interval 184 ms    QRS Duration 134 ms     ms    QTc 489 ms    P Axis 77 degrees    R AXIS -4 degrees    T Axis 10 degrees    Interpretation ECG       Sinus rhythm  Left bundle branch block  Abnormal ECG  When compared with ECG of 03-FEB-2001 02:18,  Left bundle branch block is now Present  Confirmed by MARI CONDON MD LOC:JN ([id number]) on 3/8/2022 12:32:59 PM     Comprehensive metabolic panel    Collection Time: 03/07/22  1:09 PM   Result Value Ref Range    Sodium 144 136 - 145 mmol/L    Potassium 4.9 3.5 - 5.0 mmol/L    Chloride 106 98 - 107 mmol/L    Carbon Dioxide (CO2) 27 22 - 31 mmol/L    Anion Gap 11 5 - 18 mmol/L    Urea Nitrogen 16 8 - 28 mg/dL    Creatinine 0.82 0.60 - 1.10 mg/dL    Calcium 9.7 8.5 - 10.5 mg/dL    Glucose 88 70 - " 125 mg/dL    Alkaline Phosphatase 59 45 - 120 U/L    AST 22 0 - 40 U/L    ALT 17 0 - 45 U/L    Protein Total 7.2 6.0 - 8.0 g/dL    Albumin 3.8 3.5 - 5.0 g/dL    Bilirubin Total 0.5 0.0 - 1.0 mg/dL    GFR Estimate 71 >60 mL/min/1.73m2   Vitamin D Deficiency    Collection Time: 03/07/22  1:09 PM   Result Value Ref Range    Vitamin D, Total (25-Hydroxy) 72 30 - 80 ug/L   TSH with free T4 reflex    Collection Time: 03/07/22  1:09 PM   Result Value Ref Range    TSH 1.39 0.30 - 5.00 uIU/mL   CBC with platelets and differential    Collection Time: 03/07/22  1:09 PM   Result Value Ref Range    WBC Count 9.8 4.0 - 11.0 10e3/uL    RBC Count 4.54 3.80 - 5.20 10e6/uL    Hemoglobin 13.5 11.7 - 15.7 g/dL    Hematocrit 43.6 35.0 - 47.0 %    MCV 96 78 - 100 fL    MCH 29.7 26.5 - 33.0 pg    MCHC 31.0 (L) 31.5 - 36.5 g/dL    RDW 13.0 10.0 - 15.0 %    Platelet Count 249 150 - 450 10e3/uL    % Neutrophils 51 %    % Lymphocytes 36 %    % Monocytes 9 %    % Eosinophils 4 %    % Basophils 1 %    % Immature Granulocytes 0 %    Absolute Neutrophils 5.0 1.6 - 8.3 10e3/uL    Absolute Lymphocytes 3.5 0.8 - 5.3 10e3/uL    Absolute Monocytes 0.9 0.0 - 1.3 10e3/uL    Absolute Eosinophils 0.4 0.0 - 0.7 10e3/uL    Absolute Basophils 0.1 0.0 - 0.2 10e3/uL    Absolute Immature Granulocytes 0.0 <=0.4 10e3/uL   Anaerobic Bacterial Culture Routine    Collection Time: 03/08/22  4:25 PM    Specimen: Foot, Left; Wound   Result Value Ref Range    Culture No anaerobic organisms isolated    Gram Stain    Collection Time: 03/08/22  4:25 PM    Specimen: Foot, Left; Wound   Result Value Ref Range    Gram Stain Result No organisms seen     Gram Stain Result No white blood cells seen    Wound Aerobic Bacterial Culture Routine    Collection Time: 03/08/22  4:25 PM    Specimen: Foot, Left; Wound   Result Value Ref Range    Culture No Growth    Surgical Pathology Exam    Collection Time: 03/08/22  4:40 PM   Result Value Ref Range    Case Report       Surgical  "Pathology Report                         Case: [id number]                                  Authorizing Provider:  Kendrick Pedraza DPM Collected:           03/08/2022 04:40 PM          Ordering Location:     [facility]      Received:            03/09/2022 07:35 AM                                 [facility]                                                               Pathologist:           Keegan Mo MD                                                      Specimen:    Toe, Left, LEFT FOOT SECOND DIGIT                                                          Final Diagnosis       SURGICALLY AMPUTATED SECOND TOE, LEFT FOOT:    LARGE GRANULATING CUTANEOUS AND SUBCUTANEOUS ULCER, MEDIAL SIDE OF TOE    FOCAL ACUTE OSTEOMYELITIS AND PERIOSTITIS WITH LOCALIZED BONE RESORPTION, MIDDLE PHALANX    NEGATIVE FOR OSTEOMYELITIS AT BONE DISARTICULATION MARGIN    NEGATIVE FOR ACUTE INFLAMMATION AT SKIN AND SUBCUTANEOUS TISSUE SURGICAL MARGIN       Comment       The ulcer appears to have been present for some time, as indicated by the degree of organized fibrosis that has developed, and the original cause of the ulcer is no longer apparent in the tissue.      Clinical Information       Procedure: AMPUTATION, second digit left foot - Left  Pre-op Diagnosis: Osteomyelitis of ankle and foot (H) [M86.9]  Post-op Diagnosis: M86.9 - Osteomyelitis of ankle and foot (H) [ICD-10-CM]      Gross Description       A(1). Toe, Left, LEFT FOOT SECOND DIGIT:  Received in formalin, labeled with the patient's name and \"left foot second digit,\" is a toe, anatomically consistent with second toe, measuring 4.7 cm in length and 1.6 cm in diameter, surgically disarticulated at the metatarsal-phalangeal joint. The skin is tan-white and wrinkled. There is a pink-red to gray ulceration 1.3 x 1.0 cm on the medial side of the toe, extending to within 0.4 cm of the nearest cutaneous/soft tissue surgical margin. This margin is " inked black. Longitudinal sections of the lesion through the margin are submitted in block A1. Sectioning through the lesion displays a pink-gray, granular cut surface. Sectioning through the phalanx bones displays slight congestion of the middle phalanx. No abscesses or tumor nodules are identified. The cutaneous ulcer does not appear to involve underlying bone. A full-length longitudinal section is submitted following fixation and subsequent decalcification.   RS-4C (decal blocks A2-A4).    SUMMARY OF CASSETTES: A1) Longitudinal sections of lesion to include black-inked surgical margin; A2-A4) Full-length longitudinal section of toe following fixation and subsequent decalcification.   RJR:berenice/aka      Microscopic Description       Microscopic examination performed, substantiating the above diagnosis.       Performing Labs       The technical component of this testing was completed at [facility]      Case Images     Glucose by meter    Collection Time: 03/09/22  6:02 AM   Result Value Ref Range    GLUCOSE BY METER POCT 107 (H) 70 - 99 mg/dL   Asymptomatic COVID-19 Virus (Coronavirus) by PCR Nasopharyngeal    Collection Time: 03/09/22 12:40 PM    Specimen: Nasopharyngeal; Swab   Result Value Ref Range    SARS CoV2 PCR Negative Negative   ECG 12-LEAD WITH MUSE ([facility])    Collection Time: 03/09/22 12:53 PM   Result Value Ref Range    Systolic Blood Pressure  mmHg    Diastolic Blood Pressure  mmHg    Ventricular Rate 71 BPM    Atrial Rate 71 BPM    NJ Interval 204 ms    QRS Duration 136 ms     ms    QTc 473 ms    P Axis 61 degrees    R AXIS 8 degrees    T Axis -15 degrees    Interpretation ECG       Sinus rhythm  Left bundle branch block  Abnormal ECG  When compared with ECG of 07-MAR-2022 11:59,  No significant change was found  Confirmed by REYNALDO CAO MD LOC:[facility]K ([id number]) on 3/10/2022 1:36:42 PM         MEDICATIONS:     Review of your medicines          Accurate as of March 14, 2022   2:08 PM. If you have any questions, ask your nurse or doctor.            CONTINUE these medicines which have NOT CHANGED      Dose / Directions   albuterol 108 (90 Base) MCG/ACT inhaler  Commonly known as: PROAIR HFA/PROVENTIL HFA/VENTOLIN HFA  Used for: Wheezing      Dose: 2 puff  [ALBUTEROL (PROAIR HFA;PROVENTIL HFA;VENTOLIN HFA) 90 MCG/ACTUATION INHALER] Inhale 2 puffs every 4 (four) hours as needed for wheezing. And cough  Quantity: 1 Inhaler  Refills: 3     amitriptyline 25 MG tablet  Commonly known as: ELAVIL  Used for: Back pain with left-sided sciatica      Dose: 25 mg  Take 1 tablet (25 mg) by mouth At Bedtime  Quantity: 30 tablet  Refills: 0     aspirin 81 MG chewable tablet  Commonly known as: ASA      Dose: 81 mg  [ASPIRIN 81 MG CHEWABLE TABLET] Chew 81 mg daily.  Refills: 0     CALCIUM CARBONATE-VITAMIN D PO      Dose: 3 tablet  [CALCIUM CARBONATE/VITAMIN D3 (CALCIUM+D ORAL)] Take 3 tablets by mouth daily.  Refills: 0     carisoprodol 350 MG tablet  Commonly known as: SOMA  Used for: Back pain with left-sided sciatica      Dose: 350 mg  Take 1 tablet (350 mg) by mouth 3 times daily as needed for muscle spasms  Quantity: 60 tablet  Refills: 1     cholecalciferol 25 MCG (1000 UT) Tabs      Dose: 1,000 Units  [CHOLECALCIFEROL, VITAMIN D3, 1,000 UNIT (25 MCG) TABLET] Take 1,000 Units by mouth daily.  Refills: 0     folic acid 1 MG tablet  Commonly known as: FOLVITE      Dose: 1 mg  Take 1 mg by mouth daily  Refills: 2     hydrocortisone 1 % external cream  Commonly known as: CORTAID  Used for: Rheumatoid arthritis, involving unspecified site, unspecified rheumatoid factor presence      [HYDROCORTISONE (CORTISONE, HYDROCORTISONE,) 1 % CREAM] Apply to hand three times a day  Quantity: 30 g  Refills: 2     isosorbide mononitrate 30 MG 24 hr tablet  Commonly known as: IMDUR      Dose: 30 mg  Take 30 mg by mouth 2 times daily  Refills: 0     leflunomide 20 MG tablet  Commonly known as: ARAVA      Dose: 20  mg  [LEFLUNOMIDE (ARAVA) 20 MG TABLET] Take 20 mg by mouth daily.  Refills: 0     levothyroxine 75 MCG tablet  Commonly known as: SYNTHROID/LEVOTHROID  Used for: Hypothyroidism      [LEVOTHYROXINE (SYNTHROID, LEVOTHROID) 75 MCG TABLET] TAKE 1 TABLET BY MOUTH DAILY AT 6 AM  Quantity: 90 tablet  Refills: 3     loratadine 10 MG tablet  Commonly known as: CLARITIN      Dose: 10 mg  [LORATADINE (CLARITIN) 10 MG TABLET] Take 10 mg by mouth daily.  Refills: 0     naloxone 4 MG/0.1ML nasal spray  Commonly known as: NARCAN  Used for: Opioid Dependence With Continuous Use      Dose: 4 mg  Spray 1 spray (4 mg) into one nostril alternating nostrils once as needed for opioid reversal every 2-3 minutes until assistance arrives  Quantity: 0.2 mL  Refills: 0     nystatin [id number] UNIT/GM external cream  Commonly known as: MYCOSTATIN      Dose: 1 Application  [NYSTATIN (MYCOSTATIN) CREAM] Apply 1 application topically 2 (two) times a day as needed.  Refills: 1     omeprazole 20 MG DR capsule  Commonly known as: priLOSEC      Dose: 20 mg  [OMEPRAZOLE (PRILOSEC) 20 MG CAPSULE] Take 20 mg by mouth Daily before breakfast.  Refills: 0     oxyCODONE 20 MG 12 hr tablet  Commonly known as: oxyCONTIN  Used for: Other chronic pain      Take 1 tablet (20 mg) by mouth every 24 hours AND 2 tablets (40 mg) every 24 hours. Take 40mg Q AM and 20mg Q HSTake by mouth every 12 hours Take 40mg Q AM and 20mg Q HS  Quantity: 60 tablet  Refills: 0     predniSONE 2 MG EC tablet  Commonly known as: CHARLENE  Used for: Rheumatoid arthritis involving multiple sites, unspecified whether rheumatoid factor present (H)      Dose: 2 mg  Take 1 tablet (2 mg) by mouth At Bedtime  Refills: 0     triamcinolone 0.1 % external ointment  Commonly known as: KENALOG  Used for: Venous stasis dermatitis of both lower extremities      [TRIAMCINOLONE (KENALOG) 0.1 % OINTMENT] Apply to both legs twice daily  Quantity: 30 g  Refills: 0            ASSESSMENT/PLAN  Encounter Diagnoses    Name Primary?     Other chronic pain Yes     Osteomyelitis of ankle and foot (H)      Physical deconditioning      Chronic pain continue Soma, Elavil, oxycodone as scheduled    Rheumatoid arthritis continue prednisone, Arava    Hypertension on isosorbide    Hypothyroidism continue levothyroxine last TSH on 3/7/2022 was 1.39    GERD on omeprazole    Osteomyelitis status post Amputation left second digit    Physical deconditioning PT OT      Electronically signed by: Lisa Salinas CNP

## 2022-03-14 NOTE — LETTER
3/14/2022        RE: Jacqueline Prado  [address]  [place] [address]        [facility] GERIATRIC SERVICES  Chief Complaint   [facility]     [facility]     [facility] Record Number:  [id number]  Place of Service where encounter took place:  [facility] (formerly [facility] ([facility]) [[id number]]  Code Status:  DNR    HISTORY:      HPI:  Jacqueline Prado  is 81 year old (1940) undergoing physical and occupational therapy. She is with history of RA, hypertension, opioid dependence, chronic pain on oxycontin , hypothyroidism, GERD, and osteomyelitis of the second digit of the left foot admitted on 3/7/2022 for elective amputation.   Per hospital records: Amputation of 2nd digit left foot was performed on 3/8/2022.     PTA Arava was held post op briefly due to concern for poor wound healing and restarted at discharge after being cleared by the podiatrist. PTA prednisone 2 mg daily was continued during hospital stay. EKG showed new left bundle branch block compared with ECG of 2/3/2001. Patient states she has had LBBB for several years and denies chest pain, sob, palpitation or dizziness      Today she was seen to review vital signs, labs, follow-up left second toe amputation and to establish care.  She denied chest pain shortness of breath cough congestion constipation or diarrhea she reported her pain medications are working well.  She did have an isolated elevated blood pressure however her blood pressures overall have been stable with pulse 69-84.  Labs reviewed and last TSH 1.39 vitamin D 72 BMP and CBC within normal limits.  Her left foot was covered in a dressing.  Toes warm to touch.  Surgical dressing left foot      ALLERGIES:Acetaminophen, Nsaids (non-steroidal anti-inflammatory drug) [nsaids], Tetracyclines, Baclofen, Celecoxib, Erythromycin base [erythromycin], Pentolinium, Varenicline, Erythromycin, and Petroleum jelly [petrolatum]    PAST MEDICAL HISTORY:   Past Medical History:    Diagnosis Date     Acquired hypothyroidism      Acquired lymphedema of leg      Bundle branch block     Created by Conversion  Replacement Utility updated for latest IMO load     Bundle branch block      Cellulitis      Hypertension      Impetigo      Opioid dependence (H)      Osteoporosis      RA (rheumatoid arthritis) (H)      Rheumatoid arthritis (H)      Secondary hyperparathyroidism (H)      Venous hypertension of both lower extremities      Venous insufficiency of both lower extremities      Venous stasis dermatitis of lower extremity        PAST SURGICAL HISTORY:   has a past surgical history that includes IR Lumbar Epidural Steroid Injection (6/23/2003); IR Miscellaneous Procedure (12/1/2006); REMOVAL GALLBLADDER; EXCIS CERV DISK,ONE LEVEL; Pr Inject Vertebral Body, Lumbar; Breast Cyst Aspiration (Left, 2000); and Amputate toe(s) (Left, 3/8/2022).    FAMILY HISTORY: family history includes Breast Cancer (age of onset: 72.00) in her sister; Leukemia in her sister.    SOCIAL HISTORY:  reports that she quit smoking about 3 years ago. Her smoking use included cigarettes. She smoked 0.50 packs per day. She uses smokeless tobacco. She reports current alcohol use. She reports previous drug use. Drug: Marijuana.    ROS:  Constitutional: Negative for activity change, appetite change, fatigue and fever.   HENT: Negative for congestion.    Respiratory: Negative for cough, shortness of breath and wheezing.    Cardiovascular: Negative for chest pain and leg swelling.   Gastrointestinal: Negative for abdominal distention, abdominal pain, constipation, diarrhea and nausea.   Genitourinary: Negative for dysuria.   Musculoskeletal: Negative for arthralgia. Negative for back pain.   Skin: Negative for color change and wound.   Neurological: Negative for dizziness.   Psychiatric/Behavioral: Negative for agitation, behavioral problems and confusion.     Physical Exam:  Constitutional:       Appearance: Patient is  "well-developed.   HENT:      Head: Normocephalic.   Eyes:      Conjunctiva/sclera: Conjunctivae normal.   Neck:      Musculoskeletal: Normal range of motion.   Cardiovascular:      Rate and Rhythm: Normal rate and regular rhythm.      Heart sounds: Normal heart sounds. No murmur.   Pulmonary:      Effort: No respiratory distress.      Breath sounds: Normal breath sounds. No wheezing or rales.   Abdominal:      General: Bowel sounds are normal. There is no distension.      Palpations: Abdomen is soft.      Tenderness: There is no abdominal tenderness.   Musculoskeletal:       Normal range of motion. Status post left foot second digit amputation on 3/8/2022  Skin:General:        Skin is warm.   Neurological:         Mental Status: Patient is alert and oriented to person, place, and time.   Psychiatric:         Behavior: Behavior normal.     Vitals:/78   Pulse 70   Temp 96.8  F (36  C)   Resp 18   Ht 1.549 m (5' 1\")   Wt 63.5 kg (140 lb)   SpO2 96%   BMI 26.45 kg/m   and Body mass index is 26.45 kg/m .    Lab/Diagnostic data:   Recent Results (from the past 240 hour(s))   EKG 12-lead, tracing only    Collection Time: 03/07/22 11:59 AM   Result Value Ref Range    Systolic Blood Pressure  mmHg    Diastolic Blood Pressure  mmHg    Ventricular Rate 84 BPM    Atrial Rate 84 BPM    NY Interval 184 ms    QRS Duration 134 ms     ms    QTc 489 ms    P Axis 77 degrees    R AXIS -4 degrees    T Axis 10 degrees    Interpretation ECG       Sinus rhythm  Left bundle branch block  Abnormal ECG  When compared with ECG of 03-FEB-2001 02:18,  Left bundle branch block is now Present  Confirmed by MARI CONDON MD LOC:JN ([id number]) on 3/8/2022 12:32:59 PM     Comprehensive metabolic panel    Collection Time: 03/07/22  1:09 PM   Result Value Ref Range    Sodium 144 136 - 145 mmol/L    Potassium 4.9 3.5 - 5.0 mmol/L    Chloride 106 98 - 107 mmol/L    Carbon Dioxide (CO2) 27 22 - 31 mmol/L    Anion Gap 11 5 - 18 mmol/L    " Urea Nitrogen 16 8 - 28 mg/dL    Creatinine 0.82 0.60 - 1.10 mg/dL    Calcium 9.7 8.5 - 10.5 mg/dL    Glucose 88 70 - 125 mg/dL    Alkaline Phosphatase 59 45 - 120 U/L    AST 22 0 - 40 U/L    ALT 17 0 - 45 U/L    Protein Total 7.2 6.0 - 8.0 g/dL    Albumin 3.8 3.5 - 5.0 g/dL    Bilirubin Total 0.5 0.0 - 1.0 mg/dL    GFR Estimate 71 >60 mL/min/1.73m2   Vitamin D Deficiency    Collection Time: 03/07/22  1:09 PM   Result Value Ref Range    Vitamin D, Total (25-Hydroxy) 72 30 - 80 ug/L   TSH with free T4 reflex    Collection Time: 03/07/22  1:09 PM   Result Value Ref Range    TSH 1.39 0.30 - 5.00 uIU/mL   CBC with platelets and differential    Collection Time: 03/07/22  1:09 PM   Result Value Ref Range    WBC Count 9.8 4.0 - 11.0 10e3/uL    RBC Count 4.54 3.80 - 5.20 10e6/uL    Hemoglobin 13.5 11.7 - 15.7 g/dL    Hematocrit 43.6 35.0 - 47.0 %    MCV 96 78 - 100 fL    MCH 29.7 26.5 - 33.0 pg    MCHC 31.0 (L) 31.5 - 36.5 g/dL    RDW 13.0 10.0 - 15.0 %    Platelet Count 249 150 - 450 10e3/uL    % Neutrophils 51 %    % Lymphocytes 36 %    % Monocytes 9 %    % Eosinophils 4 %    % Basophils 1 %    % Immature Granulocytes 0 %    Absolute Neutrophils 5.0 1.6 - 8.3 10e3/uL    Absolute Lymphocytes 3.5 0.8 - 5.3 10e3/uL    Absolute Monocytes 0.9 0.0 - 1.3 10e3/uL    Absolute Eosinophils 0.4 0.0 - 0.7 10e3/uL    Absolute Basophils 0.1 0.0 - 0.2 10e3/uL    Absolute Immature Granulocytes 0.0 <=0.4 10e3/uL   Anaerobic Bacterial Culture Routine    Collection Time: 03/08/22  4:25 PM    Specimen: Foot, Left; Wound   Result Value Ref Range    Culture No anaerobic organisms isolated    Gram Stain    Collection Time: 03/08/22  4:25 PM    Specimen: Foot, Left; Wound   Result Value Ref Range    Gram Stain Result No organisms seen     Gram Stain Result No white blood cells seen    Wound Aerobic Bacterial Culture Routine    Collection Time: 03/08/22  4:25 PM    Specimen: Foot, Left; Wound   Result Value Ref Range    Culture No Growth   "  Surgical Pathology Exam    Collection Time: 03/08/22  4:40 PM   Result Value Ref Range    Case Report       Surgical Pathology Report                         Case: [id number]                                  Authorizing Provider:  Kendrick Pedraza DPM Collected:           03/08/2022 04:40 PM          Ordering Location:     [facility]      Received:            03/09/2022 07:35 AM                                 [facility]                                                               Pathologist:           Keegan Mo MD                                                      Specimen:    Toe, Left, LEFT FOOT SECOND DIGIT                                                          Final Diagnosis       SURGICALLY AMPUTATED SECOND TOE, LEFT FOOT:    LARGE GRANULATING CUTANEOUS AND SUBCUTANEOUS ULCER, MEDIAL SIDE OF TOE    FOCAL ACUTE OSTEOMYELITIS AND PERIOSTITIS WITH LOCALIZED BONE RESORPTION, MIDDLE PHALANX    NEGATIVE FOR OSTEOMYELITIS AT BONE DISARTICULATION MARGIN    NEGATIVE FOR ACUTE INFLAMMATION AT SKIN AND SUBCUTANEOUS TISSUE SURGICAL MARGIN       Comment       The ulcer appears to have been present for some time, as indicated by the degree of organized fibrosis that has developed, and the original cause of the ulcer is no longer apparent in the tissue.      Clinical Information       Procedure: AMPUTATION, second digit left foot - Left  Pre-op Diagnosis: Osteomyelitis of ankle and foot (H) [M86.9]  Post-op Diagnosis: M86.9 - Osteomyelitis of ankle and foot (H) [ICD-10-CM]      Gross Description       A(1). Toe, Left, LEFT FOOT SECOND DIGIT:  Received in formalin, labeled with the patient's name and \"left foot second digit,\" is a toe, anatomically consistent with second toe, measuring 4.7 cm in length and 1.6 cm in diameter, surgically disarticulated at the metatarsal-phalangeal joint. The skin is tan-white and wrinkled. There is a pink-red to gray ulceration 1.3 x 1.0 cm on the " medial side of the toe, extending to within 0.4 cm of the nearest cutaneous/soft tissue surgical margin. This margin is inked black. Longitudinal sections of the lesion through the margin are submitted in block A1. Sectioning through the lesion displays a pink-gray, granular cut surface. Sectioning through the phalanx bones displays slight congestion of the middle phalanx. No abscesses or tumor nodules are identified. The cutaneous ulcer does not appear to involve underlying bone. A full-length longitudinal section is submitted following fixation and subsequent decalcification.   RS-4C (decal blocks A2-A4).    SUMMARY OF CASSETTES: A1) Longitudinal sections of lesion to include black-inked surgical margin; A2-A4) Full-length longitudinal section of toe following fixation and subsequent decalcification.   RJR:berenice/aka      Microscopic Description       Microscopic examination performed, substantiating the above diagnosis.       Performing Labs       The technical component of this testing was completed at [facility]      Case Images     Glucose by meter    Collection Time: 03/09/22  6:02 AM   Result Value Ref Range    GLUCOSE BY METER POCT 107 (H) 70 - 99 mg/dL   Asymptomatic COVID-19 Virus (Coronavirus) by PCR Nasopharyngeal    Collection Time: 03/09/22 12:40 PM    Specimen: Nasopharyngeal; Swab   Result Value Ref Range    SARS CoV2 PCR Negative Negative   ECG 12-LEAD WITH MUSE (LHE)    Collection Time: 03/09/22 12:53 PM   Result Value Ref Range    Systolic Blood Pressure  mmHg    Diastolic Blood Pressure  mmHg    Ventricular Rate 71 BPM    Atrial Rate 71 BPM    ID Interval 204 ms    QRS Duration 136 ms     ms    QTc 473 ms    P Axis 61 degrees    R AXIS 8 degrees    T Axis -15 degrees    Interpretation ECG       Sinus rhythm  Left bundle branch block  Abnormal ECG  When compared with ECG of 07-MAR-2022 11:59,  No significant change was found  Confirmed by REYNALDO CAO MD LOC:JN  ([id number]) on 3/10/2022 1:36:42 PM         MEDICATIONS:     Review of your medicines          Accurate as of March 14, 2022  2:08 PM. If you have any questions, ask your nurse or doctor.            CONTINUE these medicines which have NOT CHANGED      Dose / Directions   albuterol 108 (90 Base) MCG/ACT inhaler  Commonly known as: PROAIR HFA/PROVENTIL HFA/VENTOLIN HFA  Used for: Wheezing      Dose: 2 puff  [ALBUTEROL (PROAIR HFA;PROVENTIL HFA;VENTOLIN HFA) 90 MCG/ACTUATION INHALER] Inhale 2 puffs every 4 (four) hours as needed for wheezing. And cough  Quantity: 1 Inhaler  Refills: 3     amitriptyline 25 MG tablet  Commonly known as: ELAVIL  Used for: Back pain with left-sided sciatica      Dose: 25 mg  Take 1 tablet (25 mg) by mouth At Bedtime  Quantity: 30 tablet  Refills: 0     aspirin 81 MG chewable tablet  Commonly known as: ASA      Dose: 81 mg  [ASPIRIN 81 MG CHEWABLE TABLET] Chew 81 mg daily.  Refills: 0     CALCIUM CARBONATE-VITAMIN D PO      Dose: 3 tablet  [CALCIUM CARBONATE/VITAMIN D3 (CALCIUM+D ORAL)] Take 3 tablets by mouth daily.  Refills: 0     carisoprodol 350 MG tablet  Commonly known as: SOMA  Used for: Back pain with left-sided sciatica      Dose: 350 mg  Take 1 tablet (350 mg) by mouth 3 times daily as needed for muscle spasms  Quantity: 60 tablet  Refills: 1     cholecalciferol 25 MCG (1000 UT) Tabs      Dose: 1,000 Units  [CHOLECALCIFEROL, VITAMIN D3, 1,000 UNIT (25 MCG) TABLET] Take 1,000 Units by mouth daily.  Refills: 0     folic acid 1 MG tablet  Commonly known as: FOLVITE      Dose: 1 mg  Take 1 mg by mouth daily  Refills: 2     hydrocortisone 1 % external cream  Commonly known as: CORTAID  Used for: Rheumatoid arthritis, involving unspecified site, unspecified rheumatoid factor presence      [HYDROCORTISONE (CORTISONE, HYDROCORTISONE,) 1 % CREAM] Apply to hand three times a day  Quantity: 30 g  Refills: 2     isosorbide mononitrate 30 MG 24 hr tablet  Commonly known as: IMDUR      Dose: 30  mg  Take 30 mg by mouth 2 times daily  Refills: 0     leflunomide 20 MG tablet  Commonly known as: ARAVA      Dose: 20 mg  [LEFLUNOMIDE (ARAVA) 20 MG TABLET] Take 20 mg by mouth daily.  Refills: 0     levothyroxine 75 MCG tablet  Commonly known as: SYNTHROID/LEVOTHROID  Used for: Hypothyroidism      [LEVOTHYROXINE (SYNTHROID, LEVOTHROID) 75 MCG TABLET] TAKE 1 TABLET BY MOUTH DAILY AT 6 AM  Quantity: 90 tablet  Refills: 3     loratadine 10 MG tablet  Commonly known as: CLARITIN      Dose: 10 mg  [LORATADINE (CLARITIN) 10 MG TABLET] Take 10 mg by mouth daily.  Refills: 0     naloxone 4 MG/0.1ML nasal spray  Commonly known as: NARCAN  Used for: Opioid Dependence With Continuous Use      Dose: 4 mg  Spray 1 spray (4 mg) into one nostril alternating nostrils once as needed for opioid reversal every 2-3 minutes until assistance arrives  Quantity: 0.2 mL  Refills: 0     nystatin 305588 UNIT/GM external cream  Commonly known as: MYCOSTATIN      Dose: 1 Application  [NYSTATIN (MYCOSTATIN) CREAM] Apply 1 application topically 2 (two) times a day as needed.  Refills: 1     omeprazole 20 MG DR capsule  Commonly known as: priLOSEC      Dose: 20 mg  [OMEPRAZOLE (PRILOSEC) 20 MG CAPSULE] Take 20 mg by mouth Daily before breakfast.  Refills: 0     oxyCODONE 20 MG 12 hr tablet  Commonly known as: oxyCONTIN  Used for: Other chronic pain      Take 1 tablet (20 mg) by mouth every 24 hours AND 2 tablets (40 mg) every 24 hours. Take 40mg Q AM and 20mg Q HSTake by mouth every 12 hours Take 40mg Q AM and 20mg Q HS  Quantity: 60 tablet  Refills: 0     predniSONE 2 MG EC tablet  Commonly known as: CHARLENE  Used for: Rheumatoid arthritis involving multiple sites, unspecified whether rheumatoid factor present (H)      Dose: 2 mg  Take 1 tablet (2 mg) by mouth At Bedtime  Refills: 0     triamcinolone 0.1 % external ointment  Commonly known as: KENALOG  Used for: Venous stasis dermatitis of both lower extremities      [TRIAMCINOLONE (KENALOG) 0.1  % OINTMENT] Apply to both legs twice daily  Quantity: 30 g  Refills: 0            ASSESSMENT/PLAN  Encounter Diagnoses   Name Primary?     Other chronic pain Yes     Osteomyelitis of ankle and foot (H)      Physical deconditioning      Chronic pain continue Soma, Elavil, oxycodone as scheduled    Rheumatoid arthritis continue prednisone, Arava    Hypertension on isosorbide    Hypothyroidism continue levothyroxine last TSH on 3/7/2022 was 1.39    GERD on omeprazole    Osteomyelitis status post Amputation left second digit    Physical deconditioning PT OT      Electronically signed by: Lisa Salinas CNP        Sincerely,        Lisa Salinas CNP

## 2022-03-15 NOTE — PROGRESS NOTES
[facility]      OUTPATIENT PHYSICAL THERAPY EVALUATION  PLAN OF TREATMENT FOR OUTPATIENT REHABILITATION  (COMPLETE FOR INITIAL CLAIMS ONLY)  Patient's Last Name, First Name, M.I.  YOB: 1940  Jacqueline Prado                        Provider's Name  [facility] Medical Record No.  [id number]                               Onset Date:  03/08/22   Start of Care Date:  03/09/22      Type:     _X_PT   ___OT   ___SLP Medical Diagnosis:  (P) osteomyelitis                        PT Diagnosis:  impaired functional mobility   Visits from SOC:  1   _________________________________________________________________________________  Plan of Treatment/Functional Goals    Planned Interventions: balance training, bed mobility training, gait training, home exercise program, neuromuscular re-education, patient/family education, stair training, strengthening, transfer training     Goals: See Physical Therapy Goals on Care Plan in [employer] electronic health record.    Therapy Frequency: Daily  Predicted Duration of Therapy Intervention: 03/16/22  _________________________________________________________________________________    I CERTIFY THE NEED FOR THESE SERVICES FURNISHED UNDER        THIS PLAN OF TREATMENT AND WHILE UNDER MY CARE     (Physician co-signature of this document indicates review and certification of the therapy plan).                Certification date from: 03/09/22, Certification date to: 03/16/22    Referring Physician: Kendrick Pedraza DPM             Initial Assessment        See Physical Therapy evaluation dated 03/09/22 in [employer] electronic health record.

## 2022-03-16 ENCOUNTER — LAB REQUISITION (OUTPATIENT)
Dept: LAB | Facility: CLINIC | Age: 82
End: 2022-03-16
Payer: MEDICARE

## 2022-03-16 DIAGNOSIS — I10 ESSENTIAL (PRIMARY) HYPERTENSION: ICD-10-CM

## 2022-03-16 LAB — BACTERIA WND CULT: NORMAL

## 2022-03-17 ENCOUNTER — TELEPHONE (OUTPATIENT)
Dept: GERIATRICS | Facility: CLINIC | Age: 82
End: 2022-03-17
Payer: MEDICARE

## 2022-03-17 LAB
ANION GAP SERPL CALCULATED.3IONS-SCNC: 11 MMOL/L (ref 5–18)
BUN SERPL-MCNC: 13 MG/DL (ref 8–28)
CALCIUM SERPL-MCNC: 9.4 MG/DL (ref 8.5–10.5)
CHLORIDE BLD-SCNC: 104 MMOL/L (ref 98–107)
CO2 SERPL-SCNC: 26 MMOL/L (ref 22–31)
CREAT SERPL-MCNC: 0.82 MG/DL (ref 0.6–1.1)
ERYTHROCYTE [DISTWIDTH] IN BLOOD BY AUTOMATED COUNT: 12.9 % (ref 10–15)
GFR SERPL CREATININE-BSD FRML MDRD: 71 ML/MIN/1.73M2
GLUCOSE BLD-MCNC: 92 MG/DL (ref 70–125)
HCT VFR BLD AUTO: 44.4 % (ref 35–47)
HGB BLD-MCNC: 14.2 G/DL (ref 11.7–15.7)
MAGNESIUM SERPL-MCNC: 2.2 MG/DL (ref 1.8–2.6)
MCH RBC QN AUTO: 31 PG (ref 26.5–33)
MCHC RBC AUTO-ENTMCNC: 32 G/DL (ref 31.5–36.5)
MCV RBC AUTO: 97 FL (ref 78–100)
PLATELET # BLD AUTO: 263 10E3/UL (ref 150–450)
POTASSIUM BLD-SCNC: 4.3 MMOL/L (ref 3.5–5)
RBC # BLD AUTO: 4.58 10E6/UL (ref 3.8–5.2)
SODIUM SERPL-SCNC: 141 MMOL/L (ref 136–145)
WBC # BLD AUTO: 8 10E3/UL (ref 4–11)

## 2022-03-17 PROCEDURE — 83735 ASSAY OF MAGNESIUM: CPT | Mod: ORL | Performed by: FAMILY MEDICINE

## 2022-03-17 PROCEDURE — 36415 COLL VENOUS BLD VENIPUNCTURE: CPT | Mod: ORL | Performed by: FAMILY MEDICINE

## 2022-03-17 PROCEDURE — 80048 BASIC METABOLIC PNL TOTAL CA: CPT | Mod: ORL | Performed by: FAMILY MEDICINE

## 2022-03-17 PROCEDURE — 85027 COMPLETE CBC AUTOMATED: CPT | Mod: ORL | Performed by: FAMILY MEDICINE

## 2022-03-17 PROCEDURE — 83735 ASSAY OF MAGNESIUM: CPT | Performed by: FAMILY MEDICINE

## 2022-03-17 PROCEDURE — 36415 COLL VENOUS BLD VENIPUNCTURE: CPT | Performed by: FAMILY MEDICINE

## 2022-03-17 PROCEDURE — 85027 COMPLETE CBC AUTOMATED: CPT | Performed by: FAMILY MEDICINE

## 2022-03-17 PROCEDURE — 80048 BASIC METABOLIC PNL TOTAL CA: CPT | Performed by: FAMILY MEDICINE

## 2022-03-17 PROCEDURE — P9604 ONE-WAY ALLOW PRORATED TRIP: HCPCS | Mod: ORL | Performed by: FAMILY MEDICINE

## 2022-03-17 NOTE — TELEPHONE ENCOUNTER
[facility] Triage Nurse Telephone Encounter    Provider: Gavi Lynn MD  Facility: [facility]  Facility Type:  TCU    Caller: Kimberly  Call Back Number: [phone]    Allergies:    Allergies   Allergen Reactions     Acetaminophen Rash     Nsaids (Non-Steroidal Anti-Inflammatory Drug) [Nsaids] Rash     Tetracyclines Rash     Baclofen Nausea     Celecoxib Unknown     Erythromycin Base [Erythromycin] Unknown     Pentolinium Itching     Varenicline Itching and Swelling     Erythromycin Rash     Petroleum Jelly [Petrolatum] Itching and Rash        Reason for call: Nurse is calling to report Heme 2, BMP, and Mg levels.      Verbal Order/Direction given by Provider: No new orders.      Provider giving Order:  Gavi Lynn MD    Verbal Order given to: Kimberly Manrique RN

## 2022-03-18 ENCOUNTER — TRANSITIONAL CARE UNIT VISIT (OUTPATIENT)
Dept: GERIATRICS | Facility: CLINIC | Age: 82
End: 2022-03-18
Payer: MEDICARE

## 2022-03-18 ENCOUNTER — OFFICE VISIT (OUTPATIENT)
Dept: VASCULAR SURGERY | Facility: CLINIC | Age: 82
End: 2022-03-18
Attending: PODIATRIST
Payer: MEDICARE

## 2022-03-18 VITALS
SYSTOLIC BLOOD PRESSURE: 141 MMHG | BODY MASS INDEX: 26.43 KG/M2 | WEIGHT: 140 LBS | DIASTOLIC BLOOD PRESSURE: 72 MMHG | OXYGEN SATURATION: 93 % | HEART RATE: 81 BPM | RESPIRATION RATE: 20 BRPM | TEMPERATURE: 97.7 F | HEIGHT: 61 IN

## 2022-03-18 VITALS
HEIGHT: 61 IN | DIASTOLIC BLOOD PRESSURE: 72 MMHG | BODY MASS INDEX: 26.43 KG/M2 | WEIGHT: 140 LBS | SYSTOLIC BLOOD PRESSURE: 141 MMHG | OXYGEN SATURATION: 94 % | HEART RATE: 85 BPM

## 2022-03-18 DIAGNOSIS — M86.9 OSTEOMYELITIS OF ANKLE AND FOOT (H): ICD-10-CM

## 2022-03-18 DIAGNOSIS — R53.81 PHYSICAL DECONDITIONING: ICD-10-CM

## 2022-03-18 DIAGNOSIS — G89.29 OTHER CHRONIC PAIN: Primary | ICD-10-CM

## 2022-03-18 DIAGNOSIS — L97.524 ULCER OF LEFT FOOT WITH NECROSIS OF BONE (H): Primary | ICD-10-CM

## 2022-03-18 PROCEDURE — 99309 SBSQ NF CARE MODERATE MDM 30: CPT | Performed by: NURSE PRACTITIONER

## 2022-03-18 PROCEDURE — 99212 OFFICE O/P EST SF 10 MIN: CPT | Performed by: PODIATRIST

## 2022-03-18 PROCEDURE — G0463 HOSPITAL OUTPT CLINIC VISIT: HCPCS

## 2022-03-18 RX ORDER — CARISOPRODOL 350 MG/1
350 TABLET ORAL 4 TIMES DAILY PRN
Qty: 40 TABLET | Refills: 0 | Status: SHIPPED | OUTPATIENT
Start: 2022-03-18 | End: 2022-03-18

## 2022-03-18 RX ORDER — CARISOPRODOL 350 MG/1
350 TABLET ORAL 3 TIMES DAILY PRN
Qty: 40 TABLET | Refills: 0 | Status: SHIPPED | OUTPATIENT
Start: 2022-03-18 | End: 2022-03-21

## 2022-03-18 RX ORDER — PREDNISONE 10 MG/1
10 TABLET ORAL DAILY
Qty: 20 TABLET | Refills: 0 | Status: SHIPPED | OUTPATIENT
Start: 2022-03-18 | End: 2022-03-18

## 2022-03-18 ASSESSMENT — PAIN SCALES - GENERAL: PAINLEVEL: MODERATE PAIN (4)

## 2022-03-18 NOTE — PATIENT INSTRUCTIONS
Important lnstructions    OBTAIN A CAM BOOT FOR THIS PATIENT    START TAKING THE MEDICATIONS DR. DOMINGUEZ PRESCRIBED TOMORROW.       1. WEIGHT BEARING STATUS: You are to remain NON WEIGHT BEARING on your left foot. NON WEIGHT BEARING MEANS NO PRESSURE ON YOUR FOOT OR HEEL AT ANY TIME FOR ANY REASON!    2. OFFLOADING DEVICE: Must use a A WHEELCHAIR at all times! (do not use affected foot to push wheelchair)    3. STABILIZATION DEVICE: Use a CAM BOOT . You will need to WEAR THIS ANYTIME YOU ARE UP AND OUT OF BED, IT IS OKAY TO REMOVE WHEN YOU ARE SLEEPING..       4. ELEVATE: Elevating your leg means laying with your head on a pillow and your foot ABOVE YOUR WAIST.     5. DO NOT MOVE YOUR FOOT.    There is a risk of worsening the wound or incision. To give yourself a higher chance of healing, please DO NOT swing foot back and forth and wiggle foot/toes especially when inside a stabilization device.        Dressing Change lnstructions          LEAVE DRESSINGS INTACT UNTIL YOU ARE SEEN FOR FOLLOW UP IN 2-1/2 WEEKS    It IS NOT ok to get your wound wet in the bath or shower    SEEK MEDICAL CARE IF:    You have an increase in swelling, pain, or redness around the wound.    You have an increase in the amount of pus coming from the wound.    There is a bad smell coming from the wound.    The wound appears to be worsening/enlarging    You have a fever greater than 101.5 F      It is ok to continue current wound care treatment/products for the next 2-3 days until new wound care supplies are ordered and arrive. If longer than this please contact our office at [phone].        We want to hear from you!   In the next few weeks, you should receive a call or email to complete a survey about your visit at [facility]. Please help us improve your appointment experience by letting us know how we did today. We strive to make your experience good and value any ways in which we could do better.      We value your input  and suggestions.    Thank you for choosing the [facility]!

## 2022-03-18 NOTE — LETTER
3/18/2022        RE: Jacqueline Prado  [address]  [place] [address]        M [facility] GERIATRIC SERVICES  Chief Complaint   Patient presents with     EZRAECK     [facility] [place] Medical Record Number:  [id number]  Place of Service where encounter took place:  [facility] ([facility]) [[id number]]  Code Status:  DNR    HISTORY:      HPI:  Jacqueline Prado  is 81 year old (1940) undergoing physical and occupational therapy. She is with history of RA, hypertension, opioid dependence, chronic pain on oxycontin , hypothyroidism, GERD, and osteomyelitis of the second digit of the left foot admitted on 3/7/2022 for elective amputation.   Per hospital records: Amputation of 2nd digit left foot was performed on 3/8/2022.     PTA Arava was held post op briefly due to concern for poor wound healing and restarted at discharge after being cleared by the podiatrist. PTA prednisone 2 mg daily was continued during hospital stay. EKG showed new left bundle branch block compared with ECG of 2/3/2001. Patient states she has had LBBB for several years and denies chest pain, sob, palpitation or dizziness      Today she was seen to review vital signs, labs, follow-up left second toe amputation..  She denied chest pain shortness of breath cough congestion constipation or diarrhea. Her pain is controlled. Her BP's have been labile and she reports it is because her right hand arthritis is acting up. There was no redness.She will follow up with the surgeon this morning.   Labs reviewed and last TSH 1.39 vitamin D 72 BMP and CBC within normal limits.  Her left foot was covered in a dressing.        ALLERGIES:Acetaminophen, Nsaids (non-steroidal anti-inflammatory drug) [nsaids], Tetracyclines, Baclofen, Celecoxib, Erythromycin base [erythromycin], Pentolinium, Varenicline, Erythromycin, and Petroleum jelly [petrolatum]    PAST MEDICAL HISTORY:   Past Medical History:   Diagnosis Date     Acquired hypothyroidism      Acquired  lymphedema of leg      Bundle branch block     Created by Conversion  Replacement Utility updated for latest IMO load     Bundle branch block      Cellulitis      Hypertension      Impetigo      Opioid dependence (H)      Osteoporosis      RA (rheumatoid arthritis) (H)      Rheumatoid arthritis (H)      Secondary hyperparathyroidism (H)      Venous hypertension of both lower extremities      Venous insufficiency of both lower extremities      Venous stasis dermatitis of lower extremity        PAST SURGICAL HISTORY:   has a past surgical history that includes IR Lumbar Epidural Steroid Injection (6/23/2003); IR Miscellaneous Procedure (12/1/2006); REMOVAL GALLBLADDER; EXCIS CERV DISK,ONE LEVEL; Pr Inject Vertebral Body, Lumbar; Breast Cyst Aspiration (Left, 2000); and Amputate toe(s) (Left, 3/8/2022).    FAMILY HISTORY: family history includes Breast Cancer (age of onset: 72.00) in her sister; Leukemia in her sister.    SOCIAL HISTORY:  reports that she quit smoking about 3 years ago. Her smoking use included cigarettes. She smoked 0.50 packs per day. She uses smokeless tobacco. She reports current alcohol use. She reports previous drug use. Drug: Marijuana.    ROS:  Constitutional: Negative for activity change, appetite change, fatigue and fever.   HENT: Negative for congestion.    Respiratory: Negative for cough, shortness of breath and wheezing.    Cardiovascular: Negative for chest pain and leg swelling.   Gastrointestinal: Negative for abdominal distention, abdominal pain, constipation, diarrhea and nausea.   Genitourinary: Negative for dysuria.   Musculoskeletal: Negative for arthralgia. Negative for back pain.   Skin: Vascular changes lower extremities.   Neurological: Negative for dizziness.   Psychiatric/Behavioral: Negative for agitation, behavioral problems and confusion.     Physical Exam:  Constitutional:       Appearance: Patient is well-developed.   HENT:      Head: Normocephalic.   Eyes:       "Conjunctiva/sclera: Conjunctivae normal.   Neck:      Musculoskeletal: Normal range of motion.   Cardiovascular:      Rate and Rhythm: Normal rate and regular rhythm.      Heart sounds: Normal heart sounds. No murmur.   Pulmonary:      Effort: No respiratory distress.      Breath sounds: Normal breath sounds. No wheezing or rales.   Abdominal:      General: Bowel sounds are normal. There is no distension.      Palpations: Abdomen is soft.      Tenderness: There is no abdominal tenderness.   Musculoskeletal:       Normal range of motion. Status post left foot second digit amputation on 3/8/2022  Skin:General:        Skin is warm.   Neurological:         Mental Status: Patient is alert and oriented to person, place, and time.   Psychiatric:         Behavior: Behavior normal.     Vitals:BP (!) 141/72   Pulse 81   Temp 97.7  F (36.5  C)   Resp 20   Ht 1.549 m (5' 1\")   Wt 63.5 kg (140 lb)   SpO2 93%   BMI 26.45 kg/m   and Body mass index is 26.45 kg/m .    Lab/Diagnostic data:   Recent Results (from the past 240 hour(s))   Anaerobic Bacterial Culture Routine    Collection Time: 03/08/22  4:25 PM    Specimen: Foot, Left; Wound   Result Value Ref Range    Culture No anaerobic organisms isolated    Gram Stain    Collection Time: 03/08/22  4:25 PM    Specimen: Foot, Left; Wound   Result Value Ref Range    Gram Stain Result No organisms seen     Gram Stain Result No white blood cells seen    Wound Aerobic Bacterial Culture Routine    Collection Time: 03/08/22  4:25 PM    Specimen: Foot, Left; Wound   Result Value Ref Range    Culture No Growth    Surgical Pathology Exam    Collection Time: 03/08/22  4:40 PM   Result Value Ref Range    Case Report       Surgical Pathology Report                         Case: [id number]                                  Authorizing Provider:  Kendrick Pedraza DPM Collected:           03/08/2022 04:40 PM          Ordering Location:     [facility]      Received:            " "03/09/2022 07:35 AM                                 [facility]                                                               Pathologist:           Keegan Mo MD                                                      Specimen:    Toe, Left, LEFT FOOT SECOND DIGIT                                                          Final Diagnosis       SURGICALLY AMPUTATED SECOND TOE, LEFT FOOT:    LARGE GRANULATING CUTANEOUS AND SUBCUTANEOUS ULCER, MEDIAL SIDE OF TOE    FOCAL ACUTE OSTEOMYELITIS AND PERIOSTITIS WITH LOCALIZED BONE RESORPTION, MIDDLE PHALANX    NEGATIVE FOR OSTEOMYELITIS AT BONE DISARTICULATION MARGIN    NEGATIVE FOR ACUTE INFLAMMATION AT SKIN AND SUBCUTANEOUS TISSUE SURGICAL MARGIN       Comment       The ulcer appears to have been present for some time, as indicated by the degree of organized fibrosis that has developed, and the original cause of the ulcer is no longer apparent in the tissue.      Clinical Information       Procedure: AMPUTATION, second digit left foot - Left  Pre-op Diagnosis: Osteomyelitis of ankle and foot (H) [M86.9]  Post-op Diagnosis: M86.9 - Osteomyelitis of ankle and foot (H) [ICD-10-CM]      Gross Description       A(1). Toe, Left, LEFT FOOT SECOND DIGIT:  Received in formalin, labeled with the patient's name and \"left foot second digit,\" is a toe, anatomically consistent with second toe, measuring 4.7 cm in length and 1.6 cm in diameter, surgically disarticulated at the metatarsal-phalangeal joint. The skin is tan-white and wrinkled. There is a pink-red to gray ulceration 1.3 x 1.0 cm on the medial side of the toe, extending to within 0.4 cm of the nearest cutaneous/soft tissue surgical margin. This margin is inked black. Longitudinal sections of the lesion through the margin are submitted in block A1. Sectioning through the lesion displays a pink-gray, granular cut surface. Sectioning through the phalanx bones displays slight congestion of the middle phalanx. No " abscesses or tumor nodules are identified. The cutaneous ulcer does not appear to involve underlying bone. A full-length longitudinal section is submitted following fixation and subsequent decalcification.   RS-4C (decal blocks A2-A4).    SUMMARY OF CASSETTES: A1) Longitudinal sections of lesion to include black-inked surgical margin; A2-A4) Full-length longitudinal section of toe following fixation and subsequent decalcification.   RJR:berenice/aka      Microscopic Description       Microscopic examination performed, substantiating the above diagnosis.       Performing Labs       The technical component of this testing was completed at [facility]      Case Images     Glucose by meter    Collection Time: 03/09/22  6:02 AM   Result Value Ref Range    GLUCOSE BY METER POCT 107 (H) 70 - 99 mg/dL   Asymptomatic COVID-19 Virus (Coronavirus) by PCR Nasopharyngeal    Collection Time: 03/09/22 12:40 PM    Specimen: Nasopharyngeal; Swab   Result Value Ref Range    SARS CoV2 PCR Negative Negative   ECG 12-LEAD WITH MUSE (LHE)    Collection Time: 03/09/22 12:53 PM   Result Value Ref Range    Systolic Blood Pressure  mmHg    Diastolic Blood Pressure  mmHg    Ventricular Rate 71 BPM    Atrial Rate 71 BPM    AL Interval 204 ms    QRS Duration 136 ms     ms    QTc 473 ms    P Axis 61 degrees    R AXIS 8 degrees    T Axis -15 degrees    Interpretation ECG       Sinus rhythm  Left bundle branch block  Abnormal ECG  When compared with ECG of 07-MAR-2022 11:59,  No significant change was found  Confirmed by REYNALDO CAO MD LOC:JN ([id number]) on 3/10/2022 1:36:42 PM     Basic metabolic panel    Collection Time: 03/17/22  7:00 AM   Result Value Ref Range    Sodium 141 136 - 145 mmol/L    Potassium 4.3 3.5 - 5.0 mmol/L    Chloride 104 98 - 107 mmol/L    Carbon Dioxide (CO2) 26 22 - 31 mmol/L    Anion Gap 11 5 - 18 mmol/L    Urea Nitrogen 13 8 - 28 mg/dL    Creatinine 0.82 0.60 - 1.10 mg/dL    Calcium 9.4 8.5 -  10.5 mg/dL    Glucose 92 70 - 125 mg/dL    GFR Estimate 71 >60 mL/min/1.73m2   Magnesium    Collection Time: 03/17/22  7:00 AM   Result Value Ref Range    Magnesium 2.2 1.8 - 2.6 mg/dL   CBC with platelets    Collection Time: 03/17/22  7:00 AM   Result Value Ref Range    WBC Count 8.0 4.0 - 11.0 10e3/uL    RBC Count 4.58 3.80 - 5.20 10e6/uL    Hemoglobin 14.2 11.7 - 15.7 g/dL    Hematocrit 44.4 35.0 - 47.0 %    MCV 97 78 - 100 fL    MCH 31.0 26.5 - 33.0 pg    MCHC 32.0 31.5 - 36.5 g/dL    RDW 12.9 10.0 - 15.0 %    Platelet Count 263 150 - 450 10e3/uL       MEDICATIONS:     Review of your medicines          Accurate as of March 18, 2022  2:32 PM. If you have any questions, ask your nurse or doctor.            CONTINUE these medicines which may have CHANGED, or have new prescriptions. If we are uncertain of the size of tablets/capsules you have at home, strength may be listed as something that might have changed.      Dose / Directions   * carisoprodol 350 MG tablet  Commonly known as: SOMA  This may have changed: Another medication with the same name was added. Make sure you understand how and when to take each.  Used for: Back pain with left-sided sciatica  Changed by: Kendrick Pedraza DPM      Dose: 350 mg  Take 1 tablet (350 mg) by mouth 3 times daily as needed for muscle spasms  Quantity: 60 tablet  Refills: 1     * carisoprodol 350 MG tablet  Commonly known as: SOMA  This may have changed: You were already taking a medication with the same name, and this prescription was added. Make sure you understand how and when to take each.  Used for: 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), Osteomyelitis of ankle and foot (H)  Changed by: Kendrick Pedraza DPM      Dose: 350 mg  Take 1 tablet (350 mg) by mouth 3 times daily as needed for muscle spasms  Quantity: 40 tablet  Refills: 0         * This list has 2 medication(s) that are the same as other medications prescribed for you. Read the directions  carefully, and ask your doctor or other care provider to review them with you.            CONTINUE these medicines which have NOT CHANGED      Dose / Directions   albuterol 108 (90 Base) MCG/ACT inhaler  Commonly known as: PROAIR HFA/PROVENTIL HFA/VENTOLIN HFA  Used for: Wheezing      Dose: 2 puff  [ALBUTEROL (PROAIR HFA;PROVENTIL HFA;VENTOLIN HFA) 90 MCG/ACTUATION INHALER] Inhale 2 puffs every 4 (four) hours as needed for wheezing. And cough  Quantity: 1 Inhaler  Refills: 3     amitriptyline 25 MG tablet  Commonly known as: ELAVIL  Used for: Back pain with left-sided sciatica      Dose: 25 mg  Take 1 tablet (25 mg) by mouth At Bedtime  Quantity: 30 tablet  Refills: 0     aspirin 81 MG chewable tablet  Commonly known as: ASA      Dose: 81 mg  [ASPIRIN 81 MG CHEWABLE TABLET] Chew 81 mg daily.  Refills: 0     CALCIUM CARBONATE-VITAMIN D PO      Dose: 3 tablet  [CALCIUM CARBONATE/VITAMIN D3 (CALCIUM+D ORAL)] Take 3 tablets by mouth daily.  Refills: 0     cholecalciferol 25 MCG (1000 UT) Tabs      Dose: 1,000 Units  [CHOLECALCIFEROL, VITAMIN D3, 1,000 UNIT (25 MCG) TABLET] Take 1,000 Units by mouth daily.  Refills: 0     folic acid 1 MG tablet  Commonly known as: FOLVITE      Dose: 1 mg  Take 1 mg by mouth daily  Refills: 2     hydrocortisone 1 % external cream  Commonly known as: CORTAID  Used for: Rheumatoid arthritis, involving unspecified site, unspecified rheumatoid factor presence      [HYDROCORTISONE (CORTISONE, HYDROCORTISONE,) 1 % CREAM] Apply to hand three times a day  Quantity: 30 g  Refills: 2     isosorbide mononitrate 30 MG 24 hr tablet  Commonly known as: IMDUR      Dose: 30 mg  Take 30 mg by mouth 2 times daily  Refills: 0     leflunomide 20 MG tablet  Commonly known as: ARAVA      Dose: 20 mg  [LEFLUNOMIDE (ARAVA) 20 MG TABLET] Take 20 mg by mouth daily.  Refills: 0     levothyroxine 75 MCG tablet  Commonly known as: SYNTHROID/LEVOTHROID  Used for: Hypothyroidism      [LEVOTHYROXINE (SYNTHROID,  LEVOTHROID) 75 MCG TABLET] TAKE 1 TABLET BY MOUTH DAILY AT 6 AM  Quantity: 90 tablet  Refills: 3     loratadine 10 MG tablet  Commonly known as: CLARITIN      Dose: 10 mg  [LORATADINE (CLARITIN) 10 MG TABLET] Take 10 mg by mouth daily.  Refills: 0     naloxone 4 MG/0.1ML nasal spray  Commonly known as: NARCAN  Used for: Opioid Dependence With Continuous Use      Dose: 4 mg  Spray 1 spray (4 mg) into one nostril alternating nostrils once as needed for opioid reversal every 2-3 minutes until assistance arrives  Quantity: 0.2 mL  Refills: 0     nystatin 681322 UNIT/GM external cream  Commonly known as: MYCOSTATIN      Dose: 1 Application  [NYSTATIN (MYCOSTATIN) CREAM] Apply 1 application topically 2 (two) times a day as needed.  Refills: 1     omeprazole 20 MG DR capsule  Commonly known as: priLOSEC      Dose: 20 mg  [OMEPRAZOLE (PRILOSEC) 20 MG CAPSULE] Take 20 mg by mouth Daily before breakfast.  Refills: 0     oxyCODONE 20 MG 12 hr tablet  Commonly known as: oxyCONTIN  Used for: Other chronic pain      Take 1 tablet (20 mg) by mouth every 24 hours AND 2 tablets (40 mg) every 24 hours. Take 40mg Q AM and 20mg Q HSTake by mouth every 12 hours Take 40mg Q AM and 20mg Q HS  Quantity: 60 tablet  Refills: 0     predniSONE 2 MG EC tablet  Commonly known as: CHARLENE  Used for: Rheumatoid arthritis involving multiple sites, unspecified whether rheumatoid factor present (H)      Dose: 2 mg  Take 1 tablet (2 mg) by mouth At Bedtime  Refills: 0     triamcinolone 0.1 % external ointment  Commonly known as: KENALOG  Used for: Venous stasis dermatitis of both lower extremities      [TRIAMCINOLONE (KENALOG) 0.1 % OINTMENT] Apply to both legs twice daily  Quantity: 30 g  Refills: 0           Where to get your medicines      Some of these will need a paper prescription and others can be bought over the counter. Ask your nurse if you have questions.    Bring a paper prescription for each of these medications    carisoprodol 350 MG  tablet         ASSESSMENT/PLAN  Encounter Diagnoses   Name Primary?     Other chronic pain Yes     Physical deconditioning      Osteomyelitis of ankle and foot (H)      Chronic pain continue Soma, Elavil, oxycodone as scheduled    Rheumatoid arthritis continue prednisone, Arava    Hypertension on isosorbide    Hypothyroidism continue levothyroxine last TSH on 3/7/2022 was 1.39    GERD on omeprazole    Osteomyelitis status post Amputation left second digit, Follow up with surgeon as scheduled, pain control     Physical deconditioning PT OT      Electronically signed by: Lisa Salinas CNP        Sincerely,        Lisa Salinas CNP

## 2022-03-18 NOTE — PROGRESS NOTES
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 Clean proximal margin per pathology report.     -Gauze dressing applied today which she will keep intact. Continue non-weight bearing on the left foot except when working with physical therapy, CAM boot. My office will contact PT at [facility] to discuss options for limited walking.     -Rx Prednisone and Soma.     -She will follow-up with me in 2 weeks for suture removal.     Kendrick Pedraza DPM  [facility]/Foot & Ankle Surgery      HPI: Jacqueline Prado was seen again today s/p amputation 2nd digit left foot. She is doing well. Denies foot pain. Patient requests prednisone and soma while at [facility].     Past Medical History:   Diagnosis Date     Acquired hypothyroidism      Acquired lymphedema of leg      Bundle branch block     Created by Conversion  Replacement Utility updated for latest IMO load     Bundle branch block      Cellulitis      Hypertension      Impetigo      Opioid dependence (H)      Osteoporosis      RA (rheumatoid arthritis) (H)      Rheumatoid arthritis (H)      Secondary hyperparathyroidism (H)      Venous hypertension of both lower extremities      Venous insufficiency of both lower extremities      Venous stasis dermatitis of lower extremity        Allergies   Allergen Reactions     Acetaminophen Rash     Nsaids (Non-Steroidal Anti-Inflammatory Drug) [Nsaids] Rash     Tetracyclines Rash     Baclofen Nausea     Celecoxib Unknown     Erythromycin Base [Erythromycin] Unknown     Pentolinium Itching     Varenicline Itching and Swelling     Erythromycin Rash     Petroleum Jelly [Petrolatum] Itching and Rash         Current Outpatient Medications:      albuterol (PROAIR HFA;PROVENTIL HFA;VENTOLIN HFA) 90 mcg/actuation inhaler, [ALBUTEROL (PROAIR HFA;PROVENTIL HFA;VENTOLIN HFA) 90 MCG/ACTUATION INHALER] Inhale 2 puffs every 4 (four) hours as needed  for wheezing. And cough, Disp: 1 Inhaler, Rfl: 3     amitriptyline (ELAVIL) 25 MG tablet, Take 1 tablet (25 mg) by mouth At Bedtime, Disp: 30 tablet, Rfl: 0     aspirin 81 mg chewable tablet, [ASPIRIN 81 MG CHEWABLE TABLET] Chew 81 mg daily., Disp: , Rfl:      CALCIUM CARBONATE/VITAMIN D3 (CALCIUM+D ORAL), [CALCIUM CARBONATE/VITAMIN D3 (CALCIUM+D ORAL)] Take 3 tablets by mouth daily., Disp: , Rfl:      carisoprodol (SOMA) 350 MG tablet, Take 1 tablet (350 mg) by mouth 3 times daily as needed for muscle spasms, Disp: 60 tablet, Rfl: 1     cholecalciferol, vitamin D3, 1,000 unit (25 mcg) tablet, [CHOLECALCIFEROL, VITAMIN D3, 1,000 UNIT (25 MCG) TABLET] Take 1,000 Units by mouth daily., Disp: , Rfl:      folic acid (FOLVITE) 1 MG tablet, Take 1 mg by mouth daily , Disp: , Rfl: 2     hydrocortisone (CORTISONE, HYDROCORTISONE,) 1 % cream, [HYDROCORTISONE (CORTISONE, HYDROCORTISONE,) 1 % CREAM] Apply to hand three times a day, Disp: 30 g, Rfl: 2     isosorbide mononitrate (IMDUR) 30 MG 24 hr tablet, Take 30 mg by mouth 2 times daily , Disp: , Rfl:      leflunomide (ARAVA) 20 MG tablet, [LEFLUNOMIDE (ARAVA) 20 MG TABLET] Take 20 mg by mouth daily. , Disp: , Rfl:      levothyroxine (SYNTHROID, LEVOTHROID) 75 MCG tablet, [LEVOTHYROXINE (SYNTHROID, LEVOTHROID) 75 MCG TABLET] TAKE 1 TABLET BY MOUTH DAILY AT 6 AM, Disp: 90 tablet, Rfl: 3     loratadine (CLARITIN) 10 mg tablet, [LORATADINE (CLARITIN) 10 MG TABLET] Take 10 mg by mouth daily., Disp: , Rfl:      naloxone (NARCAN) 4 MG/0.1ML nasal spray, Spray 1 spray (4 mg) into one nostril alternating nostrils once as needed for opioid reversal every 2-3 minutes until assistance arrives, Disp: 0.2 mL, Rfl: 0     nystatin (MYCOSTATIN) cream, [NYSTATIN (MYCOSTATIN) CREAM] Apply 1 application topically 2 (two) times a day as needed., Disp: , Rfl: 1     omeprazole (PRILOSEC) 20 MG capsule, [OMEPRAZOLE (PRILOSEC) 20 MG CAPSULE] Take 20 mg by mouth Daily before breakfast., Disp: , Rfl:  "     oxyCODONE (OXYCONTIN) 20 MG 12 hr tablet, Take 1 tablet (20 mg) by mouth every 24 hours AND 2 tablets (40 mg) every 24 hours. Take 40mg Q AM and 20mg Q HSTake by mouth every 12 hours Take 40mg Q AM and 20mg Q HS, Disp: 60 tablet, Rfl: 0     predniSONE (CHARLENE) 2 MG EC tablet, Take 1 tablet (2 mg) by mouth At Bedtime, Disp: , Rfl:      triamcinolone (KENALOG) 0.1 % ointment, [TRIAMCINOLONE (KENALOG) 0.1 % OINTMENT] Apply to both legs twice daily, Disp: 30 g, Rfl: 0    Current Facility-Administered Medications:      denosumab (PROLIA) injection 60 mg, 60 mg, Subcutaneous, Q6 Months, Kb Alexis MD, 60 mg at 10/25/21 1416    Review of Systems - Negative       OBJECTIVE:  Appearance: alert, well appearing, and in no distress.    BP (!) 141/72   Pulse 85   Ht 5' 1\" (1.549 m)   Wt 140 lb (63.5 kg)   SpO2 94%   BMI 26.45 kg/m      @LDAVASC(10,16,17)@         Surgical site on the amputated 2nd digit left foot has intact sutures with skin edges well approximated, no gapping noted. No erythema left foot. Neurovascular status unchanged left foot.     "

## 2022-03-18 NOTE — PROGRESS NOTES
[facility] GERIATRIC SERVICES  Chief Complaint   Patient presents with     RECHECK     [place] Medical Record Number:  [id number]  Place of Service where encounter took place:  [facility] ([facility]) [[id number]]  Code Status:  DNR    HISTORY:      HPI:  Jacqueline Prado  is 81 year old (1940) undergoing physical and occupational therapy. She is with history of RA, hypertension, opioid dependence, chronic pain on oxycontin , hypothyroidism, GERD, and osteomyelitis of the second digit of the left foot admitted on 3/7/2022 for elective amputation.   Per hospital records: Amputation of 2nd digit left foot was performed on 3/8/2022.     PTA Arava was held post op briefly due to concern for poor wound healing and restarted at discharge after being cleared by the podiatrist. PTA prednisone 2 mg daily was continued during hospital stay. EKG showed new left bundle branch block compared with ECG of 2/3/2001. Patient states she has had LBBB for several years and denies chest pain, sob, palpitation or dizziness      Today she was seen to review vital signs, labs, follow-up left second toe amputation..  She denied chest pain shortness of breath cough congestion constipation or diarrhea. Her pain is controlled. Her BP's have been labile and she reports it is because her right hand arthritis is acting up. There was no redness.She will follow up with the surgeon this morning.   Labs reviewed and last TSH 1.39 vitamin D 72 BMP and CBC within normal limits.  Her left foot was covered in a dressing.        ALLERGIES:Acetaminophen, Nsaids (non-steroidal anti-inflammatory drug) [nsaids], Tetracyclines, Baclofen, Celecoxib, Erythromycin base [erythromycin], Pentolinium, Varenicline, Erythromycin, and Petroleum jelly [petrolatum]    PAST MEDICAL HISTORY:   Past Medical History:   Diagnosis Date     Acquired hypothyroidism      Acquired lymphedema of leg      Bundle branch block     Created by Conversion  Replacement Utility updated  for latest IMO load     Bundle branch block      Cellulitis      Hypertension      Impetigo      Opioid dependence (H)      Osteoporosis      RA (rheumatoid arthritis) (H)      Rheumatoid arthritis (H)      Secondary hyperparathyroidism (H)      Venous hypertension of both lower extremities      Venous insufficiency of both lower extremities      Venous stasis dermatitis of lower extremity        PAST SURGICAL HISTORY:   has a past surgical history that includes IR Lumbar Epidural Steroid Injection (6/23/2003); IR Miscellaneous Procedure (12/1/2006); REMOVAL GALLBLADDER; EXCIS CERV DISK,ONE LEVEL; Pr Inject Vertebral Body, Lumbar; Breast Cyst Aspiration (Left, 2000); and Amputate toe(s) (Left, 3/8/2022).    FAMILY HISTORY: family history includes Breast Cancer (age of onset: 72.00) in her sister; Leukemia in her sister.    SOCIAL HISTORY:  reports that she quit smoking about 3 years ago. Her smoking use included cigarettes. She smoked 0.50 packs per day. She uses smokeless tobacco. She reports current alcohol use. She reports previous drug use. Drug: Marijuana.    ROS:  Constitutional: Negative for activity change, appetite change, fatigue and fever.   HENT: Negative for congestion.    Respiratory: Negative for cough, shortness of breath and wheezing.    Cardiovascular: Negative for chest pain and leg swelling.   Gastrointestinal: Negative for abdominal distention, abdominal pain, constipation, diarrhea and nausea.   Genitourinary: Negative for dysuria.   Musculoskeletal: Negative for arthralgia. Negative for back pain.   Skin: Vascular changes lower extremities.   Neurological: Negative for dizziness.   Psychiatric/Behavioral: Negative for agitation, behavioral problems and confusion.     Physical Exam:  Constitutional:       Appearance: Patient is well-developed.   HENT:      Head: Normocephalic.   Eyes:      Conjunctiva/sclera: Conjunctivae normal.   Neck:      Musculoskeletal: Normal range of motion.  "  Cardiovascular:      Rate and Rhythm: Normal rate and regular rhythm.      Heart sounds: Normal heart sounds. No murmur.   Pulmonary:      Effort: No respiratory distress.      Breath sounds: Normal breath sounds. No wheezing or rales.   Abdominal:      General: Bowel sounds are normal. There is no distension.      Palpations: Abdomen is soft.      Tenderness: There is no abdominal tenderness.   Musculoskeletal:       Normal range of motion. Status post left foot second digit amputation on 3/8/2022  Skin:General:        Skin is warm.   Neurological:         Mental Status: Patient is alert and oriented to person, place, and time.   Psychiatric:         Behavior: Behavior normal.     Vitals:BP (!) 141/72   Pulse 81   Temp 97.7  F (36.5  C)   Resp 20   Ht 1.549 m (5' 1\")   Wt 63.5 kg (140 lb)   SpO2 93%   BMI 26.45 kg/m   and Body mass index is 26.45 kg/m .    Lab/Diagnostic data:   Recent Results (from the past 240 hour(s))   Anaerobic Bacterial Culture Routine    Collection Time: 03/08/22  4:25 PM    Specimen: Foot, Left; Wound   Result Value Ref Range    Culture No anaerobic organisms isolated    Gram Stain    Collection Time: 03/08/22  4:25 PM    Specimen: Foot, Left; Wound   Result Value Ref Range    Gram Stain Result No organisms seen     Gram Stain Result No white blood cells seen    Wound Aerobic Bacterial Culture Routine    Collection Time: 03/08/22  4:25 PM    Specimen: Foot, Left; Wound   Result Value Ref Range    Culture No Growth    Surgical Pathology Exam    Collection Time: 03/08/22  4:40 PM   Result Value Ref Range    Case Report       Surgical Pathology Report                         Case: [id number]                                  Authorizing Provider:  Kendrick Pedraza DPM Collected:           03/08/2022 04:40 PM          Ordering Location:     [facility]      Received:            03/09/2022 07:35 AM                                 [facility]                             " "                                  Pathologist:           Keegan Mo MD                                                      Specimen:    Toe, Left, LEFT FOOT SECOND DIGIT                                                          Final Diagnosis       SURGICALLY AMPUTATED SECOND TOE, LEFT FOOT:    LARGE GRANULATING CUTANEOUS AND SUBCUTANEOUS ULCER, MEDIAL SIDE OF TOE    FOCAL ACUTE OSTEOMYELITIS AND PERIOSTITIS WITH LOCALIZED BONE RESORPTION, MIDDLE PHALANX    NEGATIVE FOR OSTEOMYELITIS AT BONE DISARTICULATION MARGIN    NEGATIVE FOR ACUTE INFLAMMATION AT SKIN AND SUBCUTANEOUS TISSUE SURGICAL MARGIN       Comment       The ulcer appears to have been present for some time, as indicated by the degree of organized fibrosis that has developed, and the original cause of the ulcer is no longer apparent in the tissue.      Clinical Information       Procedure: AMPUTATION, second digit left foot - Left  Pre-op Diagnosis: Osteomyelitis of ankle and foot (H) [M86.9]  Post-op Diagnosis: M86.9 - Osteomyelitis of ankle and foot (H) [ICD-10-CM]      Gross Description       A(1). Toe, Left, LEFT FOOT SECOND DIGIT:  Received in formalin, labeled with the patient's name and \"left foot second digit,\" is a toe, anatomically consistent with second toe, measuring 4.7 cm in length and 1.6 cm in diameter, surgically disarticulated at the metatarsal-phalangeal joint. The skin is tan-white and wrinkled. There is a pink-red to gray ulceration 1.3 x 1.0 cm on the medial side of the toe, extending to within 0.4 cm of the nearest cutaneous/soft tissue surgical margin. This margin is inked black. Longitudinal sections of the lesion through the margin are submitted in block A1. Sectioning through the lesion displays a pink-gray, granular cut surface. Sectioning through the phalanx bones displays slight congestion of the middle phalanx. No abscesses or tumor nodules are identified. The cutaneous ulcer does not appear to involve underlying " bone. A full-length longitudinal section is submitted following fixation and subsequent decalcification.   RS-4C (decal blocks A2-A4).    SUMMARY OF CASSETTES: A1) Longitudinal sections of lesion to include black-inked surgical margin; A2-A4) Full-length longitudinal section of toe following fixation and subsequent decalcification.   RJR:berenice/aka      Microscopic Description       Microscopic examination performed, substantiating the above diagnosis.       Performing Labs       The technical component of this testing was completed at [facility]      Case Images     Glucose by meter    Collection Time: 03/09/22  6:02 AM   Result Value Ref Range    GLUCOSE BY METER POCT 107 (H) 70 - 99 mg/dL   Asymptomatic COVID-19 Virus (Coronavirus) by PCR Nasopharyngeal    Collection Time: 03/09/22 12:40 PM    Specimen: Nasopharyngeal; Swab   Result Value Ref Range    SARS CoV2 PCR Negative Negative   ECG 12-LEAD WITH MUSE ([facility])    Collection Time: 03/09/22 12:53 PM   Result Value Ref Range    Systolic Blood Pressure  mmHg    Diastolic Blood Pressure  mmHg    Ventricular Rate 71 BPM    Atrial Rate 71 BPM    DC Interval 204 ms    QRS Duration 136 ms     ms    QTc 473 ms    P Axis 61 degrees    R AXIS 8 degrees    T Axis -15 degrees    Interpretation ECG       Sinus rhythm  Left bundle branch block  Abnormal ECG  When compared with ECG of 07-MAR-2022 11:59,  No significant change was found  Confirmed by REYNALDO CAO MD LOC:[facility]N ([id number]) on 3/10/2022 1:36:42 PM     Basic metabolic panel    Collection Time: 03/17/22  7:00 AM   Result Value Ref Range    Sodium 141 136 - 145 mmol/L    Potassium 4.3 3.5 - 5.0 mmol/L    Chloride 104 98 - 107 mmol/L    Carbon Dioxide (CO2) 26 22 - 31 mmol/L    Anion Gap 11 5 - 18 mmol/L    Urea Nitrogen 13 8 - 28 mg/dL    Creatinine 0.82 0.60 - 1.10 mg/dL    Calcium 9.4 8.5 - 10.5 mg/dL    Glucose 92 70 - 125 mg/dL    GFR Estimate 71 >60 mL/min/1.73m2   Magnesium     Collection Time: 03/17/22  7:00 AM   Result Value Ref Range    Magnesium 2.2 1.8 - 2.6 mg/dL   CBC with platelets    Collection Time: 03/17/22  7:00 AM   Result Value Ref Range    WBC Count 8.0 4.0 - 11.0 10e3/uL    RBC Count 4.58 3.80 - 5.20 10e6/uL    Hemoglobin 14.2 11.7 - 15.7 g/dL    Hematocrit 44.4 35.0 - 47.0 %    MCV 97 78 - 100 fL    MCH 31.0 26.5 - 33.0 pg    MCHC 32.0 31.5 - 36.5 g/dL    RDW 12.9 10.0 - 15.0 %    Platelet Count 263 150 - 450 10e3/uL       MEDICATIONS:     Review of your medicines          Accurate as of March 18, 2022  2:32 PM. If you have any questions, ask your nurse or doctor.            CONTINUE these medicines which may have CHANGED, or have new prescriptions. If we are uncertain of the size of tablets/capsules you have at home, strength may be listed as something that might have changed.      Dose / Directions   * carisoprodol 350 MG tablet  Commonly known as: SOMA  This may have changed: Another medication with the same name was added. Make sure you understand how and when to take each.  Used for: Back pain with left-sided sciatica  Changed by: Kendrick Pedraza DPM      Dose: 350 mg  Take 1 tablet (350 mg) by mouth 3 times daily as needed for muscle spasms  Quantity: 60 tablet  Refills: 1     * carisoprodol 350 MG tablet  Commonly known as: SOMA  This may have changed: You were already taking a medication with the same name, and this prescription was added. Make sure you understand how and when to take each.  Used for: 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), Osteomyelitis of ankle and foot (H)  Changed by: Kendrick Pedraza DPM      Dose: 350 mg  Take 1 tablet (350 mg) by mouth 3 times daily as needed for muscle spasms  Quantity: 40 tablet  Refills: 0         * This list has 2 medication(s) that are the same as other medications prescribed for you. Read the directions carefully, and ask your doctor or other care provider to review them with you.            CONTINUE  these medicines which have NOT CHANGED      Dose / Directions   albuterol 108 (90 Base) MCG/ACT inhaler  Commonly known as: PROAIR HFA/PROVENTIL HFA/VENTOLIN HFA  Used for: Wheezing      Dose: 2 puff  [ALBUTEROL (PROAIR HFA;PROVENTIL HFA;VENTOLIN HFA) 90 MCG/ACTUATION INHALER] Inhale 2 puffs every 4 (four) hours as needed for wheezing. And cough  Quantity: 1 Inhaler  Refills: 3     amitriptyline 25 MG tablet  Commonly known as: ELAVIL  Used for: Back pain with left-sided sciatica      Dose: 25 mg  Take 1 tablet (25 mg) by mouth At Bedtime  Quantity: 30 tablet  Refills: 0     aspirin 81 MG chewable tablet  Commonly known as: ASA      Dose: 81 mg  [ASPIRIN 81 MG CHEWABLE TABLET] Chew 81 mg daily.  Refills: 0     CALCIUM CARBONATE-VITAMIN D PO      Dose: 3 tablet  [CALCIUM CARBONATE/VITAMIN D3 (CALCIUM+D ORAL)] Take 3 tablets by mouth daily.  Refills: 0     cholecalciferol 25 MCG (1000 UT) Tabs      Dose: 1,000 Units  [CHOLECALCIFEROL, VITAMIN D3, 1,000 UNIT (25 MCG) TABLET] Take 1,000 Units by mouth daily.  Refills: 0     folic acid 1 MG tablet  Commonly known as: FOLVITE      Dose: 1 mg  Take 1 mg by mouth daily  Refills: 2     hydrocortisone 1 % external cream  Commonly known as: CORTAID  Used for: Rheumatoid arthritis, involving unspecified site, unspecified rheumatoid factor presence      [HYDROCORTISONE (CORTISONE, HYDROCORTISONE,) 1 % CREAM] Apply to hand three times a day  Quantity: 30 g  Refills: 2     isosorbide mononitrate 30 MG 24 hr tablet  Commonly known as: IMDUR      Dose: 30 mg  Take 30 mg by mouth 2 times daily  Refills: 0     leflunomide 20 MG tablet  Commonly known as: ARAVA      Dose: 20 mg  [LEFLUNOMIDE (ARAVA) 20 MG TABLET] Take 20 mg by mouth daily.  Refills: 0     levothyroxine 75 MCG tablet  Commonly known as: SYNTHROID/LEVOTHROID  Used for: Hypothyroidism      [LEVOTHYROXINE (SYNTHROID, LEVOTHROID) 75 MCG TABLET] TAKE 1 TABLET BY MOUTH DAILY AT 6 AM  Quantity: 90 tablet  Refills: 3      loratadine 10 MG tablet  Commonly known as: CLARITIN      Dose: 10 mg  [LORATADINE (CLARITIN) 10 MG TABLET] Take 10 mg by mouth daily.  Refills: 0     naloxone 4 MG/0.1ML nasal spray  Commonly known as: NARCAN  Used for: Opioid Dependence With Continuous Use      Dose: 4 mg  Spray 1 spray (4 mg) into one nostril alternating nostrils once as needed for opioid reversal every 2-3 minutes until assistance arrives  Quantity: 0.2 mL  Refills: 0     nystatin 528149 UNIT/GM external cream  Commonly known as: MYCOSTATIN      Dose: 1 Application  [NYSTATIN (MYCOSTATIN) CREAM] Apply 1 application topically 2 (two) times a day as needed.  Refills: 1     omeprazole 20 MG DR capsule  Commonly known as: priLOSEC      Dose: 20 mg  [OMEPRAZOLE (PRILOSEC) 20 MG CAPSULE] Take 20 mg by mouth Daily before breakfast.  Refills: 0     oxyCODONE 20 MG 12 hr tablet  Commonly known as: oxyCONTIN  Used for: Other chronic pain      Take 1 tablet (20 mg) by mouth every 24 hours AND 2 tablets (40 mg) every 24 hours. Take 40mg Q AM and 20mg Q HSTake by mouth every 12 hours Take 40mg Q AM and 20mg Q HS  Quantity: 60 tablet  Refills: 0     predniSONE 2 MG EC tablet  Commonly known as: CHARLENE  Used for: Rheumatoid arthritis involving multiple sites, unspecified whether rheumatoid factor present (H)      Dose: 2 mg  Take 1 tablet (2 mg) by mouth At Bedtime  Refills: 0     triamcinolone 0.1 % external ointment  Commonly known as: KENALOG  Used for: Venous stasis dermatitis of both lower extremities      [TRIAMCINOLONE (KENALOG) 0.1 % OINTMENT] Apply to both legs twice daily  Quantity: 30 g  Refills: 0           Where to get your medicines      Some of these will need a paper prescription and others can be bought over the counter. Ask your nurse if you have questions.    Bring a paper prescription for each of these medications    carisoprodol 350 MG tablet         ASSESSMENT/PLAN  Encounter Diagnoses   Name Primary?     Other chronic pain Yes      Physical deconditioning      Osteomyelitis of ankle and foot (H)      Chronic pain continue Soma, Elavil, oxycodone as scheduled    Rheumatoid arthritis continue prednisone, Arava    Hypertension on isosorbide    Hypothyroidism continue levothyroxine last TSH on 3/7/2022 was 1.39    GERD on omeprazole    Osteomyelitis status post Amputation left second digit, Follow up with surgeon as scheduled, pain control     Physical deconditioning PT OT      Electronically signed by: Lisa Salinas CNP

## 2022-03-21 ENCOUNTER — TRANSITIONAL CARE UNIT VISIT (OUTPATIENT)
Dept: GERIATRICS | Facility: CLINIC | Age: 82
End: 2022-03-21
Payer: MEDICARE

## 2022-03-21 ENCOUNTER — TELEPHONE (OUTPATIENT)
Dept: VASCULAR SURGERY | Facility: CLINIC | Age: 82
End: 2022-03-21

## 2022-03-21 VITALS
BODY MASS INDEX: 25.71 KG/M2 | HEIGHT: 61 IN | DIASTOLIC BLOOD PRESSURE: 79 MMHG | TEMPERATURE: 97.7 F | SYSTOLIC BLOOD PRESSURE: 154 MMHG | HEART RATE: 86 BPM | OXYGEN SATURATION: 94 % | RESPIRATION RATE: 19 BRPM | WEIGHT: 136.2 LBS

## 2022-03-21 DIAGNOSIS — G89.29 OTHER CHRONIC PAIN: Primary | ICD-10-CM

## 2022-03-21 DIAGNOSIS — M86.9 OSTEOMYELITIS OF ANKLE AND FOOT (H): ICD-10-CM

## 2022-03-21 PROCEDURE — 99309 SBSQ NF CARE MODERATE MDM 30: CPT | Performed by: NURSE PRACTITIONER

## 2022-03-21 RX ORDER — ONDANSETRON 4 MG/1
4 TABLET, FILM COATED ORAL EVERY 6 HOURS PRN
COMMUNITY
End: 2022-10-03

## 2022-03-21 NOTE — TELEPHONE ENCOUNTER
Called back. Relayed that the AVS which includes the patient instructions and orders was sent with the patient and normally is given to the care center. The TCU may have not gotten it. Writer called [facility] and left a vm that the patient will need CAM boot and physical therapy to extend TCU stay. Also faxed in the order to [facility] (fax [phone])

## 2022-03-21 NOTE — PROGRESS NOTES
[facility]    Chief Complaint   Patient presents with     Discharge Summary [facility] Medical Record Number:  [id number]  Place of Service where encounter took place:  [facility] ([facility]) [[id number]]  Code Status:  DNR    HISTORY:      HPI:  Jacqueline Prado  is 81 year old (1940) undergoing physical and occupational therapy. Jacqueline Prado  is 81 year old (1940) undergoing physical and occupational therapy. She is with history of RA, hypertension, opioid dependence, chronic pain on oxycontin , hypothyroidism, GERD, and osteomyelitis of the second digit of the left foot admitted on 3/7/2022 for elective amputation.   Per hospital records: Amputation of 2nd digit left foot was performed on 3/8/2022.      PTA Arava was held post op briefly due to concern for poor wound healing and restarted at discharge after being cleared by the podiatrist. PTA prednisone 2 mg daily was continued during hospital stay. EKG showed new left bundle branch block compared with ECG of 2/3/2001. Patient states she has had LBBB for several years and denies chest pain, sob, palpitation or dizziness        Today she was seen to review vital signs, labs, follow-up left second toe amputation and a face-to-face for discharge.  She was to be discharged on 3/23 however this has been delayed per her surgeon.  Her family decided they wanted to take her home on this date.  She will discharge to home with current medications and treatments.  She will have PT OT home health aide RN and speech therapy  She denied chest pain shortness of breath cough congestion constipation or diarrhea. Her pain is controlled.  She does have intermittent elevated blood pressures however she reports this usually when she takes a shower or she is willing herself around in the wheelchair.  Last week it was due to her arthritis in her right hand   Labs reviewed and last TSH 1.39 vitamin D 72 BMP and CBC within normal  limits.  Her left foot was covered in a dressing.    She will follow-up with her surgeon on 4/1/2022.       ALLERGIES:Acetaminophen, Nsaids (non-steroidal anti-inflammatory drug) [nsaids], Tetracyclines, Baclofen, Celecoxib, Erythromycin base [erythromycin], Pentolinium, Varenicline, Erythromycin, and Petroleum jelly [petrolatum]    PAST MEDICAL HISTORY:   Past Medical History:   Diagnosis Date     Acquired hypothyroidism      Acquired lymphedema of leg      Bundle branch block     Created by Conversion  Replacement Utility updated for latest IMO load     Bundle branch block      Cellulitis      Hypertension      Impetigo      Opioid dependence (H)      Osteoporosis      RA (rheumatoid arthritis) (H)      Rheumatoid arthritis (H)      Secondary hyperparathyroidism (H)      Venous hypertension of both lower extremities      Venous insufficiency of both lower extremities      Venous stasis dermatitis of lower extremity        PAST SURGICAL HISTORY:   has a past surgical history that includes IR Lumbar Epidural Steroid Injection (6/23/2003); IR Miscellaneous Procedure (12/1/2006); REMOVAL GALLBLADDER; EXCIS CERV DISK,ONE LEVEL; Pr Inject Vertebral Body, Lumbar; Breast Cyst Aspiration (Left, 2000); and Amputate toe(s) (Left, 3/8/2022).    FAMILY HISTORY: family history includes Breast Cancer (age of onset: 72.00) in her sister; Leukemia in her sister.    SOCIAL HISTORY:  reports that she quit smoking about 3 years ago. Her smoking use included cigarettes. She smoked 0.50 packs per day. She uses smokeless tobacco. She reports current alcohol use. She reports previous drug use. Drug: Marijuana.    ROS:  Constitutional: Negative for activity change, appetite change, fatigue and fever.   HENT: Negative for congestion.    Respiratory: Negative for cough, shortness of breath and wheezing.    Cardiovascular: Negative for chest pain and leg swelling.   Gastrointestinal: Negative for abdominal distention, abdominal  "pain, constipation, diarrhea and nausea.   Genitourinary: Negative for dysuria.   Musculoskeletal: Negative for arthralgia. Negative for back pain.   Skin: Vascular changes lower extremities.   Neurological: Negative for dizziness.   Psychiatric/Behavioral: Negative for agitation, behavioral problems and confusion.      Physical Exam:  Constitutional:       Appearance: Patient is well-developed.   HENT:      Head: Normocephalic.   Eyes:      Conjunctiva/sclera: Conjunctivae normal.   Neck:      Musculoskeletal: Normal range of motion.   Cardiovascular:      Rate and Rhythm: Normal rate and regular rhythm.      Heart sounds: Normal heart sounds. No murmur.   Pulmonary:      Effort: No respiratory distress.      Breath sounds: Normal breath sounds. No wheezing or rales.   Abdominal:      General: Bowel sounds are normal. There is no distension.      Palpations: Abdomen is soft.      Tenderness: There is no abdominal tenderness.   Musculoskeletal:       Normal range of motion. Status post left foot second digit amputation on 3/8/2022  Skin:General:        Skin is warm.   Neurological:         Mental Status: Patient is alert and oriented to person, place, and time.   Psychiatric:         Behavior: Behavior normal.   Vitals:  BP (!) 154/79   Pulse 86   Temp 97.7  F (36.5  C)   Resp 19   Ht 1.549 m (5' 1\")   Wt 61.8 kg (136 lb 3.2 oz)   SpO2 94%   BMI 25.73 kg/m    Body mass index is 25.73 kg/m .      MEDICATIONS:     Review of your medicines          Accurate as of March 21, 2022 11:59 PM. If you have any questions, ask your nurse or doctor.            CONTINUE these medicines which may have CHANGED, or have new prescriptions. If we are uncertain of the size of tablets/capsules you have at home, strength may be listed as something that might have changed.      Dose / Directions   carisoprodol 350 MG tablet  Commonly known as: SOMA  This may have changed: Another medication with the same name was removed. Continue " taking this medication, and follow the directions you see here.  Used for: Back pain with left-sided sciatica  Changed by: Lisa Salinas CNP      Dose: 350 mg  Take 1 tablet (350 mg) by mouth 3 times daily as needed for muscle spasms  Quantity: 60 tablet  Refills: 1        CONTINUE these medicines which have NOT CHANGED      Dose / Directions   albuterol 108 (90 Base) MCG/ACT inhaler  Commonly known as: PROAIR HFA/PROVENTIL HFA/VENTOLIN HFA  Used for: Wheezing      Dose: 2 puff  [ALBUTEROL (PROAIR HFA;PROVENTIL HFA;VENTOLIN HFA) 90 MCG/ACTUATION INHALER] Inhale 2 puffs every 4 (four) hours as needed for wheezing. And cough  Quantity: 1 Inhaler  Refills: 3     amitriptyline 25 MG tablet  Commonly known as: ELAVIL  Used for: Back pain with left-sided sciatica      Dose: 25 mg  Take 1 tablet (25 mg) by mouth At Bedtime  Quantity: 30 tablet  Refills: 0     aspirin 81 MG chewable tablet  Commonly known as: ASA      Dose: 81 mg  [ASPIRIN 81 MG CHEWABLE TABLET] Chew 81 mg daily.  Refills: 0     CALCIUM CARBONATE-VITAMIN D PO      Dose: 3 tablet  [CALCIUM CARBONATE/VITAMIN D3 (CALCIUM+D ORAL)] Take 3 tablets by mouth daily.  Refills: 0     cholecalciferol 25 MCG (1000 UT) Tabs      Dose: 1,000 Units  [CHOLECALCIFEROL, VITAMIN D3, 1,000 UNIT (25 MCG) TABLET] Take 1,000 Units by mouth daily.  Refills: 0     folic acid 1 MG tablet  Commonly known as: FOLVITE      Dose: 1 mg  Take 1 mg by mouth daily  Refills: 2     hydrocortisone 1 % external cream  Commonly known as: CORTAID  Used for: Rheumatoid arthritis, involving unspecified site, unspecified rheumatoid factor presence      [HYDROCORTISONE (CORTISONE, HYDROCORTISONE,) 1 % CREAM] Apply to hand three times a day  Quantity: 30 g  Refills: 2     isosorbide mononitrate 30 MG 24 hr tablet  Commonly known as: IMDUR      Dose: 30 mg  Take 30 mg by mouth 2 times daily  Refills: 0     leflunomide 20 MG tablet  Commonly known as: ARAVA      Dose: 20 mg  [LEFLUNOMIDE (ARAVA) 20 MG  TABLET] Take 20 mg by mouth daily.  Refills: 0     levothyroxine 75 MCG tablet  Commonly known as: SYNTHROID/LEVOTHROID  Used for: Hypothyroidism      [LEVOTHYROXINE (SYNTHROID, LEVOTHROID) 75 MCG TABLET] TAKE 1 TABLET BY MOUTH DAILY AT 6 AM  Quantity: 90 tablet  Refills: 3     loratadine 10 MG tablet  Commonly known as: CLARITIN      Dose: 10 mg  [LORATADINE (CLARITIN) 10 MG TABLET] Take 10 mg by mouth daily.  Refills: 0     naloxone 4 MG/0.1ML nasal spray  Commonly known as: NARCAN  Used for: Opioid Dependence With Continuous Use      Dose: 4 mg  Spray 1 spray (4 mg) into one nostril alternating nostrils once as needed for opioid reversal every 2-3 minutes until assistance arrives  Quantity: 0.2 mL  Refills: 0     nystatin 643126 UNIT/GM external cream  Commonly known as: MYCOSTATIN      Dose: 1 Application  [NYSTATIN (MYCOSTATIN) CREAM] Apply 1 application topically 2 (two) times a day as needed.  Refills: 1     omeprazole 20 MG DR capsule  Commonly known as: priLOSEC      Dose: 20 mg  [OMEPRAZOLE (PRILOSEC) 20 MG CAPSULE] Take 20 mg by mouth Daily before breakfast.  Refills: 0     ondansetron 4 MG tablet  Commonly known as: ZOFRAN      Dose: 4 mg  Take 4 mg by mouth every 6 hours as needed for nausea  Refills: 0     oxyCODONE 20 MG 12 hr tablet  Commonly known as: oxyCONTIN  Used for: Other chronic pain      Take 1 tablet (20 mg) by mouth every 24 hours AND 2 tablets (40 mg) every 24 hours. Take 40mg Q AM and 20mg Q HSTake by mouth every 12 hours Take 40mg Q AM and 20mg Q HS  Quantity: 60 tablet  Refills: 0     predniSONE 2 MG EC tablet  Commonly known as: CHARLENE  Used for: Rheumatoid arthritis involving multiple sites, unspecified whether rheumatoid factor present (H)      Dose: 2 mg  Take 1 tablet (2 mg) by mouth At Bedtime  Refills: 0     triamcinolone 0.1 % external ointment  Commonly known as: KENALOG  Used for: Venous stasis dermatitis of both lower extremities      [TRIAMCINOLONE (KENALOG) 0.1 % OINTMENT]  Apply to both legs twice daily  Quantity: 30 g  Refills: 0             DISCHARGE DIAGNOSIS:  Encounter Diagnoses   Name Primary?     Other chronic pain Yes     Osteomyelitis of ankle and foot (H)      Chronic pain continue Soma, Elavil, oxycontin  as scheduled     Rheumatoid arthritis continue prednisone, Arava     Hypertension on isosorbide     Hypothyroidism continue levothyroxine last TSH on 3/7/2022 was 1.39     GERD on omeprazole     Osteomyelitis status post Amputation left second digit, Follow up with surgeon as scheduled, pain control      Physical deconditioning PT OT    DISCHARGE PLAN/FACE TO FACE:  I certify that services are/were furnished while this patient was under the care of a physician and that a physician or an allowed non-physician practitioner (NPP), had a face-to-face encounter that meets the physician face-to-face encounter requirements. The encounter was in whole, or in part, related to the primary reason for home health. The patient is confined to his/her home and needs intermittent skilled nursing, physical therapy, speech-language pathology, or the continued need for occupational therapy. A plan of care has been established by a physician and is periodically reviewed by a physician.  Date of Face-to-Face Encounter: 3/21/22    I certify that, based on my findings, the following services are medically necessary home health services: PT OT home health aide RN and speech therapy    My clinical findings support the need for the above skilled services because: PT OT for continued strength and endurance following recent left second toe amputation, home health aide to assist with activities of daily living, RN for vital signs medication management and monitoring of left second toe wound and amputation, speech therapy for cognition and swallowing     This patient is homebound because: She is deconditioned easily fatigued following recent amputation of left second toe, she also has activity  restrictions of nonweightbearing left lower extremity.    The patient is, or has been, under my care and I have initiated the establishment of the plan of care. This patient will be followed by a physician who will periodically review the plan of care.    Schedule follow up visit with primary care provider within 7 days to reestablish care.    Electronically signed by: Lisa Salinas CNP

## 2022-03-21 NOTE — TELEPHONE ENCOUNTER
Caller: Lia    Provider: MD Kendrick Pedraza    Detailed reason for call: Please call as soon as able today to discuss changing her orders. TCU will discharge her tomorrow without updated orders.    Best phone number to contact: [phone]    Best time to contact: any    Ok to leave a detailed message: Yes

## 2022-03-21 NOTE — TELEPHONE ENCOUNTER
Got a call from Collette, nurse at [facility], stating that patient wants to go home and they are setting her up for homecare with PT. They will get her a CAM boot. Writer told her that daughter had said they wanted patient to stay longer. There is confusion and miscommunication. Writer called Lia, daughter, and she will call Collette to discuss matters of patient's stay at TCU.

## 2022-03-22 ENCOUNTER — TELEPHONE (OUTPATIENT)
Dept: VASCULAR SURGERY | Facility: CLINIC | Age: 82
End: 2022-03-22
Payer: MEDICARE

## 2022-03-22 NOTE — TELEPHONE ENCOUNTER
CALIN, nurse manager.  Direct line is [phone].  Awaiting return call.  Can adjust weightbearing status to delay discharge if necessary.  Ok for weightbearing during therapy only for up to 30 minutes twice daily, no more than 50ft. Patient has follow up on 4/5, would like to reassess patient at that time regarding TCU needs.

## 2022-03-22 NOTE — TELEPHONE ENCOUNTER
Spoke with Mariah. Updated weightbearing orders given to her.  She states that because the patient has no other skilled nursing needs, per medicare guidelines patient does not qualify to stay in TCU and can do home PT.  Asked Mariah to check with therapy about this to see if additional modifications can be made for weightbearing to allow patient to stay in TCU longer.  She will call back only if there is anything else that will qualify her to stay.

## 2022-03-22 NOTE — TELEPHONE ENCOUNTER
Lia asking for a call back today with an update. Did PT get back to you. Did a new order get sent? She wanted to check with you before calling Mariah again.

## 2022-03-22 NOTE — TELEPHONE ENCOUNTER
Mariah from [facility] in [place] calling to clarify the weighbearing orders.  The would also know the recommended length of stay at the TCU.  They had planned to discharge the patient home this week, but the family is wanting them to stay longer.      Requesting verbal orders. [phone]

## 2022-03-23 ENCOUNTER — TELEPHONE (OUTPATIENT)
Dept: GERIATRICS | Facility: CLINIC | Age: 82
End: 2022-03-23
Payer: MEDICARE

## 2022-03-23 NOTE — TELEPHONE ENCOUNTER
Updated Lia.  Incorrect weightbearing orders were sent to TCU after patient's last visit with Dr. Pedraza.  The correct weightbearing orders were faxed to TCU.  Per TCU, an appeal process was started to attempt to extend patient's stay there.  Lia will call clinic if they have any additional questions or needs.

## 2022-03-23 NOTE — TELEPHONE ENCOUNTER
[facility] Geriatrics Triage Nurse Telephone Encounter    Provider: PATRIA Palmer  Facility: [facility] ([facility])  Facility Type:  TCU    Caller: Collette   Call Back Number: [phone]    Allergies:    Allergies   Allergen Reactions     Acetaminophen Rash     Nsaids (Non-Steroidal Anti-Inflammatory Drug) [Nsaids] Rash     Tetracyclines Rash     Baclofen Nausea     Celecoxib Unknown     Erythromycin Base [Erythromycin] Unknown     Pentolinium Itching     Varenicline Itching and Swelling     Erythromycin Rash     Petroleum Jelly [Petrolatum] Itching and Rash        Reason for call: The pt was to discharge on 3/23 and the pt and family filed an appeal to stay at the facility, now the pt's family is requesting to take the pt home today @ 5pm. The pt is needing ST, HHA, PT/OT and RN.     Verbal Order/Direction given by Provider: Ok to discharge home with current meds and tx's, ok for home services, ok to send remaining Narcs and to f/u in 7days with PCP.     Provider giving Order:  PATRIA Palmer    Verbal Order given to: Collette Karla Rentner, RN

## 2022-03-25 ENCOUNTER — TELEPHONE (OUTPATIENT)
Dept: INTERNAL MEDICINE | Facility: CLINIC | Age: 82
End: 2022-03-25

## 2022-03-25 NOTE — TELEPHONE ENCOUNTER
Reason for Call: Request for a verbal orderl:    Order or referral being requested: Verbal orders    Date needed: as soon as possible    Has the patient been seen by the PCP for this problem? Not Applicable    Additional comments: [facility], needs verbal orders for approval for a delay in start of care for nursing services per client request scheduled Monday 03/28/2022.    Phone number Patient can be reached at:  Other phone number: Deepti PH: [phone]    Best Time:  During work hours    Can we leave a detailed message on this number?  YES    Call taken on 3/25/2022 at 3:29 PM by Shaina Garay

## 2022-03-28 ENCOUNTER — TELEPHONE (OUTPATIENT)
Dept: INTERNAL MEDICINE | Facility: CLINIC | Age: 82
End: 2022-03-28
Payer: MEDICARE

## 2022-03-28 ENCOUNTER — MEDICAL CORRESPONDENCE (OUTPATIENT)
Dept: HEALTH INFORMATION MANAGEMENT | Facility: CLINIC | Age: 82
End: 2022-03-28
Payer: MEDICARE

## 2022-03-28 ENCOUNTER — TELEPHONE (OUTPATIENT)
Dept: VASCULAR SURGERY | Facility: CLINIC | Age: 82
End: 2022-03-28
Payer: MEDICARE

## 2022-03-28 NOTE — TELEPHONE ENCOUNTER
Reason for Call: Request for an order or referral:    Order or referral being requested:   Skilled Nursing  2 times weekly for 3 weeks  1 time weekly for 2 weeks  3PRN    Left 2nd toe amputation    Home health aid  1 time weekly for 4 weeks    Bathing    Please call needing to update medications    Date needed: as soon as possible    Has the patient been seen by the PCP for this problem? YES    Additional comments: n/a    Phone number Patient can be reached at:  Other phone number:  [phone]    Best Time:  anytime    Can we leave a detailed message on this number?  YES    Call taken on 3/28/2022 at 2:55 PM by Monie Frederick

## 2022-03-28 NOTE — TELEPHONE ENCOUNTER
Home care called- starting care with pt. No CAM boot- let them know family could come here and get one here - just let us know when coming and shoe size.     Also reviewed f/u appt with them.

## 2022-03-29 ENCOUNTER — TELEPHONE (OUTPATIENT)
Dept: INTERNAL MEDICINE | Facility: CLINIC | Age: 82
End: 2022-03-29

## 2022-03-29 NOTE — TELEPHONE ENCOUNTER
A woman named Hailee called stating that she is on her way right now to the clinic to  a CAM boot for patient; said she should arrive in about 20min. Shoe size 9-9 1/2.   For questions: [phone]

## 2022-03-29 NOTE — PROGRESS NOTES
[facility]      OUTPATIENT OCCUPATIONAL THERAPY  EVALUATION  PLAN OF TREATMENT FOR OUTPATIENT REHABILITATION  (COMPLETE FOR INITIAL CLAIMS ONLY)  Patient's Last Name, First Name, M.I.  YOB: 1940  Jacqueline Prado                          Provider's Name  [facility] Medical Record No.  [id number]                               Onset Date:  03/08/22   Start of Care Date:  (P) 03/09/22     Type:     ___PT   _X_OT   ___SLP Medical Diagnosis:  (P) osteomyelitis                         OT Diagnosis:  Decreased ADL independence    Visits from SOC:  1   _________________________________________________________________________________  Plan of Treatment/Functional Goals    Planned Interventions: ADL retraining, IADL retraining, balance training, transfer training   Goals: See Occupational Therapy Goals on Care Plan in [facility] electronic health record.    Therapy Frequency: Daily  Predicted Duration of Therapy Intervention: 03/16/22  _________________________________________________________________________________    I CERTIFY THE NEED FOR THESE SERVICES FURNISHED UNDER        THIS PLAN OF TREATMENT AND WHILE UNDER MY CARE .             Physician Signature               Date    X_____________________________________________________                      Certification date from: (P) 03/09/22, Certification date to: (P) 03/16/22    Referring Physician: Thaddeus Cueva MD             Initial Assessment        See Occupational Therapy evaluation dated (P) 03/09/22 in Epic electronic health record.

## 2022-03-29 NOTE — TELEPHONE ENCOUNTER
Reason for Call:  Home Health Care    Tong with [facility] called regarding (reason for call): verbal order    Orders are needed for this patient.     PT: 1 time a week for 1 week, 2 times for 4 weeks, strengthening bilater legs, Gait training, balance, transfers, steps        Pt Provider: Dr Reynolds    Phone Number Homecare Nurse can be reached at: [phone]    Can we leave a detailed message on this number? YES        Call taken on 3/29/2022 at 1:58 PM by Pam J. Behr

## 2022-03-30 ENCOUNTER — TELEPHONE (OUTPATIENT)
Dept: INTERNAL MEDICINE | Facility: CLINIC | Age: 82
End: 2022-03-30
Payer: MEDICARE

## 2022-03-30 NOTE — TELEPHONE ENCOUNTER
Covering dr please    Reason for Call:  Home Health Care    Due with [facility] called regarding (reason for call): verbal orders    Orders are needed for this patient.     OT: 1 time a week for 1 week , 2 times a week 3 weeks.      Pt Provider: Dr Reynolds    Phone Number Homecare Nurse can be reached at: [phone]    Can we leave a detailed message on this number? YES    Call taken on 3/30/2022 at 12:54 PM by Pam J. Behr

## 2022-03-30 NOTE — TELEPHONE ENCOUNTER
Left detailed message giving verbal okay for orders.  Advised to call back with further questions.

## 2022-03-31 ENCOUNTER — TELEPHONE (OUTPATIENT)
Dept: INTERNAL MEDICINE | Facility: CLINIC | Age: 82
End: 2022-03-31

## 2022-03-31 NOTE — TELEPHONE ENCOUNTER
Called Nevaeh at [facility] with Verbal order for OT as requested

## 2022-03-31 NOTE — TELEPHONE ENCOUNTER
Reason for Call: Request for an order or referral:    Order or referral being requested:   Home health aide  For bathing assistance  2 times weekly for 1 week      Date needed: as soon as possible    Has the patient been seen by the PCP for this problem? YES    Additional comments: n/a    Phone number Patient can be reached at:  Other phone number:  [phone]    Best Time:  anytime    Can we leave a detailed message on this number?  YES    Call taken on 3/31/2022 at 2:37 PM by Monie Frederick

## 2022-04-01 ENCOUNTER — NURSE TRIAGE (OUTPATIENT)
Dept: NURSING | Facility: CLINIC | Age: 82
End: 2022-04-01
Payer: MEDICARE

## 2022-04-01 NOTE — TELEPHONE ENCOUNTER
"Caller is Phys Therapist (Sukhjinder) with [facility].  Needs Orders for:  \"Discharge from Skilled PT Plan of Care.\"  This is per client's request.    Please leave detailed message on caller's cell phone with authorization to stop all PT per client's request -> [phone]    Thank you-    Jessica [facility] Nurse Advisor     Reason for Disposition    [1] Follow-up call from patient regarding patient's clinical status AND [2] information NON-URGENT     Caller is Physical Therapist    Protocols used: PCP CALL - NO TRIAGE-A-AH      "

## 2022-04-04 ENCOUNTER — TELEPHONE (OUTPATIENT)
Dept: INTERNAL MEDICINE | Facility: CLINIC | Age: 82
End: 2022-04-04
Payer: MEDICARE

## 2022-04-04 NOTE — TELEPHONE ENCOUNTER
Reason for Call: Request for an order or referral:    Order or referral being requested:   Discharge from OT without final visit per client request    Date needed: as soon as possible    Has the patient been seen by the PCP for this problem? NO    Additional comments: n/a    Phone number Patient can be reached at:  Other phone number:  [phone]    Best Time:  anytime    Can we leave a detailed message on this number?  YES    Call taken on 4/4/2022 at 11:47 AM by Monie Frederick

## 2022-04-04 NOTE — TELEPHONE ENCOUNTER
LMTCB for Nevaeh, OT to further clarify.  PCP just received request to reinstate PT orders and for home health aide orders.  Does pt want to discontinue OT services?  What services is pt currently receiving?

## 2022-04-04 NOTE — TELEPHONE ENCOUNTER
Sukhjinder is calling in and he is requesting a another evaluation of PT.    Pt did was discharged and now wanting back in for PT-needing verbal orders for both.      [phone].  [facility]

## 2022-04-04 NOTE — TELEPHONE ENCOUNTER
Nevaeh called back and Patient is discontinuing OT and restarting PT.    She also has nursing and home health aid and she is keeping those as well.    Please call Nevaeh back

## 2022-04-05 ENCOUNTER — TELEPHONE (OUTPATIENT)
Dept: INTERNAL MEDICINE | Facility: CLINIC | Age: 82
End: 2022-04-05
Payer: MEDICARE

## 2022-04-07 ENCOUNTER — OFFICE VISIT (OUTPATIENT)
Dept: VASCULAR SURGERY | Facility: CLINIC | Age: 82
End: 2022-04-07
Attending: PODIATRIST
Payer: MEDICARE

## 2022-04-07 VITALS — TEMPERATURE: 98.6 F | SYSTOLIC BLOOD PRESSURE: 124 MMHG | HEART RATE: 76 BPM | DIASTOLIC BLOOD PRESSURE: 62 MMHG

## 2022-04-07 DIAGNOSIS — M86.9 OSTEOMYELITIS OF ANKLE AND FOOT (H): Primary | ICD-10-CM

## 2022-04-07 PROCEDURE — 99212 OFFICE O/P EST SF 10 MIN: CPT | Performed by: PODIATRIST

## 2022-04-07 PROCEDURE — G0463 HOSPITAL OUTPT CLINIC VISIT: HCPCS

## 2022-04-07 ASSESSMENT — PAIN SCALES - GENERAL: PAINLEVEL: NO PAIN (0)

## 2022-04-07 NOTE — PATIENT INSTRUCTIONS
For the next 3 weeks Dr. Pedraza would like you to remain LIMITED WEIGHT BEARING MEANS THAT IT IS ONLY OKAY FOR YOU TO APPLY LIGHT PRESSURE ON THE AFFECTED FOOT WHEN TRANSFERRING FROM YOUR ASSISTIVE DEVICE TO A CHAIR OR BED. on your left foot with the use of your CAM BOOT, to allow the newly healed skin to become stronger.    After 3 weeks you can transition back to your regular shoes.     You may begin showering as normal in 4 weeks    You should avoid soaking your left foot for the next 6 weeks, this includes swimming and hot tubs.    Continue to monitor the area for breakdown & call us if your wound reopens.    Allow the steri strips to fall off on their own, do not pull these off.     We want to hear from you!   In the next few weeks, you should receive a call or email to complete a survey about your visit at [facility]. Please help us improve your appointment experience by letting us know how we did today. We strive to make your experience good and value any ways in which we could do better.      We value your input and suggestions.    Thank you for choosing the [facility]!

## 2022-04-07 NOTE — PROGRESS NOTES
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 Sutures removed today, steri-strips applied.     -I discussed with the patient that due to the small eschar, there is a chance for development of an ulcer at this location and to follow-up with me should this occur.     -I recommend she remain limited walking in the CAM boot with heel transfer x3 weeks. If no concerns at that time, she may resume use of regular shoes.     -She is discharged from my care at this time and will follow-up with me as concerns develop.     Kendrick Pedraza DPM  [facility]      HPI: Jacqueline Prado was seen again today s/p amputation 2nd digit left foot. She has returned home since her last visit with me and remained mostly non-weight bearing on her left foot.     Past Medical History:   Diagnosis Date     Acquired hypothyroidism      Acquired lymphedema of leg      Bundle branch block     Created by Conversion  Replacement Utility updated for latest IMO load     Bundle branch block      Cellulitis      Hypertension      Impetigo      Opioid dependence (H)      Osteoporosis      RA (rheumatoid arthritis) (H)      Rheumatoid arthritis (H)      Secondary hyperparathyroidism (H)      Venous hypertension of both lower extremities      Venous insufficiency of both lower extremities      Venous stasis dermatitis of lower extremity        Allergies   Allergen Reactions     Acetaminophen Rash     Nsaids (Non-Steroidal Anti-Inflammatory Drug) [Nsaids] Rash     Tetracyclines Rash     Baclofen Nausea     Celecoxib Unknown     Erythromycin Base [Erythromycin] Unknown     Pentolinium Itching     Varenicline Itching and Swelling     Erythromycin Rash     Petroleum Jelly [Petrolatum] Itching and Rash         Current Outpatient Medications:      albuterol (PROAIR HFA;PROVENTIL HFA;VENTOLIN HFA) 90 mcg/actuation inhaler,  [ALBUTEROL (PROAIR HFA;PROVENTIL HFA;VENTOLIN HFA) 90 MCG/ACTUATION INHALER] Inhale 2 puffs every 4 (four) hours as needed for wheezing. And cough, Disp: 1 Inhaler, Rfl: 3     amitriptyline (ELAVIL) 25 MG tablet, Take 1 tablet (25 mg) by mouth At Bedtime, Disp: 30 tablet, Rfl: 0     aspirin 81 mg chewable tablet, [ASPIRIN 81 MG CHEWABLE TABLET] Chew 81 mg daily., Disp: , Rfl:      CALCIUM CARBONATE/VITAMIN D3 (CALCIUM+D ORAL), [CALCIUM CARBONATE/VITAMIN D3 (CALCIUM+D ORAL)] Take 3 tablets by mouth daily., Disp: , Rfl:      carisoprodol (SOMA) 350 MG tablet, Take 1 tablet (350 mg) by mouth 3 times daily as needed for muscle spasms, Disp: 60 tablet, Rfl: 1     cholecalciferol, vitamin D3, 1,000 unit (25 mcg) tablet, [CHOLECALCIFEROL, VITAMIN D3, 1,000 UNIT (25 MCG) TABLET] Take 1,000 Units by mouth daily., Disp: , Rfl:      folic acid (FOLVITE) 1 MG tablet, Take 1 mg by mouth daily , Disp: , Rfl: 2     hydrocortisone (CORTISONE, HYDROCORTISONE,) 1 % cream, [HYDROCORTISONE (CORTISONE, HYDROCORTISONE,) 1 % CREAM] Apply to hand three times a day, Disp: 30 g, Rfl: 2     isosorbide mononitrate (IMDUR) 30 MG 24 hr tablet, Take 30 mg by mouth 2 times daily , Disp: , Rfl:      leflunomide (ARAVA) 20 MG tablet, [LEFLUNOMIDE (ARAVA) 20 MG TABLET] Take 20 mg by mouth daily. , Disp: , Rfl:      levothyroxine (SYNTHROID, LEVOTHROID) 75 MCG tablet, [LEVOTHYROXINE (SYNTHROID, LEVOTHROID) 75 MCG TABLET] TAKE 1 TABLET BY MOUTH DAILY AT 6 AM, Disp: 90 tablet, Rfl: 3     loratadine (CLARITIN) 10 mg tablet, [LORATADINE (CLARITIN) 10 MG TABLET] Take 10 mg by mouth daily., Disp: , Rfl:      naloxone (NARCAN) 4 MG/0.1ML nasal spray, Spray 1 spray (4 mg) into one nostril alternating nostrils once as needed for opioid reversal every 2-3 minutes until assistance arrives, Disp: 0.2 mL, Rfl: 0     nystatin (MYCOSTATIN) cream, [NYSTATIN (MYCOSTATIN) CREAM] Apply 1 application topically 2 (two) times a day as needed., Disp: , Rfl: 1     omeprazole  (PRILOSEC) 20 MG capsule, [OMEPRAZOLE (PRILOSEC) 20 MG CAPSULE] Take 20 mg by mouth Daily before breakfast., Disp: , Rfl:      ondansetron (ZOFRAN) 4 MG tablet, Take 4 mg by mouth every 6 hours as needed for nausea, Disp: , Rfl:      oxyCODONE (OXYCONTIN) 20 MG 12 hr tablet, Take 1 tablet (20 mg) by mouth every 24 hours AND 2 tablets (40 mg) every 24 hours. Take 40mg Q AM and 20mg Q HSTake by mouth every 12 hours Take 40mg Q AM and 20mg Q HS, Disp: 60 tablet, Rfl: 0     predniSONE (CHARLENE) 2 MG EC tablet, Take 1 tablet (2 mg) by mouth At Bedtime, Disp: , Rfl:      triamcinolone (KENALOG) 0.1 % ointment, [TRIAMCINOLONE (KENALOG) 0.1 % OINTMENT] Apply to both legs twice daily, Disp: 30 g, Rfl: 0    Current Facility-Administered Medications:      denosumab (PROLIA) injection 60 mg, 60 mg, Subcutaneous, Q6 Months, Kb Alexis MD, 60 mg at 10/25/21 1416    Review of Systems - Negative      OBJECTIVE:  Appearance: alert, well appearing, and in no distress.    /62   Pulse 76   Temp 98.6  F (37  C)     @LDAVASC(10,16,17)@    No images are attached to the encounter.     Surgical site on the left foot has intact sutures with skin edges well approximated, no gapping noted, small stable eschar. No erythema left foot. Neurovascular status unchanged left foot.

## 2022-04-11 DIAGNOSIS — Z53.9 DIAGNOSIS NOT YET DEFINED: Primary | ICD-10-CM

## 2022-04-12 ENCOUNTER — TELEPHONE (OUTPATIENT)
Dept: INTERNAL MEDICINE | Facility: CLINIC | Age: 82
End: 2022-04-12

## 2022-04-12 DIAGNOSIS — G89.29 OTHER CHRONIC PAIN: ICD-10-CM

## 2022-04-12 DIAGNOSIS — M54.32 BACK PAIN WITH LEFT-SIDED SCIATICA: ICD-10-CM

## 2022-04-13 ENCOUNTER — MYC MEDICAL ADVICE (OUTPATIENT)
Dept: INTERNAL MEDICINE | Facility: CLINIC | Age: 82
End: 2022-04-13
Payer: MEDICARE

## 2022-04-13 DIAGNOSIS — M54.32 BACK PAIN WITH LEFT-SIDED SCIATICA: ICD-10-CM

## 2022-04-13 RX ORDER — OXYCODONE HCL 20 MG/1
TABLET, FILM COATED, EXTENDED RELEASE ORAL
Qty: 60 TABLET | Refills: 0 | Status: SHIPPED | OUTPATIENT
Start: 2022-04-13 | End: 2022-04-14

## 2022-04-13 NOTE — TELEPHONE ENCOUNTER
"Called patient to clarify script for Oxycontin as requested by Dr Reynolds. Patient states she takes Oxycontin 40 mg in the morning and 20 mg at night before bed. States \"I have been on this dose for years.\"   "
Controlled Substance Refill Request for Oxycontin    Last refill: 3/10/2022 for 60 with 0    Last clinic visit: 3/7/2022     Clinic visit frequency required: Q 3 months  Next appt: Nothing scheduled    Controlled substance agreement on file: Yes:  Date 8/18/2021.    Documentation in problem list reviewed:  Yes    Processing:  Rx to be electronically transmitted to pharmacy by provider      Yonis Serrato RN  [facility]      
Pt is out of medication-  
Pts daughter is calling in -Lia Curiel and she has questions about the medication that was sent in and stating it is wrong.    Please calll Lia- [phone]  
Reason for Call:  Medication or medication refill:    Do you use a [facility]?  Name of the pharmacy and phone number for the current request:  [facility]    Name of the medication requested:   oxyCODONE (OXYCONTIN) 20 MG 12 hr tablet 60 tablet 0 3/10/2022  No   Sig - Route: Take 1 tablet (20 mg) by mouth every 24 hours AND 2 tablets (40 mg) every 24 hours. Take 40mg Q AM and 20mg Q HSTake by mouth every 12 hours Take 40mg Q AM and 20mg Q HS - Oral         Other request: Pt used to get from Dr Ramirez but states Dr Reynolds took over her pain meds.    Can we leave a detailed message on this number? NO    Phone number patient can be reached at: Home number on file [phone] (home)    Best Time: any    Call taken on 4/12/2022 at 11:37 AM by Pam J. Behr    
Spoke with Lia, Patient's daughter (CTC on file), who states the prescription for Oxycontin is incorrect and there has been some confusion. Lia states she thinks this confusion stems from getting prescriptions for Oxycontin from both Dr Reynolds and Dr Ramirez. Lia states patient takes one 40 mg tablet in the morning and 1-2 10 mg tablets at night. States patient usually gets 30 day supplies of both strengths. States patient already has her supply of 10 mg pills and just needs 40 mg strength.   
Declines

## 2022-04-13 NOTE — OP NOTE
Date: 3/8/22    Surgeon: WILMER Pedraza DPM    Preoperative diagnosis: Osteomyelitis 2nd digit left foot    Postoperative diagnosis: Same    Procedure: Amputation 2nd digit left foot    Anesthesia: Popliteal block with IV-sedation    Hemostasis: Pneumatic ankle tourniquet 250 mmHg    Pathology: 2nd digit, Aerobic/anaerobic culture     Injectables: None    Materials: 3-0 Vicryl, 3-0 Nylon    Complications: None    Blood loss: 1 ml       Findings: Patient presents for operative intervention for osteomyelitis of the 2nd digit left foot. I discussed today's procedure with the patient and her present family members to include amputation of the 2nd digit with primary closure. She will be admitted to [facility] post-op to assist with post-op cares with transfer to TCU. She consents to surgery. Patient questions invited and answered, including appropriate risk, benefits and complications. No guarantees given or implied. Patient has been NPO.    Description: Patient was brought to the operating room and placed on the table in supine position. IV-sedation and popliteal block was administered by the anesthesia department. The foot was then prepped and draped in usual aseptic manner. The extremity was elevated and exsanguinated. Well-padded ankle pneumatic tourniquet was inflated to 250mmHg and the following procedure was then performed: Attention was directed to the 2nd digit of the left foot where two semi-elliptical incisions were made at the base of the digit. The incisions were carried full thickness into the 2nd MPJ where the digit was disarticulated. The digit was removed from the surgical site in toto and sent to pathology. Deep aerobic and anaerobic cultures taken. Next, a 1000cc pulse lavage was used to irrigate the surgical site. The subcutaneous tissue was reapproximated with 3-0 vicryl in a simple fashion and the skin was closed with 3-0 nylon in a simple suture fashion.     Dressings consisted of adaptic, 4x4's,  kerlix/david roll and an ace wrap. The pneumatic tourniquet was released and a hyperemic response was noted to the remaining digits on the left foot.     The patient appeared to tolerate all the procedures and anesthesia well without apparent complications. Patient was transported from the operating room to the recovery room with vital signs stable and neurovascular status as it was pre-operatively to the left foot. Patient to be admitted to [facility] per anesthesia protocol. She will remain non-weight bearing on the left foot, surgical dressing to remain intact until I see her late next week. Consult placed to hospitalist for medical management and to care management for discharge planning to TCU. I would like to see her for follow-up late next week.      Calm

## 2022-04-14 RX ORDER — OXYCODONE HCL 40 MG/1
40 TABLET, FILM COATED, EXTENDED RELEASE ORAL EVERY 24 HOURS
Qty: 30 TABLET | Refills: 0 | Status: SHIPPED | OUTPATIENT
Start: 2022-04-14 | End: 2022-05-19

## 2022-04-19 ENCOUNTER — TELEPHONE (OUTPATIENT)
Dept: INTERNAL MEDICINE | Facility: CLINIC | Age: 82
End: 2022-04-19
Payer: MEDICARE

## 2022-04-19 RX ORDER — OXYCODONE HYDROCHLORIDE 10 MG/1
1-2 TABLET, FILM COATED, EXTENDED RELEASE ORAL
COMMUNITY
Start: 2022-04-13 | End: 2022-05-09

## 2022-04-19 NOTE — TELEPHONE ENCOUNTER
Daughter Cheir calling, requesting call back from WILBER Somers.    Lia has spoken again with [facility] and was advised that a Prior Authorization is needed for patient's insurance for the higher dose of pain medication 40mg.  Per Lia in addition to the prior auth an override authorization will be needed from the provider.    Lia can be reached at

## 2022-04-19 NOTE — TELEPHONE ENCOUNTER
PA Initiation    Medication: Oxycontin 40mg  Insurance Company: [employer] - Phone [phone] Fax [phone]  Pharmacy Filling the Rx: [id number] - [place], [place] - [address]  AT [address]  Filling Pharmacy Phone: [phone]  Filling Pharmacy Fax:    Start Date: 4/19/2022    Central Prior Authorization Team   Phone: [phone]

## 2022-04-19 NOTE — TELEPHONE ENCOUNTER
Please submit urgent PA on Oxycontin 40mg daily. She has been stable on this dose. Dr. Reynolds will now be the primary prescriber going forward.

## 2022-04-19 NOTE — TELEPHONE ENCOUNTER
We had submitted an urgent prior authorization for the OxyContin 40 mg dose, I do not yet see a decision on this however when I worked with the pharmacist at [facility] that she was able to get this through her insurance.  They will get this prescription ready for the patient.  I did communicate this to her daughter.  She has already picked up the prescription for OxyContin 10 mg from Dr. Ramirez on April 13, 2022.  I let the pharmacy know that Dr. Reynolds will be taking over prescribing, we will ensure that she has the next prescription of OxyContin 10 mg from her PCP.  No further issues at this time.

## 2022-04-19 NOTE — TELEPHONE ENCOUNTER
I have submitted message to PA team to submit urgent PA for oxycontin 40mg.     I will call the pharmacy to try to clarify access to her current opioid doses and call back her daughter.

## 2022-04-20 NOTE — TELEPHONE ENCOUNTER
PRIOR AUTHORIZATION DENIED    Medication: Oxycontin 40mg    Denial Date: 4/19/2022    Denial Rational:         Appeal Information:

## 2022-04-20 NOTE — TELEPHONE ENCOUNTER
I just confirmed with the pharmacy that the prescription is waiting there to be picked up and it did go through Medicare insurance.

## 2022-04-25 ENCOUNTER — TELEPHONE (OUTPATIENT)
Dept: INTERNAL MEDICINE | Facility: CLINIC | Age: 82
End: 2022-04-25

## 2022-04-25 NOTE — TELEPHONE ENCOUNTER
OKAY TO WAIT FOR PCP PER PT        Reason for Call:  Home Health Care    Tong with [facility] called regarding (reason for call): verbal orders    Orders are needed for this patient.     PT: discharge order-to be completed on 4/27.  Per pts request-it is an early discharge.    Pt Provider: Dr Reynolds    Phone Number Homecare Nurse can be reached at: [phone]    Can we leave a detailed message on this number? YES    Call taken on 4/25/2022 at 2:49 PM by Pam J. Behr

## 2022-04-27 NOTE — TELEPHONE ENCOUNTER
Attempted to call Sukhjinder with [facility] to give ok for early discharge from Dr Reynolds. No answer. Left secure voicemail as requested.    Yonis Serrato RN  [facility]

## 2022-04-28 NOTE — ADDENDUM NOTE
Addended by: LORENA RUIZ on: 4/28/2022 04:11 PM     Modules accepted: Orders     cont meds  check pending cultures  follow up CXR

## 2022-05-02 ENCOUNTER — TELEPHONE (OUTPATIENT)
Dept: INTERNAL MEDICINE | Facility: CLINIC | Age: 82
End: 2022-05-02

## 2022-05-02 ENCOUNTER — ALLIED HEALTH/NURSE VISIT (OUTPATIENT)
Dept: FAMILY MEDICINE | Facility: CLINIC | Age: 82
End: 2022-05-02
Payer: MEDICARE

## 2022-05-02 DIAGNOSIS — Z92.29 PERSONAL HISTORY OF OTHER DRUG THERAPY: ICD-10-CM

## 2022-05-02 DIAGNOSIS — H25.9 AGE-RELATED CATARACT OF BOTH EYES, UNSPECIFIED AGE-RELATED CATARACT TYPE: Primary | ICD-10-CM

## 2022-05-02 DIAGNOSIS — M81.0 OSTEOPOROSIS: Primary | ICD-10-CM

## 2022-05-02 DIAGNOSIS — M81.0 AGE-RELATED OSTEOPOROSIS WITHOUT CURRENT PATHOLOGICAL FRACTURE: ICD-10-CM

## 2022-05-02 PROCEDURE — 96372 THER/PROPH/DIAG INJ SC/IM: CPT | Performed by: INTERNAL MEDICINE

## 2022-05-02 PROCEDURE — 99207 PR NO CHARGE NURSE ONLY: CPT

## 2022-05-02 NOTE — PROGRESS NOTES
"Prolia Injection Phone Screen      Screening questions have been asked 2-3 days prior to administration visit for Prolia. If any questions are answered with \"Yes,\" this phone encounter were will routed to ordering provider for further evaluation.     1.  When was the last injection?  10/25/21    2.  Has insurance for this injection been verified?  Yes    3.  Did you experience any new onset achiness or rashes that lasted for over a month with your previous Prolia injection?   No    4.  Do you have a fever over 101?F or a new deep cough that is unusual for you today? No    5.  Have you started any new medications in the last 6 months that you were told could affect your immune system? These may have been prescribed by oncologist, transplant, rheumatology, or dermatology.   No    6.  In the last 6 months have you have gastric bypass or parathyroid surgery?   No    7.  Do you plan dental work requiring drilling into the bone such as implants/extractions or oral surgery in the next 2-3 months?   No    Patient informed if symptoms discussed above present prior to their administration appointment, they are to notify clinic immediately.     Patient was educated on safety of Prolia utilizing Patient Counseling Chart for Healthcare Providers, as outlined by the Prolia REMS progam.                 "

## 2022-05-02 NOTE — TELEPHONE ENCOUNTER
Pt coming in for Prolia shot today.  No orders/insurance auth in chart.  Pending for provider signature.

## 2022-05-05 DIAGNOSIS — E03.9 HYPOTHYROIDISM: ICD-10-CM

## 2022-05-08 RX ORDER — LEVOTHYROXINE SODIUM 75 UG/1
TABLET ORAL
Qty: 90 TABLET | Refills: 3 | Status: SHIPPED | OUTPATIENT
Start: 2022-05-08 | End: 2022-08-04

## 2022-05-08 NOTE — TELEPHONE ENCOUNTER
"Routing refill request to provider for review/approval because:  A break in medication    Last Written Prescription Date:  2/15/2021  Last Fill Quantity: 90,  # refills: 3   Last office visit provider:  3/7/2022     Requested Prescriptions   Pending Prescriptions Disp Refills     levothyroxine (SYNTHROID/LEVOTHROID) 75 MCG tablet [Pharmacy Med Name: LEVOTHYROXINE 0.075MG (75MCG) TABS] 90 tablet 3     Sig: TAKE 1 TABLET BY MOUTH DAILY AT 6 AM       Thyroid Protocol Passed - 5/8/2022  1:47 PM        Passed - Patient is 12 years or older        Passed - Recent (12 mo) or future (30 days) visit within the authorizing provider's specialty     Patient has had an office visit with the authorizing provider or a provider within the authorizing providers department within the previous 12 mos or has a future within next 30 days. See \"Patient Info\" tab in inbasket, or \"Choose Columns\" in Meds & Orders section of the refill encounter.              Passed - Medication is active on med list        Passed - Normal TSH on file in past 12 months     Recent Labs   Lab Test 03/07/22  1309   TSH 1.39              Passed - No active pregnancy on record     If patient is pregnant or has had a positive pregnancy test, please check TSH.          Passed - No positive pregnancy test in past 12 months     If patient is pregnant or has had a positive pregnancy test, please check TSH.               Raine Richardson RN 05/08/22 1:48 PM    "

## 2022-05-09 DIAGNOSIS — F11.20 OPIOID TYPE DEPENDENCE, CONTINUOUS (H): Primary | ICD-10-CM

## 2022-05-09 DIAGNOSIS — M06.9 RHEUMATOID ARTHRITIS, INVOLVING UNSPECIFIED SITE, UNSPECIFIED WHETHER RHEUMATOID FACTOR PRESENT (H): ICD-10-CM

## 2022-05-10 RX ORDER — OXYCODONE HYDROCHLORIDE 10 MG/1
10-20 TABLET, FILM COATED, EXTENDED RELEASE ORAL
Qty: 60 TABLET | Refills: 0 | Status: SHIPPED | OUTPATIENT
Start: 2022-05-10 | End: 2022-06-06

## 2022-05-10 NOTE — TELEPHONE ENCOUNTER
As discussed last month, ensuring oxycodone 10mg tab refill is under Dr. Reynolds's name. Dr. Reynolds, please review. She had been previously getting #84 tabs per month but prescription for 1-2 tabs at bedtime, updated prescription to #60 tabs. Please adjust uif needed.   
no

## 2022-05-12 DIAGNOSIS — Z53.9 DIAGNOSIS NOT YET DEFINED: Primary | ICD-10-CM

## 2022-05-17 DIAGNOSIS — M54.32 BACK PAIN WITH LEFT-SIDED SCIATICA: ICD-10-CM

## 2022-05-17 DIAGNOSIS — G89.29 OTHER CHRONIC PAIN: ICD-10-CM

## 2022-05-17 NOTE — TELEPHONE ENCOUNTER
Reason for Call:  Medication or medication refill:    Do you use a [facility]?  Name of the pharmacy and phone number for the current request:    [facility]    Name of the medication requested:   Oxycontin 40mg #30    Other request: n/a    Can we leave a detailed message on this number? YES    Phone number patient can be reached at: Cell number on file:    Telephone Information:   Mobile [phone]       Best Time: anytime    Call taken on 5/17/2022 at 10:43 AM by Monie Frederick

## 2022-05-19 RX ORDER — OXYCODONE HCL 40 MG/1
40 TABLET, FILM COATED, EXTENDED RELEASE ORAL EVERY 24 HOURS
Qty: 30 TABLET | Refills: 0 | Status: SHIPPED | OUTPATIENT
Start: 2022-05-19 | End: 2022-06-13

## 2022-05-19 NOTE — TELEPHONE ENCOUNTER
Controlled Substance Refill Request for Oxycontin 40 mg 12 hr    Last refill: 4/14/2022 for 30 with 0    Last clinic visit: 3/7/2022     Clinic visit frequency required: Q 3 months  Next appt: Nothing scheduled    Controlled substance agreement on file: Yes:  Date 8/18/2021.    Documentation in problem list reviewed:  Yes    Processing:  Rx to be electronically transmitted to pharmacy by provider      Yonis Serrato RN  [facility]

## 2022-05-19 NOTE — TELEPHONE ENCOUNTER
Calling as she is out of medication.    oxyCODONE (OXYCONTIN) 40 MG 12 hr tablet 30 tablet 0 4/14/2022  No   Sig - Route: Take 1 tablet (40 mg) by mouth every 24 hours - Oral   Sent to pharmacy as: oxyCODONE HCl ER 40 MG Oral Tablet ER 12 Hour Abuse-Deterrent (OxyCONTIN)   Class: E-Prescribe     Covering dr khan as Dr Reynolds will not be back until 5/31

## 2022-06-03 ENCOUNTER — MYC MEDICAL ADVICE (OUTPATIENT)
Dept: INTERNAL MEDICINE | Facility: CLINIC | Age: 82
End: 2022-06-03
Payer: MEDICARE

## 2022-06-03 DIAGNOSIS — M06.9 RHEUMATOID ARTHRITIS, INVOLVING UNSPECIFIED SITE, UNSPECIFIED WHETHER RHEUMATOID FACTOR PRESENT (H): ICD-10-CM

## 2022-06-03 DIAGNOSIS — F11.20 OPIOID TYPE DEPENDENCE, CONTINUOUS (H): ICD-10-CM

## 2022-06-03 DIAGNOSIS — M54.32 BACK PAIN WITH LEFT-SIDED SCIATICA: ICD-10-CM

## 2022-06-06 RX ORDER — OXYCODONE HYDROCHLORIDE 10 MG/1
10-20 TABLET, FILM COATED, EXTENDED RELEASE ORAL
Qty: 60 TABLET | Refills: 0 | Status: SHIPPED | OUTPATIENT
Start: 2022-06-06 | End: 2022-07-05

## 2022-06-06 NOTE — TELEPHONE ENCOUNTER
Medication: Oxycontin 10 mg      CSA in last year: YES [facility]/32[facility]    Random Utox in last year: [facility]  (if any of the above answer NO - schedule with PCP)     On opioids in addition to benzodiazepines? NO  (if the above answer YES - schedule with PCP every 6 months)           PROVIDER TO PULL THE FOLLOWING FROM  :    1. Last date filled?  2.   Only PCP Prescribing?  3.   Taken as prescribed from physician notes?

## 2022-06-13 ENCOUNTER — MYC REFILL (OUTPATIENT)
Dept: INTERNAL MEDICINE | Facility: CLINIC | Age: 82
End: 2022-06-13
Payer: MEDICARE

## 2022-06-13 DIAGNOSIS — G89.29 OTHER CHRONIC PAIN: ICD-10-CM

## 2022-06-13 DIAGNOSIS — M54.32 BACK PAIN WITH LEFT-SIDED SCIATICA: ICD-10-CM

## 2022-06-14 NOTE — TELEPHONE ENCOUNTER
Routing refill request to provider for review/approval because:  Controlled substance request    Last Written Prescription Date:  5/19/22  Last Fill Quantity: 30,  # refills: 0   Last office visit provider:  3/7/22     Requested Prescriptions   Pending Prescriptions Disp Refills     oxyCODONE (OXYCONTIN) 40 MG 12 hr tablet 30 tablet 0     Sig: Take 1 tablet (40 mg) by mouth every 24 hours       There is no refill protocol information for this order          Yohannes Rodríguez RN 06/14/22 10:47 AM

## 2022-06-15 RX ORDER — OXYCODONE HCL 40 MG/1
40 TABLET, FILM COATED, EXTENDED RELEASE ORAL EVERY 24 HOURS
Qty: 30 TABLET | Refills: 0 | Status: SHIPPED | OUTPATIENT
Start: 2022-06-15 | End: 2022-07-05

## 2022-07-12 ENCOUNTER — IMMUNIZATION (OUTPATIENT)
Dept: NURSING | Facility: CLINIC | Age: 82
End: 2022-07-12
Payer: MEDICARE

## 2022-07-12 ENCOUNTER — NURSE TRIAGE (OUTPATIENT)
Dept: NURSING | Facility: CLINIC | Age: 82
End: 2022-07-12

## 2022-07-12 DIAGNOSIS — R06.2 WHEEZING: ICD-10-CM

## 2022-07-12 DIAGNOSIS — Z79.899 DRUG THERAPY: Primary | ICD-10-CM

## 2022-07-12 PROCEDURE — 0064A COVID-19,PF,MODERNA (18+ YRS BOOSTER .25ML): CPT

## 2022-07-12 PROCEDURE — 91306 COVID-19,PF,MODERNA (18+ YRS BOOSTER .25ML): CPT

## 2022-07-12 NOTE — TELEPHONE ENCOUNTER
Pt's daughter is calling. Consent on file.    She is coming in for her COVID booster, and had Pfizer for the first 3. She is wanting to get Moderna for her 4th immunization. She has an appointment today, for this at 2:30pm.  She is also due for labs and is wondering if she can get these drawn after her immunization.  I advised her that it will be ok to get the Moderna.  I advised her that normally we have to do a lab only appointment but she can ask when she comes in.  Labs are through her rheumatologist at [facility].  I advised her that she may need to go there for labs, but she stated that she has had them drawn with us in the past.  I advised her that she could always ask when in for her immunization if the lab isn't busy.    Jacqueline is going to a family reunion in [place]. She is concerned as, there are a lot of people in the family that are not vaccinated. She will be wearing a mask the entire time.  She is wanting a script for Paxlovid to take with her to [place] just in case she gets COVID while there, as she is high risk.   She will be in [place] for 9 days, leaving on 07/20/2022.  I advised her that they are only giving Paxlovid out with a virtual visit with a provider and you have to be COVID positive, due to the small amount of medication that there is.    Good hand washing, not touching her face, wearing a mask, maintain distance from others that are ill.  It is good that she is getting her 4th booster.      Genevieve Alvarez RN  [facility] Nurse Advisor  7/12/2022 at 12:16 PM        COVID 19 Nurse Triage Plan/Patient Instructions    Please be aware that novel coronavirus (COVID-19) may be circulating in the community. If you develop symptoms such as fever, cough, or SOB or if you have concerns about the presence of another infection including coronavirus (COVID-19), please contact your health care provider or visit [email or web address].     Disposition/Instructions    Home care recommended. Follow  home care protocol based instructions.    Thank you for taking steps to prevent the spread of this virus.  o Limit your contact with others.  o Wear a simple mask to cover your cough.  o Wash your hands well and often.    Resources    [facility]: About COVID-19: [email or web address]    CDC: What to Do If You're Sick: www.cdc.gov/coronavirus/2019-ncov/about/steps-when-sick.html    CDC: Ending Home Isolation: www.cdc.gov/coronavirus/2019-ncov/hcp/disposition-in-home-patients.html     CDC: Caring for Someone: www.cdc.gov/coronavirus/2019-ncov/if-you-are-sick/care-for-someone.html     [facility]: Interim Guidance for Hospital Discharge to Home: [email or web address]/[email or web address]/hcp/hospdischarge.pdf    [facility] clinical trials (COVID-19 research studies): [email or web address]-clinical-trials     Below are the COVID-19 hotlines at the [place] [facility] ([facility]). Interpreters are available.   o For health questions: Call 740-050-2595 or 1-778.917.4409 (7 a.m. to 7 p.m.)  o For questions about schools and childcare: Call 060-130-1936 or 1-402.310.9243 (7 a.m. to 7 p.m.)    Additional Information    Negative: Nursing judgment    Negative: Nursing judgment    Negative: Nursing judgment    Negative: Nursing judgment    Information only question and nurse able to answer    Protocols used: INFORMATION ONLY CALL - NO TRIAGE-A-[place]

## 2022-07-12 NOTE — TELEPHONE ENCOUNTER
Needing refill on Albuterol.  Going to [facility] 07/20/2022 and will be at a higher elevation.      Genevieve Alvarez RN  [facility] Nurse Advisor  7/12/2022 at 12:22 PM

## 2022-07-13 RX ORDER — ALBUTEROL SULFATE 90 UG/1
2 AEROSOL, METERED RESPIRATORY (INHALATION) EVERY 4 HOURS PRN
Qty: 18 G | Refills: 4 | Status: SHIPPED | OUTPATIENT
Start: 2022-07-13 | End: 2023-11-02

## 2022-07-13 NOTE — TELEPHONE ENCOUNTER
"Last Written Prescription Date:  2/15/21  Last Fill Quantity: 1,  # refills: 3   Last office visit provider:  3/7/22     Requested Prescriptions   Pending Prescriptions Disp Refills     albuterol (PROAIR HFA/PROVENTIL HFA/VENTOLIN HFA) 108 (90 Base) MCG/ACT inhaler 18 g 1     Sig: Inhale 2 puffs into the lungs every 4 hours as needed       Asthma Maintenance Inhalers - Anticholinergics Passed - 7/13/2022 11:52 AM        Passed - Patient is age 12 years or older        Passed - Recent (12 mo) or future (30 days) visit within the authorizing provider's specialty     Patient has had an office visit with the authorizing provider or a provider within the authorizing providers department within the previous 12 mos or has a future within next 30 days. See \"Patient Info\" tab in inbasket, or \"Choose Columns\" in Meds & Orders section of the refill encounter.              Passed - Medication is active on med list       Short-Acting Beta Agonist Inhalers Protocol  Passed - 7/13/2022 11:52 AM        Passed - Patient is age 12 or older        Passed - Recent (12 mo) or future (30 days) visit within the authorizing provider's specialty     Patient has had an office visit with the authorizing provider or a provider within the authorizing providers department within the previous 12 mos or has a future within next 30 days. See \"Patient Info\" tab in inbasket, or \"Choose Columns\" in Meds & Orders section of the refill encounter.              Passed - Medication is active on med list             Yohannes Rodríguez RN 07/13/22 11:53 AM  "

## 2022-07-15 ENCOUNTER — NURSE TRIAGE (OUTPATIENT)
Dept: NURSING | Facility: CLINIC | Age: 82
End: 2022-07-15

## 2022-07-16 ENCOUNTER — NURSE TRIAGE (OUTPATIENT)
Dept: NURSING | Facility: CLINIC | Age: 82
End: 2022-07-16

## 2022-07-16 NOTE — TELEPHONE ENCOUNTER
"Pt's daughter Lia reports pharmacy told her there was an \"electronic problem with 40mg oxycontin presciption dated 7/6/22. Per Lia last pickup 6/16/22. Pt will be out tomorrow. Per Lia pharmacy contacted on call provider and pharmacy told her provider would try to do something about it in the morning.     Advised Lia to call back if more questions or concerns tomorrow.    Lia verbalizes understanding and agrees to plan.     Reason for Disposition    Caller requesting a CONTROLLED substance prescription refill (e.g., narcotics, ADHD medicines)    Protocols used: MEDICATION QUESTION CALL-A-      "

## 2022-07-16 NOTE — TELEPHONE ENCOUNTER
Daughter (Lia) is the caller.  Pt is on Oxycontin 40 mg and Rx was sent to Pharmacy however Pharmacy stating to daughter that there is a problem with the electronic transmission.  Triage RN call [facility] to understand issue.      Pharmacist has Rx however there is a communication error on the [facility]'s technical side and Pharmacist has put out a request to the IT department.      Pharmacist did speak to an on call MD yesterday for PCP, however this MD was not at a computer and could not commit they would be able to look into the situation.    Per Pharmacy the Oxycontin 10 mg pt takes is the same medication as the Oxycontin 40 mg, a different dose and both suspended release.  Being that it is the after hours daughter can wait to see if [facility]'s resolves technical issue or if pt wants to supplement with the 10 mg and work with clinic for renewal of the 10 mg Rx.    This information called to daughter.  Pt will follow up with clinic.  Michlele Redd RN  [facility] Nurse Advisor

## 2022-07-18 ENCOUNTER — TELEPHONE (OUTPATIENT)
Dept: INTERNAL MEDICINE | Facility: CLINIC | Age: 82
End: 2022-07-18

## 2022-07-18 DIAGNOSIS — M54.32 BACK PAIN WITH LEFT-SIDED SCIATICA: ICD-10-CM

## 2022-07-18 DIAGNOSIS — G89.29 OTHER CHRONIC PAIN: ICD-10-CM

## 2022-07-18 NOTE — TELEPHONE ENCOUNTER
Reason for Call:  Other call back    Detailed comments: Needing a new Rx for Oxycodone 40mg ER #30 (currently this is a unusable file)    And for:  Oxycodone 10mg ER #8         Phone Number Patient can be reached at: Other phone number:  [phone]    Best Time: anytime    Can we leave a detailed message on this number? YES    Call taken on 7/18/2022 at 8:16 AM by Monie Frederick

## 2022-07-18 NOTE — TELEPHONE ENCOUNTER
Spoke with patient's daughter to try to clarify. Lia is very upset that the prescription has not been filled. Between talking with Lia and reading messages from the weekend writer discovered that there was a technical issue with the prescription sent over to [facility] for oxyCODONE (OXYCONTIN) 40 MG 12 hr tablet. Writer called the pharmacy and they state the prescription that was sent over is corrupted and they need a new prescription sent in before they are able to fill it.

## 2022-07-18 NOTE — TELEPHONE ENCOUNTER
Patient daughter is calling back to check on status of this. Daughter is stating mother is out of medication and need it as soon as possible. Daughter is wondering if any one else can put the refills in as Dr. Reynolds is not in today. Please advised.

## 2022-07-19 RX ORDER — OXYCODONE HCL 40 MG/1
40 TABLET, FILM COATED, EXTENDED RELEASE ORAL EVERY 24 HOURS
Qty: 30 TABLET | Refills: 0 | Status: SHIPPED | OUTPATIENT
Start: 2022-07-19 | End: 2022-08-02

## 2022-07-19 NOTE — TELEPHONE ENCOUNTER
Per my discussion with Lia, they have been using the 10mg oxycontin to meet her doses of the oxycontin, as a result she will run out of her OxyContin 10 mg while she is out of town.  She will be leaving Wednesday, July 28 coming back Sunday July 24.  Since last Saturday, July 12, she has been taking 5 to 6 tablets of the OxyContin 10 mg per day.    She should be fine until Sunday with Oxycontin 10mg doses that she has available. When she returns, Lia and Jacqueline will discuss if she could take just 10mg tabs to equal each dose and this would likely resolve at minimum the insurance issues. Lia will call me back next week to let me know what they have decided.

## 2022-07-19 NOTE — TELEPHONE ENCOUNTER
They have received the oxycontin 40mg prescription this morning however when they check on insurance coverage it is requiring a prior authorization.  Patient is out of medication at this time.  We will submit urgent prior authorization for OxyContin 40 mg prescription.  Her current opioid pain regimen including OxyContin 40 mg once daily, and OxyContin at 10 to 20 mg at bedtime were previously prescribed by her chronic pain provider, Dr. Hemal Ramirez, previously seen in November 19, 2021.  Since that she was stable she was transitioned back to her primary care provider, Dr. Jordana Reynolds to prescribe and maintain this pain regimen.  She has tolerated with this without adverse effects.  She also follows with rheumatology for rheumatoid arthritis.  This current regimen allows her to be as independent as possible, per chart review.    Per [place]  OxyContin 10 mg was filled on July 12, 2022, per [facility] pharmacy this was pickd up on 7/12/22.

## 2022-07-19 NOTE — TELEPHONE ENCOUNTER
No pa needed- this medication is covered by the plan without needing a pa. Per call to insurance, the claim was rejected for needing a pharmacy DUR override code due to pt is also taking another strength of this medication. Called the pharmacy and they were able to receive a paid claim for this medication. Pharmacy will notify the patient once medication is ready.

## 2022-07-20 ENCOUNTER — TELEPHONE (OUTPATIENT)
Dept: INTERNAL MEDICINE | Facility: CLINIC | Age: 82
End: 2022-07-20

## 2022-07-20 NOTE — TELEPHONE ENCOUNTER
07/20 Dr. Reynolds this pt is scheduled for a AWV on Aug 16th it is in the middle of the provider meeting can I do a 2:20 that day or move to [facility] in September????

## 2022-08-01 DIAGNOSIS — M54.32 BACK PAIN WITH LEFT-SIDED SCIATICA: ICD-10-CM

## 2022-08-01 DIAGNOSIS — F11.20 OPIOID TYPE DEPENDENCE, CONTINUOUS (H): ICD-10-CM

## 2022-08-01 DIAGNOSIS — M06.9 RHEUMATOID ARTHRITIS, INVOLVING UNSPECIFIED SITE, UNSPECIFIED WHETHER RHEUMATOID FACTOR PRESENT (H): ICD-10-CM

## 2022-08-01 DIAGNOSIS — G89.29 OTHER CHRONIC PAIN: ICD-10-CM

## 2022-08-01 NOTE — TELEPHONE ENCOUNTER
General Call      Reason for Call:    Daughter Lia calling regarding patient meds (Oxycotin)        What are your questions or concerns:    Changing Rx for Oxycotin    Date of last appointment with provider:   n/a    Could we send this information to you in [employer] or would you prefer to receive a phone call?:   Patient would prefer a phone call   Okay to leave a detailed message?: Yes at Cell number on file:    Telephone Information:   Mobile [phone]

## 2022-08-02 RX ORDER — OXYCODONE HCL 40 MG/1
40 TABLET, FILM COATED, EXTENDED RELEASE ORAL EVERY 24 HOURS
Qty: 30 TABLET | Refills: 0 | Status: SHIPPED | OUTPATIENT
Start: 2022-08-02 | End: 2022-09-10

## 2022-08-02 RX ORDER — OXYCODONE HYDROCHLORIDE 10 MG/1
10-20 TABLET, FILM COATED, EXTENDED RELEASE ORAL AT BEDTIME
Qty: 60 TABLET | Refills: 0 | Status: SHIPPED | OUTPATIENT
Start: 2022-08-02 | End: 2022-09-01

## 2022-08-02 NOTE — TELEPHONE ENCOUNTER
I spoke with Lia, due to previous difficulties with getting her mom's OxyContin prescription, they would like to first try switching from [facility] pharmacy to [facility] in [place].  If this continues to be an issue with the 2 different doses of OxyContin they will then consider switching to all 10 mg OxyContin tablets.  I have pended the prescriptions that she is needing for PCP to sign.      I was not able to get [facility] to pull up her profile to verify next fill date.

## 2022-08-03 ENCOUNTER — MYC MEDICAL ADVICE (OUTPATIENT)
Dept: INTERNAL MEDICINE | Facility: CLINIC | Age: 82
End: 2022-08-03

## 2022-08-03 DIAGNOSIS — M54.32 BACK PAIN WITH LEFT-SIDED SCIATICA: ICD-10-CM

## 2022-08-03 DIAGNOSIS — E03.9 ACQUIRED HYPOTHYROIDISM: Primary | ICD-10-CM

## 2022-08-03 DIAGNOSIS — E03.9 HYPOTHYROIDISM: ICD-10-CM

## 2022-08-04 RX ORDER — LEVOTHYROXINE SODIUM 75 UG/1
TABLET ORAL
Qty: 90 TABLET | Refills: 3 | Status: SHIPPED | OUTPATIENT
Start: 2022-08-04 | End: 2023-09-21

## 2022-08-04 RX ORDER — CARISOPRODOL 350 MG/1
350 TABLET ORAL 3 TIMES DAILY PRN
Qty: 180 TABLET | Refills: 3 | Status: SHIPPED | OUTPATIENT
Start: 2022-08-04 | End: 2022-10-03

## 2022-08-07 ENCOUNTER — HEALTH MAINTENANCE LETTER (OUTPATIENT)
Age: 82
End: 2022-08-07

## 2022-08-10 ENCOUNTER — MYC MEDICAL ADVICE (OUTPATIENT)
Dept: INTERNAL MEDICINE | Facility: CLINIC | Age: 82
End: 2022-08-10

## 2022-08-10 DIAGNOSIS — I10 PRIMARY HYPERTENSION: Primary | ICD-10-CM

## 2022-08-11 RX ORDER — ISOSORBIDE MONONITRATE 30 MG/1
30 TABLET, EXTENDED RELEASE ORAL 2 TIMES DAILY
Qty: 180 TABLET | Refills: 1 | Status: SHIPPED | OUTPATIENT
Start: 2022-08-11 | End: 2022-08-12

## 2022-08-12 RX ORDER — ISOSORBIDE MONONITRATE 10 MG/1
10 TABLET ORAL 2 TIMES DAILY
Qty: 180 TABLET | Refills: 3 | Status: SHIPPED | OUTPATIENT
Start: 2022-08-12 | End: 2023-07-25

## 2022-09-01 ENCOUNTER — MYC REFILL (OUTPATIENT)
Dept: INTERNAL MEDICINE | Facility: CLINIC | Age: 82
End: 2022-09-01

## 2022-09-01 DIAGNOSIS — M06.9 RHEUMATOID ARTHRITIS, INVOLVING UNSPECIFIED SITE, UNSPECIFIED WHETHER RHEUMATOID FACTOR PRESENT (H): ICD-10-CM

## 2022-09-01 DIAGNOSIS — F11.20 OPIOID TYPE DEPENDENCE, CONTINUOUS (H): ICD-10-CM

## 2022-09-02 RX ORDER — OXYCODONE HYDROCHLORIDE 10 MG/1
10-20 TABLET, FILM COATED, EXTENDED RELEASE ORAL AT BEDTIME
Qty: 60 TABLET | Refills: 0 | Status: SHIPPED | OUTPATIENT
Start: 2022-09-02 | End: 2022-09-20

## 2022-09-02 RX ORDER — OXYCODONE HYDROCHLORIDE 10 MG/1
TABLET, FILM COATED, EXTENDED RELEASE ORAL
Qty: 60 TABLET | Refills: 0 | Status: SHIPPED | OUTPATIENT
Start: 2022-09-02 | End: 2022-09-29

## 2022-09-12 ENCOUNTER — TELEPHONE (OUTPATIENT)
Dept: INTERNAL MEDICINE | Facility: CLINIC | Age: 82
End: 2022-09-12

## 2022-09-12 NOTE — TELEPHONE ENCOUNTER
0912 This is the other pt that is scheduled on Sept 20th for a AWV needs to be rescheduled due to meeting which noone told us about could you please look over your schedule to add in? Thanks Lolly

## 2022-09-18 ASSESSMENT — ENCOUNTER SYMPTOMS
COUGH: 0
HEADACHES: 0
BREAST MASS: 0
HEMATOCHEZIA: 0
NAUSEA: 0
HEMATURIA: 0
DIZZINESS: 0
PARESTHESIAS: 0
DIARRHEA: 0
ABDOMINAL PAIN: 0
FREQUENCY: 0
WEAKNESS: 1
SHORTNESS OF BREATH: 1
CHILLS: 0
HEARTBURN: 1
EYE PAIN: 0
DYSURIA: 0
SORE THROAT: 0
ARTHRALGIAS: 1
FEVER: 0
MYALGIAS: 1
CONSTIPATION: 0
JOINT SWELLING: 1
NERVOUS/ANXIOUS: 0
PALPITATIONS: 0

## 2022-09-18 ASSESSMENT — ACTIVITIES OF DAILY LIVING (ADL)
CURRENT_FUNCTION: SHOPPING REQUIRES ASSISTANCE
CURRENT_FUNCTION: BATHING REQUIRES ASSISTANCE
CURRENT_FUNCTION: HOUSEWORK REQUIRES ASSISTANCE
CURRENT_FUNCTION: TRANSPORTATION REQUIRES ASSISTANCE
CURRENT_FUNCTION: LAUNDRY REQUIRES ASSISTANCE

## 2022-09-20 ENCOUNTER — OFFICE VISIT (OUTPATIENT)
Dept: INTERNAL MEDICINE | Facility: CLINIC | Age: 82
End: 2022-09-20

## 2022-09-20 VITALS
HEART RATE: 86 BPM | HEIGHT: 61 IN | BODY MASS INDEX: 25.49 KG/M2 | SYSTOLIC BLOOD PRESSURE: 126 MMHG | OXYGEN SATURATION: 95 % | DIASTOLIC BLOOD PRESSURE: 68 MMHG | WEIGHT: 135 LBS

## 2022-09-20 DIAGNOSIS — M06.9 RHEUMATOID ARTHRITIS INVOLVING MULTIPLE SITES, UNSPECIFIED WHETHER RHEUMATOID FACTOR PRESENT (H): ICD-10-CM

## 2022-09-20 DIAGNOSIS — F11.20 OPIOID TYPE DEPENDENCE, CONTINUOUS (H): ICD-10-CM

## 2022-09-20 DIAGNOSIS — E03.9 ACQUIRED HYPOTHYROIDISM: ICD-10-CM

## 2022-09-20 DIAGNOSIS — Z79.899 ENCOUNTER FOR LONG-TERM (CURRENT) USE OF MEDICATIONS: Primary | ICD-10-CM

## 2022-09-20 DIAGNOSIS — Z12.31 ENCOUNTER FOR SCREENING MAMMOGRAM FOR MALIGNANT NEOPLASM OF BREAST: ICD-10-CM

## 2022-09-20 DIAGNOSIS — Z00.00 ENCOUNTER FOR MEDICARE ANNUAL WELLNESS EXAM: ICD-10-CM

## 2022-09-20 DIAGNOSIS — Z00.00 ENCOUNTER FOR PREVENTIVE CARE: ICD-10-CM

## 2022-09-20 DIAGNOSIS — M81.0 AGE-RELATED OSTEOPOROSIS WITHOUT CURRENT PATHOLOGICAL FRACTURE: ICD-10-CM

## 2022-09-20 DIAGNOSIS — I87.2 VENOUS STASIS DERMATITIS OF BOTH LOWER EXTREMITIES: ICD-10-CM

## 2022-09-20 DIAGNOSIS — K21.9 GASTROESOPHAGEAL REFLUX DISEASE WITHOUT ESOPHAGITIS: ICD-10-CM

## 2022-09-20 DIAGNOSIS — R41.89 COGNITIVE CHANGE: ICD-10-CM

## 2022-09-20 LAB
CREAT UR-MCNC: 96 MG/DL
ERYTHROCYTE [DISTWIDTH] IN BLOOD BY AUTOMATED COUNT: 12.9 % (ref 10–15)
HBA1C MFR BLD: 5.2 % (ref 0–5.6)
HCT VFR BLD AUTO: 43.4 % (ref 35–47)
HGB BLD-MCNC: 13.4 G/DL (ref 11.7–15.7)
MCH RBC QN AUTO: 29.5 PG (ref 26.5–33)
MCHC RBC AUTO-ENTMCNC: 30.9 G/DL (ref 31.5–36.5)
MCV RBC AUTO: 96 FL (ref 78–100)
PLATELET # BLD AUTO: 252 10E3/UL (ref 150–450)
RBC # BLD AUTO: 4.54 10E6/UL (ref 3.8–5.2)
WBC # BLD AUTO: 6.7 10E3/UL (ref 4–11)

## 2022-09-20 PROCEDURE — 80307 DRUG TEST PRSMV CHEM ANLYZR: CPT | Performed by: INTERNAL MEDICINE

## 2022-09-20 PROCEDURE — G0439 PPPS, SUBSEQ VISIT: HCPCS | Performed by: INTERNAL MEDICINE

## 2022-09-20 PROCEDURE — 80053 COMPREHEN METABOLIC PANEL: CPT | Performed by: INTERNAL MEDICINE

## 2022-09-20 PROCEDURE — 85027 COMPLETE CBC AUTOMATED: CPT | Performed by: INTERNAL MEDICINE

## 2022-09-20 PROCEDURE — 80061 LIPID PANEL: CPT | Performed by: INTERNAL MEDICINE

## 2022-09-20 PROCEDURE — 82607 VITAMIN B-12: CPT | Performed by: INTERNAL MEDICINE

## 2022-09-20 PROCEDURE — 99214 OFFICE O/P EST MOD 30 MIN: CPT | Mod: 25 | Performed by: INTERNAL MEDICINE

## 2022-09-20 PROCEDURE — 83036 HEMOGLOBIN GLYCOSYLATED A1C: CPT | Performed by: INTERNAL MEDICINE

## 2022-09-20 PROCEDURE — 82746 ASSAY OF FOLIC ACID SERUM: CPT | Performed by: INTERNAL MEDICINE

## 2022-09-20 PROCEDURE — 82306 VITAMIN D 25 HYDROXY: CPT | Performed by: INTERNAL MEDICINE

## 2022-09-20 PROCEDURE — 36415 COLL VENOUS BLD VENIPUNCTURE: CPT | Performed by: INTERNAL MEDICINE

## 2022-09-20 PROCEDURE — 84443 ASSAY THYROID STIM HORMONE: CPT | Performed by: INTERNAL MEDICINE

## 2022-09-20 ASSESSMENT — ENCOUNTER SYMPTOMS
DIARRHEA: 0
SORE THROAT: 0
EYE PAIN: 0
CONSTIPATION: 0
NAUSEA: 0
FEVER: 0
JOINT SWELLING: 1
PARESTHESIAS: 0
SHORTNESS OF BREATH: 1
NERVOUS/ANXIOUS: 0
BREAST MASS: 0
HEARTBURN: 1
DIZZINESS: 0
HEADACHES: 0
WEAKNESS: 1
ARTHRALGIAS: 1
FREQUENCY: 0
PALPITATIONS: 0
HEMATURIA: 0
COUGH: 0
CHILLS: 0
DYSURIA: 0
HEMATOCHEZIA: 0
ABDOMINAL PAIN: 0
MYALGIAS: 1

## 2022-09-20 ASSESSMENT — ACTIVITIES OF DAILY LIVING (ADL)
CURRENT_FUNCTION: TRANSPORTATION REQUIRES ASSISTANCE
CURRENT_FUNCTION: SHOPPING REQUIRES ASSISTANCE
CURRENT_FUNCTION: BATHING REQUIRES ASSISTANCE
CURRENT_FUNCTION: LAUNDRY REQUIRES ASSISTANCE
CURRENT_FUNCTION: HOUSEWORK REQUIRES ASSISTANCE

## 2022-09-20 NOTE — PROGRESS NOTES
"    The patient was provided with suggestions to help her develop a healthy physical lifestyle.  She is at risk for lack of exercise and has been provided with information to increase physical activity for the benefit of her well-being.  The patient was counseled and encouraged to consider modifying their diet and eating habits. She was provided with information on recommended healthy diet options.  The patient reports that she has difficulty with activities of daily living. I have asked that the patient make a follow up appointment in 12 weeks where this issue will be further evaluated and addressed.  Information on urinary incontinence and treatment options given to patient.  Answers for HPI/ROS submitted by the patient on 9/18/2022  In general, how would you rate your overall physical health?: fair  Frequency of exercise:: None  Do you usually eat at least 4 servings of fruit and vegetables a day, include whole grains & fiber, and avoid regularly eating high fat or \"junk\" foods? : No  Taking medications regularly:: Yes  Medication side effects:: None  Activities of Daily Living: transportation requires assistance, shopping requires assistance, housework requires assistance, bathing requires assistance, laundry requires assistance  Home safety: no safety concerns identified  Hearing Impairment:: no hearing concerns  In the past 6 months, have you been bothered by leaking of urine?: Yes  abdominal pain: No  Blood in stool: No  Blood in urine: No  chest pain: No  chills: No  congestion: Yes  constipation: No  cough: No  diarrhea: No  dizziness: No  ear pain: No  eye pain: No  nervous/anxious: No  fever: No  frequency: No  genital sores: No  headaches: No  hearing loss: No  heartburn: Yes  arthralgias: Yes  joint swelling: Yes  peripheral edema: Yes  mood changes: No  myalgias: Yes  nausea: No  dysuria: No  palpitations: No  Skin sensation changes: No  sore throat: No  urgency: No  rash: No  shortness of breath: " Yes  visual disturbance: Yes  weakness: Yes  pelvic pain: No  vaginal bleeding: No  vaginal discharge: No  tenderness: No  breast mass: No  breast discharge: No  In general, how would you rate your overall mental or emotional health?: good  Additional concerns today:: No

## 2022-09-20 NOTE — PROGRESS NOTES
SUBJECTIVE:   Jacqueline is a 82 year old who presents for Preventive Visit.      Patient has been advised of split billing requirements and indicates understanding: Yes  Are you in the first 12 months of your Medicare coverage?  No    She is a delightful 82-year-old female who is here today companied by her daughter for her annual wellness visit.  Of note, overall, she has absolutely no complaints.  Her daughter does express some concerns regarding short-term memory impairment.  She concedes that this has been going on for the past couple of years.  They both state that she manages to function fairly safely within her home.  She is living in an apartment in the same building as her daughter.  She states this is very helpful.  She has housekeeping 2 days/week and someone who assist her with shopping/etc.  She expresses a desire to drive a car.  She does have issues-as stated-with short-term memory.  She has lost a significant amount of weight over the past couple of years.  Her healthcare habits are explored.  She denies any abdominal pain or constitutional symptoms.  She states that she has regular daily bowel movements.  She states that she believes she has lost weight because her appetite is down.  It seems that perhaps, she is vaping.  She has switched from smoking cigarettes to vaping.  While this has helped her cough, she concedes that it may be contributing to depressed appetite.  Of note also, she drinks a fair amount of coffee.    She does not drink alcohol.    Healthy habits are noted below.  She denies any recent falls or fractures.  She is getting her Prolia in a timely fashion.  She would like to discontinue with her rheumatologist for her Arava-but we do not do that here.      She is up-to-date on ophthalmologic, dental care.  She is seen at [facility].      Of note, they do report that she has had a worsened reflux.  She had discontinued her omeprazole.  She does drink a fair amount of coffee as  "above and vapes nicotine.  These are both contributing factors.    Healthy Habits:     In general, how would you rate your overall health?  Fair    Frequency of exercise:  None    Do you usually eat at least 4 servings of fruit and vegetables a day, include whole grains    & fiber and avoid regularly eating high fat or \"junk\" foods?  No    Taking medications regularly:  Yes    Medication side effects:  None    Ability to successfully perform activities of daily living:  Transportation requires assistance, shopping requires assistance, housework requires assistance, bathing requires assistance and laundry requires assistance    Home Safety:  No safety concerns identified    Hearing Impairment:  No hearing concerns    In the past 6 months, have you been bothered by leaking of urine? Yes    In general, how would you rate your overall mental or emotional health?  Good      PHQ-2 Total Score: 0    Additional concerns today:  No    Do you feel safe in your environment? Yes    Have you ever done Advance Care Planning? (For example, a Health Directive, POLST, or a discussion with a medical provider or your loved ones about your wishes): Yes, patient states has an Advance Care Planning document and will bring a copy to the clinic.       Fall risk  Fallen 2 or more times in the past year?: No  Any fall with injury in the past year?: No    Cognitive Screening   1) Repeat 3 items (Leader, Season, Table)    2) Clock draw: ABNORMAL had a hard time  3) 3 item recall: Recalls 3 objects  Results: 3 items recalled: COGNITIVE IMPAIRMENT LESS LIKELY    Mini-CogTM Copyright KENDRICK Rutledge. Licensed by the author for use in [facility]; reprinted with permission (jey@.[email or web address]). All rights reserved.      Do you have sleep apnea, excessive snoring or daytime drowsiness?: yes    Reviewed and updated as needed this visit by clinical staff   Tobacco  Allergies                 Reviewed and updated as needed this visit by Provider       "             Social History     Tobacco Use     Smoking status: Former Smoker     Packs/day: 0.50     Types: Cigarettes     Quit date: 2018     Years since quittin.3     Smokeless tobacco: Current User     Tobacco comment: started smoking when she was 21 years old, vaping   Substance Use Topics     Alcohol use: Yes     Comment: Alcoholic Drinks/day: glass of wine during holiday         Alcohol Use 2022   Prescreen: >3 drinks/day or >7 drinks/week? Not Applicable               Current providers sharing in care for this patient include:   Patient Care Team:  Jordana Reynolds MD as PCP - General  Claribel Davidson MD as Assigned Neuroscience Provider  Hemal Ramirez MD as Assigned Behavioral Health Provider  Jordana Reynolds MD as Assigned PCP  Vicky Ha NP as Assigned Surgical Provider  Kendrick Pedraza DPM as Assigned Musculoskeletal Provider    The following health maintenance items are reviewed in Epic and correct as of today:  Health Maintenance   Topic Date Due     ANNUAL REVIEW OF HM ORDERS  Never done     HEPATITIS B IMMUNIZATION (1 of 3 - Risk 3-dose series) Never done     MEDICARE ANNUAL WELLNESS VISIT  2018     URINE DRUG SCREEN  2020     INFLUENZA VACCINE (1) 2022     COVID-19 Vaccine (5 - Booster for Pfizer series) 2022     ADVANCE CARE PLANNING  2023     FALL RISK ASSESSMENT  2023     DTAP/TDAP/TD IMMUNIZATION (3 - Td or Tdap) 03/10/2032     DEXA  2036     PHQ-2 (once per calendar year)  Completed     Pneumococcal Vaccine: 65+ Years  Completed     IPV IMMUNIZATION  Aged Out     MENINGITIS IMMUNIZATION  Aged Out     ZOSTER IMMUNIZATION  Discontinued     Lab work is in process    Pertinent mammograms are reviewed under the imaging tab.    Review of Systems   Constitutional: Negative for chills and fever.   HENT: Positive for congestion. Negative for ear pain, hearing loss and sore throat.    Eyes: Positive for visual disturbance.  "Negative for pain.   Respiratory: Positive for shortness of breath. Negative for cough.    Cardiovascular: Positive for peripheral edema. Negative for chest pain and palpitations.   Gastrointestinal: Positive for heartburn. Negative for abdominal pain, constipation, diarrhea, hematochezia and nausea.   Breasts:  Negative for tenderness, breast mass and discharge.   Genitourinary: Negative for dysuria, frequency, genital sores, hematuria, pelvic pain, urgency, vaginal bleeding and vaginal discharge.   Musculoskeletal: Positive for arthralgias, joint swelling and myalgias.   Skin: Negative for rash.   Neurological: Positive for weakness. Negative for dizziness, headaches and paresthesias.   Psychiatric/Behavioral: Negative for mood changes. The patient is not nervous/anxious.          OBJECTIVE:   /68   Pulse 86   Ht 1.549 m (5' 1\")   Wt 61.2 kg (135 lb)   SpO2 95%   BMI 25.51 kg/m   Estimated body mass index is 25.51 kg/m  as calculated from the following:    Height as of this encounter: 1.549 m (5' 1\").    Weight as of this encounter: 61.2 kg (135 lb).  Physical Exam  GENERAL APPEARANCE: healthy, alert and no distress  EYES: Eyes grossly normal to inspection, PERRL and conjunctivae and sclerae normal  HENT: ear canals and TM's normal, nose and mouth without ulcers or lesions, oropharynx clear and oral mucous membranes moist  NECK: no adenopathy, no asymmetry, masses, or scars and thyroid normal to palpation  RESP: lungs clear to auscultation - no rales, rhonchi or wheezes  BREAST: normal without masses, tenderness or nipple discharge and no palpable axillary masses or adenopathy  CV: regular rate and rhythm, normal S1 S2, no S3 or S4, no murmur, click or rub, no peripheral edema and peripheral pulses strong  ABDOMEN: soft, nontender, no hepatosplenomegaly, no masses and bowel sounds normal  MS: no musculoskeletal defects are noted and gait is age appropriate without ataxia  SKIN: no suspicious lesions or " rashes  NEURO: Normal strength and tone, sensory exam grossly normal, mentation intact and speech normal  PSYCH: mentation appears normal and affect normal/bright    Diagnostic Test Results:  Labs reviewed in Epic  none     ASSESSMENT / PLAN:   (F49.873) Encounter for long-term (current) use of medications  (primary encounter diagnosis)  Comment: She is going to schedule a mammogram.  She will have her COVID booster closer to the holidays.  She will do the drug screening required as well as routine labs.    An extensive discussion was had today with regard to safety especially in the setting of short-term memory loss.  She will not drive a car.  She will continue to seek assistance of her personal attendance.  Her family will assist with budgeting and finances.    Plan: [id number] - Urine Drug Confirmation Panel         (Comprehensive), MA Screen Bilateral w/Luis            (K21.9) Gastroesophageal reflux disease without esophagitis  Comment: Recurrent symptoms of reflux, will resume omeprazole  Plan: omeprazole (PRILOSEC) 20 MG DR capsule            (F11.20) Opioid Dependence With Continuous Use  Comment: Referred back to the clinic from the pain center.  She has been stable with pain medication use.  We have tried to reduce pain medications in the past which is resulted in significant increase in pain and some withdrawal.  Pain medications are used for her spinal stenosis and rheumatoid arthritis.  Plan:     (E03.9) Acquired hypothyroidism  Comment: We will update labs  Plan: TSH with free T4 reflex            (M81.0) Age-related osteoporosis without current pathological fracture  Comment: Encouraged safety, ambulation within the home.  Calcium and vitamin D.  She is on Prolia with next dose due in November  Plan: Vitamin D Deficiency            (M06.9) Rheumatoid arthritis involving multiple sites, unspecified whether rheumatoid factor present (H)  Comment: Noted  Plan:     (Z00.00) Encounter for preventive  "care  Comment:   Plan: CBC with platelets, Comprehensive metabolic         panel, Lipid panel reflex to direct LDL         Fasting, Hemoglobin A1c            (I87.2) Venous stasis dermatitis of both lower extremities  Comment: Much improved with weight loss  Plan: Continue with compression stockings.    (Z12.31) Encounter for screening mammogram for malignant neoplasm of breast   Comment:   Plan: MA Screen Bilateral w/Luis            (R41.89) Cognitive change  Comment: Short-term memory loss.  We have had a careful discussion today with regard to our approach.  We are going to look for treatable causes of cognitive impairment.  We will proceed with a noncontrast MRI of the brain.  If these are all negative, we will really visit the idea of preventative Aricept or Namenda-given family history of Alzheimer's.  I have stressed the importance of safety measures.  It is medically recommended that she not drive a car.  Plan: Vitamin B12, Folate, MR Brain w/o Contrast            (Z00.00) Encounter for Medicare annual wellness exam  Comment:   Plan:       COUNSELING: Discussed the importance of good nutrition      Estimated body mass index is 25.51 kg/m  as calculated from the following:    Height as of this encounter: 1.549 m (5' 1\").    Weight as of this encounter: 61.2 kg (135 lb).        She reports that she quit smoking about 4 years ago. Her smoking use included cigarettes. She smoked 0.50 packs per day. She uses smokeless tobacco.      Appropriate preventive services were discussed with this patient, including applicable screening as appropriate for cardiovascular disease, diabetes, osteopenia/osteoporosis, and glaucoma.  As appropriate for age/gender, discussed screening for colorectal cancer, prostate cancer, breast cancer, and cervical cancer. Checklist reviewing preventive services available has been given to the patient.    Reviewed patients plan of care and provided an AVS. The Basic Care Plan (routine " screening as documented in Health Maintenance) for Jacqueline meets the Care Plan requirement. This Care Plan has been established and reviewed with the Patient.    Counseling Resources:  ATP IV Guidelines  Pooled Cohorts Equation Calculator  Breast Cancer Risk Calculator  Breast Cancer: Medication to Reduce Risk  FRAX Risk Assessment  ICSI Preventive Guidelines  Dietary Guidelines for Americans, 2010  [employer]'s MyPlate  ASA Prophylaxis  Lung CA Screening    Jordana Reynolds MD  [facility]    Identified Health Risks:

## 2022-09-20 NOTE — PATIENT INSTRUCTIONS
Patient Education   Personalized Prevention Plan  You are due for the preventive services outlined below.  Your care team is available to assist you in scheduling these services.  If you have already completed any of these items, please share that information with your care team to update in your medical record.  Health Maintenance Due   Topic Date Due     Hepatitis B Vaccine (1 of 3 - Risk 3-dose series) Never done     URINE DRUG SCREEN  05/03/2020     Flu Vaccine (1) 09/01/2022     COVID-19 Vaccine (5 - Booster for Pfizer series) 09/06/2022     Your Health Risk Assessment indicates you feel you are not in good health    A healthy lifestyle helps keep the body fit and the mind alert. It helps protect you from disease, helps you fight disease, and helps prevent chronic disease (disease that doesn't go away) from getting worse. This is important as you get older and begin to notice twinges in muscles and joints and a decline in the strength and stamina you once took for granted. A healthy lifestyle includes good healthcare, good nutrition, weight control, recreation, and regular exercise. Avoid harmful substances and do what you can to keep safe. Another part of a healthy lifestyle is stay mentally active and socially involved.    Good healthcare     Have a wellness visit every year.     If you have new symptoms, let us know right away. Don't wait until the next checkup.     Take medicines exactly as prescribed and keep your medicines in a safe place. Tell us if your medicine causes problems.   Healthy diet and weight control     Eat 3 or 4 small, nutritious, low-fat, high-fiber meals a day. Include a variety of fruits, vegetables, and whole-grain foods.     Make sure you get enough calcium in your diet. Calcium, vitamin D, and exercise help prevent osteoporosis (bone thinning).     If you live alone, try eating with others when you can. That way you get a good meal and have company while you eat it.     Try to  keep a healthy weight. If you eat more calories than your body uses for energy, it will be stored as fat and you will gain weight.     Recreation   Recreation is not limited to sports and team events. It includes any activity that provides relaxation, interest, enjoyment, and exercise. Recreation provides an outlet for physical, mental, and social energy. It can give a sense of worth and achievement. It can help you stay healthy.    Mental Exercise and Social Involvement  Mental and emotional health is as important as physical health. Keep in touch with friends and family. Stay as active as possible. Continue to learn and challenge yourself.   Things you can do to stay mentally active are:    Learn something new, like a foreign language or musical instrument.     Play SCRABBLE or do crossword puzzles. If you cannot find people to play these games with you at home, you can play them with others on your computer through the Internet.     Join a games club--anything from card games to chess or checkers or lawn bowling.     Start a new hobby.     Go back to school.     Volunteer.     Read.   Keep up with world events.    Exercise for a Healthier Heart  You may wonder how you can improve the health of your heart. If you re thinking about exercise, you re on the right track. You don t need to become an athlete. But you do need a certain amount of brisk exercise to help strengthen your heart. If you have been diagnosed with a heart condition, your healthcare provider may advise exercise to help stabilize your condition. To help make exercise a habit, choose safe, fun activities.      Exercise with a friend. When activity is fun, you're more likely to stick with it.   Before you start  Check with your healthcare provider before starting an exercise program. This is especially important if you have not been active for a while. It's also important if you have a long-term (chronic) health problem such as heart disease,  diabetes, or obesity. Or if you are at high risk for having these problems.   Why exercise?  Exercising regularly offers many healthy rewards. It can help you do all of the following:     Improve your blood cholesterol level to help prevent further heart trouble    Lower your blood pressure to help prevent a stroke or heart attack    Control diabetes, or reduce your risk of getting this disease    Improve your heart and lung function    Reach and stay at a healthy weight    Make your muscles stronger so you can stay active    Prevent falls and fractures by slowing the loss of bone mass (osteoporosis)    Manage stress better    Reduce your blood pressure    Improve your sense of self and your body image  Exercise tips      Ease into your routine. Set small goals. Then build on them. If you are not sure what your activity level should be, talk with your healthcare provider first before starting an exercise routine.    Exercise on most days. Aim for a total of 150 minutes (2 hours and 30 minutes) or more of moderate-intensity aerobic activity each week. Or 75 minutes (1 hour and 15 minutes) or more of vigorous-intensity aerobic activity each week. Or try for a combination of both. Moderate activity means that you breathe heavier and your heart rate increases but you can still talk. Think about doing 40 minutes of moderate exercise, 3 to 4 times a week. For best results, activity should last for about 40 minutes to lower blood pressure and cholesterol. It's OK to work up to the 40-minute period over time. Examples of moderate-intensity activity are walking 1 mile in 15 minutes. Or doing 30 to 45 minutes of yard work.    Step up your daily activity level.  Along with your exercise program, try being more active the whole day. Walk instead of drive. Or park further away so that you take more steps each day. Do more household tasks or yard work. You may not be able to meet the advised mount of physical activity. But doing  some moderate- or vigorous-intensity aerobic activity can help reduce your risk for heart disease. Your healthcare provider can help you figure out what is best for you.    Choose 1 or more activities you enjoy.  Walking is one of the easiest things you can do. You can also try swimming, riding a bike, dancing, or taking an exercise class.    When to call your healthcare provider  Call your healthcare provider if you have any of these:     Chest pain or feel dizzy or lightheaded    Burning, tightness, pressure, or heaviness in your chest, neck, shoulders, back, or arms    Abnormal shortness of breath    More joint or muscle pain    A very fast or irregular heartbeat (palpitations)  [employer] last reviewed this educational content on 7/1/2019 2000-2021 [employer]. All rights reserved. This information is not intended as a substitute for professional medical care. Always follow your healthcare professional's instructions.          Understanding USDA MyPlate  The USDA has guidelines to help you make healthy food choices. These are called MyPlate. MyPlate shows the food groups that make up healthy meals using the image of a place setting. Before you eat, think about the healthiest choices for what to put on your plate or in your cup or bowl. To learn more about building a healthy plate, visit www.choosemyplate.gov.    The food groups    Fruits. Any fruit or 100% fruit juice counts as part of the Fruit Group. Fruits may be fresh, canned, frozen, or dried, and may be whole, cut-up, or pureed. Make 1/2 of your plate fruits and vegetables.    Vegetables. Any vegetable or 100% vegetable juice counts as a member of the Vegetable Group. Vegetables may be fresh, frozen, canned, or dried. They can be served raw or cooked and may be whole, cut-up, or mashed. Make 1/2 of your plate fruits and vegetables.    Grains. All foods made from grains are part of the Grains Group. These include wheat, rice, oats, cornmeal,  and barley. Grains are often used to make foods such as bread, pasta, oatmeal, cereal, tortillas, and grits. Grains should be no more than 1/4 of your plate. At least half of your grains should be whole grains.    Protein. This group includes meat, poultry, seafood, beans and peas, eggs, processed soy products (such as tofu), nuts (including nut butters), and seeds. Make protein choices no more than 1/4 of your plate. Meat and poultry choices should be lean or low fat.    Dairy. The Dairy Group includes all fluid milk products and foods made from milk that contain calcium, such as yogurt and cheese. (Foods that have little calcium, such as cream, butter, and cream cheese, are not part of this group.) Most dairy choices should be low-fat or fat-free.    Oils. Oils aren't a food group, but they do contain essential nutrients. However it's important to watch your intake of oils. These are fats that are liquid at room temperature. They include canola, corn, olive, soybean, vegetable, and sunflower oil. Foods that are mainly oil include mayonnaise, certain salad dressings, and soft margarines. You likely already get your daily oil allowance from the foods you eat.  Things to limit  Eating healthy also means limiting these things in your diet:       Salt (sodium). Many processed foods have a lot of sodium. To keep sodium intake down, eat fresh vegetables, meats, poultry, and seafood when possible. Purchase low-sodium, reduced-sodium, or no-salt-added food products at the store. And don't add salt to your meals at home. Instead, season them with herbs and spices such as dill, oregano, cumin, and paprika. Or try adding flavor with lemon or lime zest and juice.    Saturated fat. Saturated fats are most often found in animal products such as beef, pork, and chicken. They are often solid at room temperature, such as butter. To reduce your saturated fat intake, choose leaner cuts of meat and poultry. And try healthier cooking  methods such as grilling, broiling, roasting, or baking. For a simple lower-fat swap, use plain nonfat yogurt instead of mayonnaise when making potato salad or macaroni salad.    Added sugars. These are sugars added to foods. They are in foods such as ice cream, candy, soda, fruit drinks, sports drinks, energy drinks, cookies, pastries, jams, and syrups. Cut down on added sugars by sharing sweet treats with a family member or friend. You can also choose fruit for dessert, and drink water or other unsweetened beverages.     [employer] last reviewed this educational content on 6/1/2020 2000-2021 [employer]. All rights reserved. This information is not intended as a substitute for professional medical care. Always follow your healthcare professional's instructions.        Activities of Daily Living    Your Health Risk Assessment indicates you have difficulties with activities of daily living such as housework, bathing, preparing meals, taking medication, etc. Please make a follow up appointment for us to address this issue in more detail.    Urinary Incontinence, Female (Adult)   Urinary incontinence means loss of bladder control. This problem affects many women, especially as they get older. If you have incontinence, you may be embarrassed to ask for help. But know that this problem can be treated.   Types of Incontinence  There are different types of incontinence. Two of the main types are described here. You can have more than one type.     Stress incontinence. With this type, urine leaks when pressure (stress) is put on the bladder. This may happen when you cough, sneeze, or laugh. Stress incontinence most often occurs because the pelvic floor muscles that support the bladder and urethra are weak. This can happen after pregnancy and vaginal childbirth or a hysterectomy. It can also be due to excess body weight or hormone changes.    Urge incontinence (also called overactive bladder). With this type,  a sudden urge to urinate is felt often. This may happen even though there may not be much urine in the bladder. The need to urinate often during the night is common. Urge incontinence most often occurs because of bladder spasms. This may be due to bladder irritation or infection. Damage to bladder nerves or pelvic muscles, constipation, and certain medicines can also lead to urge incontinence.  Treatment depends on the cause. Further evaluation is needed to find the type you have. This will likely include an exam and certain tests. Based on the results, you and your healthcare provider can then plan treatment. Until a diagnosis is made, the home care tips below can help ease symptoms.   Home care    Do pelvic floor muscle exercises, if they are prescribed. The pelvic floor muscles help support the bladder and urethra. Many women find that their symptoms improve when doing special exercises that strengthen these muscles. To do the exercises, contract the muscles you would use to stop your stream of urine. But do this when you re not urinating. Hold for 10 seconds, then relax. Repeat 10 to 20 times in a row, at least 3 times a day. Your healthcare provider may give you other instructions for how to do the exercises and how often.    Keep a bladder diary. This helps track how often and how much you urinate over a set period of time. Bring this diary with you to your next visit with the provider. The information can help your provider learn more about your bladder problem.    Lose weight, if advised to by your provider. Extra weight puts pressure on the bladder. Your provider can help you create a weight-loss plan that s right for you. This may include exercising more and making certain diet changes.    Don't have foods and drinks that may irritate the bladder. These can include alcohol and caffeinated drinks.    Quit smoking. Smoking and other tobacco use can lead to a long-term (chronic) cough that strains the pelvic  floor muscles. Smoking may also damage the bladder and urethra. Talk with your provider about treatments or methods you can use to quit smoking.    If drinking large amounts of fluid makes you have symptoms, you may be advised to limit your fluid intake. You may also be advised to drink most of your fluids during the day and to limit fluids at night.    If you re worried about urine leakage or accidents, you may wear absorbent pads to catch urine. Change the pads often. This helps reduce discomfort. It may also reduce the risk of skin or bladder infections.    Follow-up care  Follow up with your healthcare provider, or as directed. It may take some to find the right treatment for your problem. But healthy lifestyle changes can be made right away. These include such things as exercising on a regular basis, eating a healthy diet, losing weight (if needed), and quitting smoking. Your treatment plan may include special therapies or medicines. Certain procedures or surgery may also be options. Talk about any questions you have with your provider.   When to seek medical advice  Call the healthcare provider right away if any of these occur:    Fever of 100.4 F (38 C) or higher, or as directed by your provider    Bladder pain or fullness    Belly swelling    Nausea or vomiting    Back pain    Weakness, dizziness, or fainting  [employer] last reviewed this educational content on 1/1/2020 2000-2021 [employer]. All rights reserved. This information is not intended as a substitute for professional medical care. Always follow your healthcare professional's instructions.

## 2022-09-21 LAB
ALBUMIN SERPL BCG-MCNC: 4 G/DL (ref 3.5–5.2)
ALP SERPL-CCNC: 60 U/L (ref 35–104)
ALT SERPL W P-5'-P-CCNC: 13 U/L (ref 10–35)
ANION GAP SERPL CALCULATED.3IONS-SCNC: 14 MMOL/L (ref 7–15)
AST SERPL W P-5'-P-CCNC: 31 U/L (ref 10–35)
BILIRUB SERPL-MCNC: 0.3 MG/DL
BUN SERPL-MCNC: 14.4 MG/DL (ref 8–23)
CALCIUM SERPL-MCNC: 8.8 MG/DL (ref 8.8–10.2)
CHLORIDE SERPL-SCNC: 105 MMOL/L (ref 98–107)
CHOLEST SERPL-MCNC: 163 MG/DL
CREAT SERPL-MCNC: 0.87 MG/DL (ref 0.51–0.95)
DEPRECATED CALCIDIOL+CALCIFEROL SERPL-MC: 66 UG/L (ref 20–75)
DEPRECATED HCO3 PLAS-SCNC: 24 MMOL/L (ref 22–29)
FOLATE SERPL-MCNC: 38.2 NG/ML (ref 4.6–34.8)
GFR SERPL CREATININE-BSD FRML MDRD: 66 ML/MIN/1.73M2
GLUCOSE SERPL-MCNC: 91 MG/DL (ref 70–99)
HDLC SERPL-MCNC: 45 MG/DL
LDLC SERPL CALC-MCNC: 97 MG/DL
NONHDLC SERPL-MCNC: 118 MG/DL
POTASSIUM SERPL-SCNC: 4.3 MMOL/L (ref 3.4–5.3)
PROT SERPL-MCNC: 7.2 G/DL (ref 6.4–8.3)
SODIUM SERPL-SCNC: 143 MMOL/L (ref 136–145)
TRIGL SERPL-MCNC: 107 MG/DL
TSH SERPL DL<=0.005 MIU/L-ACNC: 0.71 UIU/ML (ref 0.3–4.2)
VIT B12 SERPL-MCNC: 832 PG/ML (ref 232–1245)

## 2022-09-24 ENCOUNTER — NURSE TRIAGE (OUTPATIENT)
Dept: NURSING | Facility: CLINIC | Age: 82
End: 2022-09-24

## 2022-09-24 LAB
OXYCODONE UR CFM-MCNC: 7260 NG/ML
OXYCODONE/CREAT UR: 7563 NG/MG {CREAT}
OXYMORPHONE UR CFM-MCNC: 6380 NG/ML
OXYMORPHONE/CREAT UR: 6646 NG/MG {CREAT}

## 2022-09-24 NOTE — TELEPHONE ENCOUNTER
"  Reason for Disposition    [1] MODERATE vomiting (e.g., 3 - 5 times/day) AND [2] age > 60 years    Additional Information    Negative: Shock suspected (e.g., cold/pale/clammy skin, too weak to stand, low BP, rapid pulse)    Negative: Difficult to awaken or acting confused (e.g., disoriented, slurred speech)    Negative: Sounds like a life-threatening emergency to the triager    Negative: [1] Vomiting AND [2] contains red blood or black (\"coffee ground\") material  (Exception: few red streaks in vomit that only happened once)    Negative: Severe pain in one eye    Negative: Recent head injury (within last 3 days)    Negative: Recent abdominal injury (within last 3 days)    Negative: [1] Insulin-dependent diabetes (Type I) AND [2] glucose > 400 mg/dl (22 mmol/l)    Negative: [1] Vomiting AND [2] hernia is more painful or swollen than usual    Negative: [1] SEVERE vomiting (e.g., 6 or more times/day) AND [2] present > 8 hours (Exception: patient sounds well, is drinking liquids, does not sound dehydrated, and vomiting has lasted less than 24 hours)    Protocols used: VOMITING-A-    "

## 2022-09-24 NOTE — TELEPHONE ENCOUNTER
Situation:  Call from daughter, Lia, stating patient has been having loose stools all night and started vomiting multiple times this morning.  Lia reports patient cannot keep any water. Someone will be bringing gingerale to patient. Lia is not with patient.    Outbound call to patient.   Patient reports loose stool since last night; says it occurred a couple times last night and had 2 episodes this morning. She says she has been vomiting since this morning. Now, she has the dry heaves and feels exhausted and still feeling nauseas. Patient denies feeling dizzy. She reports feeling feverish and chilly since this morning. Patient reports no concerns about food poisoning and has had no recent injuries.     94.5 F TA (no fever)    Stools: no blood; color is dark brown  Urinary: denies blood in urine, new flank pain, no frequency or dysuria, no changes in color    Grandson is w/ patient  Giving her ondansetron    Background:   Previous bouts of diarrhea and vomiting twice this year  Has chronic back pain      1:37pm paged Dr. Codie Stewart to call FNA back.  Call back from on-call who recommended home care, keep taking in fluids and nutrition.    Disposition: Home care. Patient notified of recommendations and agrees. Advised to call back w/ new symptoms or worsening symptom.

## 2022-09-29 ENCOUNTER — MYC MEDICAL ADVICE (OUTPATIENT)
Dept: INTERNAL MEDICINE | Facility: CLINIC | Age: 82
End: 2022-09-29

## 2022-09-29 DIAGNOSIS — F11.20 OPIOID TYPE DEPENDENCE, CONTINUOUS (H): ICD-10-CM

## 2022-09-29 DIAGNOSIS — R11.0 NAUSEA: Primary | ICD-10-CM

## 2022-09-29 DIAGNOSIS — M06.9 RHEUMATOID ARTHRITIS INVOLVING MULTIPLE SITES, UNSPECIFIED WHETHER RHEUMATOID FACTOR PRESENT (H): ICD-10-CM

## 2022-09-29 DIAGNOSIS — M54.32 BACK PAIN WITH LEFT-SIDED SCIATICA: ICD-10-CM

## 2022-09-29 DIAGNOSIS — M06.9 RHEUMATOID ARTHRITIS, INVOLVING UNSPECIFIED SITE, UNSPECIFIED WHETHER RHEUMATOID FACTOR PRESENT (H): ICD-10-CM

## 2022-10-03 RX ORDER — CARISOPRODOL 350 MG/1
350 TABLET ORAL 3 TIMES DAILY PRN
Qty: 180 TABLET | Refills: 3 | Status: SHIPPED | OUTPATIENT
Start: 2022-10-03 | End: 2023-05-02

## 2022-10-03 RX ORDER — OXYCODONE HYDROCHLORIDE 10 MG/1
10 TABLET, FILM COATED, EXTENDED RELEASE ORAL AT BEDTIME
Qty: 60 TABLET | Refills: 0 | Status: SHIPPED | OUTPATIENT
Start: 2022-10-03 | End: 2022-11-01

## 2022-10-03 RX ORDER — ONDANSETRON 4 MG/1
4 TABLET, FILM COATED ORAL EVERY 6 HOURS PRN
Qty: 30 TABLET | Refills: 1 | Status: SHIPPED | OUTPATIENT
Start: 2022-10-03 | End: 2023-06-08

## 2022-10-06 ENCOUNTER — DOCUMENTATION ONLY (OUTPATIENT)
Dept: OTHER | Facility: CLINIC | Age: 82
End: 2022-10-06

## 2022-10-11 ENCOUNTER — MYC REFILL (OUTPATIENT)
Dept: INTERNAL MEDICINE | Facility: CLINIC | Age: 82
End: 2022-10-11

## 2022-10-11 DIAGNOSIS — G89.29 OTHER CHRONIC PAIN: ICD-10-CM

## 2022-10-11 DIAGNOSIS — M54.32 BACK PAIN WITH LEFT-SIDED SCIATICA: ICD-10-CM

## 2022-10-13 RX ORDER — OXYCODONE HCL 40 MG/1
40 TABLET, FILM COATED, EXTENDED RELEASE ORAL EVERY 24 HOURS
Qty: 30 TABLET | Refills: 0 | Status: SHIPPED | OUTPATIENT
Start: 2022-10-13 | End: 2022-11-11

## 2022-10-23 ENCOUNTER — HEALTH MAINTENANCE LETTER (OUTPATIENT)
Age: 82
End: 2022-10-23

## 2022-10-31 DIAGNOSIS — M06.9 RHEUMATOID ARTHRITIS, INVOLVING UNSPECIFIED SITE, UNSPECIFIED WHETHER RHEUMATOID FACTOR PRESENT (H): ICD-10-CM

## 2022-10-31 DIAGNOSIS — F11.20 OPIOID TYPE DEPENDENCE, CONTINUOUS (H): ICD-10-CM

## 2022-11-01 RX ORDER — OXYCODONE HYDROCHLORIDE 10 MG/1
TABLET, FILM COATED, EXTENDED RELEASE ORAL
Qty: 60 TABLET | Refills: 0 | Status: SHIPPED | OUTPATIENT
Start: 2022-11-01 | End: 2022-12-04

## 2022-11-11 ENCOUNTER — MYC REFILL (OUTPATIENT)
Dept: INTERNAL MEDICINE | Facility: CLINIC | Age: 82
End: 2022-11-11

## 2022-11-11 DIAGNOSIS — G89.29 OTHER CHRONIC PAIN: ICD-10-CM

## 2022-11-11 DIAGNOSIS — M54.32 BACK PAIN WITH LEFT-SIDED SCIATICA: ICD-10-CM

## 2022-11-14 RX ORDER — OXYCODONE HCL 40 MG/1
40 TABLET, FILM COATED, EXTENDED RELEASE ORAL EVERY 24 HOURS
Qty: 30 TABLET | Refills: 0 | Status: SHIPPED | OUTPATIENT
Start: 2022-11-14 | End: 2022-12-14

## 2022-12-14 ENCOUNTER — MYC REFILL (OUTPATIENT)
Dept: INTERNAL MEDICINE | Facility: CLINIC | Age: 82
End: 2022-12-14

## 2022-12-14 DIAGNOSIS — M54.32 BACK PAIN WITH LEFT-SIDED SCIATICA: ICD-10-CM

## 2022-12-14 DIAGNOSIS — G89.29 OTHER CHRONIC PAIN: ICD-10-CM

## 2022-12-14 DIAGNOSIS — J30.89 SEASONAL ALLERGIC RHINITIS DUE TO OTHER ALLERGIC TRIGGER: Primary | ICD-10-CM

## 2022-12-14 NOTE — TELEPHONE ENCOUNTER
"Routing refill request to provider for review/approval because:  Drug not on the [facility] refill protocol   age    Last Written Prescription Date:  11/14/22  Last Fill Quantity: 30,  # refills: 0   Last office visit provider:  9/20/22     Requested Prescriptions   Pending Prescriptions Disp Refills     loratadine (CLARITIN) 10 MG tablet       Sig: Take 1 tablet (10 mg) by mouth daily       Antihistamines Protocol Failed - 12/14/2022 12:37 AM        Failed - Patient is 3-64 years of age     Apply weight-based dosing for peds patients age 3 - 12 years of age.    Forward request to provider for patients under the age of 3 or over the age of 64.          Passed - Recent (12 mo) or future (30 days) visit within the authorizing provider's specialty     Patient has had an office visit with the authorizing provider or a provider within the authorizing providers department within the previous 12 mos or has a future within next 30 days. See \"Patient Info\" tab in inbasket, or \"Choose Columns\" in Meds & Orders section of the refill encounter.              Passed - Medication is active on med list           oxyCODONE (OXYCONTIN) 40 MG 12 hr tablet 30 tablet 0     Sig: Take 1 tablet (40 mg) by mouth every 24 hours In addition to Oxycontin 10-20mg at bedtime       There is no refill protocol information for this order          Miguelina Molina RN 12/14/22 1:51 PM  "

## 2022-12-15 RX ORDER — LORATADINE 10 MG/1
10 TABLET ORAL DAILY
Qty: 90 TABLET | Refills: 1 | Status: SHIPPED | OUTPATIENT
Start: 2022-12-15 | End: 2023-11-02

## 2022-12-15 RX ORDER — OXYCODONE HCL 40 MG/1
40 TABLET, FILM COATED, EXTENDED RELEASE ORAL EVERY 24 HOURS
Qty: 30 TABLET | Refills: 0 | Status: SHIPPED | OUTPATIENT
Start: 2022-12-15 | End: 2023-01-12

## 2022-12-16 ENCOUNTER — TELEPHONE (OUTPATIENT)
Dept: INTERNAL MEDICINE | Facility: CLINIC | Age: 82
End: 2022-12-16

## 2022-12-16 NOTE — TELEPHONE ENCOUNTER
General Call      Reason for Call:  Minda Fitzgerald calling in and stating Dr Reynolds told pt to see Dr Ashley.  I dont see any notes.    Please advise.      Could we send this information to you in [employer] or would you prefer to receive a phone call?:   Patient would prefer a phone call   Okay to leave a detailed message?: Yes at Cell number on file:    Telephone Information:   [phone]- Lia

## 2022-12-30 ENCOUNTER — VIRTUAL VISIT (OUTPATIENT)
Dept: INTERNAL MEDICINE | Facility: CLINIC | Age: 82
End: 2022-12-30
Payer: MEDICARE

## 2022-12-30 DIAGNOSIS — M05.79 RHEUMATOID ARTHRITIS INVOLVING MULTIPLE SITES WITH POSITIVE RHEUMATOID FACTOR (H): Primary | ICD-10-CM

## 2022-12-30 DIAGNOSIS — M81.0 AGE-RELATED OSTEOPOROSIS WITHOUT CURRENT PATHOLOGICAL FRACTURE: ICD-10-CM

## 2022-12-30 DIAGNOSIS — I10 PRIMARY HYPERTENSION: ICD-10-CM

## 2022-12-30 DIAGNOSIS — N25.81 SECONDARY RENAL HYPERPARATHYROIDISM (H): ICD-10-CM

## 2022-12-30 DIAGNOSIS — I87.2 VENOUS STASIS DERMATITIS OF BOTH LOWER EXTREMITIES: ICD-10-CM

## 2022-12-30 DIAGNOSIS — I87.2 VENOUS INSUFFICIENCY OF BOTH LOWER EXTREMITIES: ICD-10-CM

## 2022-12-30 DIAGNOSIS — E03.9 ACQUIRED HYPOTHYROIDISM: ICD-10-CM

## 2022-12-30 DIAGNOSIS — F11.20 OPIOID TYPE DEPENDENCE, CONTINUOUS (H): ICD-10-CM

## 2022-12-30 DIAGNOSIS — F51.01 PRIMARY INSOMNIA: ICD-10-CM

## 2022-12-30 DIAGNOSIS — K21.9 GASTROESOPHAGEAL REFLUX DISEASE WITHOUT ESOPHAGITIS: ICD-10-CM

## 2022-12-30 PROBLEM — I89.0 ACQUIRED LYMPHEDEMA OF LEG: Status: RESOLVED | Noted: 2018-10-03 | Resolved: 2022-12-30

## 2022-12-30 PROBLEM — M86.9 OSTEOMYELITIS OF ANKLE AND FOOT (H): Status: RESOLVED | Noted: 2022-03-09 | Resolved: 2022-12-30

## 2022-12-30 PROBLEM — M86.9 OSTEOMYELITIS (H): Status: RESOLVED | Noted: 2022-03-08 | Resolved: 2022-12-30

## 2022-12-30 PROBLEM — G89.29 CHRONIC PAIN: Status: RESOLVED | Noted: 2022-03-08 | Resolved: 2022-12-30

## 2022-12-30 PROBLEM — I87.303 VENOUS HYPERTENSION OF BOTH LOWER EXTREMITIES: Status: RESOLVED | Noted: 2018-06-14 | Resolved: 2022-12-30

## 2022-12-30 PROBLEM — L60.2 ONYCHAUXIS: Status: RESOLVED | Noted: 2018-06-14 | Resolved: 2022-12-30

## 2022-12-30 PROBLEM — L97.524 ULCER OF LEFT FOOT WITH NECROSIS OF BONE (H): Status: RESOLVED | Noted: 2022-02-23 | Resolved: 2022-12-30

## 2022-12-30 PROBLEM — R22.42 LOWER LEG MASS, LEFT: Status: RESOLVED | Noted: 2019-05-15 | Resolved: 2022-12-30

## 2022-12-30 PROBLEM — M79.605 LEFT LEG PAIN: Status: RESOLVED | Noted: 2019-05-15 | Resolved: 2022-12-30

## 2022-12-30 PROCEDURE — 99215 OFFICE O/P EST HI 40 MIN: CPT | Mod: 95 | Performed by: INTERNAL MEDICINE

## 2022-12-30 NOTE — PROGRESS NOTES
Jacqueline is a 82 year old who is being evaluated via a billable video visit.      How would you like to obtain your AVS? MyChart  If the video visit is dropped, the invitation should be resent by: Text to cell phone: [phone]  Will anyone else be joining your video visit? Yes: [phone]. How would they like to receive their invitation? Text to cell phone: [phone]        Assessment & Plan     1. Rheumatoid arthritis (H) - [place]  Continue current plan    2. Acquired hypothyroidism  On levothyroxine    3. Secondary Hyperparathyroidism    4. Opioid Dependence With Continuous Use  Reviewed at length, she takes OxyContin 40 mg in the morning and 10 to 20 mg in the evening.  This is a long-term and stable dose for her.    5. Age-related osteoporosis without current pathological fracture  She is on Prolia    6. Gastroesophageal reflux disease without esophagitis  PPI    7. Primary hypertension  Blood pressure has been well controlled    8. Venous insufficiency of both lower extremities  This is better with weight loss, wears compression garments    9. Primary insomnia  She is on amitriptyline    10. Venous stasis dermatitis of both lower extremities  As above       Return in about 3 months (around 3/30/2023) for Follow up.    Brad Ashley MD  [facility]   Jacqueline is a 82 year old accompanied by her daughter, presenting for the following health issues:  Establish Care (No concerns-want to establish care.) and Recheck Medication      History of Present Illness       Reason for visit:  Establish care new patient,formerly a patient of Dr. Reynolds    She eats 0-1 servings of fruits and vegetables daily.She consumes 0 sweetened beverage(s) daily.She exercises with enough effort to increase her heart rate 9 or less minutes per day.  She exercises with enough effort to increase her heart rate 3 or less days per week.   She is taking medications regularly.             Review of  Systems         Objective           Vitals:  No vitals were obtained today due to virtual visit.    Physical Exam                   Video-Visit Details    Type of service:  Video Visit   Video Start Time: 11:41 AM  Video End Time: 12:10 AM    Originating Location (pt. Location): Home  Distant Location (provider location):  On-site  Platform used for Video Visit: [employer]  Total time spent on the visit today was over 40 minutes including chart review, history, examination, counseling and documentation and coordination with her daughter

## 2023-01-01 ENCOUNTER — MYC REFILL (OUTPATIENT)
Dept: INTERNAL MEDICINE | Facility: CLINIC | Age: 83
End: 2023-01-01

## 2023-01-01 DIAGNOSIS — F11.20 OPIOID TYPE DEPENDENCE, CONTINUOUS (H): ICD-10-CM

## 2023-01-01 DIAGNOSIS — M06.9 RHEUMATOID ARTHRITIS, INVOLVING UNSPECIFIED SITE, UNSPECIFIED WHETHER RHEUMATOID FACTOR PRESENT (H): ICD-10-CM

## 2023-01-02 ENCOUNTER — MYC MEDICAL ADVICE (OUTPATIENT)
Dept: INTERNAL MEDICINE | Facility: CLINIC | Age: 83
End: 2023-01-02

## 2023-01-02 RX ORDER — OXYCODONE HYDROCHLORIDE 10 MG/1
10 TABLET, FILM COATED, EXTENDED RELEASE ORAL EVERY 12 HOURS
Qty: 60 TABLET | Refills: 0 | Status: SHIPPED | OUTPATIENT
Start: 2023-01-02 | End: 2023-01-31

## 2023-01-11 DIAGNOSIS — M54.32 BACK PAIN WITH LEFT-SIDED SCIATICA: ICD-10-CM

## 2023-01-11 DIAGNOSIS — G89.29 OTHER CHRONIC PAIN: ICD-10-CM

## 2023-01-12 RX ORDER — OXYCODONE HCL 40 MG/1
40 TABLET, FILM COATED, EXTENDED RELEASE ORAL EVERY 24 HOURS
Qty: 30 TABLET | Refills: 0 | Status: SHIPPED | OUTPATIENT
Start: 2023-01-12 | End: 2023-02-10

## 2023-01-30 ENCOUNTER — MYC REFILL (OUTPATIENT)
Dept: INTERNAL MEDICINE | Facility: CLINIC | Age: 83
End: 2023-01-30
Payer: MEDICARE

## 2023-01-30 DIAGNOSIS — F11.20 OPIOID TYPE DEPENDENCE, CONTINUOUS (H): ICD-10-CM

## 2023-01-30 DIAGNOSIS — M06.9 RHEUMATOID ARTHRITIS, INVOLVING UNSPECIFIED SITE, UNSPECIFIED WHETHER RHEUMATOID FACTOR PRESENT (H): ICD-10-CM

## 2023-01-31 DIAGNOSIS — F11.20 OPIOID TYPE DEPENDENCE, CONTINUOUS (H): ICD-10-CM

## 2023-01-31 DIAGNOSIS — M06.9 RHEUMATOID ARTHRITIS, INVOLVING UNSPECIFIED SITE, UNSPECIFIED WHETHER RHEUMATOID FACTOR PRESENT (H): ICD-10-CM

## 2023-01-31 RX ORDER — OXYCODONE HYDROCHLORIDE 10 MG/1
10 TABLET, FILM COATED, EXTENDED RELEASE ORAL EVERY 12 HOURS
Qty: 60 TABLET | Refills: 0 | OUTPATIENT
Start: 2023-01-31

## 2023-01-31 RX ORDER — OXYCODONE HYDROCHLORIDE 10 MG/1
TABLET, FILM COATED, EXTENDED RELEASE ORAL
Qty: 60 TABLET | Refills: 0 | Status: SHIPPED | OUTPATIENT
Start: 2023-01-31 | End: 2023-03-08

## 2023-01-31 NOTE — CONFIDENTIAL NOTE
Oxycodone 40 mg prescribed January 12 for 30 tablets.  Previously December 15, November 15, October 15    OxyContin 10 mg prescribed January 3, December 5, November 2 for 60 tablets per time    OxyContin being requested now     also shows carisoprodol 350 mg    Currently 90-95 MME per day, which exceeds national guidelines    Was monitored by pain clinic but transferred narcotics back to primary clinic last year    Rheumatoid arthritis on leflunomide and low-dose prednisone    Although use has been stable I would still recommend a trial of decreasing medication such as decreasing long-acting to 30 mg or spreading throughout the day, increasing steroids, trial of allopurinol, trial of memantine, trial of duloxetine etc.    Important to occasionally challenge the status quo with chronic narcotics-my opinion

## 2023-02-10 ENCOUNTER — MYC REFILL (OUTPATIENT)
Dept: INTERNAL MEDICINE | Facility: CLINIC | Age: 83
End: 2023-02-10
Payer: MEDICARE

## 2023-02-10 DIAGNOSIS — G89.29 OTHER CHRONIC PAIN: ICD-10-CM

## 2023-02-10 DIAGNOSIS — M54.32 BACK PAIN WITH LEFT-SIDED SCIATICA: ICD-10-CM

## 2023-02-13 RX ORDER — OXYCODONE HCL 40 MG/1
40 TABLET, FILM COATED, EXTENDED RELEASE ORAL EVERY 24 HOURS
Qty: 30 TABLET | Refills: 0 | Status: SHIPPED | OUTPATIENT
Start: 2023-02-13 | End: 2023-03-27

## 2023-03-08 DIAGNOSIS — F11.20 OPIOID TYPE DEPENDENCE, CONTINUOUS (H): ICD-10-CM

## 2023-03-08 DIAGNOSIS — M06.9 RHEUMATOID ARTHRITIS, INVOLVING UNSPECIFIED SITE, UNSPECIFIED WHETHER RHEUMATOID FACTOR PRESENT (H): ICD-10-CM

## 2023-03-08 RX ORDER — OXYCODONE HYDROCHLORIDE 10 MG/1
TABLET, FILM COATED, EXTENDED RELEASE ORAL
Qty: 60 TABLET | Refills: 0 | Status: SHIPPED | OUTPATIENT
Start: 2023-03-08 | End: 2023-04-04

## 2023-03-21 ENCOUNTER — TELEPHONE (OUTPATIENT)
Dept: INTERNAL MEDICINE | Facility: CLINIC | Age: 83
End: 2023-03-21
Payer: MEDICARE

## 2023-03-21 NOTE — TELEPHONE ENCOUNTER
March 21, 2023    [employer] form was received via fax for Dr. Ashley to sign.  Patient label was attached to paperwork and placed in provider's inbox to be signed.    Stan Currie III

## 2023-03-25 DIAGNOSIS — G89.29 OTHER CHRONIC PAIN: ICD-10-CM

## 2023-03-25 DIAGNOSIS — M54.32 BACK PAIN WITH LEFT-SIDED SCIATICA: ICD-10-CM

## 2023-03-27 RX ORDER — OXYCODONE HCL 40 MG/1
TABLET, FILM COATED, EXTENDED RELEASE ORAL
Qty: 30 TABLET | Refills: 0 | Status: SHIPPED | OUTPATIENT
Start: 2023-03-27 | End: 2023-04-27

## 2023-04-04 ENCOUNTER — TELEPHONE (OUTPATIENT)
Dept: INTERNAL MEDICINE | Facility: CLINIC | Age: 83
End: 2023-04-04
Payer: MEDICARE

## 2023-04-04 DIAGNOSIS — F11.20 OPIOID TYPE DEPENDENCE, CONTINUOUS (H): ICD-10-CM

## 2023-04-04 DIAGNOSIS — M06.9 RHEUMATOID ARTHRITIS, INVOLVING UNSPECIFIED SITE, UNSPECIFIED WHETHER RHEUMATOID FACTOR PRESENT (H): ICD-10-CM

## 2023-04-04 RX ORDER — OXYCODONE HYDROCHLORIDE 10 MG/1
TABLET, FILM COATED, EXTENDED RELEASE ORAL
Qty: 60 TABLET | Refills: 0 | Status: SHIPPED | OUTPATIENT
Start: 2023-04-04 | End: 2023-04-27

## 2023-04-04 NOTE — TELEPHONE ENCOUNTER
Narcan dispensed at pharmacy per Opiate Antagonist Protocol and Collaborative Practice Agreement.    Patient requested Narcan for:  own use in the event of an opioid overdose.    Screening Questions:  I currently use or have a history of using opioids.  Yes   I am in contact with a person who has a history of using opioids.  No  The person to whom Narcan will be administered has an allergy to naloxone.  No

## 2023-04-07 NOTE — TELEPHONE ENCOUNTER
April 7, 2023    [place] was picked up from outbox of Dr. Ashley.  Paperwork has been reviewed and is complete.  Per initial initial request, this was sent via fax to [phone].     Geoffrey Campuzano

## 2023-04-27 DIAGNOSIS — M54.32 BACK PAIN WITH LEFT-SIDED SCIATICA: ICD-10-CM

## 2023-04-27 DIAGNOSIS — F11.20 OPIOID TYPE DEPENDENCE, CONTINUOUS (H): ICD-10-CM

## 2023-04-27 DIAGNOSIS — M06.9 RHEUMATOID ARTHRITIS, INVOLVING UNSPECIFIED SITE, UNSPECIFIED WHETHER RHEUMATOID FACTOR PRESENT (H): ICD-10-CM

## 2023-04-27 DIAGNOSIS — G89.29 OTHER CHRONIC PAIN: ICD-10-CM

## 2023-04-27 RX ORDER — OXYCODONE HYDROCHLORIDE 10 MG/1
TABLET, FILM COATED, EXTENDED RELEASE ORAL
Qty: 60 TABLET | Refills: 0 | Status: SHIPPED | OUTPATIENT
Start: 2023-04-27 | End: 2023-06-16

## 2023-04-27 RX ORDER — OXYCODONE HCL 40 MG/1
TABLET, FILM COATED, EXTENDED RELEASE ORAL
Qty: 30 TABLET | Refills: 0 | Status: SHIPPED | OUTPATIENT
Start: 2023-04-27 | End: 2023-05-22

## 2023-05-02 DIAGNOSIS — M54.32 BACK PAIN WITH LEFT-SIDED SCIATICA: ICD-10-CM

## 2023-05-02 RX ORDER — CARISOPRODOL 350 MG/1
350 TABLET ORAL 3 TIMES DAILY PRN
Qty: 180 TABLET | Refills: 3 | Status: SHIPPED | OUTPATIENT
Start: 2023-05-02 | End: 2023-11-02

## 2023-05-12 ENCOUNTER — TELEPHONE (OUTPATIENT)
Dept: INTERNAL MEDICINE | Facility: CLINIC | Age: 83
End: 2023-05-12
Payer: MEDICARE

## 2023-05-12 NOTE — TELEPHONE ENCOUNTER
May 12, 2023    Home health orders was picked up from outbox of Dr. Ashley.  Paperwork has been reviewed and is complete.  Per initial initial request, this was sent via fax to [phone].     Stan Currie III

## 2023-05-12 NOTE — TELEPHONE ENCOUNTER
May 12, 2023    Home health orders was picked up from outbox of Dr. Ashley.  Paperwork has been reviewed and is complete.  Per initial initial request, this was sent via fax to [phone].     Stan Currie III

## 2023-05-12 NOTE — TELEPHONE ENCOUNTER
May 12, 2023    Home health orders was picked up from outbox of Dr. Ashley.  Paperwork has been reviewed and is complete.  Per initial initial request, this was sent via fax to [phone].     Stan Currie III

## 2023-05-12 NOTE — TELEPHONE ENCOUNTER
May 12, 2023    [facility] Home health orders was received via fax for Dr. Ashley to sign.  Patient label was attached to paperwork and placed in provider's inbox to be signed.    Stan Currie III

## 2023-05-12 NOTE — TELEPHONE ENCOUNTER
May 12, 2023    Home health orders was picked up from outbox of Dr. Ashley.  Paperwork has been reviewed and is complete.  Per initial initial request, this was sent via fax to [phone].     Stan Currie III

## 2023-05-12 NOTE — TELEPHONE ENCOUNTER
May 12, 2023    Home health orders was received via fax for Dr. Ashley to sign.  Patient label was attached to paperwork and placed in provider's inbox to be signed.    Stan Currie III

## 2023-05-12 NOTE — TELEPHONE ENCOUNTER
May 12, 2023    [facility] Home health orders was received via fax for Dr. Ashley to sign.  Patient label was attached to paperwork and placed in provider's inbox to be signed.    Stan Currie III

## 2023-05-22 DIAGNOSIS — G89.29 OTHER CHRONIC PAIN: ICD-10-CM

## 2023-05-22 DIAGNOSIS — M54.32 BACK PAIN WITH LEFT-SIDED SCIATICA: ICD-10-CM

## 2023-05-22 RX ORDER — OXYCODONE HCL 40 MG/1
TABLET, FILM COATED, EXTENDED RELEASE ORAL
Qty: 30 TABLET | Refills: 0 | Status: SHIPPED | OUTPATIENT
Start: 2023-05-22 | End: 2023-06-29

## 2023-05-24 ENCOUNTER — TELEPHONE (OUTPATIENT)
Dept: INTERNAL MEDICINE | Facility: CLINIC | Age: 83
End: 2023-05-24
Payer: MEDICARE

## 2023-05-24 NOTE — TELEPHONE ENCOUNTER
May 24, 2023    Home health orders was received via fax for Dr. Ashley to sign.  Patient label was attached to paperwork and placed in provider's inbox to be signed.    Pam J. Behr

## 2023-05-25 ENCOUNTER — DOCUMENTATION ONLY (OUTPATIENT)
Dept: INTERNAL MEDICINE | Facility: CLINIC | Age: 83
End: 2023-05-25

## 2023-05-25 ENCOUNTER — ALLIED HEALTH/NURSE VISIT (OUTPATIENT)
Dept: FAMILY MEDICINE | Facility: CLINIC | Age: 83
End: 2023-05-25
Payer: MEDICARE

## 2023-05-25 DIAGNOSIS — M19.90 OSTEOARTHRITIS: Primary | ICD-10-CM

## 2023-05-25 DIAGNOSIS — M81.0 AGE-RELATED OSTEOPOROSIS WITHOUT CURRENT PATHOLOGICAL FRACTURE: Primary | ICD-10-CM

## 2023-05-25 PROCEDURE — 99207 PR NO CHARGE NURSE ONLY: CPT

## 2023-05-25 PROCEDURE — 96372 THER/PROPH/DIAG INJ SC/IM: CPT | Performed by: INTERNAL MEDICINE

## 2023-05-25 NOTE — PROGRESS NOTES
Patient is coming for a calcium and Vitamin D level 2 weeks post Prolia shot.  The order for the calcium is in the chart but not the Vitamin D.  Please advise/enter orders.  Thank you.

## 2023-05-25 NOTE — PROGRESS NOTES
Clinic Administered Medication Documentation      Prolia Documentation    Indication: Prolia  (denosumab) is a prescription medicine used to treat osteoporosis in patients who:     Are at high risk for fracture, meaning patients who have had a fracture related to osteoporosis, or who have multiple risk factors for fracture.    Cannot use another osteoporosis medicine or other osteoporosis medicines did not work well.    The timeline for early/late injections would be 4 weeks early and any time after the 6 month dinora. If a patient receives their injection late, then the subsequent injection would be 6 months from the date that they actually received the injection.    When was the last injection?  2022  Was the last injection at least 6 months ago? Yes  Has the prior authorization been completed?  Yes  Is there an active order (written within the past 365 days, with administrations remaining, not ) in the chart?  Yes  Patient denies any dental work involving the bone (e.g. tooth extraction or dental implants) in the past 4 weeks?  Yes  Patient denies plans for any dental work involving the bone (e.g. tooth extraction or dental implants) in the next 4 weeks? Yes    The following steps were completed to comply with the REMS program for Prolia:    Reviewed information in the Medication Guide and Patient Counseling Chart, including the serious risks of Prolia  and the symptoms of each risk.    Advised patient to seek prompt medical attention if they have signs or symptoms of any of the serious risks.    Provided each patient a copy of the Medication Guide and Patient Brochure.      Prior to injection, verified patient identity using patient's name and date of birth. Medication was administered. Please see MAR and medication order for additional information. Patient instructed to remain in clinic for 15 minutes and report any adverse reaction to staff immediately.    Vial/Syringe: Single dose vial. Was entire  vial of medication used? Yes  Was this medication supplied by the patient? No  Verified that the patient has refills remaining in their prescription.

## 2023-05-26 NOTE — TELEPHONE ENCOUNTER
May 26, 2023    Home health orders was picked up from outbox of Dr. Ashley.  Paperwork has been reviewed and is complete.  Per initial initial request, this was sent via fax to [phone].     Pam J. Behr

## 2023-06-07 DIAGNOSIS — R11.0 NAUSEA: ICD-10-CM

## 2023-06-08 ENCOUNTER — LAB (OUTPATIENT)
Dept: LAB | Facility: CLINIC | Age: 83
End: 2023-06-08
Payer: MEDICARE

## 2023-06-08 DIAGNOSIS — M81.0 AGE-RELATED OSTEOPOROSIS WITHOUT CURRENT PATHOLOGICAL FRACTURE: ICD-10-CM

## 2023-06-08 DIAGNOSIS — Z92.29 PERSONAL HISTORY OF OTHER DRUG THERAPY: ICD-10-CM

## 2023-06-08 LAB — CALCIUM SERPL-MCNC: 8.8 MG/DL (ref 8.8–10.2)

## 2023-06-08 PROCEDURE — 82310 ASSAY OF CALCIUM: CPT

## 2023-06-08 PROCEDURE — 36415 COLL VENOUS BLD VENIPUNCTURE: CPT

## 2023-06-08 PROCEDURE — 82306 VITAMIN D 25 HYDROXY: CPT

## 2023-06-08 RX ORDER — ONDANSETRON 4 MG/1
TABLET, FILM COATED ORAL
Qty: 30 TABLET | Refills: 1 | Status: SHIPPED | OUTPATIENT
Start: 2023-06-08 | End: 2023-11-02

## 2023-06-08 NOTE — TELEPHONE ENCOUNTER
"Last Written Prescription Date:  10/3/22  Last Fill Quantity: 30,  # refills: 1   Last office visit provider:  12/3022     Requested Prescriptions   Pending Prescriptions Disp Refills     ondansetron (ZOFRAN) 4 MG tablet [Pharmacy Med Name: ONDANSETRON HCL 4MG TABS] 30 tablet 1     Sig: TAKE ONE TABLET BY MOUTH EVERY 6 HOURS AS NEEDED FOR NAUSEA        Antivertigo/Antiemetic Agents Passed - 6/7/2023  4:05 PM        Passed - Recent (12 mo) or future (30 days) visit within the authorizing provider's specialty     Patient has had an office visit with the authorizing provider or a provider within the authorizing providers department within the previous 12 mos or has a future within next 30 days. See \"Patient Info\" tab in inbasket, or \"Choose Columns\" in Meds & Orders section of the refill encounter.              Passed - Medication is active on med list        Passed - Patient is 18 years of age or older             TYRONE HEWITT RN 06/08/23 12:39 PM  "

## 2023-06-09 LAB — DEPRECATED CALCIDIOL+CALCIFEROL SERPL-MC: 65 UG/L (ref 20–75)

## 2023-06-16 DIAGNOSIS — M06.9 RHEUMATOID ARTHRITIS, INVOLVING UNSPECIFIED SITE, UNSPECIFIED WHETHER RHEUMATOID FACTOR PRESENT (H): ICD-10-CM

## 2023-06-16 DIAGNOSIS — F11.20 OPIOID TYPE DEPENDENCE, CONTINUOUS (H): ICD-10-CM

## 2023-06-16 RX ORDER — OXYCODONE HYDROCHLORIDE 10 MG/1
TABLET, FILM COATED, EXTENDED RELEASE ORAL
Qty: 60 TABLET | Refills: 0 | Status: SHIPPED | OUTPATIENT
Start: 2023-06-16 | End: 2023-07-13

## 2023-06-27 ENCOUNTER — MEDICAL CORRESPONDENCE (OUTPATIENT)
Dept: HEALTH INFORMATION MANAGEMENT | Facility: CLINIC | Age: 83
End: 2023-06-27
Payer: MEDICARE

## 2023-06-29 ENCOUNTER — TELEPHONE (OUTPATIENT)
Dept: INTERNAL MEDICINE | Facility: CLINIC | Age: 83
End: 2023-06-29
Payer: MEDICARE

## 2023-06-29 DIAGNOSIS — G89.29 OTHER CHRONIC PAIN: ICD-10-CM

## 2023-06-29 DIAGNOSIS — M54.32 BACK PAIN WITH LEFT-SIDED SCIATICA: ICD-10-CM

## 2023-06-29 RX ORDER — OXYCODONE HCL 40 MG/1
TABLET, FILM COATED, EXTENDED RELEASE ORAL
Qty: 30 TABLET | Refills: 0 | Status: SHIPPED | OUTPATIENT
Start: 2023-06-29 | End: 2023-07-28

## 2023-06-29 NOTE — TELEPHONE ENCOUNTER
Prior Authorization Retail Medication Request    Medication/Dose: Oxycontin 40mg  ICD code (if different than what is on RX):    Previously Tried and Failed:    Rationale:  Generic on backorder    Insurance Name:  NDPERS PART D   Insurance ID:  [id number]      Pharmacy Information (if different than what is on RX)  Name:  [facility]   Phone:  [phone]    Hello,     All of the generic oxycodone strengths are on backorder, so we are looking to get a PA for the brand name Oxycontin. Please call [facility] for any updates on the PA.     Thanks,   Ciara Jimenez, PhT  [facility]

## 2023-07-05 NOTE — TELEPHONE ENCOUNTER
Prior Authorization Not Needed per Insurance    Medication: OXYCODONE HCL ER 40 MG PO T12A  Insurance Company: [employer] - Phone [phone] Fax [phone]  Expected CoPay:      Pharmacy Filling the Rx: [facility] - [place], [place] - [address] [place] [address]  Pharmacy Notified: Yes  Patient Notified: Yes    Spoke with pharmacy, they were running incorrect NDC. Changed to correct one and got a paid claim on brand name.

## 2023-07-13 DIAGNOSIS — F11.20 OPIOID TYPE DEPENDENCE, CONTINUOUS (H): ICD-10-CM

## 2023-07-13 DIAGNOSIS — M06.9 RHEUMATOID ARTHRITIS, INVOLVING UNSPECIFIED SITE, UNSPECIFIED WHETHER RHEUMATOID FACTOR PRESENT (H): ICD-10-CM

## 2023-07-13 RX ORDER — OXYCODONE HYDROCHLORIDE 10 MG/1
TABLET, FILM COATED, EXTENDED RELEASE ORAL
Qty: 60 TABLET | Refills: 0 | Status: SHIPPED | OUTPATIENT
Start: 2023-07-13 | End: 2023-08-21

## 2023-07-24 DIAGNOSIS — I10 PRIMARY HYPERTENSION: ICD-10-CM

## 2023-07-25 RX ORDER — ISOSORBIDE MONONITRATE 10 MG/1
TABLET ORAL
Qty: 180 TABLET | Refills: 3 | Status: SHIPPED | OUTPATIENT
Start: 2023-07-25

## 2023-07-25 NOTE — TELEPHONE ENCOUNTER
"Routing refill request to provider for review/approval because:  Requesting too soon for [facility] to fill    Last Written Prescription Date:  8/12/22  Last Fill Quantity: 180,  # refills: 3   Last office visit provider:  12/30/22     Requested Prescriptions   Pending Prescriptions Disp Refills    isosorbide mononitrate (ISMO/MONOKET) 10 MG tablet [Pharmacy Med Name: ISOSORBIDE MONONITRATE 10MG TABS] 180 tablet 3     Sig: TAKE ONE TABLET BY MOUTH TWICE A DAY       Nitrates Passed - 7/25/2023  2:23 AM        Passed - Blood pressure under 140/90 in past 12 months     BP Readings from Last 3 Encounters:   09/20/22 126/68   04/07/22 124/62   03/21/22 (!) 154/79                 Passed - Pt is not on erectile dysfunction medications        Passed - Recent (12 mo) or future (30 days) visit within the authorizing provider's specialty     Patient has had an office visit with the authorizing provider or a provider within the authorizing providers department within the previous 12 mos or has a future within next 30 days. See \"Patient Info\" tab in inbasket, or \"Choose Columns\" in Meds & Orders section of the refill encounter.              Passed - Medication is active on med list        Passed - Patient is age 18 or older             Angela Seay RN 07/25/23 2:24 AM  "

## 2023-07-28 DIAGNOSIS — M54.32 BACK PAIN WITH LEFT-SIDED SCIATICA: ICD-10-CM

## 2023-07-28 DIAGNOSIS — G89.29 OTHER CHRONIC PAIN: ICD-10-CM

## 2023-08-19 DIAGNOSIS — F11.20 OPIOID TYPE DEPENDENCE, CONTINUOUS (H): ICD-10-CM

## 2023-08-19 DIAGNOSIS — M06.9 RHEUMATOID ARTHRITIS, INVOLVING UNSPECIFIED SITE, UNSPECIFIED WHETHER RHEUMATOID FACTOR PRESENT (H): ICD-10-CM

## 2023-08-21 ENCOUNTER — PATIENT OUTREACH (OUTPATIENT)
Dept: CARE COORDINATION | Facility: CLINIC | Age: 83
End: 2023-08-21
Payer: MEDICARE

## 2023-08-21 RX ORDER — OXYCODONE HYDROCHLORIDE 10 MG/1
TABLET, FILM COATED, EXTENDED RELEASE ORAL
Qty: 60 TABLET | Refills: 0 | Status: SHIPPED | OUTPATIENT
Start: 2023-08-21 | End: 2023-09-21

## 2023-09-04 ENCOUNTER — PATIENT OUTREACH (OUTPATIENT)
Dept: CARE COORDINATION | Facility: CLINIC | Age: 83
End: 2023-09-04
Payer: MEDICARE

## 2023-09-09 DIAGNOSIS — M54.32 BACK PAIN WITH LEFT-SIDED SCIATICA: ICD-10-CM

## 2023-09-09 DIAGNOSIS — G89.29 OTHER CHRONIC PAIN: ICD-10-CM

## 2023-09-20 DIAGNOSIS — M06.9 RHEUMATOID ARTHRITIS, INVOLVING UNSPECIFIED SITE, UNSPECIFIED WHETHER RHEUMATOID FACTOR PRESENT (H): ICD-10-CM

## 2023-09-20 DIAGNOSIS — F11.20 OPIOID TYPE DEPENDENCE, CONTINUOUS (H): ICD-10-CM

## 2023-09-21 DIAGNOSIS — E03.9 ACQUIRED HYPOTHYROIDISM: ICD-10-CM

## 2023-09-21 RX ORDER — OXYCODONE HYDROCHLORIDE 10 MG/1
TABLET, FILM COATED, EXTENDED RELEASE ORAL
Qty: 60 TABLET | Refills: 0 | Status: SHIPPED | OUTPATIENT
Start: 2023-09-21 | End: 2023-10-16

## 2023-09-21 RX ORDER — LEVOTHYROXINE SODIUM 75 UG/1
TABLET ORAL
Qty: 90 TABLET | Refills: 3 | Status: SHIPPED | OUTPATIENT
Start: 2023-09-21 | End: 2024-09-17

## 2023-10-11 ENCOUNTER — TELEPHONE (OUTPATIENT)
Dept: INTERNAL MEDICINE | Facility: CLINIC | Age: 83
End: 2023-10-11
Payer: MEDICARE

## 2023-10-11 NOTE — TELEPHONE ENCOUNTER
October 11, 2023    Home health orders was received via fax for Dr. Ashley to sign.  Patient label was attached to paperwork and placed in provider's inbox to be signed.    Bozena Barr

## 2023-10-13 NOTE — TELEPHONE ENCOUNTER
October 13, 2023    Home health orders was picked up from outbox of Dr. Ashley.  Paperwork has been reviewed and is complete.  Per initial initial request, this was sent via fax to [phone].     Bozena Barr

## 2023-10-13 NOTE — TELEPHONE ENCOUNTER
October 13, 2023    Home health orders was picked up from outbox of Dr. Ashley.  Paperwork has been reviewed and is complete.  Per initial initial request, this was sent via fax to [phone].     Bozena Barr

## 2023-10-16 DIAGNOSIS — M06.9 RHEUMATOID ARTHRITIS, INVOLVING UNSPECIFIED SITE, UNSPECIFIED WHETHER RHEUMATOID FACTOR PRESENT (H): ICD-10-CM

## 2023-10-16 DIAGNOSIS — G89.29 OTHER CHRONIC PAIN: ICD-10-CM

## 2023-10-16 DIAGNOSIS — F11.20 OPIOID TYPE DEPENDENCE, CONTINUOUS (H): ICD-10-CM

## 2023-10-16 DIAGNOSIS — M54.32 BACK PAIN WITH LEFT-SIDED SCIATICA: ICD-10-CM

## 2023-10-16 RX ORDER — OXYCODONE HYDROCHLORIDE 10 MG/1
10 TABLET, FILM COATED, EXTENDED RELEASE ORAL EVERY 12 HOURS
Qty: 60 TABLET | Refills: 0 | OUTPATIENT
Start: 2023-10-16

## 2023-10-16 RX ORDER — OXYCODONE HYDROCHLORIDE 10 MG/1
10 TABLET, FILM COATED, EXTENDED RELEASE ORAL EVERY 12 HOURS
Qty: 60 TABLET | Refills: 0 | Status: SHIPPED | OUTPATIENT
Start: 2023-10-16 | End: 2023-11-02

## 2023-10-16 RX ORDER — OXYCODONE HCL 40 MG/1
40 TABLET, FILM COATED, EXTENDED RELEASE ORAL EVERY 24 HOURS
Qty: 30 TABLET | Refills: 0 | Status: SHIPPED | OUTPATIENT
Start: 2023-10-16 | End: 2023-11-24

## 2023-11-02 ENCOUNTER — OFFICE VISIT (OUTPATIENT)
Dept: INTERNAL MEDICINE | Facility: CLINIC | Age: 83
End: 2023-11-02
Payer: MEDICARE

## 2023-11-02 VITALS
DIASTOLIC BLOOD PRESSURE: 62 MMHG | HEIGHT: 61 IN | SYSTOLIC BLOOD PRESSURE: 108 MMHG | OXYGEN SATURATION: 98 % | WEIGHT: 124.5 LBS | RESPIRATION RATE: 12 BRPM | HEART RATE: 89 BPM | TEMPERATURE: 97.9 F | BODY MASS INDEX: 23.5 KG/M2

## 2023-11-02 DIAGNOSIS — K21.9 GASTROESOPHAGEAL REFLUX DISEASE WITHOUT ESOPHAGITIS: ICD-10-CM

## 2023-11-02 DIAGNOSIS — J30.89 SEASONAL ALLERGIC RHINITIS DUE TO OTHER ALLERGIC TRIGGER: ICD-10-CM

## 2023-11-02 DIAGNOSIS — I87.2 VENOUS STASIS DERMATITIS OF BOTH LOWER EXTREMITIES: ICD-10-CM

## 2023-11-02 DIAGNOSIS — Z00.00 ANNUAL PHYSICAL EXAM: Primary | ICD-10-CM

## 2023-11-02 DIAGNOSIS — M51.379 DEGENERATION OF LUMBAR OR LUMBOSACRAL INTERVERTEBRAL DISC: ICD-10-CM

## 2023-11-02 DIAGNOSIS — N25.81 SECONDARY RENAL HYPERPARATHYROIDISM (H): ICD-10-CM

## 2023-11-02 DIAGNOSIS — I87.2 VENOUS INSUFFICIENCY OF BOTH LOWER EXTREMITIES: ICD-10-CM

## 2023-11-02 DIAGNOSIS — Z13.220 SCREENING FOR HYPERLIPIDEMIA: ICD-10-CM

## 2023-11-02 DIAGNOSIS — M81.0 AGE-RELATED OSTEOPOROSIS WITHOUT CURRENT PATHOLOGICAL FRACTURE: ICD-10-CM

## 2023-11-02 DIAGNOSIS — L97.511 SKIN ULCER OF TOE OF RIGHT FOOT, LIMITED TO BREAKDOWN OF SKIN (H): ICD-10-CM

## 2023-11-02 DIAGNOSIS — M54.32 BACK PAIN WITH LEFT-SIDED SCIATICA: ICD-10-CM

## 2023-11-02 DIAGNOSIS — J41.0 SIMPLE CHRONIC BRONCHITIS (H): ICD-10-CM

## 2023-11-02 DIAGNOSIS — F51.01 PRIMARY INSOMNIA: ICD-10-CM

## 2023-11-02 DIAGNOSIS — E03.9 ACQUIRED HYPOTHYROIDISM: ICD-10-CM

## 2023-11-02 DIAGNOSIS — M05.79 RHEUMATOID ARTHRITIS INVOLVING MULTIPLE SITES WITH POSITIVE RHEUMATOID FACTOR (H): ICD-10-CM

## 2023-11-02 DIAGNOSIS — M20.41 HAMMER TOE OF RIGHT FOOT: ICD-10-CM

## 2023-11-02 DIAGNOSIS — I10 PRIMARY HYPERTENSION: ICD-10-CM

## 2023-11-02 DIAGNOSIS — F11.20 OPIOID TYPE DEPENDENCE, CONTINUOUS (H): ICD-10-CM

## 2023-11-02 DIAGNOSIS — R11.0 NAUSEA: ICD-10-CM

## 2023-11-02 LAB
ALBUMIN SERPL BCG-MCNC: 3.9 G/DL (ref 3.5–5.2)
ALP SERPL-CCNC: 61 U/L (ref 35–104)
ALT SERPL W P-5'-P-CCNC: 10 U/L (ref 0–50)
ANION GAP SERPL CALCULATED.3IONS-SCNC: 10 MMOL/L (ref 7–15)
AST SERPL W P-5'-P-CCNC: 21 U/L (ref 0–45)
BILIRUB SERPL-MCNC: 0.2 MG/DL
BUN SERPL-MCNC: 17.2 MG/DL (ref 8–23)
CALCIUM SERPL-MCNC: 8.7 MG/DL (ref 8.8–10.2)
CHLORIDE SERPL-SCNC: 102 MMOL/L (ref 98–107)
CHOLEST SERPL-MCNC: 162 MG/DL
CREAT SERPL-MCNC: 0.87 MG/DL (ref 0.51–0.95)
DEPRECATED HCO3 PLAS-SCNC: 25 MMOL/L (ref 22–29)
EGFRCR SERPLBLD CKD-EPI 2021: 65 ML/MIN/1.73M2
ERYTHROCYTE [DISTWIDTH] IN BLOOD BY AUTOMATED COUNT: 13.2 % (ref 10–15)
GLUCOSE SERPL-MCNC: 94 MG/DL (ref 70–99)
HCT VFR BLD AUTO: 43.5 % (ref 35–47)
HDLC SERPL-MCNC: 48 MG/DL
HGB BLD-MCNC: 13.4 G/DL (ref 11.7–15.7)
LDLC SERPL CALC-MCNC: 93 MG/DL
MCH RBC QN AUTO: 29.6 PG (ref 26.5–33)
MCHC RBC AUTO-ENTMCNC: 30.8 G/DL (ref 31.5–36.5)
MCV RBC AUTO: 96 FL (ref 78–100)
NONHDLC SERPL-MCNC: 114 MG/DL
PLATELET # BLD AUTO: 320 10E3/UL (ref 150–450)
POTASSIUM SERPL-SCNC: 4.5 MMOL/L (ref 3.4–5.3)
PROT SERPL-MCNC: 7.1 G/DL (ref 6.4–8.3)
RBC # BLD AUTO: 4.52 10E6/UL (ref 3.8–5.2)
SODIUM SERPL-SCNC: 137 MMOL/L (ref 135–145)
TRIGL SERPL-MCNC: 104 MG/DL
TSH SERPL DL<=0.005 MIU/L-ACNC: 0.83 UIU/ML (ref 0.3–4.2)
VIT D+METAB SERPL-MCNC: 63 NG/ML (ref 20–50)
WBC # BLD AUTO: 10.4 10E3/UL (ref 4–11)

## 2023-11-02 PROCEDURE — 82306 VITAMIN D 25 HYDROXY: CPT | Performed by: INTERNAL MEDICINE

## 2023-11-02 PROCEDURE — 85027 COMPLETE CBC AUTOMATED: CPT | Performed by: INTERNAL MEDICINE

## 2023-11-02 PROCEDURE — G0480 DRUG TEST DEF 1-7 CLASSES: HCPCS | Mod: 90 | Performed by: INTERNAL MEDICINE

## 2023-11-02 PROCEDURE — 36415 COLL VENOUS BLD VENIPUNCTURE: CPT | Performed by: INTERNAL MEDICINE

## 2023-11-02 PROCEDURE — G0439 PPPS, SUBSEQ VISIT: HCPCS | Performed by: INTERNAL MEDICINE

## 2023-11-02 PROCEDURE — 99214 OFFICE O/P EST MOD 30 MIN: CPT | Mod: 25 | Performed by: INTERNAL MEDICINE

## 2023-11-02 PROCEDURE — 99000 SPECIMEN HANDLING OFFICE-LAB: CPT | Performed by: INTERNAL MEDICINE

## 2023-11-02 PROCEDURE — 80053 COMPREHEN METABOLIC PANEL: CPT | Performed by: INTERNAL MEDICINE

## 2023-11-02 PROCEDURE — 80061 LIPID PANEL: CPT | Performed by: INTERNAL MEDICINE

## 2023-11-02 PROCEDURE — 84443 ASSAY THYROID STIM HORMONE: CPT | Performed by: INTERNAL MEDICINE

## 2023-11-02 RX ORDER — ALBUTEROL SULFATE 90 UG/1
2 AEROSOL, METERED RESPIRATORY (INHALATION) EVERY 4 HOURS PRN
Qty: 18 G | Refills: 4 | Status: SHIPPED | OUTPATIENT
Start: 2023-11-02

## 2023-11-02 RX ORDER — LORATADINE 10 MG/1
10 TABLET ORAL DAILY
Qty: 90 TABLET | Refills: 4 | Status: SHIPPED | OUTPATIENT
Start: 2023-11-02

## 2023-11-02 RX ORDER — ONDANSETRON 4 MG/1
4 TABLET, FILM COATED ORAL EVERY 6 HOURS PRN
Qty: 30 TABLET | Refills: 4 | Status: SHIPPED | OUTPATIENT
Start: 2023-11-02

## 2023-11-02 RX ORDER — CARISOPRODOL 350 MG/1
350 TABLET ORAL 3 TIMES DAILY PRN
Qty: 270 TABLET | Refills: 3 | Status: SHIPPED | OUTPATIENT
Start: 2023-11-02

## 2023-11-02 RX ORDER — OXYCODONE HYDROCHLORIDE 10 MG/1
20 TABLET, FILM COATED, EXTENDED RELEASE ORAL EVERY 12 HOURS
COMMUNITY
Start: 2023-11-02 | End: 2023-11-22

## 2023-11-02 RX ORDER — RESPIRATORY SYNCYTIAL VIRUS VACCINE 120MCG/0.5
0.5 KIT INTRAMUSCULAR ONCE
Qty: 1 EACH | Refills: 0 | Status: CANCELLED | OUTPATIENT
Start: 2023-11-02 | End: 2023-11-02

## 2023-11-02 RX ORDER — ASPIRIN 81 MG/1
81 TABLET, CHEWABLE ORAL DAILY
Qty: 90 TABLET | Refills: 4 | Status: SHIPPED | OUTPATIENT
Start: 2023-11-02 | End: 2023-11-02

## 2023-11-02 ASSESSMENT — ENCOUNTER SYMPTOMS
SORE THROAT: 0
NAUSEA: 1
EYE PAIN: 0
PALPITATIONS: 0
HEARTBURN: 1
DIZZINESS: 0
FREQUENCY: 0
BREAST MASS: 0
MYALGIAS: 1
SHORTNESS OF BREATH: 0
DIARRHEA: 0
COUGH: 0
CHILLS: 0
ABDOMINAL PAIN: 0
HEMATURIA: 0
JOINT SWELLING: 1
DYSURIA: 0
FEVER: 0
PARESTHESIAS: 0
WEAKNESS: 1
HEADACHES: 0
HEMATOCHEZIA: 0
NERVOUS/ANXIOUS: 0
CONSTIPATION: 0
ARTHRALGIAS: 1

## 2023-11-02 ASSESSMENT — ACTIVITIES OF DAILY LIVING (ADL)
CURRENT_FUNCTION: HOUSEWORK REQUIRES ASSISTANCE
CURRENT_FUNCTION: SHOPPING REQUIRES ASSISTANCE
CURRENT_FUNCTION: BATHING REQUIRES ASSISTANCE
CURRENT_FUNCTION: TRANSPORTATION REQUIRES ASSISTANCE
CURRENT_FUNCTION: LAUNDRY REQUIRES ASSISTANCE
CURRENT_FUNCTION: PREPARING MEALS REQUIRES ASSISTANCE

## 2023-11-02 NOTE — PROGRESS NOTES
"SUBJECTIVE:   Jacqueline is a 83 year old who presents for Preventive Visit.      11/2/2023    12:17 PM   Additional Questions   Roomed by WILBER Car   Accompanied by Lia- daughter       Are you in the first 12 months of your Medicare coverage?  No    Healthy Habits:     In general, how would you rate your overall health?  Fair    Frequency of exercise:  2-3 days/week    Duration of exercise:  Less than 15 minutes    Do you usually eat at least 4 servings of fruit and vegetables a day, include whole grains    & fiber and avoid regularly eating high fat or \"junk\" foods?  Yes    Taking medications regularly:  Yes    Medication side effects:  None    Ability to successfully perform activities of daily living:  Transportation requires assistance, shopping requires assistance, preparing meals requires assistance, housework requires assistance, bathing requires assistance and laundry requires assistance    Home Safety:  No safety concerns identified    Hearing Impairment:  No hearing concerns    In the past 6 months, have you been bothered by leaking of urine?  No    In general, how would you rate your overall mental or emotional health?  Good    Additional concerns today:  No      Today's PHQ-2 Score:       11/2/2023    12:11 PM   PHQ-2 ( 1999 [employer])   Q1: Little interest or pleasure in doing things 0   Q2: Feeling down, depressed or hopeless 0   PHQ-2 Score 0   Q1: Little interest or pleasure in doing things Not at all   Q2: Feeling down, depressed or hopeless Not at all   PHQ-2 Score 0           Have you ever done Advance Care Planning? (For example, a Health Directive, POLST, or a discussion with a medical provider or your loved ones about your wishes): Yes, advance care planning is on file.      Fall risk  Fallen 2 or more times in the past year?: No  Any fall with injury in the past year?: No    Cognitive Screening   1) Repeat 3 items (Leader, Season, Table)    2) Clock draw:  Normal-   3) 3 item recall:    Season, " leader, table  Results: 2 words out of 3    Mini-CogTM Copyright KENDRICK Rutledge. Licensed by the author for use in [facility]; reprinted with permission (jey@[facility]). All rights reserved.        Reviewed and updated as needed this visit by clinical staff   Tobacco  Allergies  Meds              Reviewed and updated as needed this visit by Provider                 Social History     Tobacco Use    Smoking status: Former     Packs/day: .5     Types: Cigarettes     Quit date: 2018     Years since quittin.4    Smokeless tobacco: Current    Tobacco comments:     started smoking when she was 21 years old, vaping   Substance Use Topics    Alcohol use: Yes     Comment: Alcoholic Drinks/day: glass of wine during holiday             2023    12:10 PM   Alcohol Use   Prescreen: >3 drinks/day or >7 drinks/week? No   Do you have a current opioid prescription? Yes   How severe is your pain on a scale from 1-10? 10            No data to display              Low Risk (0-3)  Moderate Risk (4-7)  High Risk (>8)  Do you use any other controlled substances or medications that are not prescribed by a provider? None      Current providers sharing in care for this patient include:   Patient Care Team:  Brad Ashley MD as PCP - General (Internal Medicine)  Brad Ashley MD as Assigned PCP  Brad Ashley MD as Assigned Pain Medication Provider    The following health maintenance items are reviewed in Epic and correct as of today:  Health Maintenance   Topic Date Due    NICOTINE/TOBACCO CESSATION COUNSELING Q 1 YR  Never done    RSV VACCINE (Pregnancy & 60+) (1 - 1-dose 60+ series) Never done    INFLUENZA VACCINE (1) 2023    MEDICARE ANNUAL WELLNESS VISIT  2023    TSH W/FREE T4 REFLEX  2023    URINE DRUG SCREEN  2023    ANNUAL REVIEW OF HM ORDERS  2023    FALL RISK ASSESSMENT  2024    ADVANCE CARE PLANNING  10/06/2027    DTAP/TDAP/TD IMMUNIZATION (3 - Td or Tdap)  "03/10/2032    DEXA  04/08/2036    PHQ-2 (once per calendar year)  Completed    Pneumococcal Vaccine: 65+ Years  Completed    HEPATITIS A IMMUNIZATION  Completed    COVID-19 Vaccine  Completed    IPV IMMUNIZATION  Aged Out    HPV IMMUNIZATION  Aged Out    MENINGITIS IMMUNIZATION  Aged Out    RSV MONOCLONAL ANTIBODY  Aged Out    ZOSTER IMMUNIZATION  Discontinued         Pertinent mammograms are reviewed under the imaging tab.    Review of Systems   Constitutional:  Negative for chills and fever.   HENT:  Negative for congestion, ear pain, hearing loss and sore throat.    Eyes:  Negative for pain and visual disturbance.   Respiratory:  Negative for cough and shortness of breath.    Cardiovascular:  Negative for chest pain, palpitations and peripheral edema.   Gastrointestinal:  Positive for heartburn and nausea. Negative for abdominal pain, constipation, diarrhea and hematochezia.   Breasts:  Negative for tenderness, breast mass and discharge.   Genitourinary:  Negative for dysuria, frequency, genital sores, hematuria, pelvic pain, urgency, vaginal bleeding and vaginal discharge.   Musculoskeletal:  Positive for arthralgias, joint swelling and myalgias.   Skin:  Negative for rash.   Neurological:  Positive for weakness. Negative for dizziness, headaches and paresthesias.   Psychiatric/Behavioral:  Negative for mood changes. The patient is not nervous/anxious.        OBJECTIVE:   /62 (BP Location: Left arm, Patient Position: Sitting, Cuff Size: Adult Regular)   Pulse 89   Temp 97.9  F (36.6  C) (Tympanic)   Resp 12   Ht 1.549 m (5' 1\")   Wt 56.5 kg (124 lb 8 oz)   SpO2 98%   BMI 23.52 kg/m   Estimated body mass index is 23.52 kg/m  as calculated from the following:    Height as of this encounter: 1.549 m (5' 1\").    Weight as of this encounter: 56.5 kg (124 lb 8 oz).  Physical Exam  EYES: Eyelids, conjunctiva, and sclera were normal. Pupils were normal. Cornea, iris, and lens were normal bilaterally.  HEAD, " EARS, NOSE, MOUTH, AND THROAT: Head and face were normal. Hearing was normal to voice and the ears were normal to external exam. Nose appearance was normal and there was no discharge.   NECK: Neck appearance was normal.   RESPIRATORY: Breathing pattern was normal and the chest moved symmetrically.  Percussion/auscultatory percussion was normal.  Lung sounds were normal and there were no abnormal sounds.  CARDIOVASCULAR: Heart rate and rhythm were normal.  S1 and S2 were normal and there were no extra sounds or murmurs. Peripheral pulses in arms and legs were normal.  Jugular venous pressure was normal.    GASTROINTESTINAL: The abdomen was normal in contour.   MUSCULOSKELETAL: She has kyphosis, absence of second toe on her right foot hammertoes on the remaining toes with significant degeneration of the first MTP joint ulceration on the fourth toe dorsally and ulceration on the fifth toe adjacent to the fourth toe  NEUROLOGIC: The patient was alert and oriented to person, place, time, and circumstance. Speech was normal. Cranial nerves were normal.   PSYCHIATRIC:  Mood and affect were normal and the patient had normal recent and remote memory. The patient's judgment and insight were normal.  ASSESSMENT / PLAN:   1. Annual physical exam  This is an 83-year-old woman with issues as discussed below    2. Opioid Dependence With Continuous Use  This is a patient who has been on fairly high dose of OxyContin for many years before I met her.  She has severe rheumatoid arthritis and osteoarthritis.  She has been on stable dose and her PDMP is reviewed.  She does have Narcan at home.  Previous attempts by former providers to de-escalate opioid therapy has not been successful.  Continue current plan for now  - [id number] - Urine Drug Confirmation Panel (Comprehensive); Future  - OXYCONTIN 10 MG 12 hr tablet; Take 2 tablets (20 mg) by mouth every 12 hours  - Oxycodone and Metabolite; Future  - Oxycodone and Metabolite    3.  Rheumatoid arthritis (H) - Marianne  Continue current management per rheumatology    4. Back pain with left-sided sciatica  - amitriptyline (ELAVIL) 25 MG tablet; Take 1 tablet (25 mg) by mouth at bedtime  Dispense: 90 tablet; Refill: 4  - carisoprodol (SOMA) 350 MG tablet; Take 1 tablet (350 mg) by mouth 3 times daily as needed for muscle spasms  Dispense: 270 tablet; Refill: 3    5. Simple chronic bronchitis (H)  He is no longer smoking  - albuterol (PROAIR HFA/PROVENTIL HFA/VENTOLIN HFA) 108 (90 Base) MCG/ACT inhaler; Inhale 2 puffs into the lungs every 4 hours as needed for wheezing  Dispense: 18 g; Refill: 4    6. Seasonal allergic rhinitis due to other allergic trigger  - loratadine (CLARITIN) 10 MG tablet; Take 1 tablet (10 mg) by mouth daily  Dispense: 90 tablet; Refill: 4    7. Gastroesophageal reflux disease without esophagitis  - omeprazole (PRILOSEC) 20 MG DR capsule; Take 1 capsule (20 mg) by mouth every morning (before breakfast)  Dispense: 90 capsule; Refill: 4    8. Nausea  - ondansetron (ZOFRAN) 4 MG tablet; Take 1 tablet (4 mg) by mouth every 6 hours as needed for nausea  Dispense: 30 tablet; Refill: 4    9. Acquired hypothyroidism  Continue levothyroxine  - TSH with free T4 reflex; Future  - TSH with free T4 reflex    10. Venous stasis dermatitis of both lower extremities  11. Venous insufficiency of both lower extremities  Continue compression garments    12. Secondary Hyperparathyroidism    13. Primary insomnia    14. Osteoporosis - prolia  She is due in a few weeks  - Vitamin D Deficiency; Future  - Vitamin D Deficiency    15. Primary hypertension  Controlled  - CBC with platelets; Future  - Comprehensive metabolic panel; Future  - CBC with platelets  - Comprehensive metabolic panel    16. Lumbar Disc Degeneration    17. Hammer toe of right foot  Has seen Dr. Kendrick Pedraza, referral placed  - Orthopedic  Referral; Future    18. Skin ulcer of toe of right foot, limited to breakdown of  skin (H)  - Orthopedic  Referral; Future    19. Screening for hyperlipidemia  - Lipid panel reflex to direct LDL Fasting; Future  - Lipid panel reflex to direct LDL Fasting    COUNSELING:  Reviewed preventive health counseling, as reflected in patient instructions    She reports that she quit smoking about 5 years ago. Her smoking use included cigarettes. She smoked an average of .5 packs per day. She uses smokeless tobacco.    Appropriate preventive services were discussed with this patient, including applicable screening as appropriate for fall prevention, nutrition, physical activity, Tobacco-use cessation, weight loss and cognition.  Checklist reviewing preventive services available has been given to the patient.    Reviewed patients plan of care and provided an AVS. The Basic Care Plan (routine screening as documented in Health Maintenance) for Jacqueline meets the Care Plan requirement. This Care Plan has been established and reviewed with the Patient.          Brad Ashley MD  [facility]    Identified Health Risks:  I have reviewed Opioid Use Disorder and Substance Use Disorder risk factors and made any needed referrals.

## 2023-11-02 NOTE — LETTER
Opioid / Opioid Plus Controlled Substance Agreement    This is an agreement between you and your provider about the safe and appropriate use of controlled substance/opioids prescribed by your care team. Controlled substances are medicines that can cause physical and mental dependence (abuse).    There are strict laws about having and using these medicines. We here at [facility] are committing to working with you in your efforts to get better. To support you in this work, we ll help you schedule regular office appointments for medicine refills. If we must cancel or change your appointment for any reason, we ll make sure you have enough medicine to last until your next appointment.     As a Provider, I will:  Listen carefully to your concerns and treat you with respect.   Recommend a treatment plan that I believe is in your best interest. This plan may involve therapies other than opioid pain medication.   Talk with you often about the possible benefits, and the risk of harm of any medicine that we prescribe for you.   Provide a plan on how to taper (discontinue or go off) using this medicine if the decision is made to stop its use.    As a Patient, I understand that opioid(s):   Are a controlled substance prescribed by my care team to help me function or work and manage my condition(s).   Are strong medicines and can cause serious side effects such as:  Drowsiness, which can seriously affect my driving ability  A lower breathing rate, enough to cause death  Harm to my thinking ability   Depression   Abuse of and addiction to this medicine  Need to be taken exactly as prescribed. Combining opioids with certain medicines or chemicals (such as illegal drugs, sedatives, sleeping pills, and benzodiazepines) can be dangerous or even fatal. If I stop opioids suddenly, I may have severe withdrawal symptoms.  Do not work for all types of pain nor for all patients. If they re not helpful, I may be asked to stop  them.        The risks, benefits and side effects of these medicine(s) were explained to me. I agree that:  I will take part in other treatments as advised by my care team. This may be psychiatry or counseling, physical therapy, behavioral therapy, group treatment or a referral to a specialist.     I will keep all my appointments. I understand that this is part of the monitoring of opioids. My care team may require an office visit for EVERY opioid/controlled substance refill. If I miss appointments or don t follow instructions, my care team may stop my medicine.    I will take my medicines as prescribed. I will not change the dose or schedule unless my care team tells me to. There will be no refills if I run out early.     I may be asked to come to the clinic and complete a urine drug test or complete a pill count at any time. If I don t give a urine sample or participate in a pill count, the care team may stop my medicine.    I will only receive prescriptions from this clinic for chronic pain. If I am treated by another provider for acute pain issues, I will tell them that I am taking opioid pain medication for chronic pain and that I have a treatment agreement with this provider. I will inform my [facility] care team within one business day if I am given a prescription for any pain medication by another healthcare provider. My [facility] care team can contact other providers and pharmacists about my use of any medicines.    It is up to me to make sure that I don t run out of my medicines on weekends or holidays. If my care team is willing to refill my opioid prescription without a visit, I must request refills only during office hours. Refills may take up to 3 business days to process. I will use one pharmacy to fill all my opioid and other controlled substance prescriptions. I will notify the clinic about any changes to my insurance or medication availability.    I am responsible for my  prescriptions. If the medicine/prescription is lost, stolen or destroyed, it will not be replaced. I also agree not to share controlled substance medicines with anyone.    I am aware I should not use any illegal or recreational drugs. I agree not to drink alcohol unless my care team says I can.       If I enroll in the [place] Medical Cannabis program, I will tell my care team prior to my next refill.     I will tell my care team right away if I become pregnant, have a new medical problem treated outside of my regular clinic, or have a change in my medications.    I understand that this medicine can affect my thinking, judgment and reaction time. Alcohol and drugs affect the brain and body, which can affect the safety of my driving. Being under the influence of alcohol or drugs can affect my decision-making, behaviors, personal safety, and the safety of others. Driving while impaired (DWI) can occur if a person is driving, operating, or in physical control of a car, motorcycle, boat, snowmobile, ATV, motorbike, off-road vehicle, or any other motor vehicle ([place] Statute 169A.20). I understand the risk if I choose to drive or operate any vehicle or machinery.    I understand that if I do not follow any of the conditions above, my prescriptions or treatment may be stopped or changed.          Opioids  What You Need to Know    What are opioids?   Opioids are pain medicines that must be prescribed by a doctor. They are also known as narcotics.     Examples are:   morphine (MS Contin, Rossana)  oxycodone (Oxycontin)  oxycodone and acetaminophen (Percocet)  hydrocodone and acetaminophen (Vicodin, Norco)   fentanyl patch (Duragesic)   hydromorphone (Dilaudid)   methadone  codeine (Tylenol #3)     What do opioids do well?   Opioids are best for severe short-term pain such as after a surgery or injury. They may work well for cancer pain. They may help some people with long-lasting (chronic) pain.     What do opioids NOT do  well?   Opioids never get rid of pain entirely, and they don t work well for most patients with chronic pain. Opioids don t reduce swelling, one of the causes of pain.                                    Other ways to manage chronic pain and improve function include:     Treat the health problem that may be causing pain  Anti-inflammation medicines, which reduce swelling and tenderness, such as ibuprofen (Advil, Motrin) or naproxen (Aleve)  Acetaminophen (Tylenol)  Antidepressants and anti-seizure medicines, especially for nerve pain  Topical treatments such as patches or creams  Injections or nerve blocks  Chiropractic or osteopathic treatment  Acupuncture, massage, deep breathing, meditation, visual imagery, aromatherapy  Use heat or ice at the pain site  Physical therapy   Exercise  Stop smoking  Take part in therapy       Risks and side effects     Talk to your doctor before you start or decide to keep taking opioids. Possible side effects include:    Lowering your breathing rate enough to cause death  Overdose, including death, especially if taking higher than prescribed doses  Worse depression symptoms; less pleasure in things you usually enjoy  Feeling tired or sluggish  Slower thoughts or cloudy thinking  Being more sensitive to pain over time; pain is harder to control  Trouble sleeping or restless sleep  Changes in hormone levels (for example, less testosterone)  Changes in sex drive or ability to have sex  Constipation  Unsafe driving  Itching and sweating  Dizziness  Nausea, throwing up and dry mouth    What else should I know about opioids?    Opioids may lead to dependence, tolerance, or addiction.    Dependence means that if you stop or reduce the medicine too quickly, you will have withdrawal symptoms. These include loose poop (diarrhea), jitters, flu-like symptoms, nervousness and tremors. Dependence is not the same as addiction.                     Tolerance means needing higher doses over time to  get the same effect. This may increase the chance of serious side effects.    Addiction is when people improperly use a substance that harms their body, their mind or their relations with others. Use of opiates can cause a relapse of addiction if you have a history of drug or alcohol abuse.    People who have used opioids for a long time may have a lower quality of life, worse depression, higher levels of pain and more visits to doctors.    You can overdose on opioids. Take these steps to lower your risk of overdose:    Recognize the signs:  Signs of overdose include decrease or loss of consciousness (blackout), slowed breathing, trouble waking up and blue lips. If someone is worried about overdose, they should call 911.    Talk to your doctor about Narcan (naloxone).   If you are at risk for overdose, you may be given a prescription for Narcan. This medicine very quickly reverses the effects of opioids.   If you overdose, a friend or family member can give you Narcan while waiting for the ambulance. They need to know the signs of overdose and how to give Narcan.     Don't use alcohol or street drugs.   Taking them with opioids can cause death.    Do not take any of these medicines unless your doctor says it s OK. Taking these with opioids can cause death:  Benzodiazepines, such as lorazepam (Ativan), alprazolam (Xanax) or diazepam (Valium)  Muscle relaxers, such as cyclobenzaprine (Flexeril)  Sleeping pills like zolpidem (Ambien)   Other opioids      How to keep you and other people safe while taking opioids:    Never share your opioids with others.  Opioid medicines are regulated by the Drug Enforcement Agency (ISABEL). Selling or sharing medications is a criminal act.    2. Be sure to store opioids in a secure place, locked up if possible. Young children can easily swallow them and overdose.    3. When you are traveling with your medicines, keep them in the original bottles. If you use a pill box, be sure you also  carry a copy of your medicine list from your clinic or pharmacy.    4. Safe disposal of opioids    Most pharmacies have places to get rid of medicine, called disposal kiosks. Medicine disposal options are also available in every [facility]. Search your county and  medication disposal  to find more options. You can find more details at:  [email or web address]     I agree that my provider, clinic care team, and pharmacy may work with any city, state or federal law enforcement agency that investigates the misuse, sale, or other diversion of my controlled medicine. I will allow my provider to discuss my care with, or share a copy of, this agreement with any other treating provider, pharmacy or emergency room where I receive care.    I have read this agreement and have asked questions about anything I did not understand.    _______________________________________________________  Patient Signature - Jacqueline Prado _____________________                   Date     _______________________________________________________  Provider Signature - Brad Ashley MD   _____________________                   Date     _______________________________________________________  Witness Signature (required if provider not present while patient signing)   _____________________                   Date

## 2023-11-09 LAB
OXYCODONE SERPL-MCNC: 77 NG/ML
OXYMORPHONE SERPL-MCNC: 2 NG/ML

## 2023-11-22 DIAGNOSIS — F11.20 OPIOID TYPE DEPENDENCE, CONTINUOUS (H): ICD-10-CM

## 2023-11-22 DIAGNOSIS — G89.29 OTHER CHRONIC PAIN: ICD-10-CM

## 2023-11-22 DIAGNOSIS — M54.32 BACK PAIN WITH LEFT-SIDED SCIATICA: ICD-10-CM

## 2023-11-22 RX ORDER — HYDROMORPHONE HYDROCHLORIDE 8 MG/1
TABLET, EXTENDED RELEASE ORAL
Refills: 0 | Status: CANCELLED | OUTPATIENT
Start: 2023-11-22

## 2023-11-22 NOTE — TELEPHONE ENCOUNTER
Medication Question or Refill    Contacts         Type Contact Phone/Fax    11/22/2023 04:27 PM CST Phone (Incoming) Lia Curiel (Emergency Contact) [phone]            What medication are you calling about (include dose and sig)?:     oxyCODONE (OXYCONTIN) 40 MG 12 hr tablet  40 mg, Oral, EVERY 24       Preferred Pharmacy:  Phone: [phone] Fax: [phone]    [facility] [place] - [place], [place] - [address] [place] [address]  Phone: [phone] Fax: [phone]    [place], [place] - [address]  [address] [address]  Phone: [phone] Fax: [phone]      Controlled Substance Agreement on file:   CSA -- Patient Level:     [Media Unavailable] Controlled Substance Agreement - Opioid - Scan on 11/3/2023  9:55 AM   [Media Unavailable] Controlled Substance Agreement - Opioid - Scan on 8/18/2021  3:05 PM   [Media Unavailable] Controlled Substance Agreement - Opioid - Scan on 6/11/2021   [Media Unavailable] Controlled Substance Agreement - Opioid - Scan on 2/10/2017: OUTSIDE RECORD       Who prescribed the medication?: Dion Barajas    Do you need a refill? yes      [facility] or would you prefer tto leave a detailed message?: Yes at Cell number on file:    Telephone Information:   Mobile [phone]

## 2023-11-24 RX ORDER — OXYCODONE HCL 40 MG/1
40 TABLET, FILM COATED, EXTENDED RELEASE ORAL EVERY 24 HOURS
Qty: 30 TABLET | Refills: 0 | Status: SHIPPED | OUTPATIENT
Start: 2023-11-24 | End: 2023-12-21

## 2023-11-24 RX ORDER — OXYCODONE HYDROCHLORIDE 10 MG/1
20 TABLET, FILM COATED, EXTENDED RELEASE ORAL EVERY 12 HOURS
Qty: 60 TABLET | Refills: 0 | Status: SHIPPED | OUTPATIENT
Start: 2023-11-24 | End: 2023-12-21

## 2023-11-24 NOTE — TELEPHONE ENCOUNTER
Medication Question or Refill    Contacts         Type Contact Phone/Fax    11/22/2023 04:34 PM CST Interface (Incoming) [facility] [place] - [place], [place] - [address] [phone]            Preferred Pharmacy:      [facility] [place] - [place], [place] - [address]  [address] [place] [address]  Phone: [phone] Fax: [phone]        Controlled Substance Agreement on file:   CSA -- Patient Level:     [Media Unavailable] Controlled Substance Agreement - Opioid - Scan on 11/3/2023  9:55 AM   [Media Unavailable] Controlled Substance Agreement - Opioid - Scan on 8/18/2021  3:05 PM   [Media Unavailable] Controlled Substance Agreement - Opioid - Scan on 6/11/2021   [Media Unavailable] Controlled Substance Agreement - Opioid - Scan on 2/10/2017: OUTSIDE RECORD       Who prescribed the medication?: Dr. Ashley , Dr. Jean Covering provider    Do you need a refill? Yes      Patient offered an appointment? N/A    Do you have any questions or concerns?  No

## 2023-11-28 ENCOUNTER — ALLIED HEALTH/NURSE VISIT (OUTPATIENT)
Dept: FAMILY MEDICINE | Facility: CLINIC | Age: 83
End: 2023-11-28
Payer: MEDICARE

## 2023-11-28 DIAGNOSIS — M81.0 AGE-RELATED OSTEOPOROSIS WITHOUT CURRENT PATHOLOGICAL FRACTURE: Primary | ICD-10-CM

## 2023-11-28 PROCEDURE — 99207 PR NO CHARGE NURSE ONLY: CPT

## 2023-11-28 PROCEDURE — 96372 THER/PROPH/DIAG INJ SC/IM: CPT | Performed by: INTERNAL MEDICINE

## 2023-11-28 NOTE — PROGRESS NOTES
Clinic Administered Medication Documentation      Prolia Documentation    Indication: Prolia  (denosumab) is a prescription medicine used to treat osteoporosis in patients who:   Are at high risk for fracture, meaning patients who have had a fracture related to osteoporosis, or who have multiple risk factors for fracture.  Cannot use another osteoporosis medicine or other osteoporosis medicines did not work well.  The timeline for early/late injections would be 4 weeks early and any time after the 6 month dinora. If a patient receives their injection late, then the subsequent injection would be 6 months from the date that they actually received the injection.    When was the last injection?  23   Was the last injection at least 6 months ago? Yes  Has the prior authorization been completed?  Yes  Is there an active order (written within the past 365 days, with administrations remaining, not ) in the chart?  Yes  Patient denies any dental work involving the bone (e.g. tooth extraction or dental implants) in the past 4 weeks?  Yes  Patient denies plans for any dental work involving the bone (e.g. tooth extraction or dental implants) in the next 4 weeks? Yes    The following steps were completed to comply with the REMS program for Prolia:  Reviewed information in the Medication Guide and Patient Counseling Chart, including the serious risks of Prolia  and the symptoms of each risk.  Advised patient to seek prompt medical attention if they have signs or symptoms of any of the serious risks.  Provided each patient a copy of the Medication Guide and Patient Brochure.    Prior to injection, verified patient identity using patient's name and date of birth. Medication was administered. Please see MAR and medication order for additional information. Patient instructed to report any adverse reaction to staff immediately.    Vial/Syringe: Syringe  Was this medication supplied by the patient? No  Patient has no refills  remaining.  Appointment scheduled and remind me sent for future order

## 2023-12-02 ENCOUNTER — NURSE TRIAGE (OUTPATIENT)
Dept: NURSING | Facility: CLINIC | Age: 83
End: 2023-12-02
Payer: MEDICARE

## 2023-12-02 DIAGNOSIS — U07.1 INFECTION DUE TO 2019 NOVEL CORONAVIRUS: Primary | ICD-10-CM

## 2023-12-02 NOTE — TELEPHONE ENCOUNTER
Called daughter, Lia, to offer patient a virtual visit for Covid treatment since Covid nurses are done calling patients today. When daughter calls back please see if patient would like a virtual visit today or offer [facility] as another alternative for Covid treatment.    Suki Colin RN  12/02/23 3:55 PM  [facility] Nurse Advisor

## 2023-12-03 NOTE — TELEPHONE ENCOUNTER
Pt's daughter Lia is calling back. Read message below to Lia. Provided phone number for [facility] to Lia and transferred to scheduling to check if virtual visit available.

## 2023-12-03 NOTE — TELEPHONE ENCOUNTER
Conference call with mother and daughter. Daughter is in [place].   Both have COVID. She called earlier. Daughter got Paxlovid prescription for herself, she gave mother her dose.Today is day 4-5 of mother having COVID sx. They tested positive today.  Mother is age 84 and immunocompromised. No virtual visit available until Tuesday. Vaccinated for both flu and COVID.   Pharmacy: [facility] [address] , would like to pick it up tomorrow.   Daughter has already called the pharmacy and they have it in stock.  RN COVID TREATMENT VISIT  12/02/23      The patient has been triaged and does not require a higher level of care.    Jacqueline Prado  83 year old  Current weight? 124.6#    Has the patient been seen by a primary care provider at an [facility] or [facility] within the past two years? Yes.   Have you been in close proximity to/do you have a known exposure to a person with a confirmed case of influenza? No.     General treatment eligibility:  Date of positive COVID test (PCR or at home)?  12/2/2023    Are you or have you been hospitalized for this COVID-19 infection? No.   Have you received monoclonal antibodies or antiviral treatment for COVID-19 since this positive test? No.   Do you have any of the following conditions that place you at risk of being very sick from COVID-19?   - Age 50 years or older  - Patient reports sedentary lifestyle (physically inactive)  - Current or former smoker  - Patient reports taking medicines that suppress the immune system such as: Greater than or equal to 20mg prednisone or equivalent per day, cyclophosphamide or cisplatin, methotrexate, cancer chemotherapeutic agents classified as severely immunosuppressive, tumor-necrosis (TNF) blockers, and other biologics  Yes, patient has at least one high risk condition as noted above.     Current COVID symptoms:   - fever or chills  - cough  - shortness of breath or difficulty breathing  - fatigue  -  muscle or body aches  - headache  - congestion or runny nose  - diarrhea  Yes. Patient has at least one symptom as selected.     How many days since symptoms started? 5 days or less. Established patient, 12 years or older weighing at least 88.2 lbs, who has symptoms that started in the past 5 days, has not been hospitalized nor received treatment already, and is at risk for being very sick from COVID-19.     Treatment eligibility by RN:  Are you currently pregnant or nursing? No  Do you have a clinically significant hypersensitivity to nirmatrelvir or ritonavir, or toxic epidermal necrolysis (TEN) or Freeman-Dmitri Syndrome? No  Do you have a history of hepatitis, any hepatic impairment on the Problem List (such as Child-Sanders Class C, cirrhosis, fatty liver disease, alcoholic liver disease), or was the last liver lab (hepatic panel, ALT, AST, ALK Phos, bilirubin) elevated in the past 6 months? No  Do you have any history of severe renal impairment (eGFR < 30mL/min)? No    Is patient eligible to continue? Yes, patient meets all eligibility requirements for the RN COVID treatment (as denoted by all no responses above).     Current Outpatient Medications   Medication Sig Dispense Refill    albuterol (PROAIR HFA/PROVENTIL HFA/VENTOLIN HFA) 108 (90 Base) MCG/ACT inhaler Inhale 2 puffs into the lungs every 4 hours as needed for wheezing 18 g 4    amitriptyline (ELAVIL) 25 MG tablet Take 1 tablet (25 mg) by mouth at bedtime 90 tablet 4    CALCIUM CARBONATE/VITAMIN D3 (CALCIUM+D ORAL) [CALCIUM CARBONATE/VITAMIN D3 (CALCIUM+D ORAL)] Take 3 tablets by mouth daily.      carisoprodol (SOMA) 350 MG tablet Take 1 tablet (350 mg) by mouth 3 times daily as needed for muscle spasms 270 tablet 3    cholecalciferol, vitamin D3, 1,000 unit (25 mcg) tablet [CHOLECALCIFEROL, VITAMIN D3, 1,000 UNIT (25 MCG) TABLET] Take 1,000 Units by mouth daily.      folic acid (FOLVITE) 1 MG tablet Take 1 mg by mouth daily   2    hydrocortisone  (CORTISONE, HYDROCORTISONE,) 1 % cream [HYDROCORTISONE (CORTISONE, HYDROCORTISONE,) 1 % CREAM] Apply to hand three times a day 30 g 2    isosorbide mononitrate (ISMO/MONOKET) 10 MG tablet TAKE ONE TABLET BY MOUTH TWICE A  tablet 3    leflunomide (ARAVA) 20 MG tablet [LEFLUNOMIDE (ARAVA) 20 MG TABLET] Take 20 mg by mouth daily.       levothyroxine (SYNTHROID/LEVOTHROID) 75 MCG tablet TAKE 1 TABLET BY MOUTH DAILY AT 6 AM 90 tablet 3    loratadine (CLARITIN) 10 MG tablet Take 1 tablet (10 mg) by mouth daily 90 tablet 4    naloxone (NARCAN) 4 MG/0.1ML nasal spray Spray 1 spray (4 mg) into one nostril alternating nostrils as needed for opioid reversal every 2-3 minutes until assistance arrives 0.2 mL 0    naloxone (NARCAN) 4 MG/0.1ML nasal spray Spray 1 spray (4 mg) into one nostril alternating nostrils once as needed for opioid reversal every 2-3 minutes until assistance arrives 0.2 mL 0    nystatin (MYCOSTATIN) cream [NYSTATIN (MYCOSTATIN) CREAM] Apply 1 application topically 2 (two) times a day as needed.  1    omeprazole (PRILOSEC) 20 MG DR capsule Take 1 capsule (20 mg) by mouth every morning (before breakfast) 90 capsule 4    ondansetron (ZOFRAN) 4 MG tablet Take 1 tablet (4 mg) by mouth every 6 hours as needed for nausea 30 tablet 4    oxyCODONE (OXYCONTIN) 40 MG 12 hr tablet Take 1 tablet (40 mg) by mouth every 24 hours 30 tablet 0    OXYCONTIN 10 MG 12 hr tablet Take 2 tablets (20 mg) by mouth every 12 hours 60 tablet 0    predniSONE (CHARLENE) 2 MG EC tablet Take 1 tablet (2 mg) by mouth nightly as needed (for flares)      triamcinolone (KENALOG) 0.1 % ointment [TRIAMCINOLONE (KENALOG) 0.1 % OINTMENT] Apply to both legs twice daily 30 g 0       Medications from List 1 of the standing order (on medications that exclude the use of Paxlovid) that patient is taking: NONE. Is patient taking Jazlyn's Wort? No  Is patient taking Gabbs's Wort or any meds from List 1? No.   Medications from List 2 of the  standing order (on meds that provider needs to adjust) that patient is taking:  Is patient on any of the meds from List 2? Yes. Patient will be scheduled or transferred to a  at the end of this call.   WILBER Stokes DR on call, paged via answering service @ 9:00pm: OK given to order Paxlovid medication for this patient tonight.

## 2023-12-21 DIAGNOSIS — G89.29 OTHER CHRONIC PAIN: ICD-10-CM

## 2023-12-21 DIAGNOSIS — M54.32 BACK PAIN WITH LEFT-SIDED SCIATICA: ICD-10-CM

## 2023-12-21 DIAGNOSIS — F11.20 OPIOID TYPE DEPENDENCE, CONTINUOUS (H): ICD-10-CM

## 2023-12-21 RX ORDER — OXYCODONE HCL 40 MG/1
40 TABLET, FILM COATED, EXTENDED RELEASE ORAL EVERY MORNING
Qty: 30 TABLET | Refills: 0 | Status: SHIPPED | OUTPATIENT
Start: 2023-12-21 | End: 2024-01-23

## 2023-12-21 RX ORDER — OXYCODONE HYDROCHLORIDE 10 MG/1
20 TABLET, FILM COATED, EXTENDED RELEASE ORAL EVERY 12 HOURS
Qty: 60 TABLET | Refills: 0 | Status: SHIPPED | OUTPATIENT
Start: 2023-12-21 | End: 2024-01-23

## 2023-12-22 ENCOUNTER — OFFICE VISIT (OUTPATIENT)
Dept: PODIATRY | Facility: CLINIC | Age: 83
End: 2023-12-22
Payer: MEDICARE

## 2023-12-22 VITALS
DIASTOLIC BLOOD PRESSURE: 84 MMHG | SYSTOLIC BLOOD PRESSURE: 138 MMHG | OXYGEN SATURATION: 95 % | HEART RATE: 85 BPM | RESPIRATION RATE: 18 BRPM

## 2023-12-22 DIAGNOSIS — M20.41 HAMMERTOE, BILATERAL: ICD-10-CM

## 2023-12-22 DIAGNOSIS — I73.9 PAD (PERIPHERAL ARTERY DISEASE) (H): ICD-10-CM

## 2023-12-22 DIAGNOSIS — M20.42 HAMMERTOE, BILATERAL: ICD-10-CM

## 2023-12-22 DIAGNOSIS — L97.521 ULCER OF TOE OF LEFT FOOT, LIMITED TO BREAKDOWN OF SKIN (H): Primary | ICD-10-CM

## 2023-12-22 DIAGNOSIS — B35.1 ONYCHOMYCOSIS: ICD-10-CM

## 2023-12-22 PROCEDURE — 11721 DEBRIDE NAIL 6 OR MORE: CPT | Mod: 51 | Performed by: PODIATRIST

## 2023-12-22 PROCEDURE — 11042 DBRDMT SUBQ TIS 1ST 20SQCM/<: CPT | Performed by: PODIATRIST

## 2023-12-22 ASSESSMENT — PAIN SCALES - GENERAL: PAINLEVEL: MODERATE PAIN (4)

## 2023-12-22 NOTE — PROGRESS NOTES
FOOT AND ANKLE SURGERY/PODIATRY Progress Note      ASSESSMENT:   Ulceration 4th digit left foot into subcutaneous tissue  Onychomycosis  Rubi    A new wound was identified today: yes,  it is located 4th digit left foot .    TREATMENT:  -I discussed with the patient and her daughter today that the ulceration on the dorsal fourth digit on the left foot is stable without signs of infection.  Because of the chronicity of the wound I recommend an MRI of the left foot to investigate for underlying osteomyelitis.    -Recommend nonweightbearing on the left foot with wheelchair which they have in their possession.  I will dispense a surgical shoe today to avoid dorsal pressure along the fourth digit which is likely what caused the skin to breakdown.    -After discussion of risk factors, nursing staff removed dressing, cleansed wound and consent obtained 2% Lidocaine HCL jelly was applied, under clean conditions, the fourth digit left foot ulceration(s) were debrided using #15 blade scalpel.  Devitalized and nonviable tissue, along with any fibrin and slough, was removed to improve granulation tissue formation, stimulate wound healing, decrease overall bacteria load, disrupt biofilm formation and decrease edge senescence. Wound drainage was scant No. Total excisional debridement was 0.25 sq cm into the subcutaneous tissue with a depth of 0.2 cm.   Ulcers were improved afterwards and .  Measures were as noted on the flow sheet. Medi-honey with a gauze dressing was applied. She will continue to apply Medi-honey with a gauze dressing qoday.    -I debrided nails greater than 6 in length and thickness.    -I will contact the patient with the MRI report when available and we will be guided by the results.     -She will follow-up with me in 3 weeks    Kendrick Pedraza DPM  [facility] [place]      HPI: Jacqueline Prado was seen again today at the request of Dr. Ashley for concerns related to a new sore on top  of the fourth toe on the left foot.  Patient's daughter is present today and states that they first noticed the sore approximately 2 months ago.  Both the patient and her daughter ended up getting COVID and were unable to follow-up in clinic for evaluation.  She also request nail trim today.    Past Medical History:   Diagnosis Date    Acquired hypothyroidism     Acquired lymphedema of leg     Bundle branch block     Created by Conversion  Replacement Utility updated for latest IMO load    Bundle branch block     Cellulitis     Hypertension     Impetigo     Opioid dependence (H)     Osteoporosis     RA (rheumatoid arthritis) (H)     Rheumatoid arthritis (H)     Secondary hyperparathyroidism (H24)     Venous hypertension of both lower extremities     Venous insufficiency of both lower extremities     Venous stasis dermatitis of lower extremity        Past Surgical History:   Procedure Laterality Date    AMPUTATE TOE(S) Left 3/8/2022    Procedure: AMPUTATION, second digit left foot;  Surgeon: Kendrick Pedraza DPM;  Location: [facility] Main OR    BREAST CYST ASPIRATION Left 2000    HC REMOVAL GALLBLADDER      Description: Cholecystectomy;  Recorded: 07/22/2009;    IR LUMBAR EPIDURAL STEROID INJECTION  6/23/2003    IR MISCELLANEOUS PROCEDURE  12/1/2006    [place] INJECT VERTEBRAL BODY, LUMBAR      Description: Spinal Percutaneous Vertebroplasty, Injection Lumbar;  Recorded: 11/03/2011;  Annotations: L5    ZZC EXCIS CERV DISK,ONE LEVEL      Description: Laminectomy With Disc Removal;  Recorded: 11/03/2011;  Annotations: L5-S1       Allergies   Allergen Reactions    Acetaminophen Rash    Nsaids (Non-Steroidal Anti-Inflammatory Drug) [Nsaids] Rash    Tetracyclines & Related Rash    Baclofen Nausea    Celecoxib Unknown    Erythromycin Base [Erythromycin] Unknown    Pentolinium Itching    Varenicline Itching and Swelling    Erythromycin Rash    Petroleum Jelly [Petrolatum] Itching and Rash         Current Outpatient  Medications:     albuterol (PROAIR HFA/PROVENTIL HFA/VENTOLIN HFA) 108 (90 Base) MCG/ACT inhaler, Inhale 2 puffs into the lungs every 4 hours as needed for wheezing, Disp: 18 g, Rfl: 4    amitriptyline (ELAVIL) 25 MG tablet, Take 1 tablet (25 mg) by mouth at bedtime, Disp: 90 tablet, Rfl: 4    CALCIUM CARBONATE/VITAMIN D3 (CALCIUM+D ORAL), [CALCIUM CARBONATE/VITAMIN D3 (CALCIUM+D ORAL)] Take 3 tablets by mouth daily., Disp: , Rfl:     carisoprodol (SOMA) 350 MG tablet, Take 1 tablet (350 mg) by mouth 3 times daily as needed for muscle spasms, Disp: 270 tablet, Rfl: 3    cholecalciferol, vitamin D3, 1,000 unit (25 mcg) tablet, [CHOLECALCIFEROL, VITAMIN D3, 1,000 UNIT (25 MCG) TABLET] Take 1,000 Units by mouth daily., Disp: , Rfl:     folic acid (FOLVITE) 1 MG tablet, Take 1 mg by mouth daily , Disp: , Rfl: 2    hydrocortisone (CORTISONE, HYDROCORTISONE,) 1 % cream, [HYDROCORTISONE (CORTISONE, HYDROCORTISONE,) 1 % CREAM] Apply to hand three times a day, Disp: 30 g, Rfl: 2    isosorbide mononitrate (ISMO/MONOKET) 10 MG tablet, TAKE ONE TABLET BY MOUTH TWICE A DAY, Disp: 180 tablet, Rfl: 3    leflunomide (ARAVA) 20 MG tablet, [LEFLUNOMIDE (ARAVA) 20 MG TABLET] Take 20 mg by mouth daily. , Disp: , Rfl:     levothyroxine (SYNTHROID/LEVOTHROID) 75 MCG tablet, TAKE 1 TABLET BY MOUTH DAILY AT 6 AM, Disp: 90 tablet, Rfl: 3    loratadine (CLARITIN) 10 MG tablet, Take 1 tablet (10 mg) by mouth daily, Disp: 90 tablet, Rfl: 4    naloxone (NARCAN) 4 MG/0.1ML nasal spray, Spray 1 spray (4 mg) into one nostril alternating nostrils as needed for opioid reversal every 2-3 minutes until assistance arrives, Disp: 0.2 mL, Rfl: 0    naloxone (NARCAN) 4 MG/0.1ML nasal spray, Spray 1 spray (4 mg) into one nostril alternating nostrils once as needed for opioid reversal every 2-3 minutes until assistance arrives, Disp: 0.2 mL, Rfl: 0    nystatin (MYCOSTATIN) cream, [NYSTATIN (MYCOSTATIN) CREAM] Apply 1 application topically 2 (two) times a  day as needed., Disp: , Rfl: 1    omeprazole (PRILOSEC) 20 MG DR capsule, Take 1 capsule (20 mg) by mouth every morning (before breakfast), Disp: 90 capsule, Rfl: 4    ondansetron (ZOFRAN) 4 MG tablet, Take 1 tablet (4 mg) by mouth every 6 hours as needed for nausea, Disp: 30 tablet, Rfl: 4    oxyCODONE (OXYCONTIN) 40 MG 12 hr tablet, Take 1 tablet (40 mg) by mouth every morning, Disp: 30 tablet, Rfl: 0    OXYCONTIN 10 MG 12 hr tablet, Take 2 tablets (20 mg) by mouth every 12 hours, Disp: 60 tablet, Rfl: 0    predniSONE (CHARLENE) 2 MG EC tablet, Take 1 tablet (2 mg) by mouth nightly as needed (for flares), Disp: , Rfl:     triamcinolone (KENALOG) 0.1 % ointment, [TRIAMCINOLONE (KENALOG) 0.1 % OINTMENT] Apply to both legs twice daily, Disp: 30 g, Rfl: 0    Review of Systems - 10 point Review of Systems is negative except for ulceration fourth digit left foot which is noted in HPI.      OBJECTIVE:  /84   Pulse 85   Resp 18   SpO2 95%   General appearance: Patient is alert and fully cooperative with history & exam.  No sign of distress is noted during the visit.    Vascular: Dorsalis pedis palpableBilateral.  Nonpalpable PT bilaterally.  Dermatologic:    VASC Wound Left shin (Active)       VASC Wound Left 4th toe (Active)   Post Size Length 0.5 12/22/23 1500   Post Size Width 0.5 12/22/23 1500   Post Size Depth 0.2 12/22/23 1500   Post Total Sq cm 0.25 12/22/23 1500       Incision/Surgical Site 03/08/22 Left Foot (Active)   Ulceration dorsal PIPJ's fourth digit left foot has mixed granular/fibrotic tissue without increased depth, no erythema.  Neurologic: Diminished to light touch Bilateral.  Musculoskeletal: Contracted digits noted Bilateral.

## 2023-12-22 NOTE — PATIENT INSTRUCTIONS
"  Important lnstructions      WEIGHT BEARING STATUS: You are to remain LIMITED WEIGHT BEARING on your left foot. LIMITED WEIGHT BEARING MEANS THAT IT IS ONLY OKAY FOR YOU TO APPLY LIGHT PRESSURE ON THE AFFECTED FOOT WHEN TRANSFERRING FROM YOUR ASSISTIVE DEVICE TO A CHAIR OR BED.    3. STABILIZATION DEVICE: Use a POST OP SHOE . You will need to WEAR THIS ANYTIME YOU ARE UP AND OUT OF BED, IT IS OKAY TO REMOVE WHEN YOU ARE SLEEPING..     4. ELEVATE: Elevating your leg means laying with your head on a pillow and your foot ABOVE YOUR HEART.     5. DO NOT MOVE YOUR FOOT.  There is a risk of worsening the wound or incision. To give yourself a higher chance of healing, please DO NOT swing foot back and forth and wiggle foot/toes especially when inside a stabilization device. Limited movement is allowable with therapy as recommended by the doctor.     6. TAKE A PROTEIN SHAKE TWICE A DAY.  (For ex: Boost, Ensure, Glucerna)      Dressing Change lnstructions    EVERY OTHER DAY and as needed, Cleanse your left 4th toe wound(s) with Normal saline.    Pat Dry with non-sterile gauze    Primary Dressing: Apply Medihoney or Manuka honey into/onto the wounds    Secondary dressing: Cover with dry gauze    Secure with non-sterile roll gauze (4\" x 75\" roll) and tape (1\" roll tape) as needed; avoid adhesive directly on the skin     It IS NOT ok to get your wound wet in the bath or shower    SEEK MEDICAL CARE IF:  You have an increase in swelling, pain, or redness around the wound.  You have an increase in the amount of pus coming from the wound.  There is a bad smell coming from the wound.  The wound appears to be worsening/enlarging  You have a fever greater than 101.5 F      It is ok to continue current wound care treatment/products for the next 2-3 days until new wound care supplies are ordered and arrive. If longer than this please contact our office at [phone].    *Wheelchairs are never guaranteed at the front door, if you have " your own wheel chair or knee walker, please bring that with you to your appointment. Thank you for your understanding!*    We want to hear from you!   In the next few weeks, you should receive a call or email to complete a survey about your visit at [facility]. Please help us improve your appointment experience by letting us know how we did today. We strive to make your experience good and value any ways in which we could do better.      We value your input and suggestions.    Thank you for choosing the [facility]!

## 2023-12-22 NOTE — LETTER
12/22/2023         RE: Jacqueline Prado  [address]  [place] [address]        Dear Colleague,    Thank you for referring your patient, Jacqueline Prado, to the [facility]. Please see a copy of my visit note below.    FOOT AND ANKLE SURGERY/PODIATRY Progress Note      ASSESSMENT:   Ulceration 4th digit left foot into subcutaneous tissue  Onychomycosis  [place]amm[place]oe    A new wound was identified today: yes,  it is located 4th digit left foot .    TREATMENT:  -I discussed with the patient and her daughter today that the ulceration on the dorsal fourth digit on the left foot is stable without signs of infection.  Because of the chronicity of the wound I recommend an MRI of the left foot to investigate for underlying osteomyelitis.    -Recommend nonweightbearing on the left foot with wheelchair which they have in their possession.  I will dispense a surgical shoe today to avoid dorsal pressure along the fourth digit which is likely what caused the skin to breakdown.    -After discussion of risk factors, nursing staff removed dressing, cleansed wound and consent obtained 2% Lidocaine HCL jelly was applied, under clean conditions, the fourth digit left foot ulceration(s) were debrided using #15 blade scalpel.  Devitalized and nonviable tissue, along with any fibrin and slough, was removed to improve granulation tissue formation, stimulate wound healing, decrease overall bacteria load, disrupt biofilm formation and decrease edge senescence. Wound drainage was scant No. Total excisional debridement was 0.25 sq cm into the subcutaneous tissue with a depth of 0.2 cm.   Ulcers were improved afterwards and .  Measures were as noted on the flow sheet. Medi-honey with a gauze dressing was applied. She will continue to apply Medi-honey with a gauze dressing qoday.    -I debrided nails greater than 6 in length and thickness.    -I will contact the patient with the MRI report when  available and we will be guided by the results.     -She will follow-up with me in 3 weeks    Kendrick Pedraza DPM  [facility] [place]      HPI: Jacqueline Prado was seen again today at the request of Dr. Ashley for concerns related to a new sore on top of the fourth toe on the left foot.  Patient's daughter is present today and states that they first noticed the sore approximately 2 months ago.  Both the patient and her daughter ended up getting COVID and were unable to follow-up in clinic for evaluation.  She also request nail trim today.    Past Medical History:   Diagnosis Date     Acquired hypothyroidism      Acquired lymphedema of leg      Bundle branch block     Created by Conversion  Replacement Utility updated for latest IMO load     Bundle branch block      Cellulitis      Hypertension      Impetigo      Opioid dependence (H)      Osteoporosis      RA (rheumatoid arthritis) (H)      Rheumatoid arthritis (H)      Secondary hyperparathyroidism (H24)      Venous hypertension of both lower extremities      Venous insufficiency of both lower extremities      Venous stasis dermatitis of lower extremity        Past Surgical History:   Procedure Laterality Date     AMPUTATE TOE(S) Left 3/8/2022    Procedure: AMPUTATION, second digit left foot;  Surgeon: Kendrick Pedraza DPM;  Location: [facility] OR     BREAST CYST ASPIRATION Left 2000     HC REMOVAL GALLBLADDER      Description: Cholecystectomy;  Recorded: 07/22/2009;     IR LUMBAR EPIDURAL STEROID INJECTION  6/23/2003     IR MISCELLANEOUS PROCEDURE  12/1/2006     [place] INJECT VERTEBRAL BODY, LUMBAR      Description: Spinal Percutaneous Vertebroplasty, Injection Lumbar;  Recorded: 11/03/2011;  Annotations: L5     ZZC EXCIS CERV DISK,ONE LEVEL      Description: Laminectomy With Disc Removal;  Recorded: 11/03/2011;  Annotations: L5-S1       Allergies   Allergen Reactions     Acetaminophen Rash     Nsaids (Non-Steroidal Anti-Inflammatory Drug)  [Nsaids] Rash     Tetracyclines & Related Rash     Baclofen Nausea     Celecoxib Unknown     Erythromycin Base [Erythromycin] Unknown     Pentolinium Itching     Varenicline Itching and Swelling     Erythromycin Rash     Petroleum Jelly [Petrolatum] Itching and Rash         Current Outpatient Medications:      albuterol (PROAIR HFA/PROVENTIL HFA/VENTOLIN HFA) 108 (90 Base) MCG/ACT inhaler, Inhale 2 puffs into the lungs every 4 hours as needed for wheezing, Disp: 18 g, Rfl: 4     amitriptyline (ELAVIL) 25 MG tablet, Take 1 tablet (25 mg) by mouth at bedtime, Disp: 90 tablet, Rfl: 4     CALCIUM CARBONATE/VITAMIN D3 (CALCIUM+D ORAL), [CALCIUM CARBONATE/VITAMIN D3 (CALCIUM+D ORAL)] Take 3 tablets by mouth daily., Disp: , Rfl:      carisoprodol (SOMA) 350 MG tablet, Take 1 tablet (350 mg) by mouth 3 times daily as needed for muscle spasms, Disp: 270 tablet, Rfl: 3     cholecalciferol, vitamin D3, 1,000 unit (25 mcg) tablet, [CHOLECALCIFEROL, VITAMIN D3, 1,000 UNIT (25 MCG) TABLET] Take 1,000 Units by mouth daily., Disp: , Rfl:      folic acid (FOLVITE) 1 MG tablet, Take 1 mg by mouth daily , Disp: , Rfl: 2     hydrocortisone (CORTISONE, HYDROCORTISONE,) 1 % cream, [HYDROCORTISONE (CORTISONE, HYDROCORTISONE,) 1 % CREAM] Apply to hand three times a day, Disp: 30 g, Rfl: 2     isosorbide mononitrate (ISMO/MONOKET) 10 MG tablet, TAKE ONE TABLET BY MOUTH TWICE A DAY, Disp: 180 tablet, Rfl: 3     leflunomide (ARAVA) 20 MG tablet, [LEFLUNOMIDE (ARAVA) 20 MG TABLET] Take 20 mg by mouth daily. , Disp: , Rfl:      levothyroxine (SYNTHROID/LEVOTHROID) 75 MCG tablet, TAKE 1 TABLET BY MOUTH DAILY AT 6 AM, Disp: 90 tablet, Rfl: 3     loratadine (CLARITIN) 10 MG tablet, Take 1 tablet (10 mg) by mouth daily, Disp: 90 tablet, Rfl: 4     naloxone (NARCAN) 4 MG/0.1ML nasal spray, Spray 1 spray (4 mg) into one nostril alternating nostrils as needed for opioid reversal every 2-3 minutes until assistance arrives, Disp: 0.2 mL, Rfl: 0      naloxone (NARCAN) 4 MG/0.1ML nasal spray, Spray 1 spray (4 mg) into one nostril alternating nostrils once as needed for opioid reversal every 2-3 minutes until assistance arrives, Disp: 0.2 mL, Rfl: 0     nystatin (MYCOSTATIN) cream, [NYSTATIN (MYCOSTATIN) CREAM] Apply 1 application topically 2 (two) times a day as needed., Disp: , Rfl: 1     omeprazole (PRILOSEC) 20 MG DR capsule, Take 1 capsule (20 mg) by mouth every morning (before breakfast), Disp: 90 capsule, Rfl: 4     ondansetron (ZOFRAN) 4 MG tablet, Take 1 tablet (4 mg) by mouth every 6 hours as needed for nausea, Disp: 30 tablet, Rfl: 4     oxyCODONE (OXYCONTIN) 40 MG 12 hr tablet, Take 1 tablet (40 mg) by mouth every morning, Disp: 30 tablet, Rfl: 0     OXYCONTIN 10 MG 12 hr tablet, Take 2 tablets (20 mg) by mouth every 12 hours, Disp: 60 tablet, Rfl: 0     predniSONE (CHARLENE) 2 MG EC tablet, Take 1 tablet (2 mg) by mouth nightly as needed (for flares), Disp: , Rfl:      triamcinolone (KENALOG) 0.1 % ointment, [TRIAMCINOLONE (KENALOG) 0.1 % OINTMENT] Apply to both legs twice daily, Disp: 30 g, Rfl: 0    Review of Systems - 10 point Review of Systems is negative except for ulceration fourth digit left foot which is noted in HPI.      OBJECTIVE:  /84   Pulse 85   Resp 18   SpO2 95%   General appearance: Patient is alert and fully cooperative with history & exam.  No sign of distress is noted during the visit.    Vascular: Dorsalis pedis palpableBilateral.  Nonpalpable PT bilaterally.  Dermatologic:    VASC Wound Left shin (Active)       VASC Wound Left 4th toe (Active)   Post Size Length 0.5 12/22/23 1500   Post Size Width 0.5 12/22/23 1500   Post Size Depth 0.2 12/22/23 1500   Post Total Sq cm 0.25 12/22/23 1500       Incision/Surgical Site 03/08/22 Left Foot (Active)   Ulceration dorsal PIPJ's fourth digit left foot has mixed granular/fibrotic tissue without increased depth, no erythema.  Neurologic: Diminished to light touch  Bilateral.  Musculoskeletal: Contracted digits noted Bilateral.            Again, thank you for allowing me to participate in the care of your patient.        Sincerely,        Kendrick Pedraza DPM

## 2023-12-29 ENCOUNTER — HOSPITAL ENCOUNTER (OUTPATIENT)
Dept: MRI IMAGING | Facility: HOSPITAL | Age: 83
Discharge: HOME | End: 2023-12-29
Attending: PODIATRIST | Admitting: PODIATRIST
Payer: MEDICARE

## 2023-12-29 DIAGNOSIS — L97.521 ULCER OF TOE OF LEFT FOOT, LIMITED TO BREAKDOWN OF SKIN (H): ICD-10-CM

## 2023-12-29 PROCEDURE — 255N000002 HC RX 255 OP 636: Performed by: PODIATRIST

## 2023-12-29 PROCEDURE — G1010 CDSM STANSON: HCPCS

## 2023-12-29 PROCEDURE — A9585 GADOBUTROL INJECTION: HCPCS | Performed by: PODIATRIST

## 2023-12-29 PROCEDURE — 73720 MRI LWR EXTREMITY W/O&W/DYE: CPT | Mod: LT,MG

## 2023-12-29 RX ORDER — GADOBUTROL 604.72 MG/ML
0.1 INJECTION INTRAVENOUS ONCE
Status: COMPLETED | OUTPATIENT
Start: 2023-12-29 | End: 2023-12-29

## 2023-12-29 RX ADMIN — GADOBUTROL 6 ML: 604.72 INJECTION INTRAVENOUS at 16:40

## 2024-01-08 ENCOUNTER — VIRTUAL VISIT (OUTPATIENT)
Dept: VASCULAR SURGERY | Facility: CLINIC | Age: 84
End: 2024-01-08
Attending: PODIATRIST
Payer: MEDICARE

## 2024-01-08 DIAGNOSIS — L97.521 ULCER OF TOE OF LEFT FOOT, LIMITED TO BREAKDOWN OF SKIN (H): ICD-10-CM

## 2024-01-08 DIAGNOSIS — I73.9 PAD (PERIPHERAL ARTERY DISEASE) (H): Primary | ICD-10-CM

## 2024-01-08 PROCEDURE — G2012 BRIEF CHECK IN BY MD/QHP: HCPCS | Mod: 93 | Performed by: PODIATRIST

## 2024-01-08 NOTE — PROGRESS NOTES
FOOT AND ANKLE SURGERY/PODIATRY Progress Note      ASSESSMENT: Ulceration 4th digit left foot into subcutaneous tissue    Type of service:  Telephone Visit       Telephone Start and End Time : 9:52/9:57    Originating Location (pt. Location):  home      Distant Location :  [facility]       Mode of Communication: Telephone    TREATMENT:  MRI left foot: 1.  No evidence of osteomyelitis. No abscess.  2.  Hallux valgus and degenerative arthrosis of the first metatarsophalangeal joint.  3.  Intact ligaments and tendons.    -Based on the above, I do not recommend surgical treatment at this time. Recommend continued limited walking on the left foot, non-weight bearing when able and use of Medihoney.    -She will follow-up with me on 1/17.    Kendrick Pedraza DPM  [facility]      HPI: Jacqueline Prado was seen again today for telephone visit to discuss her recent MRI report.  Patient's daughter is also available for today's visit.    Past Medical History:   Diagnosis Date    Acquired hypothyroidism     Acquired lymphedema of leg     Bundle branch block     Created by Conversion  Replacement Utility updated for latest IMO load    Bundle branch block     Cellulitis     Hypertension     Impetigo     Opioid dependence (H)     Osteoporosis     RA (rheumatoid arthritis) (H)     Rheumatoid arthritis (H)     Secondary hyperparathyroidism (H24)     Venous hypertension of both lower extremities     Venous insufficiency of both lower extremities     Venous stasis dermatitis of lower extremity        Past Surgical History:   Procedure Laterality Date    AMPUTATE TOE(S) Left 3/8/2022    Procedure: AMPUTATION, second digit left foot;  Surgeon: Kendrick Pedraza DPM;  Location: [facility]    BREAST CYST ASPIRATION Left 2000    HC REMOVAL GALLBLADDER      Description: Cholecystectomy;  Recorded: 07/22/2009;    IR LUMBAR EPIDURAL STEROID INJECTION  6/23/2003    IR  MISCELLANEOUS PROCEDURE  12/1/2006    NC INJECT VERTEBRAL BODY, LUMBAR      Description: Spinal Percutaneous Vertebroplasty, Injection Lumbar;  Recorded: 11/03/2011;  Annotations: L5    ZZC EXCIS CERV DISK,ONE LEVEL      Description: Laminectomy With Disc Removal;  Recorded: 11/03/2011;  Annotations: L5-S1       Allergies   Allergen Reactions    Acetaminophen Rash    Nsaids (Non-Steroidal Anti-Inflammatory Drug) [Nsaids] Rash    Tetracyclines & Related Rash    Baclofen Nausea    Celecoxib Unknown    Erythromycin Base [Erythromycin] Unknown    Pentolinium Itching    Shellfish-Derived Products Diarrhea    Varenicline Itching and Swelling    Erythromycin Rash    Petroleum Jelly [Petrolatum] Itching and Rash         Current Outpatient Medications:     albuterol (PROAIR HFA/PROVENTIL HFA/VENTOLIN HFA) 108 (90 Base) MCG/ACT inhaler, Inhale 2 puffs into the lungs every 4 hours as needed for wheezing, Disp: 18 g, Rfl: 4    amitriptyline (ELAVIL) 25 MG tablet, Take 1 tablet (25 mg) by mouth at bedtime, Disp: 90 tablet, Rfl: 4    CALCIUM CARBONATE/VITAMIN D3 (CALCIUM+D ORAL), [CALCIUM CARBONATE/VITAMIN D3 (CALCIUM+D ORAL)] Take 3 tablets by mouth daily., Disp: , Rfl:     carisoprodol (SOMA) 350 MG tablet, Take 1 tablet (350 mg) by mouth 3 times daily as needed for muscle spasms, Disp: 270 tablet, Rfl: 3    cholecalciferol, vitamin D3, 1,000 unit (25 mcg) tablet, [CHOLECALCIFEROL, VITAMIN D3, 1,000 UNIT (25 MCG) TABLET] Take 1,000 Units by mouth daily., Disp: , Rfl:     folic acid (FOLVITE) 1 MG tablet, Take 1 mg by mouth daily , Disp: , Rfl: 2    hydrocortisone (CORTISONE, HYDROCORTISONE,) 1 % cream, [HYDROCORTISONE (CORTISONE, HYDROCORTISONE,) 1 % CREAM] Apply to hand three times a day, Disp: 30 g, Rfl: 2    isosorbide mononitrate (ISMO/MONOKET) 10 MG tablet, TAKE ONE TABLET BY MOUTH TWICE A DAY, Disp: 180 tablet, Rfl: 3    leflunomide (ARAVA) 20 MG tablet, [LEFLUNOMIDE (ARAVA) 20 MG TABLET] Take 20 mg by mouth daily. ,  Disp: , Rfl:     levothyroxine (SYNTHROID/LEVOTHROID) 75 MCG tablet, TAKE 1 TABLET BY MOUTH DAILY AT 6 AM, Disp: 90 tablet, Rfl: 3    loratadine (CLARITIN) 10 MG tablet, Take 1 tablet (10 mg) by mouth daily, Disp: 90 tablet, Rfl: 4    naloxone (NARCAN) 4 MG/0.1ML nasal spray, Spray 1 spray (4 mg) into one nostril alternating nostrils as needed for opioid reversal every 2-3 minutes until assistance arrives, Disp: 0.2 mL, Rfl: 0    naloxone (NARCAN) 4 MG/0.1ML nasal spray, Spray 1 spray (4 mg) into one nostril alternating nostrils once as needed for opioid reversal every 2-3 minutes until assistance arrives, Disp: 0.2 mL, Rfl: 0    nystatin (MYCOSTATIN) cream, [NYSTATIN (MYCOSTATIN) CREAM] Apply 1 application topically 2 (two) times a day as needed., Disp: , Rfl: 1    omeprazole (PRILOSEC) 20 MG DR capsule, Take 1 capsule (20 mg) by mouth every morning (before breakfast), Disp: 90 capsule, Rfl: 4    ondansetron (ZOFRAN) 4 MG tablet, Take 1 tablet (4 mg) by mouth every 6 hours as needed for nausea, Disp: 30 tablet, Rfl: 4    oxyCODONE (OXYCONTIN) 40 MG 12 hr tablet, Take 1 tablet (40 mg) by mouth every morning, Disp: 30 tablet, Rfl: 0    OXYCONTIN 10 MG 12 hr tablet, Take 2 tablets (20 mg) by mouth every 12 hours, Disp: 60 tablet, Rfl: 0    predniSONE (CHARLENE) 2 MG EC tablet, Take 1 tablet (2 mg) by mouth nightly as needed (for flares), Disp: , Rfl:     triamcinolone (KENALOG) 0.1 % ointment, [TRIAMCINOLONE (KENALOG) 0.1 % OINTMENT] Apply to both legs twice daily, Disp: 30 g, Rfl: 0    Review of Systems - 10 point Review of Systems is negative except for left foot ulcer which is noted in HPI.    Imaging:     MR Foot Left w/o & w Contrast    Result Date: 12/29/2023  EXAM: MR FOOT LEFT W/O and W CONTRAST LOCATION: [facility] DATE: 12/29/2023 INDICATION: Osteomyelitis 4th digit left foot. COMPARISON: Radiographs 03/18/2022 TECHNIQUE: Routine. Additional postgadolinium T1 sequences were obtained. IV  CONTRAST: Gadavist 6mL FINDINGS: JOINTS AND BONES: -No confluent T1 hypointense enhancing marrow signal to suggest osteomyelitis. No fracture. Moderate hallux valgus and degenerative arthrosis of the first metatarsophalangeal joint. Mild degenerative arthrosis scattered about the midfoot and forefoot. No  joint effusion or synovitis. Prior amputation of the second digit at the level of the metatarsophalangeal joint. TENDONS: -No tendon tear, tendinopathy, or tenosynovitis. LIGAMENTS: -Lisfranc ligament complex and visualized collateral and capsular ligaments are intact. MUSCLES AND SOFT TISSUES: -Diffuse fatty atrophy of the intrinsic muscles of the foot. -No soft tissue mass. No loculated peripherally enhancing fluid collection to suggest abscess.     IMPRESSION: 1.  No evidence of osteomyelitis. No abscess. 2.  Hallux valgus and degenerative arthrosis of the first metatarsophalangeal joint. 3.  Intact ligaments and tendons.

## 2024-01-08 NOTE — LETTER
2024             [facility]             Wound Dressing Rx and Order Form             Order Status:New Order             Verbal: Kathi Espnio  [facility]   Account # [id number]  Fax: [phone]  Customer Service: [phone]          Patient Info:  Name: Jacqueline Prado  : 1940  Address[facility]   [facility] [address] [address]  Phone: [phone]      Insurance Info:  PRIMARY INSURANCE: Payor: MEDICARE / Plan: MEDICARE / Product Type: Medicare /   Primary Policy ID#: [id number]  SECONDARY INSURANCE: BCBS BCBS OUT OF STATE   Secondary Policy ID#: [id number]    Physician Info:   Name:  Kendrick Pedraza DPM   Dept Address/Phones:   [facility] [address] [address] [phone]  Dept: [phone]  Fax: [phone]      Wound info:  Encounter Diagnoses   Name Primary?    PAD (peripheral artery disease) (H24) Yes    Ulcer of toe of left foot, limited to breakdown of skin (H)      VASC Wound Left shin (Active)       VASC Wound Left 4th toe (Active)   Post Size Length 0.5 23 1500   Post Size Width 0.5 23 1500   Post Size Depth 0.2 23 1500   Post Total Sq cm 0.25 23 1500       Incision/Surgical Site 22 Left Foot (Active)     Drainage: moderate  Thickness:  Full  Duration of Need: 30  Days Supply: 30  Start Date: 2024  Starter Kit: Ancillary Kit (saline, gloves, gauze)  Qualifying wound/Debridement: Yes     Dressing Type Brand Size Days Supply  15/30 Quantity  changes/week   Primary Manuka honey HD Supra lite   4''x5'' 30 Every other day   Secondary Square gauze   4''x4'' 30 Every other day    Sof form roll gauze   4''x75'' 30 Every other day   Tape Papertape  1in 30 Every other day       No substitutions preferred. Call [phone].    OK to forward to covered supplier.    Electronically Signed Physician: Kendrick Pedraza DPM Date:  1/8/2024

## 2024-01-23 DIAGNOSIS — F11.20 OPIOID TYPE DEPENDENCE, CONTINUOUS (H): ICD-10-CM

## 2024-01-23 DIAGNOSIS — M54.32 BACK PAIN WITH LEFT-SIDED SCIATICA: ICD-10-CM

## 2024-01-23 DIAGNOSIS — G89.29 OTHER CHRONIC PAIN: ICD-10-CM

## 2024-01-23 RX ORDER — OXYCODONE HYDROCHLORIDE 10 MG/1
10 TABLET, FILM COATED, EXTENDED RELEASE ORAL EVERY 12 HOURS
Qty: 60 TABLET | Refills: 0 | Status: SHIPPED | OUTPATIENT
Start: 2024-01-23 | End: 2024-02-23

## 2024-02-22 DIAGNOSIS — M54.32 BACK PAIN WITH LEFT-SIDED SCIATICA: ICD-10-CM

## 2024-02-22 DIAGNOSIS — G89.29 OTHER CHRONIC PAIN: ICD-10-CM

## 2024-02-22 RX ORDER — OXYCODONE HCL 40 MG/1
40 TABLET, FILM COATED, EXTENDED RELEASE ORAL EVERY MORNING
Qty: 30 TABLET | Refills: 0 | Status: SHIPPED | OUTPATIENT
Start: 2024-02-22 | End: 2024-03-18

## 2024-02-23 DIAGNOSIS — F11.20 OPIOID TYPE DEPENDENCE, CONTINUOUS (H): ICD-10-CM

## 2024-02-23 RX ORDER — OXYCODONE HYDROCHLORIDE 10 MG/1
10 TABLET, FILM COATED, EXTENDED RELEASE ORAL EVERY 12 HOURS
Qty: 60 TABLET | Refills: 0 | Status: SHIPPED | OUTPATIENT
Start: 2024-02-23 | End: 2024-04-22

## 2024-03-18 DIAGNOSIS — G89.29 OTHER CHRONIC PAIN: ICD-10-CM

## 2024-03-18 DIAGNOSIS — M54.32 BACK PAIN WITH LEFT-SIDED SCIATICA: ICD-10-CM

## 2024-03-18 RX ORDER — OXYCODONE HCL 40 MG/1
40 TABLET, FILM COATED, EXTENDED RELEASE ORAL
Qty: 30 TABLET | Refills: 0 | Status: SHIPPED | OUTPATIENT
Start: 2024-03-18 | End: 2024-04-22

## 2024-03-19 ENCOUNTER — TELEPHONE (OUTPATIENT)
Dept: INTERNAL MEDICINE | Facility: CLINIC | Age: 84
End: 2024-03-19
Payer: MEDICARE

## 2024-03-19 NOTE — TELEPHONE ENCOUNTER
March 19, 2024    [employer] High Protein Boost Renewal was received via fax for Dr. Ashley to sign.  Patient label was attached to paperwork and placed in provider's inbox to be signed.    Monie Frederick

## 2024-03-28 NOTE — PROGRESS NOTES
"Progress Notes by Miguelina Nicole LPN at 7/10/2020  1:20 PM     Author: Miguelina Nicole LPN Service: -- Author Type: Licensed Nurse    Filed: 7/11/2020  9:26 AM Date of Service: 7/10/2020  1:20 PM Status: Signed    : Miguelina Nicole LPN (Licensed Nurse)       Jacqueline Prado is a 80 y.o. female who is being evaluated via a billable telephone visit.      The patient has been notified of following:     \"This telephone visit will be conducted via a call between you and your physician/provider. We have found that certain health care needs can be provided without the need for a physical exam.  This service lets us provide the care you need with a short phone conversation.  If a prescription is necessary we can send it directly to your pharmacy.  If lab work is needed we can place an order for that and you can then stop by our lab to have the test done at a later time.    Telephone visits are billed at different rates depending on your insurance coverage. During this emergency period, for some insurers they may be billed the same as an in-person visit.  Please reach out to your insurance provider with any questions.    If during the course of the call the physician/provider feels a telephone visit is not appropriate, you will not be charged for this service.\"    Patient has given verbal consent to a Telephone visit? Yes    What phone number would you like to be contacted at? [phone]    Patient would like to receive their AVS by AVS Preference: Staci.    Patient is here for a follow up appointment with Dr. Ramirez. Patient has low back pain, describes the pain as constant, muscle spasms, dull achy, sharp and throbbing,rates the pain as 8/10. Patient needs refills for Oxycodone Er 10mg and 40mg, Soma. CSA, RASHAUN, NDI and UDT are deferred due to COVID 19. Functionality is 5. Patient states their pain interferes with walking and work.        Miguelina Nicole LPN         " Tyler Salazar DO  Phone: [phone] Ольга Sparks, Medical Assistant    PLEASE READ CAREFULLY BEFORE CONTACTING YOUR PROVIDER.    WE WORK COLLABORATIVELY AS A TEAM. CALLING MULTIPLE STAFF MEMBERS REGARDING THE SAME ISSUE WILL DELAY YOUR CARE.  MYCHART IS THE PREFERRED COMMUNICATION FOR ALL TEAM MEMBERS.  ________________________________________________________________________________________________________________________________________________________________________    After Surgery Instructions: TOTAL HIP ARTHROPLASTY    The following is a general guide and may be applied to most patients that go home from the hospital after surgery. If you go to a rehab facility the timeline may deviate a little. Please do not hesitate to call our office for any questions or additional guidance. We will work with you to get the best experience from your new joint replacement.     WEEK 1  Relax…Don't Overdo it!  - Get up once an hour for light activity while you are awake. (get a drink, go to the bathroom, etc.)  - Keep the surgical site iced and elevated above your heart, if possible, while you are resting.  - You will have some light exercises given to you from the physical therapist in the hospital. They will teach you posterior hip precautions that you should be mindful of for the first six weeks after surgery.    SWELLING AND BRUISING    BRUISING AND SWELLING AFTER SURGERY IS NORMAL. As the patient becomes more mobile the bruising and swelling will begin to migrate towards the ankle and foot and is usually noticed toward the end of the day.    WEEK 2-3  You will have a prescription for physical therapy given to you or electronically prescribed and you should start outpatient therapy one week after your operation. Be sure to do the home exercises on the days you are not attending physical therapy.    - Continue to progress walking as tolerated.   - Continue to use ice for soreness on the surgical site  - You may wean from  your walker to a cane when you feel safe and comfortable to do so. Your physical therapist will also be helpful with progressing your assistive device.   - Be careful with bending and twisting - If it hurts, stop!!    FOLLOW UP  - Your first post-operative visit with Dr. Tyler Salazar should have been scheduled already. Please call [phone] for appointment questions  - You do not need new xrays for this visit  - Don't be nervous! This visit is to see how you are doing and make sure your incision is healing nicely and have begun working with physical therapy.       MEDICATION REFILLS - Please call main office number: [phone]    You will not receive a call indicating that your prescription has been filled.  Please contact your pharmacy with any questions.    Medication refills will be filled Monday-Friday 7am to 1pm ONLY. Please call the office or send a [employer] message for a refill request.  Any requests received outside of this timeframe will be handled on the next business day.  The office staff and orthopedic nurses cannot refill medications; messages should be left directly through the office or via my chart.  Please do not call multiple times or call other members of the care team for medication needs, this will cause the refill to take longer.    Per State and Institutional policy, pain medications can only be refilled every 7 days for up to six weeks following surgery.    DRIVING & TRAVEL AFTER SURGERY   Patients should anticipate waiting at least 3-6 weeks before traveling long distances after surgery.  At minimum you cannot be using your walker before considering driving and you cannot take any narcotic medications prior to driving. You will need to stop to walk around ever 1 hour during your travel to help with blood clot prevention.  Please call the office or your joint nurse to discuss prior to post-surgical travel.  Patients may not drive until cleared by the joint nurse or the  office.    DENTAL PROCEDURES & CLEANINGS  You must wait a minimum of 3 months for elective dental appointments (if possible, we understand emergencies happen), including routine cleanings or dental work including bridges, crowns, extractions, etc..  For any dental visit - cleaning or dental procedures - patients must take an antibiotic 1 hour before the appointment.  Antibiotics are a lifelong need before dental appointments.  The antibiotic prescribed will be based on each patient's allergies.    WOUND CARE  You will have an ace wrap on your leg put on during surgery. This compression wrap is for comfort and may be removed 24 hours after surgery. You may continue to use compression throughout your recovery if this is comfortable for you.  You have a waterproof bandage on your wound and may shower with this on. The waterproof bandage is to remain in place for 7 days. You may remove it yourself. You may leave your incision open to air after the bandage has been removed.  Under your waterproof bandage you have a mesh and glue dressin in place. Do not peel this off. This will be on for 3-4 weeks. You may trim the edges as they peel off on their own. You can continue to shower with this on. Let the water run freely over your incision when showering and do not scrub.   You may shower upon discharging from the hospital.  Soap and water is permitted to run over the surgical dressing.  Do not scrub directly over these items.  DO NOT soak your incision in a bath, hot tub, pool or pond/lake for a minimum of 3 weeks following your surgery.  DO NOT use lotions, creams, ointments on your wound for a minimum of 3 weeks following your surgery. At that time you may use vitamin E to assist with softening of your incision.    RESTARTING HOME ROUTINE - DIET & MEDICATIONS  Post-operative constipation can result due to a combination of inactivity, anesthesia and pain medication. To help prevent this, you should increase your water and  fiber intake. Physical activity such as walking will also help stimulate the bowels.   You may resume your normal diet when you discharge home.  Choose foods that help promote good bowel habits and prevent constipation, such as foods high in fiber.  You may restart your home medications the following day after your surgery UNLESS you have been given alternate instructions.  Follow the instructions given to you on your hospital discharge instructions for more information regarding your home medications.    IN-HOME PHYSICAL THERAPY & OUTPATIENT PHYSICAL THERAPY  Continue the exercises you were given in the hospital until you have been seen by in-home therapy.  Make sure to provide a phone number with the ability for the home care staff to leave a message if you do not answer your phone.    Depending on your treatment plan you will begin outpatient or home therapy after surgery. This should be arranged through the office of hospital during discharge  FOR PATIENT DOING HOME THERAPY: Outpatient physical therapy following knee replacement surgery should begin 2-3 weeks after surgery.  You should call to schedule this appointment ASAP if not already scheduled before surgery.  Waiting until you are ready for outpatient physical therapy will cause a delay in your care.  You may choose any outpatient physical therapy location.  Call the office for an order if needed.    EMERGENCIES - WHEN TO CONTACT THE SURGEON'S OFFICE IMMEDIATELY  Fever >101 with chills that has been present for at least 48 hours.   Excessive bleeding from incision that will not slow down. A small amount of drainage is normal and expected.  Once pressure is applied and the area is covered, do not continue to check the area regularly.  This will remove pressure and bleeding will continue.  Leave in place for 4-6 hours.  Signs of infection of incision-excessive drainage that is soaking through your dressing (especially if it is pus-like), redness that is  spreading out from the edges of your incision, or increased warmth around the area.  Excruciating pain for which the pain medication, taken as instructed, is not helping.  Severe calf pain.  Go directly to the emergency room or call 911, if you are experiencing chest pain or difficulty breathing.    ICE & COLD THERAPY INSTRUCTIONS    To assist with pain control and post-op swelling, you should be using ice regularly throughout recovery, especially for the first 6 weeks, regardless of the cold therapy method you use.      Always make sure there is a layer of protection between the cold pad and your skin.    If you are using ICE PACKS or GEL PACKS, you will need to alternate 20 minutes on, 20 minutes off twice per hour.    If you are using an ICE MACHINE, please follow the provided ice machine instructions.  These devices differ from ice or ice packs whereas the mechanism circulates water through tubing and a pad to provide longer periods of cold therapy to the desired site.  You can use your cold devices around the clock for optimal comfort.  We recommend using cold therapy after working with therapy or completing exercises on your own.  There is no set schedule in which you must follow while using cold therapy.  Below are a few points to remember when using a cold therapy device:    You do not need to need to use the 20 on, 20 off method.  Detach the pad from the cooler and ambulate at least once every hour.  You can check your skin under the pad at this time.  You may wear the cold therapy device during periods of sleep including overnight.  If you wake up during the night, you can check the skin at this time.  You do not need to wake up specifically to perform skin checks.  Empty the cooler and pad when device is not in use.  Follow 's instructions for cleaning your cold therapy device.      DISCHARGE MEDICATIONS - Please reference the sample schedule on the reverse side for instructions on how to best  schedule medications.    PAIN MEDICATION    _______ Oxycodone   Oxycodone has been prescribed for post-operative pain control. Take one 5 mg tablet every 6 hours for pain. Alternate with Tramadol. See medication example sheet. This medication will only be refilled ONCE every 7 days for a period of 6 weeks. After 6 weeks, you will transition to acetaminophen (Tylenol) and over -the- counter anti-inflammatories such as Ibuprofen, Advil or Aleve in conjunction with ICE.    Side effects may be constipation and nausea, vomiting, sleepiness, dizziness, lightheadedness, headache, blurred vision, dry mouth sweating, itching (if you have itching, over-the -counter Benadryl can be used as needed).   You may NOT operate a motor vehicle while taking this medication or have been cleared by your surgeon or PA.     ________ Tramadol   Tramadol has been prescribed for post-operative pain control. Take one 50 mg tablet every 6 hours for pain. Alternate with Oxycodone. See medication example sheet. This medication will only be refilled ONCE every 7 days for a period of 6 weeks. After 6 weeks, you will transition to acetaminophen (Tylenol) and over- the- counter anti-inflammatories such as Ibuprofen, Advil or Aleve in conjunction with ICE.    Side effects may be constipation and nausea, vomiting, sleepiness, dizziness, lightheadedness, headache, blurred vision, dry mouth, sweating, itching (if you have itching, over-the -counter Benadryl can be used as needed).   You may NOT operate a motor vehicle while taking this medication or have been cleared by your surgeon or PA.    NON-NARCOTIC PAIN MEDICATION     _________ Celecoxib (Celebrex) or Meloxicam (Mobic) - Prescription anti-inflammatory medication   One of these will be prescribed (if you are able to take it) as an adjunct anti-inflammatory to assist in pain control. Take one twice daily for 4 weeks. See medication example sheet. You will not receive refills on this  medication.   Side effects may include nausea or upset stomach    _________ Acetaminophen (Tylenol)   Acetaminophen has been prescribed as an adjunct for pain control. Take two 500 mg tablet every 6-8 hours for 4 weeks. See medication example sheet. No refills will be given after initial prescription.   Side effects may include nausea, heartburn, drowsiness, and headache.    _________Cyclobenzaprine (Flexeril)   This is a muscle relaxer that will be prescribed    Take this AS NEEDED per instructions on bottle   This will be only prescribed once and is available to help with muscle spasms. There will be no refills of this medication    BLOOD THINNER    __________ Aspirin or Other Blood Thinner   Aspirin or another medication has been prescribed as a blood thinner to prevent blood clots in your leg or lungs. Take as prescribed on the bottle for 4 weeks. You will not receive a refill on this medication.   Do not take this medication if you are on another blood thinner.    ANTI NAUSEA    __________ Pantoprazole   Pantoprazole has been prescribed to help with nausea and protect your stomach while taking pain medication. Take one 40 mg tablet once daily for 4 weeks. You will not receive a refill on this medication.    ANTIBIOTIC     If you are deemed “high risk” for infection after surgery and antibiotic will be prescribed. Please take this as directed for one week.     STOOL SOFTENERS    __________ Senna   Post-operative constipation can result due to a combination of inactivity, anesthesia and pain medication. To help prevent this, you should increase your water and fiber intake. Physical activity such as walking will also help stimulate the bowels.    Senna has been prescribed to help with constipation while on Oxycodone and Tramadol. Take one tablet twice daily for 4 weeks. No refills will be given.   You may also take Miralax in combination with Senna to prevent constipation.  This can be purchased over the counter  at your local pharmacy.  Take as instructed on the bottle.   Pain Medication Refills -[phone] or MyChart- Monday through Friday 7am-1pm    Medication refills will be sent upon receipt of your request during the times listed above. Due to the high call volume, you will not receive a call confirming prescription refills; please do not call multiple times.  Prescription refills may take a few hours to process, you may follow up with your pharmacy for pickup availability.    SAMPLE              The times below are an example of how to organize medications to optimize pain control  Your actual medication schedule may vary based on your last dose taken IN THE HOSPITAL      Time 3:00am 6:00am 9:00am 12:00pm 3:00pm 6:00pm 9:00pm 12:00am   Medications Tramadol Acetaminophen (Tylenol)   Oxycodone  Miralax   Blood Thinner  Colace  Pantoprazole  Tramadol  Meloxicam Acetaminophen (Tylenol)   Oxycodone Tramadol Acetaminophen (Tylenol)   Oxycodone  Miralax Blood Thinner  Colace  Tramadol   Acetaminophen (Tylenol)   Oxycodone            You may begin to wean off the pain medication as your pain remains controlled with increased activity.  The schedules provided are meant to serve as an example.  You may wean off based on your pain control.  Please note that pain medications are not filled beyond 6 weeks after surgery.              The times below are an example of how to WEAN OFF medications WHILE CONTINUING TO OPTIMIZE PAIN CONTROL.  Your actual medication schedule may vary based on your last dose taken.  Time 12:00am 4:00am 8:00am 12:00pm 4:00pm 8:00pm   Med Tramadol Oxycodone   Tramadol Oxycodone Tramadol Oxycodone     Time 12:00am 6:00am 12:00pm 6:00pm   Med Tramadol Oxycodone   Tramadol Oxycodone     Time 12:00am 8:00am 4:00pm   Med Tramadol Oxycodone   Tramadol     Time 12:00am 12:00pm   Med Tramadol Tramadol        _________________________________________________________________________________________________________________________________________________________________________    OFFICE INFORMATION    OFFICE LOCATIONS    Location 1: [facility]  [address], [address]  Office Number: [phone]    Location 2: [address]  [facility], [address]  Office Number: [phone]    Location 3: [facility]  [address] [place] [address]  Office Number: [phone]

## 2024-04-19 DIAGNOSIS — M54.32 BACK PAIN WITH LEFT-SIDED SCIATICA: ICD-10-CM

## 2024-04-19 DIAGNOSIS — F11.20 OPIOID TYPE DEPENDENCE, CONTINUOUS (H): ICD-10-CM

## 2024-04-19 DIAGNOSIS — G89.29 OTHER CHRONIC PAIN: ICD-10-CM

## 2024-04-22 RX ORDER — OXYCODONE HYDROCHLORIDE 10 MG/1
10 TABLET, FILM COATED, EXTENDED RELEASE ORAL EVERY 12 HOURS
Qty: 60 TABLET | Refills: 0 | Status: SHIPPED | OUTPATIENT
Start: 2024-04-22 | End: 2024-07-02

## 2024-04-29 ENCOUNTER — TELEPHONE (OUTPATIENT)
Dept: INTERNAL MEDICINE | Facility: CLINIC | Age: 84
End: 2024-04-29
Payer: MEDICARE

## 2024-04-29 DIAGNOSIS — M81.0 AGE-RELATED OSTEOPOROSIS WITHOUT CURRENT PATHOLOGICAL FRACTURE: ICD-10-CM

## 2024-04-29 DIAGNOSIS — Z92.29 PERSONAL HISTORY OF OTHER DRUG THERAPY: Primary | ICD-10-CM

## 2024-04-29 NOTE — TELEPHONE ENCOUNTER
Patient's last prolia was 2023, pt has an upcoming prolia injection appointment on 2024 at [facility].     If this patient is to continue with Prolia injection therapy a new, active prolia order is needed for the upcoming administration.     A Prolia order has been pended with the previously administered order's associated diagnoses; signing provider to review diagnoses to ensure these still apply to patient.    Diagnoses from order used during 2023 administration:   Personal history of other drug therapy [Z92.29]  - Primary      Age-related osteoporosis without current pathological fracture [M81.0]          Have previous orders been discontinued due to being ? Yes    Patient's most recent labs within the past year (Calcium, Albumin, Creatinine):      Latest Reference Range & Units 23 13:45   Creatinine 0.51 - 0.95 mg/dL 0.87   Calcium 8.8 - 10.2 mg/dL 8.7 (L)   Albumin 3.5 - 5.2 g/dL 3.9   (L): Data is abnormally low    Labs were completed more than 6 months ago , labs are pended if provider would like values checked prior to administration..     Prolia provider information with link to prescribing information: [email or web address]  Note: Per Prolia ordering smartset, an albumin level is recommended if Calcium level is < 8.5.     Provider action needed: please review/sign prolia order, review associated diagnoses, review/sign recent labs (if needed); please remove lab orders if not needed.  If labs are ordered, please route to Scheduling so that patient can be scheduled for lab draw and labs can be followed for results prior to prolia administration.

## 2024-04-29 NOTE — TELEPHONE ENCOUNTER
04/29 I called and talked to pt she will find a ride and call later for  a lab appt prior to he prolia

## 2024-05-02 ENCOUNTER — TELEPHONE (OUTPATIENT)
Dept: INTERNAL MEDICINE | Facility: CLINIC | Age: 84
End: 2024-05-02
Payer: MEDICARE

## 2024-05-02 NOTE — TELEPHONE ENCOUNTER
May 2, 2024    Enteral/Nutritional Auth Form was received via fax for Dr. Ashley.  Patient label was attached to paperwork and placed in provider's inbox to be signed.    Monie Frederick

## 2024-05-07 ENCOUNTER — TELEPHONE (OUTPATIENT)
Dept: INTERNAL MEDICINE | Facility: CLINIC | Age: 84
End: 2024-05-07

## 2024-05-07 NOTE — TELEPHONE ENCOUNTER
Forms/Letter Request    Type of form/letter: OTHER: Enteral/Nutritional Authorization Form       Do we have the form/letter: Yes: Dr Ashley wants an appointment with patient in order to fill out form.     Lia (daughter) would like to know if the appointment can be virtual or if Dr Ashley wants patient to come in.    Who is the form from? [facility]    How would you like the form/letter returned:       Could we send this information to you in [employer] or would you prefer to receive a phone call?:   Phone call   Okay to leave a detailed message?: No at Other phone number:  [phone]

## 2024-05-13 NOTE — TELEPHONE ENCOUNTER
Talked to pt and she will let her daughter know that she needs to come in and do labs before her Prolia Vaccine on the 29th of May.The pt stated that they will call and schedule a lab as soon as she finds out when her daughter is available to bring her.

## 2024-05-13 NOTE — TELEPHONE ENCOUNTER
----- Message from Steve Serrato RN sent at 5/13/2024  7:41 AM CDT -----  Regarding: Labs prior to Prolia  Please contact patient again and help her set up a lab only appointment prior to her Prolia on 5/29.    Thanks.

## 2024-05-16 NOTE — PHARMACY-ADMISSION MEDICATION HISTORY
Pharmacy Note - Admission Medication History    Pertinent Provider Information:     -Patient takes AM meds around 12:00 noon and PM meds around midnight or 01:00 AM.   -Patient takes Oxycontin 10-30mg at bedtime (dose dependent upon how she feels) + 40mg daily around 12:00 noon  -Historically has taken isosorbide 10mg BID, but most recent pharmacy fill was for 30mg BID, and family reports she is taking the most current Rx   ______________________________________________________________________    Prior To Admission (PTA) med list completed and updated in EMR.       Current Facility-Administered Medications for the 3/8/22 encounter (Hospital Encounter)   Medication     denosumab (PROLIA) injection 60 mg     PTA Med List   Medication Sig Last Dose     albuterol (PROAIR HFA;PROVENTIL HFA;VENTOLIN HFA) 90 mcg/actuation inhaler [ALBUTEROL (PROAIR HFA;PROVENTIL HFA;VENTOLIN HFA) 90 MCG/ACTUATION INHALER] Inhale 2 puffs every 4 (four) hours as needed for wheezing. And cough  at PRN     amitriptyline (ELAVIL) 25 MG tablet [AMITRIPTYLINE (ELAVIL) 25 MG TABLET] two tablets by mouth at bedtime (Patient taking differently: Take 25 mg by mouth nightly as needed )  at PRN     aspirin 81 mg chewable tablet [ASPIRIN 81 MG CHEWABLE TABLET] Chew 81 mg daily. Past Week at Unknown time     CALCIUM CARBONATE/VITAMIN D3 (CALCIUM+D ORAL) [CALCIUM CARBONATE/VITAMIN D3 (CALCIUM+D ORAL)] Take 3 tablets by mouth daily. Past Week at Unknown time     carisoprodol (SOMA) 350 MG tablet Take 1 tablet (350 mg) by mouth 3 times daily as needed for muscle spasms 3/8/2022 at 1200     cephALEXin (KEFLEX) 500 MG capsule Take 1 capsule (500 mg) by mouth 3 times daily 3/8/2022 at 1200     cholecalciferol, vitamin D3, 1,000 unit (25 mcg) tablet [CHOLECALCIFEROL, VITAMIN D3, 1,000 UNIT (25 MCG) TABLET] Take 1,000 Units by mouth daily. Past Week at Unknown time     folic acid (FOLVITE) 1 MG tablet Take 1 mg by mouth daily  Past Week at Unknown time      hydrochlorothiazide (MICROZIDE) 12.5 MG capsule Take 12.5 mg by mouth daily as needed  at PRN     hydrocortisone (CORTISONE, HYDROCORTISONE,) 1 % cream [HYDROCORTISONE (CORTISONE, HYDROCORTISONE,) 1 % CREAM] Apply to hand three times a day (Patient taking differently: Apply topically 3 times daily as needed )  at PRN     isosorbide mononitrate (IMDUR) 30 MG 24 hr tablet Take 30 mg by mouth 2 times daily  3/8/2022 at 0000     leflunomide (ARAVA) 20 MG tablet [LEFLUNOMIDE (ARAVA) 20 MG TABLET] Take 20 mg by mouth daily.  3/5/2022     levothyroxine (SYNTHROID, LEVOTHROID) 75 MCG tablet [LEVOTHYROXINE (SYNTHROID, LEVOTHROID) 75 MCG TABLET] TAKE 1 TABLET BY MOUTH DAILY AT 6 AM 3/8/2022 at 1200     loratadine (CLARITIN) 10 mg tablet [LORATADINE (CLARITIN) 10 MG TABLET] Take 10 mg by mouth daily. 3/8/2022 at 1200     naloxone (NARCAN) 4 MG/0.1ML nasal spray Spray 1 spray (4 mg) into one nostril alternating nostrils once as needed for opioid reversal every 2-3 minutes until assistance arrives      nystatin (MYCOSTATIN) cream [NYSTATIN (MYCOSTATIN) CREAM] Apply 1 application topically 2 (two) times a day as needed.  at PRN     omeprazole (PRILOSEC) 20 MG capsule [OMEPRAZOLE (PRILOSEC) 20 MG CAPSULE] Take 20 mg by mouth Daily before breakfast. 3/8/2022 at 1200     oxyCODONE (OXYCONTIN) 10 MG 12 hr tablet Take 1 tablet (10 mg) by mouth every 12 hours (Patient taking differently: Take 10-30 mg by mouth At Bedtime ) 3/8/2022 at 0000     oxyCODONE (OXYCONTIN) 40 MG 12 hr tablet Take 1 tablet (40 mg) by mouth every 24 hours 3/8/2022 at 1200     predniSONE (CHARLENE) 2 MG EC tablet Take 1 tablet (2 mg) by mouth At Bedtime 3/8/2022 at 0000     triamcinolone (KENALOG) 0.1 % ointment [TRIAMCINOLONE (KENALOG) 0.1 % OINTMENT] Apply to both legs twice daily (Patient taking differently: Apply topically 2 times daily as needed To legs)  at PRN       Information source(s): Family member, Patient's pharmacy and  Manohar/Viktor    Method of interview communication: in-person    Patient was asked about OTC/herbal products specifically.  PTA med list reflects this.    Based on the pharmacist's assessment, the PTA med list information appears reliable    Allergies were reviewed, assessed, and updated with the patient.      Patient does not anticipate needing any multi-use medications during admission.     Thank you for the opportunity to participate in the care of this patient.      Chandrika Razo [facility]     3/8/2022     4:58 PM     Jermain Mcdaniel(Attending)

## 2024-05-23 ENCOUNTER — TELEPHONE (OUTPATIENT)
Dept: INTERNAL MEDICINE | Facility: CLINIC | Age: 84
End: 2024-05-23
Payer: MEDICARE

## 2024-05-23 NOTE — TELEPHONE ENCOUNTER
----- Message from Steve Serrato RN sent at 5/23/2024 10:18 AM CDT -----  Regarding: Labs prior to Prolia  Please contact patient and help schedule a lab appointment for her prior to her next Prolia injection on 5/29. She will need these done prior to this appointment or will need to reschedule her injection.

## 2024-05-28 DIAGNOSIS — M54.32 BACK PAIN WITH LEFT-SIDED SCIATICA: ICD-10-CM

## 2024-05-28 DIAGNOSIS — G89.29 OTHER CHRONIC PAIN: ICD-10-CM

## 2024-05-29 ENCOUNTER — LAB (OUTPATIENT)
Dept: LAB | Facility: CLINIC | Age: 84
End: 2024-05-29
Payer: MEDICARE

## 2024-05-29 ENCOUNTER — TELEPHONE (OUTPATIENT)
Dept: INTERNAL MEDICINE | Facility: CLINIC | Age: 84
End: 2024-05-29

## 2024-05-29 DIAGNOSIS — M81.0 AGE-RELATED OSTEOPOROSIS WITHOUT CURRENT PATHOLOGICAL FRACTURE: ICD-10-CM

## 2024-05-29 DIAGNOSIS — Z92.29 PERSONAL HISTORY OF OTHER DRUG THERAPY: ICD-10-CM

## 2024-05-29 PROCEDURE — 82310 ASSAY OF CALCIUM: CPT

## 2024-05-29 PROCEDURE — 82565 ASSAY OF CREATININE: CPT

## 2024-05-29 PROCEDURE — 36415 COLL VENOUS BLD VENIPUNCTURE: CPT

## 2024-05-30 LAB
CALCIUM SERPL-MCNC: 9.4 MG/DL (ref 8.8–10.2)
CREAT SERPL-MCNC: 0.89 MG/DL (ref 0.51–0.95)
EGFRCR SERPLBLD CKD-EPI 2021: 63 ML/MIN/1.73M2

## 2024-06-20 ENCOUNTER — ALLIED HEALTH/NURSE VISIT (OUTPATIENT)
Dept: FAMILY MEDICINE | Facility: CLINIC | Age: 84
End: 2024-06-20
Payer: MEDICARE

## 2024-06-20 DIAGNOSIS — Z92.29 PERSONAL HISTORY OF OTHER DRUG THERAPY: ICD-10-CM

## 2024-06-20 DIAGNOSIS — M81.0 AGE-RELATED OSTEOPOROSIS WITHOUT CURRENT PATHOLOGICAL FRACTURE: Primary | ICD-10-CM

## 2024-06-20 PROCEDURE — 96372 THER/PROPH/DIAG INJ SC/IM: CPT | Performed by: INTERNAL MEDICINE

## 2024-06-20 PROCEDURE — 99207 PR NO CHARGE NURSE ONLY: CPT

## 2024-06-20 NOTE — PROGRESS NOTES
Clinic Administered Medication Documentation      Prolia Documentation    Indication: Prolia  (denosumab) is a prescription medicine used to treat osteoporosis in patients who:   Are at high risk for fracture, meaning patients who have had a fracture related to osteoporosis, or who have multiple risk factors for fracture.  Cannot use another osteoporosis medicine or other osteoporosis medicines did not work well.  The timeline for early/late injections would be 4 weeks early and any time after the 6 month dinora. If a patient receives their injection late, then the subsequent injection would be 6 months from the date that they actually received the injection.    When was the last injection?  23  Was the last injection at least 6 months ago? Yes  Has the prior authorization been completed?  Yes  Is there an active order (written within the past 365 days, with administrations remaining, not ) in the chart?  Yes   GFR Estimate   Date Value Ref Range Status   2024 63 >60 mL/min/1.73m2 Final   01/15/2019 >60 >60 mL/min/1.73m2 Final     Has patient had a GFR within the last 12 months? Yes   Is GFR under 30, or patient has a diagnosis of CKD4 or CKD5? No   Patient denies gastric bypass or parathyroid surgery in past 6 months? Yes - patient denies.   Patient denies dental work in the past two months involving drilling into the bone, such as implants/extractions, oral surgery or a tooth extraction that has not healed yet?  Yes  Patient denies plans for an emergency tooth extraction within the next week? Yes    The following steps were completed to comply with the REMS program for Prolia:  Reviewed information in the Medication Guide, including the serious risks of Prolia  and the symptoms of each risk.  Advised patient to seek prompt medical attention if they have signs or symptoms of any of the serious risks.  Provided each patient a copy of the Medication Guide and Patient Guide.    Prior to injection,  verified patient identity using patient's name and date of birth. Medication was administered. Please see MAR and medication order for additional information. Patient instructed to remain in clinic for 15 minutes and report any adverse reaction to staff immediately.    Vial/Syringe: Syringe  Was this medication supplied by the patient? No  Verified that the patient has refills remaining in their prescription.

## 2024-06-25 ENCOUNTER — OFFICE VISIT (OUTPATIENT)
Dept: URGENT CARE | Facility: URGENT CARE | Age: 84
End: 2024-06-25
Payer: MEDICARE

## 2024-06-25 VITALS
OXYGEN SATURATION: 97 % | WEIGHT: 124 LBS | DIASTOLIC BLOOD PRESSURE: 86 MMHG | RESPIRATION RATE: 16 BRPM | SYSTOLIC BLOOD PRESSURE: 153 MMHG | TEMPERATURE: 98.2 F | HEIGHT: 63 IN | BODY MASS INDEX: 21.97 KG/M2 | HEART RATE: 100 BPM

## 2024-06-25 DIAGNOSIS — M06.9 RHEUMATOID ARTHRITIS FLARE (H): Primary | ICD-10-CM

## 2024-06-25 PROCEDURE — 99214 OFFICE O/P EST MOD 30 MIN: CPT | Performed by: NURSE PRACTITIONER

## 2024-06-25 RX ORDER — PREDNISONE 20 MG/1
40 TABLET ORAL ONCE
Status: COMPLETED | OUTPATIENT
Start: 2024-06-25 | End: 2024-06-25

## 2024-06-25 RX ORDER — PREDNISONE 20 MG/1
TABLET ORAL
Qty: 14 TABLET | Refills: 0 | Status: SHIPPED | OUTPATIENT
Start: 2024-06-25

## 2024-06-25 RX ADMIN — PREDNISONE 40 MG: 20 TABLET ORAL at 18:54

## 2024-06-25 ASSESSMENT — PAIN SCALES - GENERAL: PAINLEVEL: WORST PAIN (10)

## 2024-06-25 NOTE — PROGRESS NOTES
Chief Complaint   Patient presents with    Arthritis     X3 days RA in right hand worsening, swelling, warm, very painful     Urgent Care     SUBJECTIVE:  Jacqueline Prado is a 84 year old female presenting with 3 days of rheumatoid arthritis flare in bilateral hands.  Right joints with worsening erythema and edema tenderness stiffness pain.  She is requesting prednisone refill.  Already reached out to her rheumatologist and he is out on vacation.  Normally takes low-dose daily maintenance, but for flares needs higher doses.    Past Medical History:   Diagnosis Date    Acquired hypothyroidism     Acquired lymphedema of leg     Bundle branch block     Created by Conversion  Replacement Utility updated for latest IMO load    Bundle branch block     Cellulitis     Hypertension     Impetigo     Opioid dependence (H)     Osteoporosis     RA (rheumatoid arthritis) (H)     Rheumatoid arthritis (H)     Secondary hyperparathyroidism (H24)     Venous hypertension of both lower extremities     Venous insufficiency of both lower extremities     Venous stasis dermatitis of lower extremity      Current Outpatient Medications   Medication Sig Dispense Refill    albuterol (PROAIR HFA/PROVENTIL HFA/VENTOLIN HFA) 108 (90 Base) MCG/ACT inhaler Inhale 2 puffs into the lungs every 4 hours as needed for wheezing 18 g 4    amitriptyline (ELAVIL) 25 MG tablet Take 1 tablet (25 mg) by mouth at bedtime 90 tablet 4    CALCIUM CARBONATE/VITAMIN D3 (CALCIUM+D ORAL) [CALCIUM CARBONATE/VITAMIN D3 (CALCIUM+D ORAL)] Take 3 tablets by mouth daily.      carisoprodol (SOMA) 350 MG tablet Take 1 tablet (350 mg) by mouth 3 times daily as needed for muscle spasms 270 tablet 3    cholecalciferol, vitamin D3, 1,000 unit (25 mcg) tablet [CHOLECALCIFEROL, VITAMIN D3, 1,000 UNIT (25 MCG) TABLET] Take 1,000 Units by mouth daily.      folic acid (FOLVITE) 1 MG tablet Take 1 mg by mouth daily   2    hydrocortisone (CORTISONE, HYDROCORTISONE,) 1 % cream  [HYDROCORTISONE (CORTISONE, HYDROCORTISONE,) 1 % CREAM] Apply to hand three times a day 30 g 2    isosorbide mononitrate (ISMO/MONOKET) 10 MG tablet TAKE ONE TABLET BY MOUTH TWICE A  tablet 3    leflunomide (ARAVA) 20 MG tablet [LEFLUNOMIDE (ARAVA) 20 MG TABLET] Take 20 mg by mouth daily.       levothyroxine (SYNTHROID/LEVOTHROID) 75 MCG tablet TAKE 1 TABLET BY MOUTH DAILY AT 6 AM 90 tablet 3    loratadine (CLARITIN) 10 MG tablet Take 1 tablet (10 mg) by mouth daily 90 tablet 4    naloxone (NARCAN) 4 MG/0.1ML nasal spray Spray 1 spray (4 mg) into one nostril alternating nostrils as needed for opioid reversal every 2-3 minutes until assistance arrives 0.2 mL 0    naloxone (NARCAN) 4 MG/0.1ML nasal spray Spray 1 spray (4 mg) into one nostril alternating nostrils once as needed for opioid reversal every 2-3 minutes until assistance arrives 0.2 mL 0    nystatin (MYCOSTATIN) cream [NYSTATIN (MYCOSTATIN) CREAM] Apply 1 application topically 2 (two) times a day as needed.  1    omeprazole (PRILOSEC) 20 MG DR capsule Take 1 capsule (20 mg) by mouth every morning (before breakfast) 90 capsule 4    ondansetron (ZOFRAN) 4 MG tablet Take 1 tablet (4 mg) by mouth every 6 hours as needed for nausea 30 tablet 4    OXYCONTIN 10 MG 12 hr tablet TAKE ONE TABLET BY MOUTH EVERY 12 HOURS 60 tablet 0    OXYCONTIN 40 MG 12 hr tablet TAKE ONE TABLET BY MOUTH EVERY MORNING 30 tablet 0    predniSONE (DELTASONE) 20 MG tablet 2 tabs for 4 days, 1 tab for 4 days, 0.5 tab for 4 days 14 tablet 0    triamcinolone (KENALOG) 0.1 % ointment [TRIAMCINOLONE (KENALOG) 0.1 % OINTMENT] Apply to both legs twice daily 30 g 0    predniSONE (CHARLENE) 2 MG EC tablet Take 1 tablet (2 mg) by mouth nightly as needed (for flares) (Patient not taking: Reported on 6/25/2024)       Current Facility-Administered Medications   Medication Dose Route Frequency Provider Last Rate Last Admin    denosumab (PROLIA) injection 60 mg  60 mg Subcutaneous Q6 Months    60 mg  "at 24 1503     Social History     Tobacco Use    Smoking status: Former     Current packs/day: 0.00     Types: Cigarettes     Quit date: 2018     Years since quittin.0    Smokeless tobacco: Current    Tobacco comments:     started smoking when she was 21 years old, vaping   Substance Use Topics    Alcohol use: Yes     Comment: Alcoholic Drinks/day: glass of wine during holiday     Allergies   Allergen Reactions    Acetaminophen Rash    Nsaids (Non-Steroidal Anti-Inflammatory Drug) [Nsaids] Rash    Tetracyclines & Related Rash    Baclofen Nausea    Celecoxib Unknown    Erythromycin Base [Erythromycin] Unknown    Pentolinium Itching    Shellfish-Derived Products Diarrhea    Varenicline Itching and Swelling    Erythromycin Rash    Petroleum Jelly [Petrolatum] Itching and Rash       Review of Systems  All systems negative except for those listed above in HPI.    OBJECTIVE:   BP (!) 153/86   Pulse 100   Temp 98.2  F (36.8  C) (Temporal)   Resp 16   Ht 1.6 m (5' 3\")   Wt 56.2 kg (124 lb)   SpO2 97%   BMI 21.97 kg/m      Physical Exam  Vitals reviewed.   Constitutional:       Appearance: Normal appearance.   HENT:      Head: Normocephalic and atraumatic.   Cardiovascular:      Rate and Rhythm: Normal rate.      Pulses: Normal pulses.   Pulmonary:      Effort: Pulmonary effort is normal.   Musculoskeletal:         General: Swelling, tenderness and deformity present.   Skin:     General: Skin is warm and dry.      Findings: Erythema present.   Neurological:      General: No focal deficit present.      Mental Status: She is alert and oriented to person, place, and time.   Psychiatric:         Mood and Affect: Mood normal.         Behavior: Behavior normal.       ASSESSMENT:    ICD-10-CM    1. Rheumatoid arthritis flare (H)  M06.9 predniSONE (DELTASONE) tablet 40 mg     predniSONE (DELTASONE) 20 MG tablet        PLAN:     Prednisone per patient request for rheumatoid arthritis flare  Use caution in regards " to high blood pressure elevated blood sugars etc.  Follow-up closely with rheumatology  ER if severe worsening pain dysfunction fever sweats chills nausea vomiting redness warmth streaking    Follow up with primary care provider with any problems, questions or concerns or if symptoms worsen or fail to improve. Patient agreed to plan and verbalized understanding.    Brittney Whitaker, CHATA-BC  [facility] [place]

## 2024-06-28 ENCOUNTER — TELEPHONE (OUTPATIENT)
Dept: INTERNAL MEDICINE | Facility: CLINIC | Age: 84
End: 2024-06-28
Payer: COMMERCIAL

## 2024-06-28 NOTE — TELEPHONE ENCOUNTER
Patient is on the Chronic Pain Quality Measure/Registry and does not have a chronic opioid diagnosis in the problem list.   This encounter is being routed to the assigned pain provider to review the chart and update the diagnosis list if appropriate.    The two ICD-10 codes which would be acceptable are F11.90 or F11.2.    Adding either the F11.90 or F11.2 diagnoses to the problem list will automatically enroll your patient in the annual Health Maintenance Plan to update ACC components of the metric at their next visit.    Once the problem list has been updated or noted if not appropriate, provider will sign the encounter and close the chart (no need to route back to support staff).      Sandra Rodriguez LPN

## 2024-07-01 DIAGNOSIS — G89.29 OTHER CHRONIC PAIN: ICD-10-CM

## 2024-07-01 DIAGNOSIS — M54.32 BACK PAIN WITH LEFT-SIDED SCIATICA: ICD-10-CM

## 2024-07-01 DIAGNOSIS — F11.20 OPIOID TYPE DEPENDENCE, CONTINUOUS (H): ICD-10-CM

## 2024-07-02 RX ORDER — OXYCODONE HYDROCHLORIDE 10 MG/1
10 TABLET, FILM COATED, EXTENDED RELEASE ORAL EVERY 12 HOURS
Qty: 60 TABLET | Refills: 0 | Status: SHIPPED | OUTPATIENT
Start: 2024-07-02 | End: 2024-07-30

## 2024-07-02 RX ORDER — OXYCODONE HCL 40 MG/1
40 TABLET, FILM COATED, EXTENDED RELEASE ORAL
Qty: 30 TABLET | Refills: 0 | Status: SHIPPED | OUTPATIENT
Start: 2024-07-02 | End: 2024-07-30

## 2024-07-09 NOTE — TELEPHONE ENCOUNTER
[facility] High Protein Boost renewal form placed again in the in box of Dr Ashley

## 2024-07-17 ENCOUNTER — TELEPHONE (OUTPATIENT)
Dept: INTERNAL MEDICINE | Facility: CLINIC | Age: 84
End: 2024-07-17
Payer: MEDICARE

## 2024-07-17 NOTE — TELEPHONE ENCOUNTER
Order/Referral Request    Who is requesting:      Orders being requested: Booster supplement - pt needs medical letter stating why she needs this as a medical need     Reason service is needed/diagnosis: dietary supplement     When are orders needed by: asap    Has this been discussed with Provider: unsure    Does patient have a preference on a Group/Provider/Facility? [facility]     Does patient have an appointment scheduled?: No    Where to send orders: Fax and Place orders within Epic

## 2024-07-17 NOTE — TELEPHONE ENCOUNTER
Attempted to call , no answer (1/3). Left message requesting  call back clinic.      When  returns calll, please clarify request. No DME orders for boost noted.   5/7 telephone encounter states we received an enteral/nutrition form and it was recommended patient schedule a virtual visit to assist PCP in filling this out. This was not completed, is that what  is referring to?     Monie Frederick     MS    5/2/24  1:30 PM  Note  May 2, 2024     Enteral/Nutritional Auth Form was received via fax for Dr. Ashley.  Patient label was attached to paperwork and placed in provider's inbox to be signed.     Brad Hou MD   to [facility] Scheduling Registration [facility]    5/8/24 11:42 AM  Note  Virtual is okay, just needs some help filling in the blanks

## 2024-07-18 NOTE — TELEPHONE ENCOUNTER
Patient is on the Chronic Pain Quality Measure/Registry and does not have a chronic opioid diagnosis in the problem list.   This encounter is still open and is being routed to the assigned pain provider to review the chart and update the diagnosis list if appropriate.    The two ICD-10 codes which would be acceptable are F11.90 or F11.2.    Adding either the F11.90 or F11.2 diagnoses to the problem list will automatically enroll your patient in the annual Health Maintenance Plan to update ACC components of the metric at their next visit.    Once the problem list has been updated or noted if not appropriate, provider will sign the encounter and close the chart (no need to route back to support staff).      Sandra Rodriguez LPN

## 2024-07-19 NOTE — TELEPHONE ENCOUNTER
Multiple attempts to contact  to clarify request have failed. Left message to call clinic.     If call is returned, please clarify if the enteral nutrition note mentioned below is what they are needing. If so, patient will need a visit to discuss and get the form completed.

## 2024-07-30 DIAGNOSIS — M54.32 BACK PAIN WITH LEFT-SIDED SCIATICA: ICD-10-CM

## 2024-07-30 DIAGNOSIS — F11.20 OPIOID TYPE DEPENDENCE, CONTINUOUS (H): ICD-10-CM

## 2024-07-30 DIAGNOSIS — G89.29 OTHER CHRONIC PAIN: ICD-10-CM

## 2024-07-30 RX ORDER — OXYCODONE HYDROCHLORIDE 10 MG/1
10 TABLET, FILM COATED, EXTENDED RELEASE ORAL EVERY 12 HOURS
Qty: 60 TABLET | Refills: 0 | Status: SHIPPED | OUTPATIENT
Start: 2024-07-30

## 2024-07-30 RX ORDER — OXYCODONE HCL 40 MG/1
40 TABLET, FILM COATED, EXTENDED RELEASE ORAL
Qty: 30 TABLET | Refills: 0 | Status: SHIPPED | OUTPATIENT
Start: 2024-07-30 | End: 2024-09-18

## 2024-07-30 NOTE — CONFIDENTIAL NOTE
PDMP reviewed.  OxyContin 40 mg tablets filled July 2 #30    OxyContin 10 mg tablets filled July 2 #60    Prior to that May 29 and prior to that April 22    Last office visit November 2, 2023 which addressed high dose of OxyContin.  Underlying conditions include rheumatoid arthritis and back pain    Urine toxicology updated November 2, 2023    Okay to fill

## 2024-07-31 ENCOUNTER — TELEPHONE (OUTPATIENT)
Dept: INTERNAL MEDICINE | Facility: CLINIC | Age: 84
End: 2024-07-31
Payer: MEDICARE

## 2024-09-17 DIAGNOSIS — M54.32 BACK PAIN WITH LEFT-SIDED SCIATICA: ICD-10-CM

## 2024-09-17 DIAGNOSIS — E03.9 ACQUIRED HYPOTHYROIDISM: ICD-10-CM

## 2024-09-17 DIAGNOSIS — G89.29 OTHER CHRONIC PAIN: ICD-10-CM

## 2024-09-17 RX ORDER — LEVOTHYROXINE SODIUM 75 UG/1
TABLET ORAL
Qty: 90 TABLET | Refills: 0 | Status: SHIPPED | OUTPATIENT
Start: 2024-09-17

## 2024-10-11 ENCOUNTER — MYC REFILL (OUTPATIENT)
Dept: INTERNAL MEDICINE | Facility: CLINIC | Age: 84
End: 2024-10-11
Payer: MEDICARE

## 2024-10-11 DIAGNOSIS — J30.89 SEASONAL ALLERGIC RHINITIS DUE TO OTHER ALLERGIC TRIGGER: ICD-10-CM

## 2024-10-11 DIAGNOSIS — K21.9 GASTROESOPHAGEAL REFLUX DISEASE WITHOUT ESOPHAGITIS: ICD-10-CM

## 2024-10-11 DIAGNOSIS — J41.0 SIMPLE CHRONIC BRONCHITIS (H): ICD-10-CM

## 2024-10-11 DIAGNOSIS — R11.0 NAUSEA: ICD-10-CM

## 2024-10-14 RX ORDER — ALBUTEROL SULFATE 90 UG/1
2 INHALANT RESPIRATORY (INHALATION) EVERY 4 HOURS PRN
Qty: 18 G | Refills: 0 | Status: SHIPPED | OUTPATIENT
Start: 2024-10-14

## 2024-10-14 RX ORDER — LORATADINE 10 MG/1
10 TABLET ORAL DAILY
Qty: 90 TABLET | Refills: 0 | Status: SHIPPED | OUTPATIENT
Start: 2024-10-14

## 2024-10-14 RX ORDER — ONDANSETRON 4 MG/1
4 TABLET, FILM COATED ORAL EVERY 6 HOURS PRN
Qty: 30 TABLET | Refills: 0 | Status: SHIPPED | OUTPATIENT
Start: 2024-10-14

## 2024-10-16 DIAGNOSIS — G89.29 OTHER CHRONIC PAIN: ICD-10-CM

## 2024-10-16 DIAGNOSIS — M54.32 BACK PAIN WITH LEFT-SIDED SCIATICA: ICD-10-CM

## 2024-10-16 DIAGNOSIS — F11.20 OPIOID TYPE DEPENDENCE, CONTINUOUS (H): ICD-10-CM

## 2024-10-16 RX ORDER — OXYCODONE HYDROCHLORIDE 40 MG/1
40 TABLET, FILM COATED, EXTENDED RELEASE ORAL
Qty: 30 TABLET | Refills: 0 | Status: SHIPPED | OUTPATIENT
Start: 2024-10-16

## 2024-10-16 RX ORDER — OXYCODONE HYDROCHLORIDE 10 MG/1
10 TABLET, FILM COATED, EXTENDED RELEASE ORAL EVERY 12 HOURS
Qty: 60 TABLET | Refills: 0 | Status: SHIPPED | OUTPATIENT
Start: 2024-10-16

## 2024-11-18 ENCOUNTER — MYC REFILL (OUTPATIENT)
Dept: INTERNAL MEDICINE | Facility: CLINIC | Age: 84
End: 2024-11-18
Payer: MEDICARE

## 2024-11-18 DIAGNOSIS — M54.32 BACK PAIN WITH LEFT-SIDED SCIATICA: ICD-10-CM

## 2024-11-18 DIAGNOSIS — I10 PRIMARY HYPERTENSION: ICD-10-CM

## 2024-11-18 DIAGNOSIS — G89.29 OTHER CHRONIC PAIN: ICD-10-CM

## 2024-11-18 DIAGNOSIS — F11.20 OPIOID TYPE DEPENDENCE, CONTINUOUS (H): ICD-10-CM

## 2024-11-18 RX ORDER — OXYCODONE HYDROCHLORIDE 40 MG/1
40 TABLET, FILM COATED, EXTENDED RELEASE ORAL
Qty: 30 TABLET | Refills: 0 | Status: SHIPPED | OUTPATIENT
Start: 2024-11-18

## 2024-11-18 RX ORDER — ISOSORBIDE MONONITRATE 10 MG/1
10 TABLET ORAL 2 TIMES DAILY
Qty: 180 TABLET | Refills: 3 | Status: SHIPPED | OUTPATIENT
Start: 2024-11-18 | End: 2024-11-19

## 2024-11-18 RX ORDER — OXYCODONE HYDROCHLORIDE 10 MG/1
10 TABLET, FILM COATED, EXTENDED RELEASE ORAL EVERY 12 HOURS
Qty: 60 TABLET | Refills: 0 | OUTPATIENT
Start: 2024-11-18

## 2024-11-18 RX ORDER — OXYCODONE HYDROCHLORIDE 10 MG/1
10 TABLET, FILM COATED, EXTENDED RELEASE ORAL EVERY 12 HOURS
Qty: 60 TABLET | Refills: 0 | Status: SHIPPED | OUTPATIENT
Start: 2024-11-18

## 2024-11-18 RX ORDER — OXYCODONE HCL 40 MG/1
40 TABLET, FILM COATED, EXTENDED RELEASE ORAL
Qty: 30 TABLET | Refills: 0 | OUTPATIENT
Start: 2024-11-18

## 2024-11-19 ENCOUNTER — TELEPHONE (OUTPATIENT)
Dept: INTERNAL MEDICINE | Facility: CLINIC | Age: 84
End: 2024-11-19
Payer: MEDICARE

## 2024-11-19 DIAGNOSIS — I10 PRIMARY HYPERTENSION: Primary | ICD-10-CM

## 2024-11-19 RX ORDER — ISOSORBIDE MONONITRATE 30 MG/1
30 TABLET, EXTENDED RELEASE ORAL DAILY
Qty: 90 TABLET | Refills: 4 | Status: SHIPPED | OUTPATIENT
Start: 2024-11-19

## 2024-11-19 NOTE — TELEPHONE ENCOUNTER
A script was sent over for Jacqueline for Isosorbide mononitrate 10mg tablets but we are unable to get those in stock due to backorder issue that has been on going.  We do have the extended release tablets in stock.  Please send an alternative for this prescription.    Thanks,  Medina Apodaca  Pharmacy Technician   [facility]  [phone]

## 2024-12-02 ENCOUNTER — TELEPHONE (OUTPATIENT)
Dept: INTERNAL MEDICINE | Facility: CLINIC | Age: 84
End: 2024-12-02
Payer: MEDICARE

## 2024-12-02 DIAGNOSIS — M81.0 AGE-RELATED OSTEOPOROSIS WITHOUT CURRENT PATHOLOGICAL FRACTURE: ICD-10-CM

## 2024-12-02 DIAGNOSIS — Z92.29 PERSONAL HISTORY OF OTHER DRUG THERAPY: Primary | ICD-10-CM

## 2024-12-02 NOTE — TELEPHONE ENCOUNTER
Patient's last prolia was 2024, pt has an upcoming prolia injection appointment on 2024 at [place].     If this patient is to continue with Prolia injection therapy a new, active prolia order is needed for the upcoming administration.     A Prolia order has been pended with the previously administered order's associated diagnoses; signing provider to review diagnoses to ensure these still apply to patient.    Diagnoses from order used during 2024 administration:   Personal history of other drug therapy [Z92.29]  - Primary      Age-related osteoporosis without current pathological fracture [M81.0]          Have previous orders been discontinued due to being ? Yes    Patient's most recent labs within the past year (Calcium, Albumin, Creatinine):      Latest Reference Range & Units 24 13:46   Creatinine 0.51 - 0.95 mg/dL 0.89   Calcium 8.8 - 10.2 mg/dL 9.4       Labs were completed more than 6 months ago , labs are pended if provider would like values checked prior to administration..     Prolia provider information with link to prescribing information: [email or web address]  Note: Per Prolia ordering smartset, an albumin level is recommended if Calcium level is < 8.5.     Provider action needed: please review/sign prolia order, review associated diagnoses, review/sign recent labs (if needed); please remove lab orders if not needed.  If labs are ordered, please route to Scheduling so that patient can be scheduled for lab draw and labs can be followed for results prior to prolia administration. The following steps were completed to comply with the REMS program for Prolia:  Reviewed the serious risks of Prolia  and the symptoms of each risk.  Advised patient to seek prompt medical attention if they have signs or symptoms of any of the serious risks.  Patient will be provided a copy of the Medication Guide and Patient Brochure prior to first  injection.    Steve Serrato RN

## 2024-12-17 DIAGNOSIS — G89.29 OTHER CHRONIC PAIN: ICD-10-CM

## 2024-12-17 DIAGNOSIS — F11.20 OPIOID TYPE DEPENDENCE, CONTINUOUS (H): ICD-10-CM

## 2024-12-17 DIAGNOSIS — E03.9 ACQUIRED HYPOTHYROIDISM: ICD-10-CM

## 2024-12-17 DIAGNOSIS — M54.32 BACK PAIN WITH LEFT-SIDED SCIATICA: ICD-10-CM

## 2024-12-17 RX ORDER — LEVOTHYROXINE SODIUM 75 UG/1
TABLET ORAL
Qty: 60 TABLET | Refills: 0 | Status: SHIPPED | OUTPATIENT
Start: 2024-12-17

## 2024-12-17 RX ORDER — OXYCODONE HYDROCHLORIDE 10 MG/1
10 TABLET, FILM COATED, EXTENDED RELEASE ORAL EVERY 12 HOURS
Qty: 60 TABLET | Refills: 0 | Status: SHIPPED | OUTPATIENT
Start: 2024-12-17

## 2024-12-17 RX ORDER — OXYCODONE HYDROCHLORIDE 40 MG/1
40 TABLET, FILM COATED, EXTENDED RELEASE ORAL
Qty: 30 TABLET | Refills: 0 | Status: SHIPPED | OUTPATIENT
Start: 2024-12-17

## 2024-12-30 ENCOUNTER — LAB (OUTPATIENT)
Dept: LAB | Facility: CLINIC | Age: 84
End: 2024-12-30
Payer: COMMERCIAL

## 2024-12-30 DIAGNOSIS — M81.0 AGE-RELATED OSTEOPOROSIS WITHOUT CURRENT PATHOLOGICAL FRACTURE: ICD-10-CM

## 2024-12-30 DIAGNOSIS — Z92.29 PERSONAL HISTORY OF OTHER DRUG THERAPY: ICD-10-CM

## 2024-12-30 DIAGNOSIS — Z79.899 HIGH RISK MEDICATION USE: Primary | ICD-10-CM

## 2024-12-30 LAB
ALT SERPL W P-5'-P-CCNC: 9 U/L (ref 0–50)
AST SERPL W P-5'-P-CCNC: 27 U/L (ref 0–45)
BASOPHILS # BLD AUTO: 0 10E3/UL (ref 0–0.2)
BASOPHILS NFR BLD AUTO: 1 %
CALCIUM SERPL-MCNC: 9.4 MG/DL (ref 8.8–10.4)
CREAT SERPL-MCNC: 0.87 MG/DL (ref 0.51–0.95)
EGFRCR SERPLBLD CKD-EPI 2021: 65 ML/MIN/1.73M2
EOSINOPHIL # BLD AUTO: 0.2 10E3/UL (ref 0–0.7)
EOSINOPHIL NFR BLD AUTO: 2 %
ERYTHROCYTE [DISTWIDTH] IN BLOOD BY AUTOMATED COUNT: 13.1 % (ref 10–15)
HCT VFR BLD AUTO: 42.3 % (ref 35–47)
HGB BLD-MCNC: 13.5 G/DL (ref 11.7–15.7)
IMM GRANULOCYTES # BLD: 0 10E3/UL
IMM GRANULOCYTES NFR BLD: 0 %
LYMPHOCYTES # BLD AUTO: 1.9 10E3/UL (ref 0.8–5.3)
LYMPHOCYTES NFR BLD AUTO: 29 %
MCH RBC QN AUTO: 29.2 PG (ref 26.5–33)
MCHC RBC AUTO-ENTMCNC: 31.9 G/DL (ref 31.5–36.5)
MCV RBC AUTO: 91 FL (ref 78–100)
MONOCYTES # BLD AUTO: 0.4 10E3/UL (ref 0–1.3)
MONOCYTES NFR BLD AUTO: 6 %
NEUTROPHILS # BLD AUTO: 4.1 10E3/UL (ref 1.6–8.3)
NEUTROPHILS NFR BLD AUTO: 62 %
PLATELET # BLD AUTO: 287 10E3/UL (ref 150–450)
RBC # BLD AUTO: 4.63 10E6/UL (ref 3.8–5.2)
VIT D+METAB SERPL-MCNC: 52 NG/ML (ref 20–50)
WBC # BLD AUTO: 6.6 10E3/UL (ref 4–11)

## 2024-12-30 PROCEDURE — 82565 ASSAY OF CREATININE: CPT

## 2024-12-30 PROCEDURE — 84460 ALANINE AMINO (ALT) (SGPT): CPT

## 2024-12-30 PROCEDURE — 82310 ASSAY OF CALCIUM: CPT

## 2024-12-30 PROCEDURE — 84450 TRANSFERASE (AST) (SGOT): CPT

## 2024-12-30 PROCEDURE — 85025 COMPLETE CBC W/AUTO DIFF WBC: CPT

## 2024-12-30 PROCEDURE — 82306 VITAMIN D 25 HYDROXY: CPT

## 2024-12-30 PROCEDURE — 36415 COLL VENOUS BLD VENIPUNCTURE: CPT

## 2025-01-15 ENCOUNTER — OFFICE VISIT (OUTPATIENT)
Dept: INTERNAL MEDICINE | Facility: CLINIC | Age: 85
End: 2025-01-15
Payer: COMMERCIAL

## 2025-01-15 ENCOUNTER — ALLIED HEALTH/NURSE VISIT (OUTPATIENT)
Dept: FAMILY MEDICINE | Facility: CLINIC | Age: 85
End: 2025-01-15
Payer: COMMERCIAL

## 2025-01-15 VITALS
RESPIRATION RATE: 15 BRPM | HEIGHT: 60 IN | HEART RATE: 91 BPM | TEMPERATURE: 98.8 F | DIASTOLIC BLOOD PRESSURE: 82 MMHG | WEIGHT: 128.4 LBS | SYSTOLIC BLOOD PRESSURE: 136 MMHG | BODY MASS INDEX: 25.21 KG/M2 | OXYGEN SATURATION: 95 %

## 2025-01-15 DIAGNOSIS — G31.84 MILD COGNITIVE IMPAIRMENT: ICD-10-CM

## 2025-01-15 DIAGNOSIS — M20.42 HAMMERTOE, BILATERAL: ICD-10-CM

## 2025-01-15 DIAGNOSIS — M20.41 HAMMERTOE, BILATERAL: ICD-10-CM

## 2025-01-15 DIAGNOSIS — Z00.00 ANNUAL PHYSICAL EXAM: Primary | ICD-10-CM

## 2025-01-15 DIAGNOSIS — E03.9 ACQUIRED HYPOTHYROIDISM: ICD-10-CM

## 2025-01-15 DIAGNOSIS — F11.20 OPIOID TYPE DEPENDENCE, CONTINUOUS (H): ICD-10-CM

## 2025-01-15 DIAGNOSIS — I73.9 PAD (PERIPHERAL ARTERY DISEASE): ICD-10-CM

## 2025-01-15 DIAGNOSIS — Z71.89 ADVANCE CARE PLANNING: ICD-10-CM

## 2025-01-15 DIAGNOSIS — K21.9 GASTROESOPHAGEAL REFLUX DISEASE WITHOUT ESOPHAGITIS: ICD-10-CM

## 2025-01-15 DIAGNOSIS — M81.0 AGE-RELATED OSTEOPOROSIS WITHOUT CURRENT PATHOLOGICAL FRACTURE: ICD-10-CM

## 2025-01-15 DIAGNOSIS — I10 PRIMARY HYPERTENSION: ICD-10-CM

## 2025-01-15 DIAGNOSIS — R11.0 NAUSEA: ICD-10-CM

## 2025-01-15 DIAGNOSIS — M81.0 AGE-RELATED OSTEOPOROSIS WITHOUT CURRENT PATHOLOGICAL FRACTURE: Primary | ICD-10-CM

## 2025-01-15 DIAGNOSIS — J41.0 SIMPLE CHRONIC BRONCHITIS (H): ICD-10-CM

## 2025-01-15 DIAGNOSIS — F51.01 PRIMARY INSOMNIA: ICD-10-CM

## 2025-01-15 DIAGNOSIS — N25.81 SECONDARY RENAL HYPERPARATHYROIDISM: ICD-10-CM

## 2025-01-15 DIAGNOSIS — M05.79 RHEUMATOID ARTHRITIS INVOLVING MULTIPLE SITES WITH POSITIVE RHEUMATOID FACTOR (H): ICD-10-CM

## 2025-01-15 DIAGNOSIS — I87.2 VENOUS INSUFFICIENCY OF BOTH LOWER EXTREMITIES: ICD-10-CM

## 2025-01-15 DIAGNOSIS — Z92.29 PERSONAL HISTORY OF OTHER DRUG THERAPY: ICD-10-CM

## 2025-01-15 PROBLEM — L97.521 ULCER OF TOE OF LEFT FOOT, LIMITED TO BREAKDOWN OF SKIN (H): Status: RESOLVED | Noted: 2023-12-22 | Resolved: 2025-01-15

## 2025-01-15 PROBLEM — B35.1 ONYCHOMYCOSIS: Status: RESOLVED | Noted: 2023-12-22 | Resolved: 2025-01-15

## 2025-01-15 PROCEDURE — 96372 THER/PROPH/DIAG INJ SC/IM: CPT | Performed by: INTERNAL MEDICINE

## 2025-01-15 PROCEDURE — 99207 PR NO CHARGE NURSE ONLY: CPT

## 2025-01-15 RX ORDER — LEVOTHYROXINE SODIUM 75 UG/1
75 TABLET ORAL
Qty: 90 TABLET | Refills: 4 | Status: SHIPPED | OUTPATIENT
Start: 2025-01-15

## 2025-01-15 RX ORDER — DONEPEZIL HYDROCHLORIDE 5 MG/1
5 TABLET, FILM COATED ORAL AT BEDTIME
Qty: 90 TABLET | Refills: 1 | Status: SHIPPED | OUTPATIENT
Start: 2025-01-15

## 2025-01-15 RX ORDER — ONDANSETRON 4 MG/1
4 TABLET, FILM COATED ORAL EVERY 6 HOURS PRN
Qty: 30 TABLET | Refills: 11 | Status: SHIPPED | OUTPATIENT
Start: 2025-01-15

## 2025-01-15 SDOH — HEALTH STABILITY: PHYSICAL HEALTH: ON AVERAGE, HOW MANY DAYS PER WEEK DO YOU ENGAGE IN MODERATE TO STRENUOUS EXERCISE (LIKE A BRISK WALK)?: 0 DAYS

## 2025-01-15 ASSESSMENT — PATIENT HEALTH QUESTIONNAIRE - PHQ9
SUM OF ALL RESPONSES TO PHQ QUESTIONS 1-9: 3
10. IF YOU CHECKED OFF ANY PROBLEMS, HOW DIFFICULT HAVE THESE PROBLEMS MADE IT FOR YOU TO DO YOUR WORK, TAKE CARE OF THINGS AT HOME, OR GET ALONG WITH OTHER PEOPLE: NOT DIFFICULT AT ALL
SUM OF ALL RESPONSES TO PHQ QUESTIONS 1-9: 3

## 2025-01-15 ASSESSMENT — SOCIAL DETERMINANTS OF HEALTH (SDOH): HOW OFTEN DO YOU GET TOGETHER WITH FRIENDS OR RELATIVES?: ONCE A WEEK

## 2025-01-15 ASSESSMENT — ANXIETY QUESTIONNAIRES
1. FEELING NERVOUS, ANXIOUS, OR ON EDGE: NOT AT ALL
3. WORRYING TOO MUCH ABOUT DIFFERENT THINGS: NOT AT ALL
2. NOT BEING ABLE TO STOP OR CONTROL WORRYING: NOT AT ALL
8. IF YOU CHECKED OFF ANY PROBLEMS, HOW DIFFICULT HAVE THESE MADE IT FOR YOU TO DO YOUR WORK, TAKE CARE OF THINGS AT HOME, OR GET ALONG WITH OTHER PEOPLE?: NOT DIFFICULT AT ALL
5. BEING SO RESTLESS THAT IT IS HARD TO SIT STILL: NOT AT ALL
7. FEELING AFRAID AS IF SOMETHING AWFUL MIGHT HAPPEN: NOT AT ALL
7. FEELING AFRAID AS IF SOMETHING AWFUL MIGHT HAPPEN: NOT AT ALL
GAD7 TOTAL SCORE: 0
GAD7 TOTAL SCORE: 0
6. BECOMING EASILY ANNOYED OR IRRITABLE: NOT AT ALL
4. TROUBLE RELAXING: NOT AT ALL
IF YOU CHECKED OFF ANY PROBLEMS ON THIS QUESTIONNAIRE, HOW DIFFICULT HAVE THESE PROBLEMS MADE IT FOR YOU TO DO YOUR WORK, TAKE CARE OF THINGS AT HOME, OR GET ALONG WITH OTHER PEOPLE: NOT DIFFICULT AT ALL
GAD7 TOTAL SCORE: 0

## 2025-01-15 NOTE — PROGRESS NOTES
Preventive Care Visit  [facility]  Brad Ashley MD, Internal Medicine  Joel 15, 2025      Assessment & Plan     1. Annual physical exam (Primary)  Is an 84-year-old woman with issues as discussed below  - [place] ADVANCE CARE PLANNING FIRST 30 MINS    2. Opioid Dependence With Continuous Use  She has been on OxyContin for many decades with failed attempts to lower this.  Overall she seems to be doing okay, no evidence of abuse.  Feeling per controlled substance agreement.  - naloxone (NARCAN) 4 MG/0.1ML nasal spray; Spray 1 spray (4 mg) into one nostril alternating nostrils once as needed for opioid reversal. every 2-3 minutes until assistance arrives  Dispense: 0.2 mL; Refill: 0    3. Nausea  - ondansetron (ZOFRAN) 4 MG tablet; Take 1 tablet (4 mg) by mouth every 6 hours as needed for nausea.  Dispense: 30 tablet; Refill: 11    4. Mild cognitive impairment  Patient has had a decline in her memory.  Some episodes of getting lost in her apartment, some short-term memory issues.  Amitriptyline and Soma have been discontinued.  Discussed decreasing OxyContin with her daughter and does not feel like this would go well and could worsen the situation.  Will try Aricept and touch base with me in 1 to 2 months and we could consider increasing the dose if it is effective.  Discussed long-term care as well.  - donepezil (ARICEPT) 5 MG tablet; Take 1 tablet (5 mg) by mouth at bedtime.  Dispense: 90 tablet; Refill: 1    5. Primary insomnia  Stable, off amitriptyline    6. Primary hypertension  Pressure okay continue same    7. Simple chronic bronchitis (H)  Will no longer smoking    8. Acquired hypothyroidism  - levothyroxine (SYNTHROID/LEVOTHROID) 75 MCG tablet; Take 1 tablet (75 mcg) by mouth every morning (before breakfast).  Dispense: 90 tablet; Refill: 4    9. Gastroesophageal reflux disease without esophagitis  - omeprazole (PRILOSEC) 20 MG DR capsule; Take 1 capsule (20 mg) by mouth every morning  "(before breakfast).  Dispense: 90 capsule; Refill: 4    10. Osteoporosis - prolia  She is going to meet with Dr. Reynolds at [facility]    11. PAD (peripheral artery disease)  Stable    12. Rheumatoid arthritis (H) - [place]  She is on leflunomide under the guidance of rheumatology    13. Secondary Hyperparathyroidism  Vitamin D level in normal range    14. Venous insufficiency of both lower extremities  Continue with compression and elevation    15. Hammertoe, bilateral  Stable    16. Advance care planning  We had a 16-minute discussion today.  She does have a healthcare directive but we do not have a copy.  Her daughter has a copy of this.  Her 2 daughters are listed as her medical decision-maker in the event that she is unable to make decisions for herself.  She would also want her son-in-law Dr. Livan Curiel to participate especially with difficult medical care decisions.  At this point she remains full code and we discussed this at length.  She would not want any ongoing life-sustaining treatment if she had no meaningful chance of recovery.  She would want to be kept comfortable.  - [place] ADVANCE CARE PLANNING FIRST 30 MINS    BMI  Estimated body mass index is 25.5 kg/m  as calculated from the following:    Height as of this encounter: 1.511 m (4' 11.5\").    Weight as of this encounter: 58.2 kg (128 lb 6.4 oz).     Counseling  Appropriate preventive services were addressed with this patient via screening, questionnaire, or discussion as appropriate for fall prevention, nutrition, physical activity, Tobacco-use cessation, social engagement, weight loss and cognition.  Checklist reviewing preventive services available has been given to the patient.  Reviewed patient's diet, addressing concerns and/or questions.   The patient was instructed to see the dentist every 6 months.   Updated plan of care.  Patient reported difficulty with activities of daily living were addressed today.I have reviewed Opioid Use Disorder " and Substance Use Disorder risk factors and made any needed referrals.     James Phillips is a 84 year old, presenting for the following:  Annual Visit        1/15/2025     2:42 PM   Additional Questions   Roomed by Livan FRANK RN   Accompanied by Daughter           HPI  Health Care Directive  Patient does not have a Health Care Directive: Patient states has Advance Directive and will bring in a copy to clinic.      1/15/2025   General Health   How would you rate your overall physical health? (!) POOR   Feel stress (tense, anxious, or unable to sleep) Not at all         1/15/2025   Nutrition   Diet: Regular (no restrictions)         1/15/2025   Exercise   Days per week of moderate/strenous exercise 0 days   (!) EXERCISE CONCERN      1/15/2025   Social Factors   Frequency of gathering with friends or relatives Once a week   Worry food won't last until get money to buy more No   Food not last or not have enough money for food? No   Do you have housing? (Housing is defined as stable permanent housing and does not include staying ouside in a car, in a tent, in an abandoned building, in an overnight shelter, or couch-surfing.) Yes   Are you worried about losing your housing? No   Lack of transportation? No   Unable to get utilities (heat,electricity)? No         1/15/2025   Fall Risk   Fallen 2 or more times in the past year? No   Trouble with walking or balance? Yes   Gait Speed Test (Document in seconds) 5.14   Gait Speed Test Interpretation Greater than 5.01 seconds - ABNORMAL          1/15/2025   Activities of Daily Living- Home Safety   Needs help with the following daily activites Telephone use    Transportation    Shopping    Preparing meals    Housework    Bathing    Laundry    Dressing   Safety concerns in the home None of the above       Multiple values from one day are sorted in reverse-chronological order         1/15/2025   Dental   Dentist two times every year? (!) NO         1/15/2025   Hearing Screening    Hearing concerns? None of the above         1/15/2025   Driving Risk Screening   Patient/family members have concerns about driving No         1/15/2025   General Alertness/Fatigue Screening   Have you been more tired than usual lately? No         1/15/2025   Urinary Incontinence Screening   Bothered by leaking urine in past 6 months No         1/15/2025   TB Screening   Were you born outside of the [place]? No       Today's PHQ-9 Score:       1/15/2025     2:35 PM   PHQ-9 SCORE   PHQ-9 Total Score MyChart 3 (Minimal depression)   PHQ-9 Total Score 3        Patient-reported         1/15/2025   Substance Use   Alcohol more than 3/day or more than 7/wk No   Do you have a current opioid prescription? (!) YES   How severe/bad is pain from 1 to 10? 8/10   Do you use any other substances recreationally? No       Social History     Tobacco Use    Smoking status: Former     Current packs/day: 0.00     Types: Cigarettes     Quit date: 2018     Years since quittin.6    Smokeless tobacco: Never    Tobacco comments:     started smoking when she was 21 years old, vaping   Vaping Use    Vaping status: Every Day   Substance Use Topics    Alcohol use: Yes     Comment: Alcoholic Drinks/day: glass of wine during holiday    Drug use: Not Currently     Types: Marijuana     Comment: Drug use: medical marijuana                Reviewed and updated as needed this visit by Provider                    Current providers sharing in care for this patient include:  Patient Care Team:  Brad Ashley MD as PCP - General (Internal Medicine)  Brad Ashley MD as Assigned PCP  Brad Ashley MD as Assigned Pain Medication Provider  Kendrick Pedraza DPM as Assigned Surgical Provider    The following health maintenance items are reviewed in Epic and correct as of today:  Health Maintenance   Topic Date Due    SPIROMETRY  Never done    COPD ACTION PLAN  Never done    URINE DRUG SCREEN  2023    MEDICARE ANNUAL WELLNESS VISIT   "11/02/2024    BMP  11/02/2024    TSH W/FREE T4 REFLEX  11/02/2024    ANNUAL REVIEW OF HM ORDERS  11/02/2024    CONTROLLED SUBSTANCE AGREEMENT FOR CHRONIC PAIN MANAGEMENT  11/03/2024    COVID-19 Vaccine (9 - 2024-25 season) 01/14/2025    FABIAN ASSESSMENT  01/15/2026    FALL RISK ASSESSMENT  01/15/2026    PHQ-9  01/15/2026    ADVANCE CARE PLANNING  11/02/2028    DTAP/TDAP/TD IMMUNIZATION (3 - Td or Tdap) 03/10/2032    DEXA  04/08/2036    PHQ-2 (once per calendar year)  Completed    INFLUENZA VACCINE  Completed    Pneumococcal Vaccine: 50+ Years  Completed    HEPATITIS A IMMUNIZATION  Completed    RSV VACCINE  Completed    HPV IMMUNIZATION  Aged Out    MENINGITIS IMMUNIZATION  Aged Out    RSV MONOCLONAL ANTIBODY  Aged Out    ZOSTER IMMUNIZATION  Discontinued          Objective    Exam  /82 (BP Location: Right arm, Patient Position: Sitting, Cuff Size: Adult Regular)   Pulse 91   Temp 98.8  F (37.1  C) (Tympanic)   Resp 15   Ht 1.511 m (4' 11.5\")   Wt 58.2 kg (128 lb 6.4 oz)   SpO2 95%   BMI 25.50 kg/m     Estimated body mass index is 25.5 kg/m  as calculated from the following:    Height as of this encounter: 1.511 m (4' 11.5\").    Weight as of this encounter: 58.2 kg (128 lb 6.4 oz).    Physical Exam  EYES: Eyelids, conjunctiva, and sclera were normal.  HEAD, EARS, NOSE, MOUTH, AND THROAT: Head and face were normal. Hearing was normal to voice and the ears were normal to external exam. Nose appearance was normal and there was no discharge.   NECK: Neck appearance was normal.   RESPIRATORY: Breathing pattern was normal and the chest moved symmetrically.    Lung sounds were normal and there were no abnormal sounds.  CARDIOVASCULAR: Heart rate and rhythm were normal.  S1 and S2 were normal and there were no extra sounds or murmurs.  There was no peripheral edema.  GASTROINTESTINAL: The abdomen was normal in contour.   PSYCHIATRIC:  Mood and affect were normal and the patient had normal recent and remote " memory. The patient's judgment and insight were somewhat impaired.        1/15/2025   Mini Cog   Clock Draw Score 0 Abnormal   3 Item Recall 0 objects recalled   Mini Cog Total Score 0              Signed Electronically by: Brad Ashley MD    Answers submitted by the patient for this visit:  Patient Health Questionnaire (Submitted on 1/15/2025)  If you checked off any problems, how difficult have these problems made it for you to do your work, take care of things at home, or get along with other people?: Not difficult at all  PHQ9 TOTAL SCORE: 3  Patient Health Questionnaire (G7) (Submitted on 1/15/2025)  FABIAN 7 TOTAL SCORE: 0

## 2025-01-15 NOTE — PROGRESS NOTES
Clinic Administered Medication Documentation      Prolia Documentation    Indication: Prolia  (denosumab) is a prescription medicine used to treat osteoporosis in patients who:   Are at high risk for fracture, meaning patients who have had a fracture related to osteoporosis, or who have multiple risk factors for fracture.  Cannot use another osteoporosis medicine or other osteoporosis medicines did not work well.  The timeline for early/late injections would be 4 weeks early and any time after the 6 month dinora. If a patient receives their injection late, then the subsequent injection would be 6 months from the date that they actually received the injection.    When was the last injection?  2024  Was the last injection at least 6 months ago? Yes  Has the prior authorization been completed?  Yes  Is there an active order (written within the past 365 days, with administrations remaining, not ) in the chart?  Yes   GFR Estimate   Date Value Ref Range Status   2024 65 >60 mL/min/1.73m2 Final     Comment:     eGFR calculated using  CKD-EPI equation.   01/15/2019 >60 >60 mL/min/1.73m2 Final     Has patient had a GFR within the last 12 months? Yes   Is GFR under 30, or patient has a diagnosis of CKD4 or CKD5? No   Patient denies gastric bypass or parathyroid surgery in past 6 months? Yes - patient denies.   Patient denies dental work in the past two months involving drilling into the bone, such as implants/extractions, oral surgery or a tooth extraction that has not healed yet?  Yes  Patient denies plans for an emergency tooth extraction within the next week? Yes    The following steps were completed to comply with the REMS program for Prolia:  Reviewed information in the Medication Guide, including the serious risks of Prolia  and the symptoms of each risk.  Advised patient to seek prompt medical attention if they have signs or symptoms of any of the serious risks.  Provided each patient a copy of the  Medication Guide and Patient Guide.    Prior to injection, verified patient identity using patient's name and date of birth. Medication was administered. Please see MAR and medication order for additional information. Patient instructed to remain in clinic for 15 minutes and report any adverse reaction to staff immediately.    Vial/Syringe: Syringe  Was this medication supplied by the patient? No  Verified that the patient has administrations remaining in their prescription.

## 2025-01-15 NOTE — PATIENT INSTRUCTIONS
Patient Education   Preventive Care Advice   This is general advice given by our system to help you stay healthy. However, your care team may have specific advice just for you. Please talk to your care team about your preventive care needs.  Nutrition  Eat 5 or more servings of fruits and vegetables each day.  Try wheat bread, brown rice and whole grain pasta (instead of white bread, rice, and pasta).  Get enough calcium and vitamin D. Check the label on foods and aim for 100% of the RDA (recommended daily allowance).  Lifestyle  Exercise at least 150 minutes each week  (30 minutes a day, 5 days a week).  Do muscle strengthening activities 2 days a week. These help control your weight and prevent disease.  No smoking.  Wear sunscreen to prevent skin cancer.  Have a dental exam and cleaning every 6 months.  Yearly exams  See your health care team every year to talk about:  Any changes in your health.  Any medicines your care team has prescribed.  Preventive care, family planning, and ways to prevent chronic diseases.  Shots (vaccines)   HPV shots (up to age 26), if you've never had them before.  Hepatitis B shots (up to age 59), if you've never had them before.  COVID-19 shot: Get this shot when it's due.  Flu shot: Get a flu shot every year.  Tetanus shot: Get a tetanus shot every 10 years.  Pneumococcal, hepatitis A, and RSV shots: Ask your care team if you need these based on your risk.  Shingles shot (for age 50 and up)  General health tests  Diabetes screening:  Starting at age 35, Get screened for diabetes at least every 3 years.  If you are younger than age 35, ask your care team if you should be screened for diabetes.  Cholesterol test: At age 39, start having a cholesterol test every 5 years, or more often if advised.  Bone density scan (DEXA): At age 50, ask your care team if you should have this scan for osteoporosis (brittle bones).  Hepatitis C: Get tested at least once in your life.  STIs (sexually  transmitted infections)  Before age 24: Ask your care team if you should be screened for STIs.  After age 24: Get screened for STIs if you're at risk. You are at risk for STIs (including HIV) if:  You are sexually active with more than one person.  You don't use condoms every time.  You or a partner was diagnosed with a sexually transmitted infection.  If you are at risk for HIV, ask about PrEP medicine to prevent HIV.  Get tested for HIV at least once in your life, whether you are at risk for HIV or not.  Cancer screening tests  Cervical cancer screening: If you have a cervix, begin getting regular cervical cancer screening tests starting at age 21.  Breast cancer scan (mammogram): If you've ever had breasts, begin having regular mammograms starting at age 40. This is a scan to check for breast cancer.  Colon cancer screening: It is important to start screening for colon cancer at age 45.  Have a colonoscopy test every 10 years (or more often if you're at risk) Or, ask your provider about stool tests like a FIT test every year or Cologuard test every 3 years.  To learn more about your testing options, visit:   .  For help making a decision, visit:   https://bit.ly/jm26158.  Prostate cancer screening test: If you have a prostate, ask your care team if a prostate cancer screening test (PSA) at age 55 is right for you.  Lung cancer screening: If you are a current or former smoker ages 50 to 80, ask your care team if ongoing lung cancer screenings are right for you.  For informational purposes only. Not to replace the advice of your health care provider. Copyright   2023 [facility]. All rights reserved. Clinically reviewed by the [facility]. [employer] 245529 - REV 01/24.  Learning About Activities of Daily Living  What are activities of daily living?     Activities of daily living (ADLs) are the basic self-care tasks you do every day. These include eating, bathing, dressing,  and moving around.  As you age, and if you have health problems, you may find that it's harder to do some of these tasks. If so, your doctor can suggest ideas that may help.  To measure what kind of help you may need, your doctor will ask how well you are able to do ADLs. Let your doctor know if there are any tasks that you are having trouble doing. This is an important first step to getting help. And when you have the help you need, you can stay as independent as possible.  How will a doctor assess your ADLs?  Asking about ADLs is part of a routine health checkup your doctor will likely do as you age. Your health check might be done in a doctor's office, in your home, or at a hospital. The goal is to find out if you are having any problems that could make it hard to care for yourself or that make it unsafe for you to be on your own.  To measure your ADLs, your doctor will ask how hard it is for you to do routine tasks. Your doctor may also want to know if you have changed the way you do a task because of a health problem. Your doctor may watch how you:  Walk back and forth.  Keep your balance while you stand or walk.  Move from sitting to standing or from a bed to a chair.  Button or unbutton a shirt or sweater.  Remove and put on your shoes.  It's common to feel a little worried or anxious if you find you can't do all the things you used to be able to do. Talking with your doctor about ADLs is a way to make sure you're as safe as possible and able to care for yourself as well as you can. You may want to bring a caregiver, friend, or family member to your checkup. They can help you talk to your doctor.  Follow-up care is a key part of your treatment and safety. Be sure to make and go to all appointments, and call your doctor if you are having problems. It's also a good idea to know your test results and keep a list of the medicines you take.  Current as of: October 24, 2023  Content Version: 14.3    2024 [facility]  [employer].   Care instructions adapted under license by your healthcare professional. If you have questions about a medical condition or this instruction, always ask your healthcare professional. [facility] [employer] disclaims any warranty or liability for your use of this information.    Preventing Falls: Care Instructions  Injuries and health problems such as trouble walking or poor eyesight can increase your risk of falling. So can some medicines. But there are things you can do to help prevent falls. You can exercise to get stronger. You can also arrange your home to make it safer.    Talk to your doctor about the medicines you take. Ask if any of them increase the risk of falls and whether they can be changed or stopped.   Try to exercise regularly. It can help improve your strength and balance. This can help lower your risk of falling.         Practice fall safety and prevention.   Wear low-heeled shoes that fit well and give your feet good support. Talk to your doctor if you have foot problems that make this hard.  Carry a cellphone or wear a medical alert device that you can use to call for help.  Use stepladders instead of chairs to reach high objects. Don't climb if you're at risk for falls. Ask for help, if needed.  Wear the correct eyeglasses, if you need them.        Make your home safer.   Remove rugs, cords, clutter, and furniture from walkways.  Keep your house well lit. Use night-lights in hallways and bathrooms.  Install and use sturdy handrails on stairways.  Wear nonskid footwear, even inside. Don't walk barefoot or in socks without shoes.        Be safe outside.   Use handrails, curb cuts, and ramps whenever possible.  Keep your hands free by using a shoulder bag or backpack.  Try to walk in well-lit areas. Watch out for uneven ground, changes in pavement, and debris.  Be careful in the winter. Walk on the grass or gravel when sidewalks are slippery. Use de-icer on steps and walkways.  "Add non-slip devices to shoes.    Put grab bars and nonskid mats in your shower or tub and near the toilet. Try to use a shower chair or bath bench when bathing.   Get into a tub or shower by putting in your weaker leg first. Get out with your strong side first. Have a phone or medical alert device in the bathroom with you.   Where can you learn more?  Go to https://www.[employer]/patiented  Enter G117 in the search box to learn more about \"Preventing Falls: Care Instructions.\"  Current as of: July 31, 2024  Content Version: 14.3    2024 [employer].   Care instructions adapted under license by your healthcare professional. If you have questions about a medical condition or this instruction, always ask your healthcare professional. [employer] disclaims any warranty or liability for your use of this information.    Chronic Pain: Care Instructions  Your Care Instructions     Chronic pain is pain that lasts a long time (months or even years) and may or may not have a clear cause. It is different from acute pain, which usually does have a clear cause--like an injury or illness--and gets better over time. Chronic pain:  Lasts over time but may vary from day to day.  Does not go away despite efforts to end it.  May disrupt your sleep and lead to fatigue.  May cause depression or anxiety.  May make your muscles tense, causing more pain.  Can disrupt your work, hobbies, home life, and relationships with friends and family.  Chronic pain is a very real condition. It is not just in your head. Treatment can help and usually includes several methods used together, such as medicines, physical therapy, exercise, and other treatments. Learning how to relax and changing negative thought patterns can also help you cope.  Chronic pain is complex. Taking an active role in your treatment will help you better manage your pain. Tell your doctor if you have trouble dealing with your pain. You may have to try " several things before you find what works best for you.  Follow-up care is a key part of your treatment and safety. Be sure to make and go to all appointments, and call your doctor if you are having problems. It's also a good idea to know your test results and keep a list of the medicines you take.  How can you care for yourself at home?  Pace yourself. Break up large jobs into smaller tasks. Save harder tasks for days when you have less pain, or go back and forth between hard tasks and easier ones. Take rest breaks.  Relax, and reduce stress. Relaxation techniques such as deep breathing or meditation can help.  Keep moving. Gentle, daily exercise can help reduce pain over the long run. Try low- or no-impact exercises such as walking, swimming, and stationary biking. Do stretches to stay flexible.  Try heat, cold packs, and massage.  Get enough sleep. Chronic pain can make you tired and drain your energy. Talk with your doctor if you have trouble sleeping because of pain.  Think positive. Your thoughts can affect your pain level. Do things that you enjoy to distract yourself when you have pain instead of focusing on the pain. See a movie, read a book, listen to music, or spend time with a friend.  If you think you are depressed, talk to your doctor about treatment.  Keep a daily pain diary. Record how your moods, thoughts, sleep patterns, activities, and medicine affect your pain. You may find that your pain is worse during or after certain activities or when you are feeling a certain emotion. Having a record of your pain can help you and your doctor find the best ways to treat your pain.  Take pain medicines exactly as directed.  If the doctor gave you a prescription medicine for pain, take it as prescribed.  If you are not taking a prescription pain medicine, ask your doctor if you can take an over-the-counter medicine.  Reducing constipation caused by pain medicine  Talk to your doctor about a laxative. If a  "laxative doesn't work, your doctor may suggest a prescription medicine.  Include fruits, vegetables, beans, and whole grains in your diet each day. These foods are high in fiber.  If your doctor recommends it, get more exercise. Walking is a good choice. Bit by bit, increase the amount you walk every day. Try for at least 30 minutes on most days of the week.  Schedule time each day for a bowel movement. A daily routine may help. Take your time and do not strain when having a bowel movement.  When should you call for help?   Call your doctor now or seek immediate medical care if:    Your pain gets worse or is out of control.     You feel down or blue, or you do not enjoy things like you once did. You may be depressed, which is common in people with chronic pain. Depression can be treated.     You have vomiting or cramps for more than 2 hours.   Watch closely for changes in your health, and be sure to contact your doctor if:    You cannot sleep because of pain.     You are very worried or anxious about your pain.     You have trouble taking your pain medicine.     You have any concerns about your pain medicine.     You have trouble with bowel movements, such as:  No bowel movement in 3 days.  Blood in the anal area, in your stool, or on the toilet paper.  Diarrhea for more than 24 hours.   Where can you learn more?  Go to https://www.[employer].net/patiented  Enter N004 in the search box to learn more about \"Chronic Pain: Care Instructions.\"  Current as of: July 31, 2024  Content Version: 14.3    2024 [employer].   Care instructions adapted under license by your healthcare professional. If you have questions about a medical condition or this instruction, always ask your healthcare professional. [employer] disclaims any warranty or liability for your use of this information.       "

## 2025-02-27 DIAGNOSIS — M54.32 BACK PAIN WITH LEFT-SIDED SCIATICA: ICD-10-CM

## 2025-02-27 DIAGNOSIS — G89.29 OTHER CHRONIC PAIN: ICD-10-CM

## 2025-02-27 DIAGNOSIS — J30.89 SEASONAL ALLERGIC RHINITIS DUE TO OTHER ALLERGIC TRIGGER: ICD-10-CM

## 2025-02-27 DIAGNOSIS — F11.20 OPIOID TYPE DEPENDENCE, CONTINUOUS (H): ICD-10-CM

## 2025-02-27 RX ORDER — LORATADINE 10 MG/1
1 TABLET ORAL DAILY
Qty: 90 TABLET | Refills: 0 | Status: SHIPPED | OUTPATIENT
Start: 2025-02-27

## 2025-02-27 RX ORDER — OXYCODONE HYDROCHLORIDE 40 MG/1
40 TABLET, FILM COATED, EXTENDED RELEASE ORAL
Qty: 30 TABLET | Refills: 0 | Status: SHIPPED | OUTPATIENT
Start: 2025-02-27

## 2025-02-27 RX ORDER — OXYCODONE HYDROCHLORIDE 10 MG/1
10 TABLET, FILM COATED, EXTENDED RELEASE ORAL EVERY 12 HOURS
Qty: 60 TABLET | Refills: 0 | Status: SHIPPED | OUTPATIENT
Start: 2025-02-27

## 2025-04-14 ENCOUNTER — APPOINTMENT (OUTPATIENT)
Dept: CT IMAGING | Facility: CLINIC | Age: 85
End: 2025-04-14
Attending: EMERGENCY MEDICINE
Payer: MEDICARE

## 2025-04-14 ENCOUNTER — HOSPITAL ENCOUNTER (EMERGENCY)
Facility: CLINIC | Age: 85
Discharge: HOME | End: 2025-04-15
Attending: EMERGENCY MEDICINE | Admitting: EMERGENCY MEDICINE
Payer: MEDICARE
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
## 2025-04-14 DIAGNOSIS — S09.90XA CLOSED HEAD INJURY, INITIAL ENCOUNTER: ICD-10-CM

## 2025-04-14 DIAGNOSIS — N94.89 PELVIC CYST IN FEMALE: ICD-10-CM

## 2025-04-14 DIAGNOSIS — S16.1XXA CERVICAL STRAIN, INITIAL ENCOUNTER: ICD-10-CM
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
## 2025-04-14 DIAGNOSIS — M54.50 ACUTE MIDLINE LOW BACK PAIN WITHOUT SCIATICA: ICD-10-CM

## 2025-04-14 PROCEDURE — 72131 CT LUMBAR SPINE W/O DYE: CPT

## 2025-04-14 PROCEDURE — 72128 CT CHEST SPINE W/O DYE: CPT

## 2025-04-14 PROCEDURE — 99284 EMERGENCY DEPT VISIT MOD MDM: CPT | Mod: 25

## 2025-04-14 PROCEDURE — 99285 EMERGENCY DEPT VISIT HI MDM: CPT | Mod: 25

## 2025-04-14 PROCEDURE — 70450 CT HEAD/BRAIN W/O DYE: CPT

## 2025-04-14 PROCEDURE — 72125 CT NECK SPINE W/O DYE: CPT

## 2025-04-14 ASSESSMENT — ACTIVITIES OF DAILY LIVING (ADL): ADLS_ACUITY_SCORE: 48

## 2025-04-14 ASSESSMENT — COLUMBIA-SUICIDE SEVERITY RATING SCALE - C-SSRS
6. HAVE YOU EVER DONE ANYTHING, STARTED TO DO ANYTHING, OR PREPARED TO DO ANYTHING TO END YOUR LIFE?: NO
1. IN THE PAST MONTH, HAVE YOU WISHED YOU WERE DEAD OR WISHED YOU COULD GO TO SLEEP AND NOT WAKE UP?: NO
2. HAVE YOU ACTUALLY HAD ANY THOUGHTS OF KILLING YOURSELF IN THE PAST MONTH?: NO

## 2025-04-15 VITALS
BODY MASS INDEX: 25.19 KG/M2 | DIASTOLIC BLOOD PRESSURE: 68 MMHG | HEART RATE: 86 BPM | OXYGEN SATURATION: 93 % | WEIGHT: 128.31 LBS | HEIGHT: 60 IN | RESPIRATION RATE: 18 BRPM | SYSTOLIC BLOOD PRESSURE: 125 MMHG | TEMPERATURE: 98 F

## 2025-04-15 ASSESSMENT — ACTIVITIES OF DAILY LIVING (ADL): ADLS_ACUITY_SCORE: 48

## 2025-04-15 NOTE — ED PROVIDER NOTES
EMERGENCY DEPARTMENT ENCOUnter      NAME: Jacqueline Prado  AGE: 84 year old female  YOB: 1940  MRN: [id number]  EVALUATION DATE & TIME: 4/14/2025 10:48 PM    PCP: Brad Ashley    ED PROVIDER: Eva Mora MD      Chief Complaint   Patient presents with    Fall         FINAL IMPRESSION:  1. Fall at home, initial encounter    2. Cervical strain, initial encounter    3. Acute midline low back pain without sciatica    4. Closed head injury, initial encounter    5. Pelvic cyst in female          ED COURSE & MEDICAL DECISION MAKING:      In summary, the patient is an 84-year-old female that presents to the emergency department for evaluation of injuries from a fall.  No bony injuries were appreciated.  Patient has no skull fractures or intracranial hemorrhage.  SHe has multiple chronic spine fractures.  Incidentally noted was a left pelvic cyst, which will be followed up nonemergent as an outpatient.    2315- I met with the patient, obtained history, performed an initial exam, and discussed options and plan for diagnostics and treatment here in the ED. offered pain medication which the patient declined.  The patient's c-collar was changed to an Aspen cervical collar for comfort by Dr. Back.  0045-rechecked, updated and discharged patient.  The patient cervical collar was discontinued by Dr. Back      Medical Decision Making  Medical Decision Making  I obtained history from Family Member/Significant Other  Discharge. I recommended the patient continue their current prescription strength medication(s): pain meds. See documentation for any additional details.    MIPS (CTPE, Dental pain, [place], Sinusitis, Asthma/COPD, Head Trauma): Adult Minor Head Trauma:Age 65 years or older    SEPSIS: None        At the conclusion of the encounter I discussed the results of all of the tests and the disposition. The questions were answered. The patient or family acknowledged understanding and was agreeable  with the care plan.     MEDICATIONS GIVEN IN THE EMERGENCY:  Medications - No data to display    NEW PRESCRIPTIONS STARTED AT TODAY'S ER VISIT  Discharge Medication List as of 4/15/2025  1:08 AM             =================================================================    HPI        Jacqueline Prado is a 84 year old female with a pertinent history of hypertension and RA who presents to this ED via walk in for evaluation of a fall.      The patient reports that she tripped and fell.  She struck the right side of her head with no loss of consciousness.  She was dazed for a short period of time according to her family member.  Is having midline and right sided neck pain.  She denies any weakness numbness or tingling.  She has minimal headache and no nausea or vomiting.  She has mild low back pain which she describes as dull and constant, without any numbness or tingling.           PAST MEDICAL HISTORY:  Past Medical History:   Diagnosis Date    Acquired hypothyroidism     Acquired lymphedema of leg     Bundle branch block     Created by Conversion  Replacement Utility updated for latest IMO load    Bundle branch block     Cellulitis     Hypertension     Impetigo     Opioid dependence (H)     Osteoporosis     RA (rheumatoid arthritis) (H)     Rheumatoid arthritis (H)     Secondary hyperparathyroidism     Venous hypertension of both lower extremities     Venous insufficiency of both lower extremities     Venous stasis dermatitis of lower extremity        PAST SURGICAL HISTORY:  Past Surgical History:   Procedure Laterality Date    AMPUTATE TOE(S) Left 3/8/2022    Procedure: AMPUTATION, second digit left foot;  Surgeon: Kendrick Pedraza DPM;  Location: [facility] OR    BREAST CYST ASPIRATION Left 2000    HC REMOVAL GALLBLADDER      Description: Cholecystectomy;  Recorded: 07/22/2009;    IR LUMBAR EPIDURAL STEROID INJECTION  6/23/2003    IR MISCELLANEOUS PROCEDURE  12/1/2006    MI INJECT VERTEBRAL BODY, LUMBAR       Description: Spinal Percutaneous Vertebroplasty, Injection Lumbar;  Recorded: 11/03/2011;  Annotations: L5    ZZC EXCIS CERV DISK,ONE LEVEL      Description: Laminectomy With Disc Removal;  Recorded: 11/03/2011;  Annotations: L5-S1       CURRENT MEDICATIONS:    albuterol (PROAIR HFA/PROVENTIL HFA/VENTOLIN HFA) 108 (90 Base) MCG/ACT inhaler  CALCIUM CARBONATE/VITAMIN D3 (CALCIUM+D ORAL)  cholecalciferol, vitamin D3, 1,000 unit (25 mcg) tablet  donepezil (ARICEPT) 5 MG tablet  folic acid (FOLVITE) 1 MG tablet  hydrocortisone (CORTISONE, HYDROCORTISONE,) 1 % cream  isosorbide mononitrate (IMDUR) 30 MG 24 hr tablet  leflunomide (ARAVA) 20 MG tablet  levothyroxine (SYNTHROID/LEVOTHROID) 75 MCG tablet  loratadine (CLARITIN) 10 MG tablet  naloxone (NARCAN) 4 MG/0.1ML nasal spray  nystatin (MYCOSTATIN) cream  omeprazole (PRILOSEC) 20 MG DR capsule  ondansetron (ZOFRAN) 4 MG tablet  OXYCONTIN 10 MG 12 hr tablet  OXYCONTIN 40 MG 12 hr tablet  predniSONE (DELTASONE) 20 MG tablet  predniSONE (CHARLENE) 2 MG EC tablet  triamcinolone (KENALOG) 0.1 % ointment        ALLERGIES:  Allergies   Allergen Reactions    Acetaminophen Rash    Nsaids (Non-Steroidal Anti-Inflammatory Drug) [Nsaids] Rash    Tetracyclines & Related Rash    Baclofen Nausea    Celecoxib Unknown    Erythromycin Base [Erythromycin] Unknown    Pentolinium Itching    Shellfish-Derived Products Diarrhea    Varenicline Itching and Swelling    Erythromycin Rash    Petroleum Jelly [Petrolatum] Itching and Rash       FAMILY HISTORY:  Family History   Problem Relation Age of Onset    Breast Cancer Sister 72    Leukemia Sister     Alzheimer Disease Brother     Dementia Brother     No Known Problems Daughter     No Known Problems Daughter     Other - See Comments Son         COVID       SOCIAL HISTORY:   Social History     Socioeconomic History    Marital status:    Tobacco Use    Smoking status: Former     Current packs/day: 0.00     Types: Cigarettes     Quit date:  2018     Years since quittin.8    Smokeless tobacco: Never    Tobacco comments:     started smoking when she was 21 years old, vaping   Vaping Use    Vaping status: Every Day   Substance and Sexual Activity    Alcohol use: Yes     Comment: Alcoholic Drinks/day: glass of wine during holiday    Drug use: Not Currently     Types: Marijuana     Comment: Drug use: medical marijuana    Sexual activity: Never   Social History Narrative    Lives in senior independent living with 5 hours per day of long-term county care.  .  3 kids.  Graduated from college.      Social Drivers of Health     Financial Resource Strain: Low Risk  (1/15/2025)    Financial Resource Strain     Within the past 12 months, have you or your family members you live with been unable to get utilities (heat, electricity) when it was really needed?: No   Food Insecurity: Low Risk  (1/15/2025)    Food Insecurity     Within the past 12 months, did you worry that your food would run out before you got money to buy more?: No     Within the past 12 months, did the food you bought just not last and you didn t have money to get more?: No   Transportation Needs: Low Risk  (1/15/2025)    Transportation Needs     Within the past 12 months, has lack of transportation kept you from medical appointments, getting your medicines, non-medical meetings or appointments, work, or from getting things that you need?: No   Physical Activity: Unknown (1/15/2025)    Exercise Vital Sign     Days of Exercise per Week: 0 days   Stress: No Stress Concern Present (1/15/2025)    [employer] - Occupational Stress Questionnaire     Feeling of Stress : Not at all   Social Connections: Unknown (1/15/2025)    Social Connection and Isolation Panel [NHANES]     Frequency of Social Gatherings with Friends and Family: Once a week   Housing Stability: Low Risk  (1/15/2025)    Housing Stability     Do you have housing? : Yes     Are you worried about  "losing your housing?: No       VITALS:  Patient Vitals for the past 24 hrs:   BP Temp Temp src Pulse Resp SpO2 Height Weight   04/15/25 0100 125/68 -- -- -- -- -- -- --   04/15/25 0045 119/57 -- -- -- -- -- -- --   04/15/25 0040 -- -- -- 86 -- 93 % -- --   04/15/25 0036 112/61 -- -- 84 -- 93 % -- --   04/15/25 0014 121/56 -- -- 82 -- 92 % -- --   04/15/25 0001 123/63 -- -- 89 -- 95 % -- --   04/14/25 2246 (!) 173/85 98  F (36.7  C) Oral 107 18 95 % 1.511 m (4' 11.5\") 58.2 kg (128 lb 4.9 oz)       PHYSICAL EXAM    Constitutional:  Well developed, Well nourished,  HENT:  Normocephalic, Atraumatic, Bilateral external ears normal, Oropharynx moist, Nose normal.   Neck:  Normal range of motion, No meningismus, No stridor.  Midline and right lateral  neck pain without any bony step-offs appreciated; diffuse midline lumbar spine pain  Eyes:  EOMI, Conjunctiva normal, No discharge.   Respiratory:  Normal breath sounds, No respiratory distress, No wheezing, No chest tenderness.   Cardiovascular:  Normal heart rate, Normal rhythm, No murmurs  GI:  Soft, No tenderness,   Musculoskeletal:  Neurovascularly intact distally, No edema, No tenderness, No cyanosis, Good range of motion in all major joints.   Integument:  Warm, Dry, No erythema, No rash.   Lymphatic:  No lymphadenopathy noted.   Neurologic:  Alert & oriented, Normal motor function, Normal sensory function, No focal deficits noted.   Psychiatric:  Affect normal, Judgment normal, Mood normal.      LAB:  All pertinent labs reviewed and interpreted.  Results for orders placed or performed during the hospital encounter of 04/14/25   CT Head w/o Contrast    Impression    IMPRESSION:  HEAD CT:  1.  No CT evidence for acute intracranial process.  2.  Brain atrophy and presumed chronic microvascular ischemic changes as above.    CERVICAL SPINE CT:  1.  No fracture or posttraumatic subluxation.  2.  Degenerative changes with central canal and foraminal stenosis as described " above.    THORACIC SPINE CT:  Multiple old thoracic compression fractures.    LUMBAR SPINE CT:  1.  No acute fracture or posttraumatic subluxation.  2.  Marked old L1 burst fracture with increasing retropulsion now contributing to mild central canal stenosis and moderate to marked right and mild left L1-L2 foraminal narrowing.  3.  Moderate old L4 compression fracture appeared acute on the prior MRI.  4.  Stable moderate old L5 compression fracture with prior vertebroplasty/kyphoplasty.  5.  Degenerative changes with central canal and foraminal stenosis as described above.  6.  Incompletely imaged cystic lesion along the left pelvic sidewall likely an ovarian cyst. Recommend nonemergent pelvic ultrasound for further evaluation.     CT Thoracic Spine w/o Contrast    Impression    IMPRESSION:  HEAD CT:  1.  No CT evidence for acute intracranial process.  2.  Brain atrophy and presumed chronic microvascular ischemic changes as above.    CERVICAL SPINE CT:  1.  No fracture or posttraumatic subluxation.  2.  Degenerative changes with central canal and foraminal stenosis as described above.    THORACIC SPINE CT:  Multiple old thoracic compression fractures.    LUMBAR SPINE CT:  1.  No acute fracture or posttraumatic subluxation.  2.  Marked old L1 burst fracture with increasing retropulsion now contributing to mild central canal stenosis and moderate to marked right and mild left L1-L2 foraminal narrowing.  3.  Moderate old L4 compression fracture appeared acute on the prior MRI.  4.  Stable moderate old L5 compression fracture with prior vertebroplasty/kyphoplasty.  5.  Degenerative changes with central canal and foraminal stenosis as described above.  6.  Incompletely imaged cystic lesion along the left pelvic sidewall likely an ovarian cyst. Recommend nonemergent pelvic ultrasound for further evaluation.     CT Lumbar Spine w/o Contrast    Impression    IMPRESSION:  HEAD CT:  1.  No CT evidence for acute intracranial  process.  2.  Brain atrophy and presumed chronic microvascular ischemic changes as above.    CERVICAL SPINE CT:  1.  No fracture or posttraumatic subluxation.  2.  Degenerative changes with central canal and foraminal stenosis as described above.    THORACIC SPINE CT:  Multiple old thoracic compression fractures.    LUMBAR SPINE CT:  1.  No acute fracture or posttraumatic subluxation.  2.  Marked old L1 burst fracture with increasing retropulsion now contributing to mild central canal stenosis and moderate to marked right and mild left L1-L2 foraminal narrowing.  3.  Moderate old L4 compression fracture appeared acute on the prior MRI.  4.  Stable moderate old L5 compression fracture with prior vertebroplasty/kyphoplasty.  5.  Degenerative changes with central canal and foraminal stenosis as described above.  6.  Incompletely imaged cystic lesion along the left pelvic sidewall likely an ovarian cyst. Recommend nonemergent pelvic ultrasound for further evaluation.     CT Cervical Spine w/o Contrast    Impression    IMPRESSION:  HEAD CT:  1.  No CT evidence for acute intracranial process.  2.  Brain atrophy and presumed chronic microvascular ischemic changes as above.    CERVICAL SPINE CT:  1.  No fracture or posttraumatic subluxation.  2.  Degenerative changes with central canal and foraminal stenosis as described above.    THORACIC SPINE CT:  Multiple old thoracic compression fractures.    LUMBAR SPINE CT:  1.  No acute fracture or posttraumatic subluxation.  2.  Marked old L1 burst fracture with increasing retropulsion now contributing to mild central canal stenosis and moderate to marked right and mild left L1-L2 foraminal narrowing.  3.  Moderate old L4 compression fracture appeared acute on the prior MRI.  4.  Stable moderate old L5 compression fracture with prior vertebroplasty/kyphoplasty.  5.  Degenerative changes with central canal and foraminal stenosis as described above.  6.  Incompletely imaged cystic  lesion along the left pelvic sidewall likely an ovarian cyst. Recommend nonemergent pelvic ultrasound for further evaluation.         RADIOLOGY:  I have independently reviewed and interpreted the above imaging, pending the final radiology read.  CT Thoracic Spine w/o Contrast   Final Result   IMPRESSION:   HEAD CT:   1.  No CT evidence for acute intracranial process.   2.  Brain atrophy and presumed chronic microvascular ischemic changes as above.      CERVICAL SPINE CT:   1.  No fracture or posttraumatic subluxation.   2.  Degenerative changes with central canal and foraminal stenosis as described above.      THORACIC SPINE CT:   Multiple old thoracic compression fractures.      LUMBAR SPINE CT:   1.  No acute fracture or posttraumatic subluxation.   2.  Marked old L1 burst fracture with increasing retropulsion now contributing to mild central canal stenosis and moderate to marked right and mild left L1-L2 foraminal narrowing.   3.  Moderate old L4 compression fracture appeared acute on the prior MRI.   4.  Stable moderate old L5 compression fracture with prior vertebroplasty/kyphoplasty.   5.  Degenerative changes with central canal and foraminal stenosis as described above.   6.  Incompletely imaged cystic lesion along the left pelvic sidewall likely an ovarian cyst. Recommend nonemergent pelvic ultrasound for further evaluation.         CT Lumbar Spine w/o Contrast   Final Result   IMPRESSION:   HEAD CT:   1.  No CT evidence for acute intracranial process.   2.  Brain atrophy and presumed chronic microvascular ischemic changes as above.      CERVICAL SPINE CT:   1.  No fracture or posttraumatic subluxation.   2.  Degenerative changes with central canal and foraminal stenosis as described above.      THORACIC SPINE CT:   Multiple old thoracic compression fractures.      LUMBAR SPINE CT:   1.  No acute fracture or posttraumatic subluxation.   2.  Marked old L1 burst fracture with increasing retropulsion now  contributing to mild central canal stenosis and moderate to marked right and mild left L1-L2 foraminal narrowing.   3.  Moderate old L4 compression fracture appeared acute on the prior MRI.   4.  Stable moderate old L5 compression fracture with prior vertebroplasty/kyphoplasty.   5.  Degenerative changes with central canal and foraminal stenosis as described above.   6.  Incompletely imaged cystic lesion along the left pelvic sidewall likely an ovarian cyst. Recommend nonemergent pelvic ultrasound for further evaluation.         CT Head w/o Contrast   Final Result   IMPRESSION:   HEAD CT:   1.  No CT evidence for acute intracranial process.   2.  Brain atrophy and presumed chronic microvascular ischemic changes as above.      CERVICAL SPINE CT:   1.  No fracture or posttraumatic subluxation.   2.  Degenerative changes with central canal and foraminal stenosis as described above.      THORACIC SPINE CT:   Multiple old thoracic compression fractures.      LUMBAR SPINE CT:   1.  No acute fracture or posttraumatic subluxation.   2.  Marked old L1 burst fracture with increasing retropulsion now contributing to mild central canal stenosis and moderate to marked right and mild left L1-L2 foraminal narrowing.   3.  Moderate old L4 compression fracture appeared acute on the prior MRI.   4.  Stable moderate old L5 compression fracture with prior vertebroplasty/kyphoplasty.   5.  Degenerative changes with central canal and foraminal stenosis as described above.   6.  Incompletely imaged cystic lesion along the left pelvic sidewall likely an ovarian cyst. Recommend nonemergent pelvic ultrasound for further evaluation.         CT Cervical Spine w/o Contrast   Final Result   IMPRESSION:   HEAD CT:   1.  No CT evidence for acute intracranial process.   2.  Brain atrophy and presumed chronic microvascular ischemic changes as above.      CERVICAL SPINE CT:   1.  No fracture or posttraumatic subluxation.   2.  Degenerative changes  with central canal and foraminal stenosis as described above.      THORACIC SPINE CT:   Multiple old thoracic compression fractures.      LUMBAR SPINE CT:   1.  No acute fracture or posttraumatic subluxation.   2.  Marked old L1 burst fracture with increasing retropulsion now contributing to mild central canal stenosis and moderate to marked right and mild left L1-L2 foraminal narrowing.   3.  Moderate old L4 compression fracture appeared acute on the prior MRI.   4.  Stable moderate old L5 compression fracture with prior vertebroplasty/kyphoplasty.   5.  Degenerative changes with central canal and foraminal stenosis as described above.   6.  Incompletely imaged cystic lesion along the left pelvic sidewall likely an ovarian cyst. Recommend nonemergent pelvic ultrasound for further evaluation.         US Pelvic Complete with Transvaginal    (Results Pending)           I, Marty Kilgore, am serving as a scribe to document services personally performed by Dr. Mora based on my observation and the provider's statements to me. I, Dr. Mora attest that Marty Kilgore is acting in a scribe capacity, has observed my performance of the services and has documented them in accordance with my direction.    Eva Mora MD  Emergency Medicine  [facility] EMERGENCY ROOM  [address] [address] [phone]  Dept: [phone]      Eva Mora MD  04/15/25 0251

## 2025-04-15 NOTE — ED TRIAGE NOTES
Pt reports tripping and falling about 90 minutes PTA. Pt reports pain and tenderness to neck, worse to R side. C-collar placed in triage.     Triage Assessment (Adult)       Row Name 04/14/25 2626          Triage Assessment    Airway WDL WDL        Respiratory WDL    Respiratory WDL WDL        Skin Circulation/Temperature WDL    Skin Circulation/Temperature WDL WDL        Cardiac WDL    Cardiac WDL WDL        Peripheral/Neurovascular WDL    Peripheral Neurovascular WDL WDL        Cognitive/Neuro/Behavioral WDL    Cognitive/Neuro/Behavioral WDL WDL        [place] Coma Scale    Best Eye Response 4-->(E4) spontaneous     Best Motor Response 6-->(M6) obeys commands     Best Verbal Response 5-->(V5) oriented     [place] Coma Scale Score 15

## 2025-04-15 NOTE — ED NOTES
AIDET performed, white board updated for rounding. Patient updated on plan of care. Patient's pain assessed. Call light within reach, bed in low position, side rails up. Visitor at bedside: hiral.

## 2025-04-15 NOTE — DISCHARGE INSTRUCTIONS
Follow-up with your spine care doctors within the next week for a recheck of your injuries.  The CT scans tonight demonstrated chronic L1 burst fracture, chronic L4 compression fracture, and chronic L5 compression fracture.  It also demonstrated a left pelvic cystic lesion that was incompletely visualized and it was recommended that you obtain a pelvic ultrasound for complete visualization of this region.  I have ordered the pelvic ultrasound as an outpatient  Continue your medications as previously prescribed  Ice to your neck or back every 1-2 hours for 10 to 15 minutes at a time for the next couple days  Return to the emergency department for worsening problems or concerns

## 2025-04-22 ENCOUNTER — HOSPITAL ENCOUNTER (EMERGENCY)
Facility: CLINIC | Age: 85
Discharge: HOME | End: 2025-04-22
Attending: EMERGENCY MEDICINE
Payer: MEDICARE

## 2025-04-22 ENCOUNTER — PATIENT OUTREACH (OUTPATIENT)
Dept: CARE COORDINATION | Facility: CLINIC | Age: 85
End: 2025-04-22
Payer: MEDICARE

## 2025-04-22 ENCOUNTER — APPOINTMENT (OUTPATIENT)
Dept: CT IMAGING | Facility: CLINIC | Age: 85
End: 2025-04-22
Payer: MEDICARE

## 2025-04-22 VITALS
BODY MASS INDEX: 25.52 KG/M2 | HEART RATE: 83 BPM | DIASTOLIC BLOOD PRESSURE: 88 MMHG | WEIGHT: 130 LBS | SYSTOLIC BLOOD PRESSURE: 172 MMHG | TEMPERATURE: 98.4 F | HEIGHT: 60 IN | OXYGEN SATURATION: 92 % | RESPIRATION RATE: 16 BRPM

## 2025-04-22 DIAGNOSIS — R51.9 HEADACHE, UNSPECIFIED HEADACHE TYPE: ICD-10-CM
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
## 2025-04-22 LAB
ALBUMIN SERPL BCG-MCNC: 3.4 G/DL (ref 3.5–5.2)
ALP SERPL-CCNC: 66 U/L (ref 40–150)
ALT SERPL W P-5'-P-CCNC: 9 U/L (ref 0–50)
ANION GAP SERPL CALCULATED.3IONS-SCNC: 9 MMOL/L (ref 7–15)
AST SERPL W P-5'-P-CCNC: 20 U/L (ref 0–45)
BILIRUB DIRECT SERPL-MCNC: 0.22 MG/DL (ref 0–0.3)
BILIRUB SERPL-MCNC: 0.4 MG/DL
BUN SERPL-MCNC: 17.8 MG/DL (ref 8–23)
CALCIUM SERPL-MCNC: 8.9 MG/DL (ref 8.8–10.4)
CHLORIDE SERPL-SCNC: 106 MMOL/L (ref 98–107)
CREAT SERPL-MCNC: 1.15 MG/DL (ref 0.51–0.95)
EGFRCR SERPLBLD CKD-EPI 2021: 46 ML/MIN/1.73M2
ERYTHROCYTE [DISTWIDTH] IN BLOOD BY AUTOMATED COUNT: 14 % (ref 10–15)
GLUCOSE SERPL-MCNC: 91 MG/DL (ref 70–99)
HCO3 SERPL-SCNC: 28 MMOL/L (ref 22–29)
HCT VFR BLD AUTO: 33.1 % (ref 35–47)
HGB BLD-MCNC: 10.4 G/DL (ref 11.7–15.7)
HOLD SPECIMEN: NORMAL
HOLD SPECIMEN: NORMAL
MCH RBC QN AUTO: 28.2 PG (ref 26.5–33)
MCHC RBC AUTO-ENTMCNC: 31.4 G/DL (ref 31.5–36.5)
MCV RBC AUTO: 90 FL (ref 78–100)
PLATELET # BLD AUTO: 289 10E3/UL (ref 150–450)
POTASSIUM SERPL-SCNC: 4.2 MMOL/L (ref 3.4–5.3)
PROT SERPL-MCNC: 6.3 G/DL (ref 6.4–8.3)
RBC # BLD AUTO: 3.69 10E6/UL (ref 3.8–5.2)
SODIUM SERPL-SCNC: 143 MMOL/L (ref 135–145)
WBC # BLD AUTO: 7 10E3/UL (ref 4–11)

## 2025-04-22 PROCEDURE — 250N000013 HC RX MED GY IP 250 OP 250 PS 637

## 2025-04-22 PROCEDURE — 84450 TRANSFERASE (AST) (SGOT): CPT | Performed by: EMERGENCY MEDICINE

## 2025-04-22 PROCEDURE — 72125 CT NECK SPINE W/O DYE: CPT

## 2025-04-22 PROCEDURE — 64405 NJX AA&/STRD GR OCPL NRV: CPT | Mod: 50

## 2025-04-22 PROCEDURE — 96374 THER/PROPH/DIAG INJ IV PUSH: CPT

## 2025-04-22 PROCEDURE — 84132 ASSAY OF SERUM POTASSIUM: CPT | Performed by: EMERGENCY MEDICINE

## 2025-04-22 PROCEDURE — 96375 TX/PRO/DX INJ NEW DRUG ADDON: CPT

## 2025-04-22 PROCEDURE — 72131 CT LUMBAR SPINE W/O DYE: CPT

## 2025-04-22 PROCEDURE — 70450 CT HEAD/BRAIN W/O DYE: CPT

## 2025-04-22 PROCEDURE — 96361 HYDRATE IV INFUSION ADD-ON: CPT

## 2025-04-22 PROCEDURE — 85041 AUTOMATED RBC COUNT: CPT | Performed by: EMERGENCY MEDICINE

## 2025-04-22 PROCEDURE — 99285 EMERGENCY DEPT VISIT HI MDM: CPT | Mod: 25

## 2025-04-22 PROCEDURE — 258N000003 HC RX IP 258 OP 636

## 2025-04-22 PROCEDURE — 250N000011 HC RX IP 250 OP 636

## 2025-04-22 PROCEDURE — 36415 COLL VENOUS BLD VENIPUNCTURE: CPT | Performed by: EMERGENCY MEDICINE

## 2025-04-22 PROCEDURE — 85014 HEMATOCRIT: CPT | Performed by: EMERGENCY MEDICINE

## 2025-04-22 PROCEDURE — 82310 ASSAY OF CALCIUM: CPT | Performed by: EMERGENCY MEDICINE

## 2025-04-22 PROCEDURE — 72128 CT CHEST SPINE W/O DYE: CPT

## 2025-04-22 PROCEDURE — 84155 ASSAY OF PROTEIN SERUM: CPT | Performed by: EMERGENCY MEDICINE

## 2025-04-22 RX ORDER — METOCLOPRAMIDE HYDROCHLORIDE 5 MG/ML
5 INJECTION INTRAMUSCULAR; INTRAVENOUS ONCE
Status: COMPLETED | OUTPATIENT
Start: 2025-04-22 | End: 2025-04-22

## 2025-04-22 RX ORDER — DIPHENHYDRAMINE HYDROCHLORIDE 50 MG/ML
12.5 INJECTION, SOLUTION INTRAMUSCULAR; INTRAVENOUS ONCE
Status: COMPLETED | OUTPATIENT
Start: 2025-04-22 | End: 2025-04-22

## 2025-04-22 RX ORDER — ACETAMINOPHEN 325 MG/1
975 TABLET ORAL ONCE
Status: COMPLETED | OUTPATIENT
Start: 2025-04-22 | End: 2025-04-22

## 2025-04-22 RX ADMIN — SODIUM CHLORIDE 500 ML: 0.9 INJECTION, SOLUTION INTRAVENOUS at 14:42

## 2025-04-22 RX ADMIN — ACETAMINOPHEN 975 MG: 325 TABLET, FILM COATED ORAL at 15:22

## 2025-04-22 RX ADMIN — METOCLOPRAMIDE 5 MG: 5 INJECTION, SOLUTION INTRAMUSCULAR; INTRAVENOUS at 14:43

## 2025-04-22 RX ADMIN — DIPHENHYDRAMINE HYDROCHLORIDE 12.5 MG: 50 INJECTION, SOLUTION INTRAMUSCULAR; INTRAVENOUS at 14:44

## 2025-04-22 ASSESSMENT — COLUMBIA-SUICIDE SEVERITY RATING SCALE - C-SSRS
6. HAVE YOU EVER DONE ANYTHING, STARTED TO DO ANYTHING, OR PREPARED TO DO ANYTHING TO END YOUR LIFE?: NO
2. HAVE YOU ACTUALLY HAD ANY THOUGHTS OF KILLING YOURSELF IN THE PAST MONTH?: NO
1. IN THE PAST MONTH, HAVE YOU WISHED YOU WERE DEAD OR WISHED YOU COULD GO TO SLEEP AND NOT WAKE UP?: NO

## 2025-04-22 ASSESSMENT — ACTIVITIES OF DAILY LIVING (ADL)
ADLS_ACUITY_SCORE: 48

## 2025-04-22 NOTE — ED PROVIDER NOTES
Emergency Department Encounter   NAME: Jacqueline Prado ; AGE: 84 year old female ; YOB: 1940 ; MRN: [id number] ; PCP: Brad Ashley   ED PROVIDER: Caryn Jarvis PA-C    Evaluation Date & Time:   4/22/2025  1:02 PM    CHIEF COMPLAINT:  Headache and Fall      Impression and Plan   MDM: Jacqueline Prado is a 84 year old female who presents to the ED for evaluation of fall and she was seen here on 4/14 due to this fall.  Today she was walking around her condo screaming in pain per her son-in-law who is here with her.  Neurologically intact although patient cognitively impaired at baseline.  On exam patient comfortable appearing and very pleasant.  Frail appearing.  She is oriented but cannot give me of great history on what exactly happened.  She notes she fell while she was dancing last week but unsure if she has fallen recently.  She is neurologically intact and moving all extremities.  She does have tenderness sort of diffusely down the spine but more in the L-spine.  No obvious step-offs or hematomas.  Stating headache is mostly in the front of her head and she does have a some ecchymosis over the front head that is yellow appearing and appears to be healing.  No large hematomas.  She states sometimes she gets neck pain as well.  No reproducible pain to palpation of the neck.  Oxygen was initially documented at 88%, however, when I am in examining her she is persistently at 95% on room air.  No increased work of breathing.  I suspect this was an error.  Patient is not on oxygen at home.    Differential including acute intracranial bleed, spinal fracture, postconcussive headache, other causes of headache like acute space-occupying lesion or other abnormality.  Imaging that was done 4/14 is repeated.  Head CT, C-spine CT, T-spine CT, L-spine CT all ordered.  No intracranial masses or lesions.  No intracranial bleeds.  There are several chronic appearing T-spine and L-spine fractures.  Nothing  acute.    Patient was initially given fluids, Benadryl, Reglan for symptoms.  When I reassessed her she is finally getting the pain medication.  Her son-in-law and daughter who are with her now states she is actually not allergic to Tylenol although this is listed in her allergy chart and they would like to try this.  She just recently Tylenol the other day without developing a rash.  This is provided as well.  We will also plan to do an occipital nerve block to help the headache.  This is done as stated in procedure note patient tolerated well.    Basic lab work ordered.  She does have acutely worsened kidney function with creatinine 1.15 and GFR 46 and previously normal.  She was given some fluids here and she has been drinking and eating less than normal at home so we discussed importance of maintaining her appetite and food intake.  Her hemoglobin is also down to 10.4.  Patient denies any blood in the stool or vomit.  She does take a baby aspirin daily.  It sounds like her primary care doctor wanted her to stop taking this daily although patient still has been taking.  Discussed holding this like the primary care doctor discussed especially until they follow-up with the primary doctor.  She should follow-up with a primary care doctor in 1 to 2 weeks for recheck of her hemoglobin.    Discussed return precautions including other acute neurologic changes otherwise can follow-up with primary doctor.  Plan is going to be to move patient in assisted living soon per children.  We did discuss whether or not she is safe to go home at this point.  Patient herself states she has lots of help from her multiple family lumbar barriers who live close to her.  Son in law and daughter who are with her today state that she believes she is safe at home as well.    Blood pressure was elevated prior to discharge at 172/88.  I was not informed.  No change in symptoms.  No further workup pursued.  Patient expresses understanding and  is discharged in stable condition.       Medical Decision Making  I independently interpreted the imaging and labs and note  . See radiology report for final interpretation.  Discharge. No recommendations on prescription strength medication(s). See documentation for any additional details.    MIPS (CTPE, Dental pain, Albarado, Sinusitis, Asthma/COPD, Head Trauma): Adult Minor Head Trauma:Age 65 years or older and Currently taking antiplatelet medications: clopidogrel or other antiplatelet medications    SEPSIS: None    Critical Care: 0    ED COURSE:  2:03 PM I met and introduced myself to the patient. I gathered initial history and performed my physical exam. We discussed plan for initial workup.    I have staffed the patient with Dr. Gr, ED MD, who has evaluated the patient and agrees with all aspects of today's care.    I rechecked the patient and discussed results, discharge, follow up, and reasons to return to the ED.     At the conclusion of the encounter I discussed the results of all the tests and the disposition. The questions were answered. The patient or family acknowledged understanding and was agreeable with the care plan.    FINAL IMPRESSION:    ICD-10-CM    1. Fall at home, initial encounter  W19.XXXA     Y92.009       2. Headache, unspecified headache type  R51.9             MEDICATIONS GIVEN IN THE EMERGENCY DEPARTMENT:  Medications   metoclopramide (REGLAN) injection 5 mg (5 mg Intravenous $Given 4/22/25 1443)   diphenhydrAMINE (BENADRYL) injection 12.5 mg (12.5 mg Intravenous $Given 4/22/25 1444)   sodium chloride 0.9% BOLUS 500 mL (0 mLs Intravenous Stopped 4/22/25 1601)   acetaminophen (TYLENOL) tablet 975 mg (975 mg Oral $Given 4/22/25 1522)         NEW PRESCRIPTIONS STARTED AT TODAY'S ED VISIT:  Discharge Medication List as of 4/22/2025  4:01 PM            HPI   Use of Intrepreter: N/A     Jacqueline Prado is a 84 year old female with a pertinent history of cognitive impairment, frequent  falls, hypothyroidism, rheumatoid arthritis, stasis dermatitis, osteoporosis, bronchitis, kyphosis who presents to the ED by private vehicle for evaluation of recent fall    Patient here today for falls. Unable to contribute much to history secondary to cognitive impairment. Son in law Dr. Livan Curiel provides history via phone:   - States patient fell on 4/14 while she was dancing.  He does not believe she had any falls since.  She is here today because she was out in the hallway at their condo screaming for help earlier this morning.  One of the neighbors found.  She had stated that she was screaming for help because she felt her head hurts so bad.  She took 40 mg of OxyContin prior to arrival.  She had evaluation after the fall occurred initially on 4/14.    Patient result states she does not recall having any other falls since dancing.  She reports that she hit her head twice at this time.  Been walking around okay with her walker at home.          Medical History     Past Medical History:   Diagnosis Date    Acquired hypothyroidism     Acquired lymphedema of leg     Bundle branch block     Bundle branch block     Cellulitis     Hypertension     Impetigo     Opioid dependence (H)     Osteoporosis     RA (rheumatoid arthritis) (H)     Rheumatoid arthritis (H)     Secondary hyperparathyroidism     Venous hypertension of both lower extremities     Venous insufficiency of both lower extremities     Venous stasis dermatitis of lower extremity        Past Surgical History:   Procedure Laterality Date    AMPUTATE TOE(S) Left 3/8/2022    Procedure: AMPUTATION, second digit left foot;  Surgeon: Kendrick Pedraza DPM;  Location: [facility] [place] OR    BREAST CYST ASPIRATION Left 2000    HC REMOVAL GALLBLADDER      Description: Cholecystectomy;  Recorded: 07/22/2009;    IR LUMBAR EPIDURAL STEROID INJECTION  6/23/2003    IR MISCELLANEOUS PROCEDURE  12/1/2006    ID INJECT VERTEBRAL BODY, LUMBAR      Description: Spinal  Percutaneous Vertebroplasty, Injection Lumbar;  Recorded: 2011;  Annotations: L5    ZZC EXCIS CERV DISK,ONE LEVEL      Description: Laminectomy With Disc Removal;  Recorded: 2011;  Annotations: L5-S1       Family History   Problem Relation Age of Onset    Breast Cancer Sister 72    Leukemia Sister     Alzheimer Disease Brother     Dementia Brother     No Known Problems Daughter     No Known Problems Daughter     Other - See Comments Son         COVID       Social History     Tobacco Use    Smoking status: Former     Current packs/day: 0.00     Types: Cigarettes     Quit date: 2018     Years since quittin.8    Smokeless tobacco: Never    Tobacco comments:     started smoking when she was 21 years old, vaping   Vaping Use    Vaping status: Every Day   Substance Use Topics    Alcohol use: Yes     Comment: Alcoholic Drinks/day: glass of wine during holiday    Drug use: Not Currently     Types: Marijuana     Comment: Drug use: medical marijuana       albuterol (PROAIR HFA/PROVENTIL HFA/VENTOLIN HFA) 108 (90 Base) MCG/ACT inhaler  CALCIUM CARBONATE/VITAMIN D3 (CALCIUM+D ORAL)  cholecalciferol, vitamin D3, 1,000 unit (25 mcg) tablet  donepezil (ARICEPT) 5 MG tablet  folic acid (FOLVITE) 1 MG tablet  hydrocortisone (CORTISONE, HYDROCORTISONE,) 1 % cream  isosorbide mononitrate (IMDUR) 30 MG 24 hr tablet  leflunomide (ARAVA) 20 MG tablet  levothyroxine (SYNTHROID/LEVOTHROID) 75 MCG tablet  loratadine (CLARITIN) 10 MG tablet  naloxone (NARCAN) 4 MG/0.1ML nasal spray  nystatin (MYCOSTATIN) cream  omeprazole (PRILOSEC) 20 MG DR capsule  ondansetron (ZOFRAN) 4 MG tablet  OXYCONTIN 10 MG 12 hr tablet  OXYCONTIN 40 MG 12 hr tablet  predniSONE (DELTASONE) 20 MG tablet  predniSONE (CHARLENE) 2 MG EC tablet  triamcinolone (KENALOG) 0.1 % ointment          Physical Exam     First Vitals:  Patient Vitals for the past 24 hrs:   BP Temp Temp src Pulse Resp SpO2 Height Weight   25 1656 (!) 172/88 -- -- 83 -- 92 % --  --   04/22/25 1359 -- -- -- -- -- 95 % -- --   04/22/25 1256 (!) 149/72 98.4  F (36.9  C) Temporal 90 16 (!) 88 % 1.524 m (5') 59 kg (130 lb)         PHYSICAL EXAM:   Physical Exam    Constitutional: alert,  no acute distress,  not ill-appearing  Head: normocephalic, healed bruising to right anterior portion of head, no hematomas   Eyes: EOM intact   Neck: ROM normal  Cardio: regular rate  Pulmonary: effort normal   Abdominal: flat, no distention  MSK:   -  kyphotic  - diffuse spinal tenderness  - no step offs or hematomas or overlying skin changes   Skin: no visible rashes or erythema   Neuro: no gross focal deficit, acting per baseline, normal strength and sensation, normal CN exam   Psychiatric: mood and behavior within normal limit    Results     LAB:  All pertinent labs reviewed and interpreted  Labs Ordered and Resulted from Time of ED Arrival to Time of ED Departure   CBC WITH PLATELETS - Abnormal       Result Value    WBC Count 7.0      RBC Count 3.69 (*)     Hemoglobin 10.4 (*)     Hematocrit 33.1 (*)     MCV 90      MCH 28.2      MCHC 31.4 (*)     RDW 14.0      Platelet Count 289     BASIC METABOLIC PANEL - Abnormal    Sodium 143      Potassium 4.2      Chloride 106      Carbon Dioxide (CO2) 28      Anion Gap 9      Urea Nitrogen 17.8      Creatinine 1.15 (*)     GFR Estimate 46 (*)     Calcium 8.9      Glucose 91     HEPATIC FUNCTION PANEL - Abnormal    Protein Total 6.3 (*)     Albumin 3.4 (*)     Bilirubin Total 0.4      Alkaline Phosphatase 66      AST 20      ALT 9      Bilirubin Direct 0.22          RADIOLOGY:  CT Lumbar Spine w/o Contrast   Final Result   IMPRESSION:   HEAD CT:   1.  No CT evidence for acute intracranial process.   2.  Brain atrophy and presumed chronic microvascular ischemic changes as above.      CERVICAL SPINE CT:   1.  No fracture or posttraumatic subluxation.   2.  No high-grade spinal canal or neural foraminal stenosis.      THORACIC SPINE CT:   1.  No acute fracture or  posttraumatic subluxation.   2.  Chronic appearing anterior wedging compression deformity of T10.   3.  Multilevel Schmorl's nodes with associated height loss along the T3, T4, T6, T9 and T10 vertebrae.      LUMBAR SPINE CT:   1.  No acute fracture or posttraumatic subluxation.   2.  Unchanged chronic burst fracture of L1 with retropulsion of the fractured vertebral body.   3.  Unchanged chronic compression fracture of L4.   4.  Unchanged compression fracture along L5 upper endplate with vertebroplasty changes.   5.  Mild to moderate central canal stenosis at L3-L4 due to degenerative anterolisthesis and ligamentum flavum hypertrophy is probably not significantly changed.   6.  Moderate to severe right and mild left L1-L2 foraminal narrowing.         CT Thoracic Spine w/o Contrast   Final Result   IMPRESSION:   HEAD CT:   1.  No CT evidence for acute intracranial process.   2.  Brain atrophy and presumed chronic microvascular ischemic changes as above.      CERVICAL SPINE CT:   1.  No fracture or posttraumatic subluxation.   2.  No high-grade spinal canal or neural foraminal stenosis.      THORACIC SPINE CT:   1.  No acute fracture or posttraumatic subluxation.   2.  Chronic appearing anterior wedging compression deformity of T10.   3.  Multilevel Schmorl's nodes with associated height loss along the T3, T4, T6, T9 and T10 vertebrae.      LUMBAR SPINE CT:   1.  No acute fracture or posttraumatic subluxation.   2.  Unchanged chronic burst fracture of L1 with retropulsion of the fractured vertebral body.   3.  Unchanged chronic compression fracture of L4.   4.  Unchanged compression fracture along L5 upper endplate with vertebroplasty changes.   5.  Mild to moderate central canal stenosis at L3-L4 due to degenerative anterolisthesis and ligamentum flavum hypertrophy is probably not significantly changed.   6.  Moderate to severe right and mild left L1-L2 foraminal narrowing.         CT Cervical Spine w/o Contrast    Final Result   IMPRESSION:   HEAD CT:   1.  No CT evidence for acute intracranial process.   2.  Brain atrophy and presumed chronic microvascular ischemic changes as above.      CERVICAL SPINE CT:   1.  No fracture or posttraumatic subluxation.   2.  No high-grade spinal canal or neural foraminal stenosis.      THORACIC SPINE CT:   1.  No acute fracture or posttraumatic subluxation.   2.  Chronic appearing anterior wedging compression deformity of T10.   3.  Multilevel Schmorl's nodes with associated height loss along the T3, T4, T6, T9 and T10 vertebrae.      LUMBAR SPINE CT:   1.  No acute fracture or posttraumatic subluxation.   2.  Unchanged chronic burst fracture of L1 with retropulsion of the fractured vertebral body.   3.  Unchanged chronic compression fracture of L4.   4.  Unchanged compression fracture along L5 upper endplate with vertebroplasty changes.   5.  Mild to moderate central canal stenosis at L3-L4 due to degenerative anterolisthesis and ligamentum flavum hypertrophy is probably not significantly changed.   6.  Moderate to severe right and mild left L1-L2 foraminal narrowing.         Head CT w/o contrast   Final Result   IMPRESSION:   HEAD CT:   1.  No CT evidence for acute intracranial process.   2.  Brain atrophy and presumed chronic microvascular ischemic changes as above.      CERVICAL SPINE CT:   1.  No fracture or posttraumatic subluxation.   2.  No high-grade spinal canal or neural foraminal stenosis.      THORACIC SPINE CT:   1.  No acute fracture or posttraumatic subluxation.   2.  Chronic appearing anterior wedging compression deformity of T10.   3.  Multilevel Schmorl's nodes with associated height loss along the T3, T4, T6, T9 and T10 vertebrae.      LUMBAR SPINE CT:   1.  No acute fracture or posttraumatic subluxation.   2.  Unchanged chronic burst fracture of L1 with retropulsion of the fractured vertebral body.   3.  Unchanged chronic compression fracture of L4.   4.  Unchanged  compression fracture along L5 upper endplate with vertebroplasty changes.   5.  Mild to moderate central canal stenosis at L3-L4 due to degenerative anterolisthesis and ligamentum flavum hypertrophy is probably not significantly changed.   6.  Moderate to severe right and mild left L1-L2 foraminal narrowing.           PROCEDURES:  PROCEDURE: Occipital Nerve Block   INDICATIONS: Head Pain s/p fall   PROCEDURE PROVIDER: Caryn Jarvis PA-C   CONSENT: Verbal consent was obtained from patient   MEDICATIONS: 5 mLs of   Marcaine     DETAILS: Area was cleansed with an alcohol swab.  Occipital protuberance identified.  Bilateral occipital nerve blocks performed with Marcaine.  About 4 cc injected on the right side and 1 cc to the left side.  Patient tolerated well without complication.   COMPLICATIONS: Patient tolerated procedure well, without complication       Caryn Jarvis PA-C   Emergency Medicine   [facility] EMERGENCY ROOM       Caryn Jarvis PA-C  04/22/25 2116

## 2025-04-22 NOTE — DISCHARGE INSTRUCTIONS
Your head CT looked okay today without any bleeds.  All the fractures in your back are chronic and not new.  Hopefully made you feel better with the headache injection today.  Come back here for any acute worsening of headache or other concerns.  Otherwise follow-up with your regular doctor as needed.    You can add Tylenol to your regimen if you feel like this helps headache pain.  You can take it 1000 mg up to 3 times per day    Your hemoglobin was lower than normal today. Please hold your aspirin, make an appointment with your PCP in 1-2 weeks to recheck your hemoglobin.

## 2025-04-22 NOTE — ED TRIAGE NOTES
Patient brought in by EMS from home, states 3 days ago she was dancing and caught her foot on the carpet and fell hitting her head, c/o generalized intermittent head pain for last 3 days, A&O x4, impendent at home, 2nd fall in last 2 month, no thinners, no LOC. Dr. Livan Foster mother-in-law.      Triage Assessment (Adult)       Row Name 04/22/25 1911          Triage Assessment    Airway WDL WDL        Respiratory WDL    Respiratory WDL WDL        Skin Circulation/Temperature WDL    Skin Circulation/Temperature WDL WDL        Cardiac WDL    Cardiac WDL WDL        Peripheral/Neurovascular WDL    Peripheral Neurovascular WDL WDL        Cognitive/Neuro/Behavioral WDL    Cognitive/Neuro/Behavioral WDL WDL

## 2025-04-22 NOTE — ED PROVIDER NOTES
Emergency Department Midlevel Supervisory Note     I had a face to face encounter with this patient seen by the Advanced Practice Provider (RAFI). I personally made/approved the management plan and take responsibility for the patient management. I personally saw patient and performed a substantive portion of the visit including all aspects of the medical decision making.     ED Course:  2:16 PM  Caryn Jarvis PA-C staffed patient with me. I agree with their assessment and plan of management, and I will see the patient.  3:14 PM I met with the patient to introduce myself, gather additional history, perform my initial exam, and discuss the plan.     MDM:  ED Course as of 04/22/25 2158   Tue Apr 22, 2025   1405 85 yo F who presents with falls. Hx of dementia. Family noted that she's had falls. She was walking the hallways screaming that she had a severe headache. She has diffuse tenderness on exam. Has chronic back pain.   Appears well and relatively comfortable here.  Awake and alert.  Given that her pain usually originates from back of her head, radiates upward, we offered occipital nerve block.  We found to have a couple of tender spots in the back and provided 2 blocks on the right and left.    Head CT normal.    Found to be anemic, no dark or bloody stools.  On aspirin, although primary encouraged her to stop.  I think this would be a good time to stop and have her hemoglobin rechecked in a couple of weeks.    Mild ORAL, was given small fluid bolus.  Creatinine is another marker to recheck in the coming 1 to 2 weeks.      1. Fall at home, initial encounter    2. Headache, unspecified headache type        Brief HPI:     Jacqueline Prado is a 84 year old female who presents for evaluation of frequent falls. Patient is here for falls. Unable to provide much history due to cognitive impairment. Son in law provides history via phone.       Brief Physical Exam: BP (!) 172/88   Pulse 83   Temp 98.4  F (36.9  C)  (Temporal)   Resp 16   Ht 1.524 m (5')   Wt 59 kg (130 lb)   SpO2 92%   BMI 25.39 kg/m    Constitutional:  Alert, in no acute distress  EYES: Conjunctivae clear  HENT:  Atraumatic, tenderness to right occiput.  Respiratory:  Respirations even, unlabored, in no acute respiratory distress  Cardiovascular:  Regular rate and rhythm, good peripheral perfusion  Musculoskeletal:  Moves all 4 extremities equally, grossly symmetrical strength  Integument: Warm & dry. No appreciable rash, erythema.  Neurologic:  Alert & oriented, speech clear and fluent, no focal deficits noted  Psych: Normal mood and affect      Labs and Imaging:  Results for orders placed or performed during the hospital encounter of 04/22/25   Head CT w/o contrast    Impression    IMPRESSION:  HEAD CT:  1.  No CT evidence for acute intracranial process.  2.  Brain atrophy and presumed chronic microvascular ischemic changes as above.    CERVICAL SPINE CT:  1.  No fracture or posttraumatic subluxation.  2.  No high-grade spinal canal or neural foraminal stenosis.    THORACIC SPINE CT:  1.  No acute fracture or posttraumatic subluxation.  2.  Chronic appearing anterior wedging compression deformity of T10.  3.  Multilevel Schmorl's nodes with associated height loss along the T3, T4, T6, T9 and T10 vertebrae.    LUMBAR SPINE CT:  1.  No acute fracture or posttraumatic subluxation.  2.  Unchanged chronic burst fracture of L1 with retropulsion of the fractured vertebral body.  3.  Unchanged chronic compression fracture of L4.  4.  Unchanged compression fracture along L5 upper endplate with vertebroplasty changes.  5.  Mild to moderate central canal stenosis at L3-L4 due to degenerative anterolisthesis and ligamentum flavum hypertrophy is probably not significantly changed.  6.  Moderate to severe right and mild left L1-L2 foraminal narrowing.     CT Cervical Spine w/o Contrast    Impression    IMPRESSION:  HEAD CT:  1.  No CT evidence for acute intracranial  process.  2.  Brain atrophy and presumed chronic microvascular ischemic changes as above.    CERVICAL SPINE CT:  1.  No fracture or posttraumatic subluxation.  2.  No high-grade spinal canal or neural foraminal stenosis.    THORACIC SPINE CT:  1.  No acute fracture or posttraumatic subluxation.  2.  Chronic appearing anterior wedging compression deformity of T10.  3.  Multilevel Schmorl's nodes with associated height loss along the T3, T4, T6, T9 and T10 vertebrae.    LUMBAR SPINE CT:  1.  No acute fracture or posttraumatic subluxation.  2.  Unchanged chronic burst fracture of L1 with retropulsion of the fractured vertebral body.  3.  Unchanged chronic compression fracture of L4.  4.  Unchanged compression fracture along L5 upper endplate with vertebroplasty changes.  5.  Mild to moderate central canal stenosis at L3-L4 due to degenerative anterolisthesis and ligamentum flavum hypertrophy is probably not significantly changed.  6.  Moderate to severe right and mild left L1-L2 foraminal narrowing.     CT Thoracic Spine w/o Contrast    Impression    IMPRESSION:  HEAD CT:  1.  No CT evidence for acute intracranial process.  2.  Brain atrophy and presumed chronic microvascular ischemic changes as above.    CERVICAL SPINE CT:  1.  No fracture or posttraumatic subluxation.  2.  No high-grade spinal canal or neural foraminal stenosis.    THORACIC SPINE CT:  1.  No acute fracture or posttraumatic subluxation.  2.  Chronic appearing anterior wedging compression deformity of T10.  3.  Multilevel Schmorl's nodes with associated height loss along the T3, T4, T6, T9 and T10 vertebrae.    LUMBAR SPINE CT:  1.  No acute fracture or posttraumatic subluxation.  2.  Unchanged chronic burst fracture of L1 with retropulsion of the fractured vertebral body.  3.  Unchanged chronic compression fracture of L4.  4.  Unchanged compression fracture along L5 upper endplate with vertebroplasty changes.  5.  Mild to moderate central canal  stenosis at L3-L4 due to degenerative anterolisthesis and ligamentum flavum hypertrophy is probably not significantly changed.  6.  Moderate to severe right and mild left L1-L2 foraminal narrowing.     CT Lumbar Spine w/o Contrast    Impression    IMPRESSION:  HEAD CT:  1.  No CT evidence for acute intracranial process.  2.  Brain atrophy and presumed chronic microvascular ischemic changes as above.    CERVICAL SPINE CT:  1.  No fracture or posttraumatic subluxation.  2.  No high-grade spinal canal or neural foraminal stenosis.    THORACIC SPINE CT:  1.  No acute fracture or posttraumatic subluxation.  2.  Chronic appearing anterior wedging compression deformity of T10.  3.  Multilevel Schmorl's nodes with associated height loss along the T3, T4, T6, T9 and T10 vertebrae.    LUMBAR SPINE CT:  1.  No acute fracture or posttraumatic subluxation.  2.  Unchanged chronic burst fracture of L1 with retropulsion of the fractured vertebral body.  3.  Unchanged chronic compression fracture of L4.  4.  Unchanged compression fracture along L5 upper endplate with vertebroplasty changes.  5.  Mild to moderate central canal stenosis at L3-L4 due to degenerative anterolisthesis and ligamentum flavum hypertrophy is probably not significantly changed.  6.  Moderate to severe right and mild left L1-L2 foraminal narrowing.     Extra Green Top (Lithium Heparin) Tube   Result Value Ref Range    Hold Specimen JIC    Extra Purple Top Tube   Result Value Ref Range    Hold Specimen JIC    CBC (+ platelets, no diff)   Result Value Ref Range    WBC Count 7.0 4.0 - 11.0 10e3/uL    RBC Count 3.69 (L) 3.80 - 5.20 10e6/uL    Hemoglobin 10.4 (L) 11.7 - 15.7 g/dL    Hematocrit 33.1 (L) 35.0 - 47.0 %    MCV 90 78 - 100 fL    MCH 28.2 26.5 - 33.0 pg    MCHC 31.4 (L) 31.5 - 36.5 g/dL    RDW 14.0 10.0 - 15.0 %    Platelet Count 289 150 - 450 10e3/uL   Basic metabolic panel   Result Value Ref Range    Sodium 143 135 - 145 mmol/L    Potassium 4.2 3.4 -  5.3 mmol/L    Chloride 106 98 - 107 mmol/L    Carbon Dioxide (CO2) 28 22 - 29 mmol/L    Anion Gap 9 7 - 15 mmol/L    Urea Nitrogen 17.8 8.0 - 23.0 mg/dL    Creatinine 1.15 (H) 0.51 - 0.95 mg/dL    GFR Estimate 46 (L) >60 mL/min/1.73m2    Calcium 8.9 8.8 - 10.4 mg/dL    Glucose 91 70 - 99 mg/dL   Hepatic function panel   Result Value Ref Range    Protein Total 6.3 (L) 6.4 - 8.3 g/dL    Albumin 3.4 (L) 3.5 - 5.2 g/dL    Bilirubin Total 0.4 <=1.2 mg/dL    Alkaline Phosphatase 66 40 - 150 U/L    AST 20 0 - 45 U/L    ALT 9 0 - 50 U/L    Bilirubin Direct 0.22 0.00 - 0.30 mg/dL       I have reviewed the relevant laboratory studies above.    Any images independently reviewed are listed in the medical decision making    EKG: I reviewed and independently interpreted the patient's EKG, with comments/interpretation noted in the MDM    Procedures:  I was present for the key portions of procedures documented in RAFI/midlevel note, see midlevel note for further details.    Alber Gr MD  [facility] EMERGENCY ROOM  [address] [address] [phone]       Alber Gr MD  04/22/25 3647

## 2025-04-22 NOTE — ED NOTES
Pt discharging via POV with family. All belongings with patient. Discharge instructions provided and questions encouraged.  IV discontinued, recommended to follow up with PCP. Fluids encouraged. VSS and left in stable condition.

## 2025-04-22 NOTE — PROGRESS NOTES
[facility] Medicare ACO Reach Population - Proactive Outreach    Background: Patient outreach conducted proactively to support health maintenance initiatives and identify any opportunities to integrated Care Coordination assistance in patient-centered goals.      Patient agreeable to scheduling Hospital/ED follow up? No, Patient states she will talk to her daughter and get the appointment scheduled.      If No, CC team member encouraged patient to contact [facility] at [phone] to schedule an appointment in the future, or schedule through [facility].    Patient accepts CC: Patient declined.     Dafne Rubi RN  [facility]

## 2025-04-23 ENCOUNTER — TELEPHONE (OUTPATIENT)
Dept: INTERNAL MEDICINE | Facility: CLINIC | Age: 85
End: 2025-04-23
Payer: MEDICARE

## 2025-04-23 ENCOUNTER — PATIENT OUTREACH (OUTPATIENT)
Dept: CARE COORDINATION | Facility: CLINIC | Age: 85
End: 2025-04-23
Payer: MEDICARE

## 2025-04-23 NOTE — TELEPHONE ENCOUNTER
April 23, 2025    Home health orders was received via fax for Dr. Ashley.  Patient label was attached to paperwork and placed in provider's inbox to be signed.    Monie Frederick

## 2025-04-23 NOTE — PROGRESS NOTES
[facility]    Background: Primary Care-Care Coordination initial outreach identified per system criteria and reviewed by [facility] Resource [place] team.    Assessment: Upon chart review, [facility] Team member will not proceed with patient outreach related to this episode of Primary Care-Care Coordination program due to reason below:    Patient has discharged to a Memory Care, Long-term Care, Assisted Living or Group Home where patient is receiving on-site support with their daily cares, including support with ED follow up plan.    Plan: Primary Care-Care Coordination episode addressed appropriately per reason noted above.      JOSR Tanner  [facility]    *Connected Care Resource Team does NOT follow patient ongoing. Referrals are identified based on internal discharge reports and the outreach is to ensure patient has an understanding of their discharge instructions.

## 2025-05-01 DIAGNOSIS — G89.29 OTHER CHRONIC PAIN: ICD-10-CM

## 2025-05-01 DIAGNOSIS — M54.32 BACK PAIN WITH LEFT-SIDED SCIATICA: ICD-10-CM

## 2025-05-01 DIAGNOSIS — F11.20 OPIOID TYPE DEPENDENCE, CONTINUOUS (H): ICD-10-CM

## 2025-05-01 RX ORDER — OXYCODONE HYDROCHLORIDE 10 MG/1
10 TABLET, FILM COATED, EXTENDED RELEASE ORAL EVERY 12 HOURS
Qty: 60 TABLET | Refills: 0 | Status: SHIPPED | OUTPATIENT
Start: 2025-05-01

## 2025-05-01 RX ORDER — OXYCODONE HYDROCHLORIDE 40 MG/1
40 TABLET, FILM COATED, EXTENDED RELEASE ORAL
Qty: 30 TABLET | Refills: 0 | Status: SHIPPED | OUTPATIENT
Start: 2025-05-01

## 2025-05-13 NOTE — PATIENT INSTRUCTIONS
"*Wheelchairs are never guaranteed at the front door, if you have your own wheel chair or knee walker, please bring that with you to your appointment. Thank you for your understanding!*    Wound care supplies were not ordered or needed today.    Important lnstructions      WEIGHT BEARING STATUS: You are to remain LIMITED WEIGHT BEARING on your left and right foot. LIMITED WEIGHT BEARING MEANS THAT IT IS ONLY OKAY FOR YOU TO APPLY LIGHT PRESSURE ON THE AFFECTED FOOT WHEN TRANSFERRING FROM YOUR ASSISTIVE DEVICE TO A CHAIR OR BED.    2. OFFLOADING DEVICE: Must use either a Rolling Knee Walker or a Wheelchair at all times! (do not use affected foot to push wheelchair)    3. STABILIZATION DEVICE: Use a POST OP SHOE to both feet. You will need to WEAR THIS ANYTIME YOU ARE UP AND OUT OF BED, IT IS OKAY TO REMOVE WHEN YOU ARE SLEEPING.. You can purchase post-op shoes either at [employer] [facility] (not covered by insurance) or online on [employer].    4. ELEVATE: Elevating your leg means laying with your head on a pillow and your foot ABOVE YOUR HEART.     5. DO NOT MOVE YOUR FOOT.  There is a risk of worsening the wound or incision. To give yourself a higher chance of healing, please DO NOT swing foot back and forth and wiggle foot/toes especially when inside a stabilization device. Limited movement is allowable with therapy as recommended by the doctor.     6. TAKE A PROTEIN SHAKE TWICE A DAY.  (For ex: Boost, Ensure, Glucerna)    7. KEEP YOUR WOUND DRY AT ALL TIMES    Use a shower bag or a cast cover to keep your foot/leg dry during showers. These can be purchased on [employer] or any pharmacy.         Dressing Change lnstructions    Change DAILY to your bilateral feet/toes    Primary Dressing: Apply dry gauze into/onto the wounds    Secure with non-sterile roll gauze (4\" x 75\" roll) and tape (1\" roll tape) as needed; avoid adhesive directly on the skin       SEEK MEDICAL CARE IF:  You have an increase in swelling, pain, or redness " around the wound.  You have an increase in the amount of pus coming from the wound.  There is a bad smell coming from the wound.  The wound appears to be worsening/enlarging  You have a fever greater than 101.5 F      It is ok to continue current wound care treatment/products for the next 2-3 days until new wound care supplies are ordered and arrive. If longer than this please contact our office at [phone].      We want to hear from you!   In the next few weeks, you should receive a call or email to complete a survey about your visit at [facility]. Please help us improve your appointment experience by letting us know how we did today. We strive to make your experience good and value any ways in which we could do better.      We value your input and suggestions.    Thank you for choosing the [facility]!

## 2025-05-20 ENCOUNTER — OFFICE VISIT (OUTPATIENT)
Dept: VASCULAR SURGERY | Facility: CLINIC | Age: 85
End: 2025-05-20
Attending: PODIATRIST
Payer: MEDICARE

## 2025-05-20 VITALS
HEART RATE: 89 BPM | TEMPERATURE: 98.6 F | RESPIRATION RATE: 16 BRPM | DIASTOLIC BLOOD PRESSURE: 89 MMHG | SYSTOLIC BLOOD PRESSURE: 153 MMHG | OXYGEN SATURATION: 95 %

## 2025-05-20 DIAGNOSIS — L97.521 ULCER OF LEFT FOOT, LIMITED TO BREAKDOWN OF SKIN (H): ICD-10-CM

## 2025-05-20 DIAGNOSIS — I73.9 PAD (PERIPHERAL ARTERY DISEASE): Primary | ICD-10-CM

## 2025-05-20 DIAGNOSIS — M20.42 HAMMERTOE, BILATERAL: ICD-10-CM

## 2025-05-20 DIAGNOSIS — M20.41 HAMMERTOE, BILATERAL: ICD-10-CM

## 2025-05-20 DIAGNOSIS — L97.511 ULCER OF RIGHT FOOT LIMITED TO BREAKDOWN OF SKIN (H): ICD-10-CM

## 2025-05-20 DIAGNOSIS — L97.524 ULCER OF TOE, LEFT, WITH NECROSIS OF BONE (H): ICD-10-CM

## 2025-05-20 DIAGNOSIS — B35.1 ONYCHOMYCOSIS: ICD-10-CM

## 2025-05-20 DIAGNOSIS — L97.513 ULCER OF TOE, RIGHT, WITH NECROSIS OF MUSCLE (H): ICD-10-CM

## 2025-05-20 PROCEDURE — 11044 DBRDMT BONE 1ST 20 SQ CM/<: CPT | Performed by: PODIATRIST

## 2025-05-20 PROCEDURE — 11042 DBRDMT SUBQ TIS 1ST 20SQCM/<: CPT | Performed by: PODIATRIST

## 2025-05-20 PROCEDURE — 11043 DBRDMT MUSC&/FSCA 1ST 20/<: CPT | Performed by: PODIATRIST

## 2025-05-20 PROCEDURE — 11721 DEBRIDE NAIL 6 OR MORE: CPT | Mod: Q8 | Performed by: PODIATRIST

## 2025-05-20 ASSESSMENT — PAIN SCALES - GENERAL: PAINLEVEL_OUTOF10: MODERATE PAIN (5)

## 2025-05-20 NOTE — Clinical Note
Please call patient to schedule the ABIs/TCPO2 and assist with getting the stat MRIs scheduled. Thanks!

## 2025-05-20 NOTE — PROGRESS NOTES
FOOT AND ANKLE SURGERY/PODIATRY Progress Note      ASSESSMENT:   Ulceration third digit right foot into muscle  Ulceration fourth digit left foot into bone  Ulceration right forefoot into subcutaneous tissue  Ulceration left forefoot in the subcutaneous tissue  Ulceration first MPJ left foot into subcutaneous tissue   Onychomycosis  Hammertoe  HAV        TREATMENT:  - I discussed with the patient and her son-in-law today that the ulceration on the fourth digit left foot does have increased depth and appears to probe to bone.  Ulceration dorsal third digit right foot also has increased depth.    -Due to the above and increased depth at both ulcerative locations I am concerned about underlying osteomyelitis and recommended I refer the patient for an MRI at this time to investigate for underlying osteomyelitis.  Reviewed that surgical intervention may be necessary including digital amputation.    -We reviewed that obtaining healing of the ulcerations along the dorsal PIPJ with severe hammertoe deformities will be a challenge given the increased skin tension at both locations.    -Referred for updated ABIs    -After discussion of risk factors, nursing staff removed dressing, cleansed wound and consent obtained 2% Lidocaine HCL jelly was applied, under clean conditions, the fourth digit left foot ulceration(s) were debrided using #15 blade scalpel.  Devitalized and nonviable tissue, along with any fibrin and slough, was removed to improve granulation tissue formation, stimulate wound healing, decrease overall bacteria load, disrupt biofilm formation and decrease edge senescence. Wound drainage was small Serosanguinous. Total excisional debridement was 0.04 sq cm into the bone with a depth of 0.3 cm.   Ulcers were improved afterwards and .  Measures were as noted on the flow sheet.  Gauze dressing applied today which be reapplied daily.    -After discussion of risk factors, nursing staff removed dressing, cleansed  wound and consent obtained 2% Lidocaine HCL jelly was applied, under clean conditions, the third digit right foot ulceration(s) were debrided using #15 blade scalpel.  Devitalized and nonviable tissue, along with any fibrin and slough, was removed to improve granulation tissue formation, stimulate wound healing, decrease overall bacteria load, disrupt biofilm formation and decrease edge senescence. Wound drainage was small Serosanguinous. Total excisional debridement was 0.06 sq cm into the muscle/fascia with a depth of 0.3 cm.   Ulcers were improved afterwards and .  Measures were as noted on the flow sheet.  Gauze dressing applied today.    -After discussion of risk factors, nursing staff removed dressing, cleansed wound and consent obtained 2% Lidocaine HCL jelly was applied, under clean conditions, the plantar bilateral forefoot and medial first MPJ left foot ulceration(s) were debrided using #15 blade scalpel.  Devitalized and nonviable tissue, along with any fibrin and slough, was removed to improve granulation tissue formation, stimulate wound healing, decrease overall bacteria load, disrupt biofilm formation and decrease edge senescence. Wound drainage was small Serosanguinous. Total excisional debridement was 3 sq cm into the subcutaneous tissue with a depth of 0.2 cm.   Ulcers were improved afterwards and .  Measures were as noted on the flow sheet.  Gauze dressing applied today which be reapplied daily.    -I debrided nails greater than 6 in length and thickness    -I will contact the patient with the MRI and ABELARDO reports when available and we will be guided by the results.     Kendrick Pedraza DPM  [facility] [place]      HPI: Jacqueline Prado was seen again today for new ulcerations along bilateral feet.  Patient is known to me having performed surgery on her left foot in 2022.  She is accompanied today by her son-in-law who reports that she has developed nonviable tissue  along multiple areas bilateral feet.  Patient also does not have a clear timeline as to the presence of the nonviable tissue.  Currently residing in independent living.  Ambulating in walking shoes.  Past medical history reviewed.  Patient request nail trim today.    Past Medical History:   Diagnosis Date    Acquired hypothyroidism     Acquired lymphedema of leg     Bundle branch block     Created by Conversion  Replacement Utility updated for latest IMO load    Bundle branch block     Cellulitis     Hypertension     Impetigo     Opioid dependence (H)     Osteoporosis     RA (rheumatoid arthritis) (H)     Rheumatoid arthritis (H)     Secondary hyperparathyroidism     Venous hypertension of both lower extremities     Venous insufficiency of both lower extremities     Venous stasis dermatitis of lower extremity        Past Surgical History:   Procedure Laterality Date    AMPUTATE TOE(S) Left 3/8/2022    Procedure: AMPUTATION, second digit left foot;  Surgeon: Kendrick Pedraza DPM;  Location: [facility]    BREAST CYST ASPIRATION Left 2000    HC REMOVAL GALLBLADDER      Description: Cholecystectomy;  Recorded: 07/22/2009;    IR LUMBAR EPIDURAL STEROID INJECTION  6/23/2003    IR MISCELLANEOUS PROCEDURE  12/1/2006    GA INJECT VERTEBRAL BODY, LUMBAR      Description: Spinal Percutaneous Vertebroplasty, Injection Lumbar;  Recorded: 11/03/2011;  Annotations: L5    ZZC EXCIS CERV DISK,ONE LEVEL      Description: Laminectomy With Disc Removal;  Recorded: 11/03/2011;  Annotations: L5-S1       Allergies   Allergen Reactions    Acetaminophen Rash    Nsaids (Non-Steroidal Anti-Inflammatory Drug) [Nsaids] Rash    Tetracyclines & Related Rash    Baclofen Nausea    Celecoxib Unknown    Erythromycin Base [Erythromycin] Unknown    Pentolinium Itching    Shellfish-Derived Products Diarrhea    Varenicline Itching and Swelling    Erythromycin Rash    Petroleum Jelly [Petrolatum] Itching and Rash         Current Outpatient  Medications:     albuterol (PROAIR HFA/PROVENTIL HFA/VENTOLIN HFA) 108 (90 Base) MCG/ACT inhaler, Inhale 2 puffs into the lungs every 4 hours as needed for wheezing., Disp: 18 g, Rfl: 0    CALCIUM CARBONATE/VITAMIN D3 (CALCIUM+D ORAL), [CALCIUM CARBONATE/VITAMIN D3 (CALCIUM+D ORAL)] Take 3 tablets by mouth daily., Disp: , Rfl:     cholecalciferol, vitamin D3, 1,000 unit (25 mcg) tablet, [CHOLECALCIFEROL, VITAMIN D3, 1,000 UNIT (25 MCG) TABLET] Take 1,000 Units by mouth daily., Disp: , Rfl:     donepezil (ARICEPT) 5 MG tablet, Take 1 tablet (5 mg) by mouth at bedtime., Disp: 90 tablet, Rfl: 1    folic acid (FOLVITE) 1 MG tablet, Take 1 mg by mouth daily , Disp: , Rfl: 2    hydrocortisone (CORTISONE, HYDROCORTISONE,) 1 % cream, [HYDROCORTISONE (CORTISONE, HYDROCORTISONE,) 1 % CREAM] Apply to hand three times a day, Disp: 30 g, Rfl: 2    isosorbide mononitrate (IMDUR) 30 MG 24 hr tablet, Take 1 tablet (30 mg) by mouth daily., Disp: 90 tablet, Rfl: 4    leflunomide (ARAVA) 20 MG tablet, [LEFLUNOMIDE (ARAVA) 20 MG TABLET] Take 20 mg by mouth daily. , Disp: , Rfl:     levothyroxine (SYNTHROID/LEVOTHROID) 75 MCG tablet, Take 1 tablet (75 mcg) by mouth every morning (before breakfast)., Disp: 90 tablet, Rfl: 4    loratadine (CLARITIN) 10 MG tablet, TAKE ONE TABLET BY MOUTH ONCE DAILY (NEED TO BE SEEN IN CLINIC FOR FURTHER REFILLS), Disp: 90 tablet, Rfl: 0    naloxone (NARCAN) 4 MG/0.1ML nasal spray, Spray 1 spray (4 mg) into one nostril alternating nostrils once as needed for opioid reversal. every 2-3 minutes until assistance arrives, Disp: 0.2 mL, Rfl: 0    nystatin (MYCOSTATIN) cream, [NYSTATIN (MYCOSTATIN) CREAM] Apply 1 application topically 2 (two) times a day as needed., Disp: , Rfl: 1    omeprazole (PRILOSEC) 20 MG DR capsule, Take 1 capsule (20 mg) by mouth every morning (before breakfast)., Disp: 90 capsule, Rfl: 4    ondansetron (ZOFRAN) 4 MG tablet, Take 1 tablet (4 mg) by mouth every 6 hours as needed for  nausea., Disp: 30 tablet, Rfl: 11    OXYCONTIN 10 MG 12 hr tablet, TAKE ONE TABLET BY MOUTH EVERY 12 HOURS, Disp: 60 tablet, Rfl: 0    OXYCONTIN 40 MG 12 hr tablet, TAKE ONE TABLET BY MOUTH ONCE DAILY WITH BREAKFAST, Disp: 30 tablet, Rfl: 0    predniSONE (CHARLENE) 2 MG EC tablet, Take 1 tablet (2 mg) by mouth nightly as needed (for flares), Disp: , Rfl:     triamcinolone (KENALOG) 0.1 % ointment, [TRIAMCINOLONE (KENALOG) 0.1 % OINTMENT] Apply to both legs twice daily, Disp: 30 g, Rfl: 0    predniSONE (DELTASONE) 20 MG tablet, 2 tabs for 4 days, 1 tab for 4 days, 0.5 tab for 4 days (Patient not taking: Reported on 5/20/2025), Disp: 14 tablet, Rfl: 0    Current Facility-Administered Medications:     denosumab (PROLIA) injection 60 mg, 60 mg, Subcutaneous, Q6 Months, , 60 mg at 01/15/25 1545    Review of Systems - 10 point Review of Systems is negative except for bilateral foot ulcers which is noted in HPI.      OBJECTIVE:  BP (!) 153/89   Pulse 89   Temp 98.6  F (37  C)   Resp 16   SpO2 95%   General appearance: Patient is alert and fully cooperative with history & exam.  No sign of distress is noted during the visit.    Vascular: Dorsalis pedis palpable bilaterally.  Nonpalpable PT bilaterally.  Dermatologic:    VASC Wound Left shin (Active)   Pre Size Length 4 10/06/21 1400   Pre Size Width 1.5 10/06/21 1400   Pre Size Depth 0.1 10/06/21 1400   Pre Total Sq cm 6 10/06/21 1400       VASC Wound Left 4th toe (Active)       VASC Wound left 3rd toe (Active)   Pre Size Length 0.2 05/20/25 1500   Pre Size Width 0.2 05/20/25 1500   Pre Size Depth 0.3 05/20/25 1500   Pre Total Sq cm 0.04 05/20/25 1500   Description macerated 05/20/25 1500       VASC Wound left medial foot (Active)   Description callous 05/20/25 1500       VASC Wound left 4th toe (Active)   Post Size Length 0.2 05/20/25 1600   Post Size Width 0.2 05/20/25 1600   Post Size Depth 0.3 05/20/25 1600   Post Total Sq cm 0.04 05/20/25 1600   Description callous  05/20/25 1500       VASC Wound left plantar foot (Active)   Post Size Length 1 05/20/25 1600   Post Size Width 0.5 05/20/25 1600   Post Size Depth 0.2 05/20/25 1600   Post Total Sq cm 0.5 05/20/25 1600   Description callous 05/20/25 1500       VASC Wound right 4th toe (Active)   Description callous 05/20/25 1500       VASC Wound right plantar foot (Active)   Post Size Length 1 05/20/25 1600   Post Size Width 0.5 05/20/25 1600   Post Size Depth 0.2 05/20/25 1600   Post Total Sq cm 0.5 05/20/25 1600   Description callous 05/20/25 1500       VASC Wound 1st MPJ Left foot (Active)   Post Size Length 0.3 05/20/25 1600   Post Size Width 0.3 05/20/25 1600   Post Size Depth 0.2 05/20/25 1600   Post Total Sq cm 0.09 05/20/25 1600       Incision/Surgical Site 03/08/22 Left Foot (Active)   Ulceration dorsal PIPJ fourth digit left foot has increased depth and appears to probe to bone.  Ulceration dorsal PIPJ third digit right foot has increased depth.  Ulcerations plantar bilateral forefoot have granular/fibrotic tissue.  Ulceration medial first MPJ left foot has fibrotic base.  Thinning of skin along lateral third digit left foot.  No erythema bilateral feet.  Amputated second digit left foot. Nails greater than 6 elongated, thick, yellow with subungal debris. Trophic changes noted including diminished hair growth and shiny skin.  Neurologic: Diminished to light touch Bilateral.  Musculoskeletal: Contracted digits noted Bilateral.      Picture:

## 2025-05-20 NOTE — LETTER
[facility]  [address]  [place], [place] [address]  [phone]      Fax [phone]    [facility]           FAX: [phone]            Customer Service: [phone]        Account #: [id number]    Wound Dressing Rx and Order Form  Order Status: new  Verbal: Nikki  Date: May 20, 2025     Jacqueline Prado  Gender: female  : 1940  [address]  [place] [address]  [phone] (home)     Medical Record: [id number]  Primary Care Provider: Brad Ashley      ICD-10-CM    1. PAD (peripheral artery disease)  I73.9 US TCO2 and ABELARDO      2. Onychomycosis  B35.1 DEBRIDEMENT OF NAILS, 6 OR MORE      3. Hammertoe, bilateral  M20.41     M20.42       4. Ulcer of toe, left, with necrosis of bone (H)  L97.524 DEBRIDE SKIN/SUQ/MUSCLE/BONE      5. Ulcer of toe, right, with necrosis of muscle (H)  L97.513 DEBRIDE SKIN/SUQ/MUSCLE      6. Ulcer of left foot, limited to breakdown of skin (H)  L97.521 DEBRIDE SKIN/SUBQ TISSUE      7. Ulcer of right foot limited to breakdown of skin (H)  L97.511 DEBRIDE SKIN/SUBQ TISSUE            Insurance Info:  INSURER: Payor: MEDICARE / Plan: MEDICARE / Product Type: Medicare /   Policy ID#:  [id number]  SECONDARY INSURANCE:  [facility]  Secondary Policy ID#:  [id number]        Physician Info:   Name:  MERVIN DOMINGUEZ     Dept Address/Phones:   [address] [place] [address] [address]  [facility] [id number] [phone]  Fax: [phone]    Lymphedema circumferential measurements (in cm):      2019     2:00 PM 10/6/2021     1:00 PM 11/3/2021     2:00 PM   Circumferential Measures   Right just above MTP 20 20.3    Right Ankle 21.7 21.8    Right Widest Calf 33.8 33.4    Left - just above MTP 19.7 20.5 20.5   Left Ankle 22.1 22.5 22.5   Left Widest Calf 33.2 35.5 36.5   Left Knee to Ankle   36         Wound info:  VASC Wound Left shin (Active)   Pre Size Length 4 10/06/21 1400   Pre Size Width 1.5 10/06/21 1400   Pre Size Depth  0.1 10/06/21 1400   Pre Total Sq cm 6 10/06/21 1400   Number of days: 1322       VASC Wound Left 4th toe (Active)   Number of days: 515       VASC Wound left 3rd toe (Active)   Pre Size Length 0.2 05/20/25 1500   Pre Size Width 0.2 05/20/25 1500   Pre Size Depth 0.3 05/20/25 1500   Pre Total Sq cm 0.04 05/20/25 1500   Description macerated 05/20/25 1500   Number of days:        VASC Wound left medial foot (Active)   Description callous 05/20/25 1500   Number of days:        VASC Wound left 4th toe (Active)   Post Size Length 0.2 05/20/25 1600   Post Size Width 0.2 05/20/25 1600   Post Size Depth 0.3 05/20/25 1600   Post Total Sq cm 0.04 05/20/25 1600   Description callous 05/20/25 1500   Number of days:        VASC Wound left plantar foot (Active)   Post Size Length 1 05/20/25 1600   Post Size Width 0.5 05/20/25 1600   Post Size Depth 0.2 05/20/25 1600   Post Total Sq cm 0.5 05/20/25 1600   Description callous 05/20/25 1500   Number of days:        VASC Wound right 4th toe (Active)   Description callous 05/20/25 1500   Number of days:        VASC Wound right plantar foot (Active)   Post Size Length 1 05/20/25 1600   Post Size Width 0.5 05/20/25 1600   Post Size Depth 0.2 05/20/25 1600   Post Total Sq cm 0.5 05/20/25 1600   Description callous 05/20/25 1500   Number of days:        VASC Wound 1st MPJ Left foot (Active)   Post Size Length 0.3 05/20/25 1600   Post Size Width 0.3 05/20/25 1600   Post Size Depth 0.2 05/20/25 1600   Post Total Sq cm 0.09 05/20/25 1600   Number of days: 0       Incision/Surgical Site 03/08/22 Left Foot (Active)   Number of days: 1169        Drainage: moderate  Thickness:  full  Duration of Need: 30 DAYS  Days Supply: 30 DAYS  Start Date: 5/20/2025  Starter Kit, Ancillary Kit (saline, gloves, gauze): Yes   Qualifying wound/Debridement: Yes     DISPENSE AS WRITTEN.   Call [phone]. Please call patient for out-of-pocket costs and options.      Dressing Type Brand Size Frequency of change   "Quantity   Primary Square gauze  4\"x4\" DAILY and as needed     Sof form roll gauze  4\"x75\" DAILY and as needed 60- needs 1 per foot    Paper tape  2\" rolls DAILY and as needed      DISPENSE AS WRITTEN. Call [phone] with questions.       OK to forward to covered supplier.    Electronically Signed Physician:  MERVIN DOMINGUEZ             Date: May 20, 2025    "

## 2025-06-12 ENCOUNTER — ANCILLARY PROCEDURE (OUTPATIENT)
Dept: VASCULAR ULTRASOUND | Facility: CLINIC | Age: 85
End: 2025-06-12
Attending: PODIATRIST
Payer: MEDICARE

## 2025-06-12 DIAGNOSIS — I73.9 PAD (PERIPHERAL ARTERY DISEASE): ICD-10-CM

## 2025-06-12 PROCEDURE — 93923 UPR/LXTR ART STDY 3+ LVLS: CPT

## 2025-06-12 PROCEDURE — 93923 UPR/LXTR ART STDY 3+ LVLS: CPT | Mod: 26 | Performed by: STUDENT IN AN ORGANIZED HEALTH CARE EDUCATION/TRAINING PROGRAM

## 2025-06-17 ENCOUNTER — TELEPHONE (OUTPATIENT)
Dept: VASCULAR SURGERY | Facility: CLINIC | Age: 85
End: 2025-06-17
Payer: MEDICARE

## 2025-06-17 ENCOUNTER — HOSPITAL ENCOUNTER (OUTPATIENT)
Dept: MRI IMAGING | Facility: CLINIC | Age: 85
Discharge: HOME | End: 2025-06-17
Attending: PODIATRIST
Payer: MEDICARE

## 2025-06-17 DIAGNOSIS — I73.9 PAD (PERIPHERAL ARTERY DISEASE): Primary | ICD-10-CM

## 2025-06-17 DIAGNOSIS — L97.513 ULCER OF TOE, RIGHT, WITH NECROSIS OF MUSCLE (H): ICD-10-CM

## 2025-06-17 PROCEDURE — 73718 MRI LOWER EXTREMITY W/O DYE: CPT | Mod: RT

## 2025-06-17 NOTE — TELEPHONE ENCOUNTER
----- Message from Leesa Barnett sent at 6/17/2025  2:34 PM CDT -----  Pt should see Dr SCANLON if agreeable.  ----- Message -----  From: Kendrick Pedraza DPM  Sent: 6/17/2025   2:25 PM CDT  To: Vicky Robles RN; Leesa Barnett DO; St#    Yes, the patient's son-in-law is Dr. Curiel....the head of surgery at [place].  ----- Message -----  From: Vicky Robles RN  Sent: 6/17/2025   2:22 PM CDT  To: Kendrick Pedraza DPM; Leesa Barnett,#    I called patient's daughter about scheduling the ultrasound and appointment with vascular surgeon, she said she will call back after discussing with her  who is a general surgeon  ----- Message -----  From: Kendrick Pedraza DPM  Sent: 6/17/2025   2:01 PM CDT  To: Leesa Barnett DO; Lizzie Ayoub, RN;#    Not sure. Still waiting on the right foot MRI to determine if osteomyelitis is present.  I am guessing she has bone infection in both feet and likely require bilateral foot surgery.  ----- Message -----  From: Leesa Barnett DO  Sent: 6/17/2025   1:07 PM CDT  To: Kendrick Pedraza DPM; Lizzie Ayoub#    Yes she needs bilateral arterial duplex and then we would need to see. Which foot are you treating first?    Thanks!  ----- Message -----  From: Kendrick Pedraza DPM  Sent: 6/17/2025   9:12 AM CDT  To: Leesa Barnett DO; Lizzie Ayoub, RN;#    Not sure if I messaged you guys on this patient but she may be a candidate for toe amputation on both feet.  TBI's are okay but not great.  She is an elderly patient who will likely require TCU postop to assist with nonweightbearing after foot surgery.  Is this someone you guys want to see now, or just see how the amputations heal? Thanks!

## 2025-06-17 NOTE — TELEPHONE ENCOUNTER
Writer called to discuss schedule BLE arterial ultrasound and appointment with Dr. Barnett or Dr. Granda. Patient's daughter Lia said they will wait until the MRI report is back and she will discuss with her  who is a general surgeon and call back.

## 2025-06-17 NOTE — TELEPHONE ENCOUNTER
----- Message from Kendrick Pedraza sent at 6/17/2025  2:00 PM CDT -----  Not sure. Still waiting on the right foot MRI to determine if osteomyelitis is present.  I am guessing she has bone infection in both feet and likely require bilateral foot surgery.  ----- Message -----  From: Leesa Barnett DO  Sent: 6/17/2025   1:07 PM CDT  To: Kendrick Pedraza DPM; Lizzie Ayoub#    Yes she needs bilateral arterial duplex and then we would need to see. Which foot are you treating first?    Thanks!  ----- Message -----  From: Kendrick Pedraza DPM  Sent: 6/17/2025   9:12 AM CDT  To: Leesa Barnett DO; Lizzie Ayoub, RN;#    Not sure if I messaged you guys on this patient but she may be a candidate for toe amputation on both feet.  TBI's are okay but not great.  She is an elderly patient who will likely require TCU postop to assist with nonweightbearing after foot surgery.  Is this someone you guys want to see now, or just see how the amputations heal? Thanks!

## 2025-06-17 NOTE — TELEPHONE ENCOUNTER
Per Dr. Barnett, if patient's family calls back to schedule, patient should see Dr. Granda if possible

## 2025-06-17 NOTE — TELEPHONE ENCOUNTER
See other encounter; patient will also need to schedule VV with Dr. Pedraza to go over MRI results (scheduled in the evening 6/17).

## 2025-06-18 NOTE — TELEPHONE ENCOUNTER
Message received from nursing to reschedule this video visit.  This can not be double booked due the the length of time anticipated the appointment will take.     Okay to schedule at 4pm on Tuesday 6/24 or 11:40 on Monday 6/23. If we schedule on 6/23 at 11:40 we will need to adjust the current appointment to be double booked earlier in the day, adjust both the nurse visit and the visit with Dr. Pedraza.     Spoke with daughter, Lia. She will call us back after talking to her spouse, Dr. Curiel, to determine which of those times will work for them.

## 2025-06-18 NOTE — TELEPHONE ENCOUNTER
Video visit to go over MRI results scheduled for 6/23. They would like to discuss scheduling with vascular at that time.

## 2025-06-19 ENCOUNTER — TELEPHONE (OUTPATIENT)
Dept: INTERNAL MEDICINE | Facility: CLINIC | Age: 85
End: 2025-06-19
Payer: MEDICARE

## 2025-06-19 DIAGNOSIS — M81.0 AGE-RELATED OSTEOPOROSIS WITHOUT CURRENT PATHOLOGICAL FRACTURE: ICD-10-CM

## 2025-06-19 DIAGNOSIS — Z92.29 PERSONAL HISTORY OF OTHER DRUG THERAPY: Primary | ICD-10-CM

## 2025-06-19 NOTE — TELEPHONE ENCOUNTER
Patient's last prolia was 1/15/2025, pt has an upcoming prolia injection appointment on 2025 at [place].     If this patient is to continue with Prolia injection therapy a new, active prolia order is needed for the upcoming administration.     A Prolia order has been pended with the previously administered order's associated diagnoses; signing provider to review diagnoses to ensure these still apply to patient.    Diagnoses from order used during 1/15/2025 administration:   Personal history of other drug therapy [Z92.29]  - Primary      Age-related osteoporosis without current pathological fracture [M81.0]          Have previous orders been discontinued due to being ? Yes    Patient's most recent labs within the past year (Calcium, GFR, Creatinine, Vitamin D):      Latest Reference Range & Units 25 14:48   Creatinine 0.51 - 0.95 mg/dL 1.15 (H)   GFR Estimate >60 mL/min/1.73m2 46 (L)   Calcium 8.8 - 10.4 mg/dL 8.9   (H): Data is abnormally high  (L): Data is abnormally low    Labs were completed within the past 6 months , labs were completed within last six months, no labs are pended for provider review. .     Prolia provider information with link to prescribing information: [email or web address]  Note: Per Prolia ordering smartset, an albumin level is recommended if Calcium level is < 8.5.     Provider action needed: please review/sign prolia order, review associated diagnoses, review/sign recent labs (if needed); please remove lab orders if not needed.  The following steps were completed to comply with the REMS program for Prolia:  Reviewed the serious risks of Prolia  and the symptoms of each risk.  Advised patient to seek prompt medical attention if they have signs or symptoms of any of the serious risks.  Patient will be provided a copy of the Medication Guide and Patient Brochure prior to first injection.    Steve Serrato RN

## 2025-06-24 ENCOUNTER — VIRTUAL VISIT (OUTPATIENT)
Dept: VASCULAR SURGERY | Facility: CLINIC | Age: 85
End: 2025-06-24
Attending: PODIATRIST
Payer: MEDICARE

## 2025-06-24 VITALS — WEIGHT: 125 LBS | HEIGHT: 60 IN | BODY MASS INDEX: 24.54 KG/M2

## 2025-06-24 DIAGNOSIS — M86.171 ACUTE OSTEOMYELITIS OF TOE, RIGHT (H): Primary | ICD-10-CM

## 2025-06-24 PROCEDURE — 99207 PR NO BILLABLE SERVICE THIS VISIT: CPT | Performed by: PODIATRIST

## 2025-06-24 PROCEDURE — 1126F AMNT PAIN NOTED NONE PRSNT: CPT | Mod: 95 | Performed by: PODIATRIST

## 2025-06-24 ASSESSMENT — PAIN SCALES - GENERAL: PAINLEVEL_OUTOF10: NO PAIN (0)

## 2025-06-24 NOTE — PROGRESS NOTES
Virtual Visit Details    Type of service:  Video Visit   Video Start Time: 3:57 PM  Video End Time:4:09 PM    Originating Location (pt. Location): Home    Distant Location (provider location):  On-site  Platform used for Video Visit: [facility]    MRI right foot:1.  There is a wound over the dorsal aspect of the fourth proximal phalangeal head, with adjacent abnormal marrow signal in the fourth proximal phalanx head and shaft and to a lesser extent the fourth middle phalanx base compatible with acute   osteomyelitis.  2.  No definite evidence for osteomyelitis elsewhere.  3.  Soft tissue edema over the fourth toe could indicate cellulitis. No discrete drainable fluid collection.  4.  Additional more chronic-appearing findings as described.    -I reviewed the above MRI report with the patient's daughter and son-in-law today which indicate osteomyelitis of the fourth digit.    - After our previous discussion, patient's family would like to proceed with palliative care in the short-term to allow the patient to move to an assisted living facility.  After she has moved to her new living situation and after vascular surgery has determined if intervention is necessary we will plan to communicate either via MyChart or additional video visit to discuss treatment plan going forward.  All questions invited and answered.

## 2025-06-24 NOTE — NURSING NOTE
Current patient location: [address]  [place] [address]    Is the patient currently in the state of [place]? YES    Visit mode: VIDEO    If the visit is dropped, the patient can be reconnected by:VIDEO VISIT: Send to e-mail at: [email or web address]    Will anyone else be joining the visit? YES: How would they like to receive their invitation? Text to cell phone: Daughter and son-joselito will be in on visit   (If patient encounters technical issues they should call [phone]    Are changes needed to the allergy or medication list? No    Are refills needed on medications prescribed by this physician? NO    Rooming Documentation:  Questionnaire(s) completed    Reason for visit: RECHECK    [facility] V[facility]  No vitals

## 2025-06-27 ENCOUNTER — OFFICE VISIT (OUTPATIENT)
Dept: VASCULAR SURGERY | Facility: CLINIC | Age: 85
End: 2025-06-27
Attending: STUDENT IN AN ORGANIZED HEALTH CARE EDUCATION/TRAINING PROGRAM
Payer: MEDICARE

## 2025-06-27 ENCOUNTER — ANCILLARY PROCEDURE (OUTPATIENT)
Dept: VASCULAR ULTRASOUND | Facility: CLINIC | Age: 85
End: 2025-06-27
Attending: STUDENT IN AN ORGANIZED HEALTH CARE EDUCATION/TRAINING PROGRAM
Payer: MEDICARE

## 2025-06-27 VITALS — OXYGEN SATURATION: 94 % | DIASTOLIC BLOOD PRESSURE: 69 MMHG | HEART RATE: 72 BPM | SYSTOLIC BLOOD PRESSURE: 137 MMHG

## 2025-06-27 DIAGNOSIS — I70.423: ICD-10-CM

## 2025-06-27 DIAGNOSIS — I73.9 PAD (PERIPHERAL ARTERY DISEASE): Primary | ICD-10-CM

## 2025-06-27 DIAGNOSIS — I70.213 ATHEROSCLEROSIS OF NATIVE ARTERIES OF EXTREMITIES WITH INTERMITTENT CLAUDICATION, BILATERAL LEGS: ICD-10-CM

## 2025-06-27 DIAGNOSIS — Z01.818 PRE-OP EXAM: ICD-10-CM

## 2025-06-27 DIAGNOSIS — I73.9 PAD (PERIPHERAL ARTERY DISEASE): ICD-10-CM

## 2025-06-27 PROCEDURE — 93925 LOWER EXTREMITY STUDY: CPT | Mod: 26 | Performed by: SURGERY

## 2025-06-27 PROCEDURE — 93925 LOWER EXTREMITY STUDY: CPT

## 2025-06-27 RX ORDER — METHYLPREDNISOLONE 4 MG/1
TABLET ORAL
COMMUNITY
Start: 2024-09-05

## 2025-06-27 NOTE — Clinical Note
Orders in for RLE angiogram and left. Please schedule right first. We will do post-ops together. Thanks!

## 2025-06-27 NOTE — PATIENT INSTRUCTIONS
Jacqueline Prado    Your visit to [facility] for your procedure is coming soon and we look forward to seeing you! This friendly reminder and pre-procedure checklist will help to ensure your procedure goes smoothly and meets your expectations. At [facility], our goal is to provide you with a great patient experience and to deliver genuine, professional care to every patient.     Please complete all the steps in advance of your visit. If you have any questions about the items listed below, please give our office a call. We can be reached at [phone] or visit our website at [email or web address] for more information.       Procedure: Bilateral  Leg  Angiogram -right leg first   Procedure Date :  TBD  Arrival Time: TBD  Procedure Time :  TBD  Admission Type: Outpatient  Surgeon: Dr. Elizabeth Granda  Procedure Location: [facility]:  [address] [place] [address] (phone: [phone], Fax: [phone])    2 week Post Procedure Appointment with ultrasound: TBD  6 weeks appt with repeat ultrasound: TBD    If you take blood thinners: SEE SPECIFIC INSTRUCTIONS BELOW   PLEASE DO NOT STOP YOUR ASPIRIN OR PLAVIX UNLESS SPECIFICALLY DIRECTED BY THE VASCULAR SURGEON TO STOP!  - In most cases Vascular providers want you to continue these. This is different from most NON vascular surgeries and may not be well known by your Primary Care Provider     If you take these diabetic medications, please discuss with your primary doctor and follow the hold instructions:   Hold seven (7) days prior for once weekly injectable doses [semaglutide (Ozempic, Wegovy), dulaglutide  (Trulicity), exenatide ER (Bydureon), tirzepatide (Mounjaro)]  Hold the day before and day of for once daily injectable GLP-1 agonists [exenatide (Byetta), liraglutide (Saxenda,Victoza)]  Hold seven (7) days for oral semaglutide (Rybelsus)    Prepare for the  peripheral angiography as follows:  Do not eat 8 hours before your procedure. You may have clear liquids up to 1 hour before surgery.  Tell your healthcare provider about all medicines you take and any allergies you may have.  Arrange for a family member or friend to drive you home.  If you are on Metformin, please HOLD for 48 hours after the procedure.  Please be sure to address your diabetic medications with your Primary Care Physician prior to your angiogram.    Peripheral Angiography    Peripheral angiography is an outpatient procedure that makes a  map  of the vessels (arteries) in your lower body, legs, and arms, using X-ray and dye.This map can show where blood flow may be blocked.    An angiogram is commonly performed under sedation with the use of local anesthesia.    The procedure usually starts with a needle put into the femoral (groin) artery. From one treatment site, areas all over the body can be treated.  After access is established, catheters (thin tubes) and wires are threaded through the arterial system to a specific area of interest or throughout the entire body.  As a contrast agent (iodine dye) is injected, X-ray images are taken to let your provider view the flow of the dye and identify blockages. The surgeon can then choose the best mode of therapy for you - whether during or following the angiogram. This decision depends on your symptoms and the severity and characteristics of the blockages.  Two common therapies that can be provided during the angiogram are balloon angioplasty and stent placement.                   Angioplasty can be used to open arterial blockages. Guided by X-ray, your provider navigates through the blockage with a wire and introduces a special device equipped with an inflatable balloon. After positioning the balloon device across the blocked portion of the artery, the provider inflates the balloon to expand the artery and compress the blockage. The balloon is then  deflated and removed while keeping the wire in place across the area that has been treated. Next, contrast dye is injected to assess the result. Treatment is considered a success if blood flow is improved and less than 30% of the blockage remains. If the vessel is still considerably narrowed, placing a stent may be the next step.    Stents are used to prop open an artery at the site of a narrowing. Stents are generally placed after balloon angioplasty when there is residual narrowing or insufficient blood flow in a treated vessel. Stents are considered a permanent implant and cannot be used if you have a metal allergy. Stents that are used in the leg are constructed of a nickel-titanium alloy (Nitinol), a memory-shaped metal. This alloy has a predetermined size and shape at body temperature and expands to this size and shape after being introduced through a catheter. These stents resist kinking and are flexible so that damage from activities that involve your legs is minimized.  Your procedure may require other techniques to address the problem or plaque.     If surgery is felt to be a better option, your vascular provider will obtain any additional X-ray images needed to plan a surgical bypass of the blocked vessel/s and will then conclude the angiogram.      During the procedure  Here is what to expect:  You may get medicine through an IV (intravenous) line to relax you. You re given an injection to numb the insertion site. Then, a tiny skin cut (incision) is made near an artery in your groin.  Your provider inserts a thin tube (catheter) through the incision. He or she then threads the catheter into an artery while looking at a video monitor.  Contrast  dye  is injected into the catheter to confirm position. You may feel warmth or pressure in your legs and back. You lie still as X-rays are taken. The catheter is then taken out.  After the procedure  You ll be taken to a recovery area. A healthcare provider will  apply pressure to the site for about 10 minutes. Your healthcare provider will tell you how long to lie down and keep the insertion site still. Your healthcare provider will discuss the results with you soon after the procedure.      Angiogram Procedure Discharge Instructions:     1. If you received sedation for your procedure: Do not drive or operate heavy machinery for the rest of the day.  2. Avoid strenuous activity for 72 hours (3 days):                        - Do not lift greater than 10 pounds.                        - Excessive exercise                        - Straining                        - Return to your normal activities as you tolerate after the 3 days restriction  3. Avoid tub baths, Jacuzzis, hot tubs and pools for 72 hours (3 days) or until puncture site is will healed.  4. You may shower beginning tomorrow. Do not scrub puncture site(s) until well healed, pat dry.  5. You can expect to return to work 1-2 days after your procedure - depending on the nature of your profession.  6. It is normal to have some tenderness and minimal swelling at puncture site. A small area of discoloration may be present. Tenderness typically subsides in 24-48 hours. A small knot may also be present at puncture site for 6-8 weeks, this can be a normal part of the healing process.       After the angiogram If you:      1. Experience any bleeding or active swelling from puncture site: Lie down, firmly apply pressure to puncture site and CALL 9-1-1  2. Fever greater than 101 degrees Fahrenheit.  3. Redness, swelling, warmth to touch, or purulent (yellow/green/foul smelling) drainage from the puncture site.  4. Increasing pain, tenderness or swelling at puncture site OR of arm/leg near puncture site.  5. Feeling weak or faint.  6. Change in color, temperature, or sensation of arm/leg where puncture was made.  7. You can t feel your thrill (pulse at your dialysis fistula site) or it feels weak (If you had fistulogram  "done).     Call us with any other questions or concerns after your procedure: [phone]      All invasive procedures can have complications. While the risk of an angiogram is low it is not zero. The most common complications are related to the arterial access site.       Risks/ Complications   Bruising is Common  You will likely have bruising (ecchymosis) where the artery was entered.    Pain and Bleeding  Less commonly, patients experience pain and bleeding that may include blood collecting under the skin (hematoma).    Blockage and Leakage   In rare cases, the access artery can become blocked. Infrequently, patients experience persistent leakage of blood where the artery was entered, which can result in the formation of a pseudoaneurysm--a blood-filled sac--that may require further treatment.  Other complications related to an angiogram include:   Allergic reaction to the iodine contrast dye, which can lead to the development of kidney failure.  Very rarely during balloon angioplasty and/or stent placement, part of the arterial blockage can break off (embolism) and travel to more distant arteries. This can worsen blood flow.    Pre-Procedure checklist:    [] Pre-procedure labs need to be drawn no more than 3 days prior to your angiogram. If labs are completed more than 3 days in advance, your labs will need to be repeated. (Labs to be drawn: CBC, BMP and if on Warfarin, an INR). Please call [phone] to make an appointment with your preferred lab location, or may go as walk-in appointment to [facility] or [facility] labs.  [] Contact your insurance regarding coverage  If you would like a Good [place] Estimate for your upcoming procedure at [facility] Location, contact Cost of Care Estimates   Advocates are available Monday through Friday 8am - 5pm   [phone]  You may also submit a request online at [email or web address]  - Complete the secure online form found under \"Services " "and Procedure Pricing\"   If your procedure is at [facility], please contact the numbers below for Cost of Care Estimates.   - Facility Charge: [phone]    Anesthesiology charge:  [phone]   [] DO NOT BRING FMLA WITH TO SURGERY.  These should be sent to the provider's office by fax to [phone].     [] Day of Surgery  Medications - Take as indicated with sips of water.   Wear comfortable loose-fitting clothes. Wear your glasses-Not contacts. Do not wear jewelry and remove body piercings. Surgery may be cancelled if they are not removed.   You may have 1 family member wait in the lobby during your surgery. Visitor restrictions are subject to change. Please verify with the surgery nurse when they call.   If same day surgery-Have a someone come with you to surgery that can help you understand the surgeon's instructions, drive you home and stay with you overnight the first night.    [] You will receive a call from a nurse 1-3 days prior to the procedure. They will go over more details with you    Notify our office right away, if you have any changes in your health status, or if you develop a cold, flu, diarrhea, infection, fever or sore throat before your scheduled surgery date. We can be reached at  [phone] Monday-Friday 8 am-4:30 pm if you have any questions.   Thank you for choosing [facility]    Please scan QR codes to watch videos about Angioplasty for Peripheral Artery Disease. There are links provided if you are unable to use the [email or web address]                                                                                                                                       [email or web address]                    [email or web address]  Angioplasty for Peripheral Artery Disease: Before Your Procedure                "                                Angioplasty for Peripheral Artery Disease: Returning Home

## 2025-06-27 NOTE — PROGRESS NOTES
Vascular Clinic Rooming Questions      Patient is here for consult for Peripheral Artery Disease. Symptoms are  non-healing wound.      /69 (BP Location: Right arm)   Pulse 72   SpO2 94%     The provider has been notified that the patient has no concerns.     Questions patient would like addressed today are: N/A.    Refills are needed: N/A    Has homecare services and agency name:  No

## 2025-06-28 NOTE — PROGRESS NOTES
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 Based on duplex, patient has mostly infrapopliteal disease on both extremities. TcPO2s suggest poor healing potential with any podiatric intervention. Patient will require bilateral angiograms, will schedule urgently, RLE angiogram first following by LLE angiogram 2 weeks later. If patient is unable to tolerate lying flat, then will need to schedule under general anesthesia.    RECOMMENDATION/RISKS:    - Will recheck LDL  - Scheduling RLE and LLE over next several weeks. No clinic visits needed between angiograms    HPI:  Jacqueline Prado is a 85 year old female who was seen today in consultation for bilateral toe wounds. She has had these wounds for several months and believes that they are due to irritation from the shoes that she was wearing. She denies fevers, chills, ascending cellulitis, or drainage. No prior history of angiograms or vascular interventions. Not on aspirin or statin. LDL 93, A1c 5.2%. Has significant back pain requiring high doses of opioids. Unsure whether she will be able to lie flat for extending periods of time.    REVIEW OF SYSTEMS:    A 12 point ROS was reviewed and is negative.     PHH:    Past Medical History:   Diagnosis Date    Acquired hypothyroidism     Acquired lymphedema of leg     Bundle branch block     Created by Conversion  Replacement Utility updated for latest IMO load    Bundle branch block     Cellulitis      Hypertension     Impetigo     Opioid dependence (H)     Osteoporosis     RA (rheumatoid arthritis) (H)     Rheumatoid arthritis (H)     Secondary hyperparathyroidism     Venous hypertension of both lower extremities     Venous insufficiency of both lower extremities     Venous stasis dermatitis of lower extremity          Past Surgical History:   Procedure Laterality Date    AMPUTATE TOE(S) Left 3/8/2022    Procedure: AMPUTATION, second digit left foot;  Surgeon: Kendrick Pedraza DPM;  Location: [facility] Main OR    BREAST CYST ASPIRATION Left 2000    HC REMOVAL GALLBLADDER      Description: Cholecystectomy;  Recorded: 07/22/2009;    IR LUMBAR EPIDURAL STEROID INJECTION  6/23/2003    IR MISCELLANEOUS PROCEDURE  12/1/2006    TN INJECT VERTEBRAL BODY, LUMBAR      Description: Spinal Percutaneous Vertebroplasty, Injection Lumbar;  Recorded: 11/03/2011;  Annotations: L5    ZZC EXCIS CERV DISK,ONE LEVEL      Description: Laminectomy With Disc Removal;  Recorded: 11/03/2011;  Annotations: L5-S1        ALLERGIES:     Allergies   Allergen Reactions    Acetaminophen Rash    Nsaids (Non-Steroidal Anti-Inflammatory Drug) [Nsaids] Rash    Tetracyclines & Related Rash    Baclofen Nausea    Celecoxib Unknown    Erythromycin Base [Erythromycin] Unknown    Pentolinium Itching    Shellfish-Derived Products Diarrhea    Varenicline Itching and Swelling    Erythromycin Rash    Petroleum Jelly [Petrolatum] Itching and Rash        MEDS:    Current Outpatient Medications   Medication Sig Dispense Refill    albuterol (PROAIR HFA/PROVENTIL HFA/VENTOLIN HFA) 108 (90 Base) MCG/ACT inhaler Inhale 2 puffs into the lungs every 4 hours as needed for wheezing. 18 g 0    CALCIUM CARBONATE/VITAMIN D3 (CALCIUM+D ORAL) [CALCIUM CARBONATE/VITAMIN D3 (CALCIUM+D ORAL)] Take 3 tablets by mouth daily.      cholecalciferol, vitamin D3, 1,000 unit (25 mcg) tablet [CHOLECALCIFEROL, VITAMIN D3, 1,000 UNIT (25 MCG) TABLET] Take 1,000 Units by mouth  daily.      donepezil (ARICEPT) 5 MG tablet Take 1 tablet (5 mg) by mouth at bedtime. 90 tablet 1    folic acid (FOLVITE) 1 MG tablet Take 1 mg by mouth daily   2    hydrochlorothiazide (MICROZIDE) 12.5 MG capsule Take 1 capsule (12.5 mg) by mouth every morning. 90 capsule 4    hydrocortisone (CORTISONE, HYDROCORTISONE,) 1 % cream [HYDROCORTISONE (CORTISONE, HYDROCORTISONE,) 1 % CREAM] Apply to hand three times a day 30 g 2    isosorbide mononitrate (IMDUR) 30 MG 24 hr tablet Take 1 tablet (30 mg) by mouth daily. 90 tablet 4    leflunomide (ARAVA) 20 MG tablet [LEFLUNOMIDE (ARAVA) 20 MG TABLET] Take 20 mg by mouth daily.       levothyroxine (SYNTHROID/LEVOTHROID) 75 MCG tablet Take 1 tablet (75 mcg) by mouth every morning (before breakfast). 90 tablet 4    loratadine (CLARITIN) 10 MG tablet TAKE ONE TABLET BY MOUTH ONCE DAILY (NEED TO BE SEEN IN CLINIC FOR FURTHER REFILLS) 90 tablet 0    LORazepam (ATIVAN) 1 MG tablet Take 1 tablet (1 mg) by mouth every 6 hours as needed for anxiety. 1 tablet 0    methylPREDNISolone (MEDROL DOSEPAK) 4 MG tablet therapy pack Take by mouth as instructed per packaging.      naloxone (NARCAN) 4 MG/0.1ML nasal spray Spray 1 spray (4 mg) into one nostril alternating nostrils once as needed for opioid reversal. every 2-3 minutes until assistance arrives 0.2 mL 0    nystatin (MYCOSTATIN) cream [NYSTATIN (MYCOSTATIN) CREAM] Apply 1 application topically 2 (two) times a day as needed.  1    omeprazole (PRILOSEC) 20 MG DR capsule Take 1 capsule (20 mg) by mouth every morning (before breakfast). 90 capsule 4    ondansetron (ZOFRAN) 4 MG tablet Take 1 tablet (4 mg) by mouth every 6 hours as needed for nausea. 30 tablet 11    OXYCONTIN 10 MG 12 hr tablet TAKE ONE TABLET BY MOUTH EVERY 12 HOURS 60 tablet 0    OXYCONTIN 40 MG 12 hr tablet TAKE ONE TABLET BY MOUTH ONCE DAILY WITH BREAKFAST 30 tablet 0    predniSONE (DELTASONE) 20 MG tablet 2 tabs for 4 days, 1 tab for 4 days, 0.5 tab for 4 days 14  tablet 0    predniSONE (CHARLENE) 2 MG EC tablet Take 1 tablet (2 mg) by mouth nightly as needed (for flares)      triamcinolone (KENALOG) 0.1 % ointment [TRIAMCINOLONE (KENALOG) 0.1 % OINTMENT] Apply to both legs twice daily 30 g 0     Current Facility-Administered Medications   Medication Dose Route Frequency Provider Last Rate Last Admin    [START ON 7/3/2025] denosumab (PROLIA) injection 60 mg  60 mg Subcutaneous Q6 Months             SOCIAL HABITS:    Tobacco Use      Smoking status: Former        Packs/day: 0.00        Types: Cigarettes        Quit date: 2018        Years since quittin.0        Passive exposure: Past      Smokeless tobacco: Never      Tobacco comments: started smoking when she was 21 years old, vaping       Alcohol Use: Not on file       History   Drug Use Unknown     Comment: Drug use: medical marijuana        FAMILY HISTORY:    Family History   Problem Relation Age of Onset    Breast Cancer Sister 72    Leukemia Sister     Alzheimer Disease Brother     Dementia Brother     No Known Problems Daughter     No Known Problems Daughter     Other - See Comments Son         COVID       PE:  /69 (BP Location: Right arm)   Pulse 72   SpO2 94%   Wt Readings from Last 1 Encounters:   25 56.7 kg (125 lb)     There is no height or weight on file to calculate BMI.    EXAM:  GENERAL: This is a well-developed 85 year old female who appears her stated age  EYES: Grossly normal.  MOUTH: Buccal mucosa normal   CARDIAC:  Normotensive, not tachycardic  CHEST/LUNG:  Breathing unlabored on RA  MUSCULOSKELETAL: Grossly normal and both lower extremities are intact.  HEME/LYMPH: No lymphedema  NEUROLOGIC: Focally intact, Alert and oriented x 3.   PSYCH: appropriate affect  INTEGUMENT: Small ulcers on 4th toes      DIAGNOSTIC STUDIES:     Images:  US Lower Extremity Arterial Duplex Bilateral  Result Date: 2025  Table formatting from the original result was not included. Arterial Duplex  Ultrasound (Date: 06/27/25) Lower Extremity Artery Evaluation Indication: Surveillance Bilateral Leg Arterial: Bilateral Foot/ Toe wounds.  Previous: 6/12/2025 Tcp02/ABELARDO's History: Previous Smoker, Hypertension, PAD, and Vascular Ulcers Technique: Duplex imaging is performed utilizing gray-scale, two-dimensional images, and color-flow imaging. Doppler waveform analysis and spectral Doppler imaging is also performed. LOWER EXTREMITY ARTERIAL DUPLEX EXAM WITH WAVEFORMS Right Leg:(cm/s) Location: Velocities Waveforms EIA:   132  T CFA:   111  B PFA:   130  B SFA Proximal:   128  B SFA Mid:   108  B SFA Distal:   122  B Popliteal Artery:   90 / 107  T PTA:   62   M PURNIMA:   45  M DPA:   140  T Waveforms: T=Triphasic, M=Monophasic, B=Biphasic Left Leg:(cm/s) Location: Velocities Waveforms EIA:   141  B CFA:   128  B PFA:   61  B SFA Proximal:   69  T SFA Mid:   133 / 212 B / T SFA Distal:   112  T Popliteal Artery:   95 / 81  T PTA:   92   M PURNIMA:   132  M DPA:   111  M Waveforms: T=Triphasic, M=Monophasic, B=Biphasic Impression: 1.Right Lower Extremity: No obvious hemodynamic significant stenosis in right lower extremity.  Monophasic flow in tibial arteries 2.Left Lower Extremity: No obvious hemodynamically significant stenosis in left lower extremity but with monophasic flow in tibial arteries Reference: Category Normal 1-19% 20-49% 50-99% Occluded PSV <160 cm/sec without spectral broadening <160 cm/sec with spectral broadening Increased Increased Absent Flow Ratio N/A N/A < 2.0 >2.0 N/A Post-Stenotic Turbulence No No No Yes N/A      MR Foot Right w/o Contrast  Result Date: 6/17/2025  EXAM: MR FOOT RIGHT W/O CONTRAST LOCATION: [facility] DATE: 6/17/2025 INDICATION: Osteomyelitis third digit right. COMPARISON: None. TECHNIQUE: Unenhanced. FINDINGS: There is a wound over the dorsal aspect of the fourth proximal phalangeal head with adjacent abnormal marrow signal in the fourth proximal phalanx head  and shaft and to a lesser extent the fourth middle phalanx base compatible with acute osteomyelitis. No definite evidence for osteomyelitis elsewhere. Soft tissue edema over the fourth toe could indicate cellulitis. No discrete drainable fluid collection. More diffuse soft tissue edema over the foot is nonspecific. There are lesser hammertoe deformities. Scattered mild degenerative changes. Diffuse intrinsic foot muscle atrophy suggests chronic denervation.     IMPRESSION: 1.  There is a wound over the dorsal aspect of the fourth proximal phalangeal head, with adjacent abnormal marrow signal in the fourth proximal phalanx head and shaft and to a lesser extent the fourth middle phalanx base compatible with acute osteomyelitis. 2.  No definite evidence for osteomyelitis elsewhere. 3.  Soft tissue edema over the fourth toe could indicate cellulitis. No discrete drainable fluid collection. 4.  Additional more chronic-appearing findings as described.    US TCO2 and ABELARDO  Result Date: 2025  Table formatting from the original result was not included. BILATERAL RESTING ANKLE-BRACHIAL INDICES (ABELARDO'S) with TCPO2 (Date: 25) Indication: Bilateral foot and toe wounds Previous: none History: Previous Smoker, Skin Color Change, and Vascular Ulcers  Resting ABELARDO's          Right: mmHg Index     Brachial: 116           Ankle: 99 0.85  Ankle(PT): 117 1.01          Digit: 52 0.45 Tcp02-Upper Le  Tcp02-Lower Le  Tcp02-Ankle: 8  Tcp02-Foot: 2                Left: mmHg Index     Brachial: 107           Ankle(PT): 132 1.14  Ankle(PT): 131 1.13          Digit: 64 0.55 Tcp02-Upper Le  Tcp02-Lower Le  Tcp02-Ankle: 57  Tcp02-Foot: 15  Resting ankle-brachial index of 1.01 on the right. Toe Pressures of 52 mmHg and TBI of 0.45. Tcp02 Pressures of 2 mmHg at the level of the foot. Resting ankle-brachial index of 1.14 on the left. Toe Pressures of 64 mmHg and TBI of 0.55. Tcp02 Pressures of 15 mmHg at the level of the  foot. VPR WAVEFORMS: The right volume plethysmography waveforms are normal at the lower thigh level, mildly abnormal at the upper calf level and mildly abnormal at the ankle. The left volume plethysmography waveforms are normal at the lower thigh level, mildly abnormal at the upper calf level and mildly abnormal at the ankle. Comments: Difficult to keep the sensors secure to her skin.  Impression:  1. RIGHT LOWER EXTREMITY: ABELARDO is Normal with multiphasic waveforms with an ABELARDO of 1.01. Toe Pressures are Mildly abnormal but adequate for wound healing with toe pressures of 52 mmHg. Tcp02 Pressures are Abnormal and impaired for wound healing with Tcp02 pressures of 2 mmHg. 2. LEFT LOWER EXTREMITY: ABELARDO is Normal with multiphasic waveforms with an ABELARDO of 1.14. Toe Pressures are Mildly abnormal but adequate for wound healing with toe pressures of 64 mmHg. Tcp02 Pressures are Abnormal and impaired for wound healing with Tcp02 pressures of 15 mmHg. Reference: Wound classification Grade ABELARDO Ankle Systolic Pressure Toe Pressures 0 > 0.80 > 100 mmHg > 60 mmHg 1 0.6 - 0.79 70 - 100 mmHg 40 - 59 mmHg 2 0.4 - 0.59 50-70 mmHg 30 - 39 mmHg 3 < 0.39 < 50 mmHg < 30 mmHg Digit Pressures DBI Disease Category > 0.70 Normal < 0.70 Abnormal > 30 mmHg Potential wound healing < 30 mmHg Impaired wound healing Ankle Brachial Pressures ABELARDO Disease Category > 1.3  Likely vessel calcification with monophasic waveforms, non-diagnostic 0.95-1.30 Normal with multiphasic waveforms 0.50-0.95 Single level disease 0.30-0.50 Multilevel disease < 0.30 Critical limb ischema Volume Plethysmography Recording (VPR) at all levels Normal Sharp systolic peak, fast upstroke, prominent dicrotic notch in wave Mild Sharp systolic peak, fast upstroke, absent dicrotic notch in wave Moderate Flattened systolic peak, slowed upstroke, absent dicrotic notch inwave Severe amplitude wave with = upslope and down slope Occluded Flat Line TCP02 Criteria Normal Values 50-80  WO/Supplemental Oxygen Impaired Healing <30 WO/Supplemental Oxygen      MR Foot Left w/o Contrast  Result Date: 6/12/2025  EXAM: MR FOOT LEFT W/O CONTRAST LOCATION: [facility] DATE: 6/12/2025 INDICATION: Left fourth toe ulceration, left first MTP ulceration. Concern for as well as. COMPARISON: X-rays 3/18/2022, MRI 12/29/2023, MRI TECHNIQUE: Unenhanced. FINDINGS: Ulceration dorsal fourth toe near the PIP joint with associated cellulitis. Marrow edema with abnormal T1 marrow signal involving the head and shaft of the proximal phalanx fourth toe compatible with osteomyelitis. Marrow edema of the middle phalanx fourth toe with mild abnormal T1 marrow signal of the base also likely represents osteomyelitis. Soft tissue edema dorsal third toe could represent cellulitis. Marrow edema of the middle thirds third toe with mild diffuse decreased T1 marrow signal. Old amputation of the second toe. Severe hallux valgus. Mild marrow edema medial aspect first metatarsal head without associated abnormal T1 marrow signal. Moderate degenerative arthritis of the first MTP joint. Additional more diffuse subcutaneous edema over the dorsal foot. No fluid collection. Generalized muscle atrophy..     IMPRESSION: 1.  Ulceration dorsal fourth toe with associated cellulitis. 2.  Osteomyelitis of the proximal phalanx fourth toe and likely the middle phalanx of the fourth toe about the PIP joint. 3.  Marrow edema of the middle phalanx third toe could represent reactive edema, contusion, or early osteomyelitis. Correlate for associated ulceration. 4.  No abscess.       I personally reviewed the images and my interpretation is poor TcPO2s bilaterally which do not suggest good wound healing. Toe pressures borderline. Duplex showing transition to monophasic flow in tibial arteries.    LABS:      Sodium   Date Value Ref Range Status   04/22/2025 143 135 - 145 mmol/L Final   11/02/2023 137 135 - 145 mmol/L Final     Comment:      Reference intervals for this test were updated on 09/26/2023 to more accurately reflect our healthy population. There may be differences in the flagging of prior results with similar values performed with this method. Interpretation of those prior results can be made in the context of the updated reference intervals.    09/20/2022 143 136 - 145 mmol/L Final     Urea Nitrogen   Date Value Ref Range Status   04/22/2025 17.8 8.0 - 23.0 mg/dL Final   11/02/2023 17.2 8.0 - 23.0 mg/dL Final   09/20/2022 14.4 8.0 - 23.0 mg/dL Final   03/17/2022 13 8 - 28 mg/dL Final   03/07/2022 16 8 - 28 mg/dL Final   09/16/2021 16 8 - 28 mg/dL Final     Hemoglobin   Date Value Ref Range Status   04/22/2025 10.4 (L) 11.7 - 15.7 g/dL Final   12/30/2024 13.5 11.7 - 15.7 g/dL Final   11/02/2023 13.4 11.7 - 15.7 g/dL Final     Platelet Count   Date Value Ref Range Status   04/22/2025 289 150 - 450 10e3/uL Final   12/30/2024 287 150 - 450 10e3/uL Final   11/02/2023 320 150 - 450 10e3/uL Final       40 minutes spent on the day of encounter doing chart review, history and exam, documentation, and further activities as noted.     Jimbo Ellis MD  VASCULAR SURGERY

## 2025-07-01 ENCOUNTER — TELEPHONE (OUTPATIENT)
Dept: VASCULAR SURGERY | Facility: CLINIC | Age: 85
End: 2025-07-01
Payer: MEDICARE

## 2025-07-01 NOTE — TELEPHONE ENCOUNTER
Writer spoke with pt's daughter, Lia at length about scheduling angiogram.  Pt is currently in [facility] and will be transferring to a new location and also has dementia.  They would like to wait for pt to settle into her new facility before scheduling angiogram. Writer gave direct phone number to surgery scheduling; they will call when ready to schedule.

## 2025-07-03 DIAGNOSIS — G89.29 OTHER CHRONIC PAIN: ICD-10-CM

## 2025-07-03 DIAGNOSIS — F11.20 OPIOID TYPE DEPENDENCE, CONTINUOUS (H): ICD-10-CM

## 2025-07-03 DIAGNOSIS — M54.32 BACK PAIN WITH LEFT-SIDED SCIATICA: ICD-10-CM

## 2025-07-03 RX ORDER — OXYCODONE HYDROCHLORIDE 40 MG/1
40 TABLET, FILM COATED, EXTENDED RELEASE ORAL
Qty: 30 TABLET | Refills: 0 | Status: SHIPPED | OUTPATIENT
Start: 2025-07-03

## 2025-07-03 RX ORDER — OXYCODONE HYDROCHLORIDE 10 MG/1
10 TABLET, FILM COATED, EXTENDED RELEASE ORAL EVERY 12 HOURS
Qty: 60 TABLET | Refills: 0 | Status: SHIPPED | OUTPATIENT
Start: 2025-07-03

## 2025-07-15 ENCOUNTER — MYC MEDICAL ADVICE (OUTPATIENT)
Dept: INTERNAL MEDICINE | Facility: CLINIC | Age: 85
End: 2025-07-15
Payer: MEDICARE

## 2025-07-15 DIAGNOSIS — M81.0 AGE-RELATED OSTEOPOROSIS WITHOUT CURRENT PATHOLOGICAL FRACTURE: ICD-10-CM

## 2025-07-15 DIAGNOSIS — Z92.29 PERSONAL HISTORY OF OTHER DRUG THERAPY: ICD-10-CM

## 2025-07-15 DIAGNOSIS — I10 PRIMARY HYPERTENSION: Primary | ICD-10-CM

## 2025-07-16 ENCOUNTER — MYC MEDICAL ADVICE (OUTPATIENT)
Dept: INTERNAL MEDICINE | Facility: CLINIC | Age: 85
End: 2025-07-16

## 2025-07-16 ENCOUNTER — TELEPHONE (OUTPATIENT)
Dept: INTERNAL MEDICINE | Facility: CLINIC | Age: 85
End: 2025-07-16

## 2025-07-16 DIAGNOSIS — F51.01 PRIMARY INSOMNIA: Primary | ICD-10-CM

## 2025-07-16 DIAGNOSIS — R45.1 AGITATION: ICD-10-CM

## 2025-07-16 DIAGNOSIS — F41.9 ANXIETY: ICD-10-CM

## 2025-07-16 RX ORDER — DENOSUMAB 60 MG/ML
60 INJECTION SUBCUTANEOUS
Qty: 1 ML | Refills: 0 | Status: SHIPPED | OUTPATIENT
Start: 2025-07-16

## 2025-07-16 NOTE — TELEPHONE ENCOUNTER
New Medication Request    Contacts       Contact Date/Time Type Contact Phone/Fax    07/16/2025 01:58 PM CDT Phone (Incoming) DaodailaLivan (Emergency Contact) [phone] (M)            What medication are you requesting?: Pts son is requesting a medication thatll calm the pt down     Reason for medication request: she was just moved into care center    Have you taken this medication before?: No    Controlled Substance Agreement on file:   CSA -- Patient Level:     [Media Unavailable] Controlled Substance Agreement - Opioid - Scan on 1/16/2025  3:32 PM: OPIOID   [Media Unavailable] Controlled Substance Agreement - Opioid - Scan on 11/3/2023  9:55 AM   [Media Unavailable] Controlled Substance Agreement - Opioid - Scan on 8/18/2021  3:05 PM   [Media Unavailable] Controlled Substance Agreement - Opioid - Scan on 6/11/2021   [Media Unavailable] Controlled Substance Agreement - Opioid - Scan on 2/10/2017: OUTSIDE RECORD         Patient offered an appointment? No    Preferred Pharmacy:     [facility] - [place], [place] - [address] [place] [address]  Phone: [phone] Fax: [phone]      Could we send this information to you in [place] or would you prefer to receive a phone call?:   Patient would prefer a phone call   Okay to leave a detailed message?: N/A at Cell number on file:    Telephone Information:   Mobile [phone]

## 2025-07-17 ENCOUNTER — TELEPHONE (OUTPATIENT)
Dept: INTERNAL MEDICINE | Facility: CLINIC | Age: 85
End: 2025-07-17
Payer: MEDICARE

## 2025-07-17 RX ORDER — QUETIAPINE FUMARATE 25 MG/1
12.5 TABLET, FILM COATED ORAL
Qty: 15 TABLET | Refills: 0 | Status: SHIPPED | OUTPATIENT
Start: 2025-07-17

## 2025-07-17 NOTE — TELEPHONE ENCOUNTER
July 17, 2025    Home health orders was received via fax for Dr. Ashley.  Patient label was attached to paperwork and placed in provider's inbox to be signed.    Monie Frederick

## 2025-07-23 ENCOUNTER — TELEPHONE (OUTPATIENT)
Dept: INTERNAL MEDICINE | Facility: CLINIC | Age: 85
End: 2025-07-23
Payer: MEDICARE

## 2025-07-23 NOTE — TELEPHONE ENCOUNTER
Patients daughter calling to see if orders have been sent for [facility] to give patient medications. It looks like these possibly were sent yesterday to fax [phone]. Relayed message to daughter. Daughter will follow up with assisted living      Marivel Oliveira RN

## 2025-07-24 ENCOUNTER — MYC MEDICAL ADVICE (OUTPATIENT)
Dept: INTERNAL MEDICINE | Facility: CLINIC | Age: 85
End: 2025-07-24
Payer: MEDICARE

## 2025-07-24 DIAGNOSIS — M54.32 BACK PAIN WITH LEFT-SIDED SCIATICA: ICD-10-CM

## 2025-07-24 DIAGNOSIS — F11.20 OPIOID TYPE DEPENDENCE, CONTINUOUS (H): ICD-10-CM

## 2025-07-24 DIAGNOSIS — G89.29 OTHER CHRONIC PAIN: ICD-10-CM

## 2025-07-25 RX ORDER — OXYCODONE HYDROCHLORIDE 10 MG/1
TABLET, FILM COATED, EXTENDED RELEASE ORAL EVERY 12 HOURS
Qty: 60 TABLET | Refills: 0 | Status: CANCELLED | OUTPATIENT
Start: 2025-07-31

## 2025-07-28 ENCOUNTER — MYC MEDICAL ADVICE (OUTPATIENT)
Dept: INTERNAL MEDICINE | Facility: CLINIC | Age: 85
End: 2025-07-28
Payer: MEDICARE

## 2025-07-28 NOTE — TELEPHONE ENCOUNTER
Attempted to reach patient for HFU. Phone just continued to ring no voicemail box- unable to leave voice message. Will continue to try to reach patient.   Appt previously scheduled on 11/8 with PCP for follow up, changed to HFU    Spoke with daughter. She is unsure what dose pt had been taking of this, she does think they got it OTC. Does not think pt is currently taking this now that she is at assisted living.

## 2025-07-29 RX ORDER — OXYCODONE HCL 20 MG/1
20 TABLET, FILM COATED, EXTENDED RELEASE ORAL AT BEDTIME
Qty: 30 TABLET | Refills: 0 | Status: SHIPPED | OUTPATIENT
Start: 2025-07-29

## 2025-07-29 RX ORDER — OXYCODONE HCL 40 MG/1
40 TABLET, FILM COATED, EXTENDED RELEASE ORAL
Qty: 30 TABLET | Refills: 0 | Status: SHIPPED | OUTPATIENT
Start: 2025-07-31

## (undated) DEVICE — DRSG GAUZE 4X4" TRAY 6939

## (undated) DEVICE — PREP POVIDONE IODINE SOLUTION 10% 4OZ BOTTLE 29906-004

## (undated) DEVICE — BNDG KLING 4" 2236

## (undated) DEVICE — GLOVE BIOGEL PI INDICATOR 8.0 LF 41680

## (undated) DEVICE — CUFF TOURN 18IN STRL DISP

## (undated) DEVICE — ESU PENCIL SMOKE EVAC W/ROCKER SWITCH 0703-047-000

## (undated) DEVICE — SOL NACL 0.9% INJ 1000ML BAG 2B1324X

## (undated) DEVICE — TRAY PREP DRY SKIN SCRUB 067

## (undated) DEVICE — SUCTION IRR SYSTEM W/O TIP INTERPULSE HANDPIECE 0210-100-000

## (undated) DEVICE — NDL 25GA 1.5" 305127

## (undated) DEVICE — SU VICRYL+ 3-0 27IN SH UND VCP416H

## (undated) DEVICE — BONE CLEANING TIP INTERPULSE  0210-010-000

## (undated) DEVICE — SYR 10ML LL W/O NDL 302995

## (undated) DEVICE — GLOVE BIOGEL PI ORTHOPRO SZ 7.5 47675

## (undated) DEVICE — Device

## (undated) DEVICE — PLATE GROUNDING ADULT W/CORD 9165L

## (undated) DEVICE — NEEDLE HYPO 18X1-1/2 SAFETY 305918

## (undated) DEVICE — CUSTOM PACK LOWER EXTREMITY SOP5BLEHEA

## (undated) DEVICE — SU ETHILON 3-0 PS-2 18" 1669H

## (undated) DEVICE — GOWN LG DISP 9515

## (undated) DEVICE — SUCTION MANIFOLD NEPTUNE 2 SYS 1 PORT 702-025-000

## (undated) DEVICE — BANDAGE STRETCH GAUZE 4IN 2247

## (undated) DEVICE — PREP POVIDONE-IODINE 7.5% SCRUB 4OZ BOTTLE MDS093945

## (undated) DEVICE — SOL NACL 0.9% IRRIG 1000ML BOTTLE 2F7124

## (undated) DEVICE — SOL WATER IRRIG 1000ML BOTTLE 2F7114

## (undated) DEVICE — DRSG ADAPTIC 3X3" 6112

## (undated) DEVICE — DRSG GAUZE 4X4" 3033

## (undated) RX ORDER — ONDANSETRON 2 MG/ML
INJECTION INTRAMUSCULAR; INTRAVENOUS
Status: DISPENSED
Start: 2022-03-08

## (undated) RX ORDER — BUPIVACAINE HYDROCHLORIDE 5 MG/ML
INJECTION, SOLUTION PERINEURAL
Status: DISPENSED
Start: 2022-03-08